# Patient Record
Sex: MALE | Race: BLACK OR AFRICAN AMERICAN | NOT HISPANIC OR LATINO | Employment: FULL TIME | URBAN - METROPOLITAN AREA
[De-identification: names, ages, dates, MRNs, and addresses within clinical notes are randomized per-mention and may not be internally consistent; named-entity substitution may affect disease eponyms.]

---

## 2017-01-13 ENCOUNTER — INITIAL CONSULT (OUTPATIENT)
Dept: HEMATOLOGY/ONCOLOGY | Facility: CLINIC | Age: 47
End: 2017-01-13
Payer: COMMERCIAL

## 2017-01-13 VITALS
WEIGHT: 315 LBS | SYSTOLIC BLOOD PRESSURE: 140 MMHG | DIASTOLIC BLOOD PRESSURE: 82 MMHG | BODY MASS INDEX: 59.08 KG/M2 | TEMPERATURE: 99 F | HEART RATE: 70 BPM | OXYGEN SATURATION: 98 %

## 2017-01-13 DIAGNOSIS — D75.839 THROMBOCYTOSIS: ICD-10-CM

## 2017-01-13 DIAGNOSIS — D72.829 LEUKOCYTOSIS, UNSPECIFIED TYPE: ICD-10-CM

## 2017-01-13 DIAGNOSIS — D64.9 ANEMIA, UNSPECIFIED TYPE: Primary | ICD-10-CM

## 2017-01-13 PROCEDURE — 1159F MED LIST DOCD IN RCRD: CPT | Mod: S$GLB,,, | Performed by: INTERNAL MEDICINE

## 2017-01-13 PROCEDURE — 99999 PR PBB SHADOW E&M-EST. PATIENT-LVL III: CPT | Mod: PBBFAC,,, | Performed by: INTERNAL MEDICINE

## 2017-01-13 PROCEDURE — 3077F SYST BP >= 140 MM HG: CPT | Mod: S$GLB,,, | Performed by: INTERNAL MEDICINE

## 2017-01-13 PROCEDURE — 99214 OFFICE O/P EST MOD 30 MIN: CPT | Mod: S$GLB,,, | Performed by: INTERNAL MEDICINE

## 2017-01-13 PROCEDURE — 3079F DIAST BP 80-89 MM HG: CPT | Mod: S$GLB,,, | Performed by: INTERNAL MEDICINE

## 2017-01-13 NOTE — MR AVS SNAPSHOT
[facility]  [address] [place] [id number]  Phone: [phone]                  Roberta Ware Jr.   2017 1:00 PM   Initial consult    Description:  Male : 1970   Provider:  Michelle Palmer MD   Department:  [facility]           Reason for Visit     Consult           Diagnoses this Visit        Comments    Anemia, unspecified type    -  Primary     Leukocytosis, unspecified type         Thrombocytosis                To Do List           Future Appointments        Provider Department Dept Phone    2017 8:30 AM LAB, [place] [facility] [phone]    2017 3:30 PM Michelle Palmer MD [facility] [phone]      Goals (5 Years of Data)              Today    16    Blood Pressure < 140/90   140/82  162/78  155/77    Exercise 3x per week (30 min per time)             Follow-Up and Disposition     Return in about 2 years (around 2019).      [facility]     [facility] Line - 24/7 Assistance  Registered nurses in the [facility] provide clinical advisement, health education, appointment booking, and other advisory services.  Call for this free service at [phone].             Medications           Message regarding Medications     Verify the changes and/or additions to your medication regime listed below are the same as discussed with your clinician today.  If any of these changes or additions are incorrect, please notify your healthcare provider.        STOP taking these medications     sildenafil (VIAGRA) 100 MG tablet Take 1 tablet (100 mg total) by mouth daily as needed for Erectile Dysfunction.           Verify that the below list of medications is an accurate representation of the medications you are currently taking.  If none reported, the list may be blank. If incorrect, please contact your healthcare provider. Carry this list with you  in case of emergency.           Current Medications     aliskiren (TEKTURNA) 300 MG Tab Take 1 tablet (300 mg total) by mouth once daily.    amlodipine (NORVASC) 10 MG tablet Take 1 tablet (10 mg total) by mouth once daily.    BYSTOLIC 10 mg Tab TAKE 2 TABLETS BY MOUTH EVERY DAY    dicyclomine (BENTYL) 20 mg tablet Take 1 tablet (20 mg total) by mouth 2 (two) times daily.    famotidine (PEPCID) 20 MG tablet Take 1 tablet (20 mg total) by mouth 2 (two) times daily.    irbesartan (AVAPRO) 300 MG tablet Take 1 tablet (300 mg total) by mouth once daily.    omeprazole (PRILOSEC) 40 MG capsule Take 1 capsule (40 mg total) by mouth every morning.    tramadol (ULTRAM) 50 mg tablet Take 1 tablet (50 mg total) by mouth every 6 (six) hours as needed.    cetirizine (ZYRTEC) 10 MG tablet Take 1 tablet (10 mg total) by mouth daily as needed for Rhinitis.           Clinical Reference Information           Vital Signs - Last Recorded  Most recent update: 1/13/2017  1:27 PM by Trisha Lopez LPN    BP Pulse Temp Wt SpO2 BMI    (!) 140/82 (BP Location: Left arm, Patient Position: Sitting, BP Method: Manual) 70 99.2 °F (37.3 °C) (Oral) (!) 211 kg (465 lb 2.7 oz) 98% 59.08 kg/m2      Blood Pressure          Most Recent Value    BP  (!)  140/82      Allergies as of 1/13/2017     Hydrochlorothiazide      Immunizations Administered on Date of Encounter - 1/13/2017     None      Orders Placed During Today's Visit      Normal Orders This Visit    Soluble Transferrin Receptor     Future Labs/Procedures Expected by Expires    C-reactive protein  1/13/2017 3/14/2018    CBC auto differential  1/13/2017 1/13/2018    Copper, serum  1/13/2017 3/14/2018    Ferritin  1/13/2017 1/13/2018    Folate RBC  1/13/2017 3/14/2018    Haptoglobin  1/13/2017 1/13/2018    Iron and TIBC  1/13/2017 1/13/2018    Jak2 V617F Mutation Detection, Blood  1/13/2017 1/13/2018    Lactate dehydrogenase  1/13/2017 3/14/2018    Pathologist Interpretation Differential  1/13/2017  3/14/2018    Protein electrophoresis, serum  1/13/2017 1/13/2018    Reticulocytes  1/13/2017 1/13/2018    Sedimentation rate, manual  1/13/2017 1/13/2018    Vitamin B12  1/13/2017 1/13/2018    Zinc  1/13/2017 3/14/2018

## 2017-01-13 NOTE — LETTER
January 16, 2017      ANTON Chau MD  [address]  [place] [address]           [facility]  [address] [place] [address]  Phone: [phone]          Patient: Roberta Ware Jr.   MR Number: [id number]   YOB: 1970   Date of Visit: 1/13/2017       Dear Dr. ANTON Chau:    Thank you for referring Roberta Ware to me for evaluation. Attached you will find relevant portions of my assessment and plan of care.    If you have questions, please do not hesitate to call me. I look forward to following Roberta Ware along with you.    Sincerely,    Michelle Palmer MD    Enclosure  CC:  No Recipients    If you would like to receive this communication electronically, please contact [email or web address] or [phone] to request more information on [employer] access.    For providers and/or their staff who would like to refer a patient to [facility], please contact us through our one-stop-shop provider referral line, Lana Richard, at [phone].    If you feel you have received this communication in error or would no longer like to receive these types of communications, please e-mail [email or web address]

## 2017-01-13 NOTE — PROGRESS NOTES
Subjective:       Patient ID: Roberta Ware Jr. is a 46 y.o. male.    Chief Complaint: Consult (Dr Chau - blood loss)    HPI   HISTORY OF PRESENT ILLNESS:  The patient is a pleasant 46-year-old gentleman seen today in consultation for anemia.  The patient was recently hospitalized with acute blood loss anemia.  The patient reported 1-1/2 week history of dark stools, lightheadedness, dizziness, fatigue and dyspnea on exertion.  Hemoglobin 6g/dl on admission. He underwent a total of 4 units of packed red blood cell transfusion during hospitalization.The patient was followed by GI during hospitalization.  EGD  performed and showed ulcerations without any evidence of bleeding.  The patient was followed by GI during hospitalization.  He takes indomethacin for gout approximately one time per week.  He also had been taking ibuprofen for headaches.  The patient reports an intermittent epigastric abdominal pain over the past year.  The patient was begun on a PPI since hospital discharge.  He has had no further episodes of   melena.  Review of records reveals that the patient was previously followed by colleague, Dr. Paul.  The patient's aunt's the patient was followed by Dr. Paul for anemia and leukocytosis.  Dr. Paul prescribed ferrous sulfate supplementation.  The patient did not take prescribed medication.  No history of alcohol abuse, chronic liver disease, clotting disorder, neuropathy or recent surgery.  He does have chronic kidney disease, stage III,HTN, gout and morbid obesity.  He reports a 3-pound weight loss.  He report arthralgias and joint swelling.  The patient has been lost to follow up in this clinic and was last seen on 1/23/2015.  He underwent a colonoscopy approximately four years ago significant for diverticulosis.  He is a lifelong nonsmoker.  He denies any palpable lymphadenopathy.  Workup to date revealed an abnormal flow cytometry, which revealed an atypical T-cell subset.  T-cell  receptor gene rearrangement was positive.  MICHAEL-2 mutation testing was negative. CBC from 12/27/2016 reveals a [place] blood [place] [address], hemoglobin of 8.6 g/dL, hematocrit of 28.5%, MCV of 86 and platelet count of 502,000. He is here for further evaluation.    PAST MEDICAL HISTORY:  Diverticulosis, ED, GERD, gout, essential hypertension,   morbid obesity, osteoarthritis, ulcer.    PAST SURGICAL HISTORY:  Colonoscopy,EGD    FAMILY HISTORY: Mother diagnosed with breast CA. Maternal aunt diagnosed with breast CA. Paternal aunt diagnosed with lung cancer. Paternal aunt diagnosed with breast cancer        Review of Systems   Constitutional: Negative for appetite change, fatigue, fever and unexpected weight change.   HENT: Negative for mouth sores.    Eyes: Negative for visual disturbance.   Respiratory: Negative for cough and shortness of breath.    Cardiovascular: Negative for chest pain.   Gastrointestinal: Negative for abdominal pain, blood in stool and diarrhea.   Genitourinary: Negative for frequency and hematuria.   Musculoskeletal: Negative for back pain.   Skin: Negative for rash.   Neurological: Negative for headaches.   Hematological: Negative for adenopathy.   Psychiatric/Behavioral: The patient is not nervous/anxious.        Objective:       Vitals:    01/13/17 1325   BP: (!) 140/82   BP Location: Left arm   Patient Position: Sitting   BP Method: Manual   Pulse: 70   Temp: 99.2 °F (37.3 °C)   TempSrc: Oral   SpO2: 98%   Weight: (!) 211 kg (465 lb 2.7 oz)       Physical Exam   Constitutional: He is oriented to person, place, and time. He appears well-developed and well-nourished.   HENT:   Head: Normocephalic.   Mouth/Throat: Oropharynx is clear and moist. No oropharyngeal exudate.   Eyes: Conjunctivae are normal. Pupils are equal, round, and reactive to light. No scleral icterus.   Neck: Normal range of motion. Neck supple. No thyromegaly present.   Cardiovascular: Normal rate, regular rhythm and normal  heart sounds.    No murmur heard.  Pulmonary/Chest: Effort normal and breath sounds normal. He has no wheezes. He has no rales.   Abdominal: Soft. Bowel sounds are normal. He exhibits no distension and no mass. There is no hepatosplenomegaly. There is no tenderness. There is no rebound and no guarding.   Musculoskeletal: Normal range of motion. He exhibits no edema.   Lymphadenopathy:     He has no cervical adenopathy.     He has no axillary adenopathy.        Right: No supraclavicular adenopathy present.        Left: No supraclavicular adenopathy present.   Neurological: He is alert and oriented to person, place, and time. No cranial nerve deficit.   Skin: No rash noted. No erythema.   Psychiatric: He has a normal mood and affect.       Labs:   Lab Results   Component Value Date    WBC 17.81 (H) 12/27/2016    WBC 17.81 (H) 12/27/2016    HGB 8.6 (L) 12/27/2016    HGB 8.6 (L) 12/27/2016    HCT 28.5 (L) 12/27/2016    HCT 28.5 (L) 12/27/2016    MCV 86 12/27/2016    MCV 86 12/27/2016     (H) 12/27/2016     (H) 12/27/2016       Assessment:       1. Anemia, unspecified type    2. Leukocytosis, unspecified type    3. Thrombocytosis        Plan:   This patient is a 46-year-old gentleman with a longstanding history a mild   anemia, lost to follow up, with a recent hospitalization secondary to   acute-on-chronic anemia related to acute blood loss.  The patient's   hematological workup to date revealed abnormal flow cytometry with an atypical   T-cell subset of unknown significance.  Molecular studies including JAK2   mutation testing negative.  Today, cell lines are also noted for a mild chronic   leukocytosis and a mild thrombocytosis.  Plan testing including B12, zinc, retic   count, SPEP, LDH, iron and TIBC, folate RBCs, ferritin, C-reactive protein,   serum copper, CBC and iron studies. Await the results of workup.  Last   followup discussed the issue of bone marrow biopsy.  The patient did not proceed  with  planned evaluation.  We will await the results of workup and we will   determine further management at that time.        CC: ANTON Chau M.D.

## 2017-01-16 ENCOUNTER — LAB VISIT (OUTPATIENT)
Dept: LAB | Facility: HOSPITAL | Age: 47
End: 2017-01-16
Attending: INTERNAL MEDICINE
Payer: COMMERCIAL

## 2017-01-16 DIAGNOSIS — D64.9 ANEMIA, UNSPECIFIED TYPE: ICD-10-CM

## 2017-01-16 LAB
BASOPHILS # BLD AUTO: 0.04 K/UL
BASOPHILS NFR BLD: 0.3 %
CRP SERPL-MCNC: 9.4 MG/L
DIFFERENTIAL METHOD: ABNORMAL
EOSINOPHIL # BLD AUTO: 0.3 K/UL
EOSINOPHIL NFR BLD: 2.2 %
ERYTHROCYTE [DISTWIDTH] IN BLOOD BY AUTOMATED COUNT: 18.6 %
ERYTHROCYTE [SEDIMENTATION RATE] IN BLOOD BY WESTERGREN METHOD: 48 MM/HR
FERRITIN SERPL-MCNC: 24 NG/ML
HAPTOGLOB SERPL-MCNC: 195 MG/DL
HCT VFR BLD AUTO: 32 %
HGB BLD-MCNC: 9.4 G/DL
IRON SERPL-MCNC: 24 UG/DL
LDH SERPL L TO P-CCNC: 193 U/L
LYMPHOCYTES # BLD AUTO: 4.4 K/UL
LYMPHOCYTES NFR BLD: 31.7 %
MCH RBC QN AUTO: 24.2 PG
MCHC RBC AUTO-ENTMCNC: 29.4 %
MCV RBC AUTO: 83 FL
MONOCYTES # BLD AUTO: 0.8 K/UL
MONOCYTES NFR BLD: 5.8 %
NEUTROPHILS # BLD AUTO: 8.3 K/UL
NEUTROPHILS NFR BLD: 59.6 %
PLATELET # BLD AUTO: 551 K/UL
PMV BLD AUTO: 8.9 FL
RBC # BLD AUTO: 3.88 M/UL
RETICS/RBC NFR AUTO: 2.2 %
SATURATED IRON: 7 %
TOTAL IRON BINDING CAPACITY: 336 UG/DL
TRANSFERRIN SERPL-MCNC: 227 MG/DL
VIT B12 SERPL-MCNC: 271 PG/ML
WBC # BLD AUTO: 13.93 K/UL

## 2017-01-16 PROCEDURE — 82728 ASSAY OF FERRITIN: CPT

## 2017-01-16 PROCEDURE — 85060 BLOOD SMEAR INTERPRETATION: CPT | Mod: ,,, | Performed by: PATHOLOGY

## 2017-01-16 PROCEDURE — 82525 ASSAY OF COPPER: CPT

## 2017-01-16 PROCEDURE — 82747 ASSAY OF FOLIC ACID RBC: CPT

## 2017-01-16 PROCEDURE — 85045 AUTOMATED RETICULOCYTE COUNT: CPT

## 2017-01-16 PROCEDURE — 82607 VITAMIN B-12: CPT

## 2017-01-16 PROCEDURE — 83615 LACTATE (LD) (LDH) ENZYME: CPT

## 2017-01-16 PROCEDURE — 84165 PROTEIN E-PHORESIS SERUM: CPT | Mod: 26,,, | Performed by: PATHOLOGY

## 2017-01-16 PROCEDURE — 84165 PROTEIN E-PHORESIS SERUM: CPT

## 2017-01-16 PROCEDURE — 84630 ASSAY OF ZINC: CPT

## 2017-01-16 PROCEDURE — 83540 ASSAY OF IRON: CPT

## 2017-01-16 PROCEDURE — 85025 COMPLETE CBC W/AUTO DIFF WBC: CPT

## 2017-01-16 PROCEDURE — 86140 C-REACTIVE PROTEIN: CPT

## 2017-01-16 PROCEDURE — 85651 RBC SED RATE NONAUTOMATED: CPT

## 2017-01-16 PROCEDURE — 83010 ASSAY OF HAPTOGLOBIN QUANT: CPT

## 2017-01-17 LAB
ALBUMIN SERPL ELPH-MCNC: 3.62 G/DL
ALPHA1 GLOB SERPL ELPH-MCNC: 0.39 G/DL
ALPHA2 GLOB SERPL ELPH-MCNC: 0.71 G/DL
B-GLOBULIN SERPL ELPH-MCNC: 0.93 G/DL
GAMMA GLOB SERPL ELPH-MCNC: 1.85 G/DL
PATH REV BLD -IMP: NORMAL
PATH REV BLD -IMP: NORMAL
PATHOLOGIST INTERPRETATION SPE: NORMAL
PROT SERPL-MCNC: 7.5 G/DL

## 2017-01-18 LAB
FOLATE RBC-MCNC: 587 NG/ML
ZINC SERPL-MCNC: 74 UG/DL (ref 60–130)

## 2017-01-19 LAB — COPPER SERPL-MCNC: 1489 UG/L (ref 665–1480)

## 2017-01-30 ENCOUNTER — OFFICE VISIT (OUTPATIENT)
Dept: HEMATOLOGY/ONCOLOGY | Facility: CLINIC | Age: 47
End: 2017-01-30
Payer: COMMERCIAL

## 2017-01-30 VITALS
OXYGEN SATURATION: 98 % | TEMPERATURE: 99 F | WEIGHT: 315 LBS | BODY MASS INDEX: 59.92 KG/M2 | HEART RATE: 62 BPM | DIASTOLIC BLOOD PRESSURE: 88 MMHG | SYSTOLIC BLOOD PRESSURE: 142 MMHG

## 2017-01-30 DIAGNOSIS — D64.9 ANEMIA, UNSPECIFIED TYPE: Primary | ICD-10-CM

## 2017-01-30 DIAGNOSIS — D72.829 LEUKOCYTOSIS, UNSPECIFIED TYPE: ICD-10-CM

## 2017-01-30 PROCEDURE — 99213 OFFICE O/P EST LOW 20 MIN: CPT | Mod: S$GLB,,, | Performed by: INTERNAL MEDICINE

## 2017-01-30 PROCEDURE — 99999 PR PBB SHADOW E&M-EST. PATIENT-LVL III: CPT | Mod: PBBFAC,,, | Performed by: INTERNAL MEDICINE

## 2017-01-30 PROCEDURE — 3079F DIAST BP 80-89 MM HG: CPT | Mod: S$GLB,,, | Performed by: INTERNAL MEDICINE

## 2017-01-30 PROCEDURE — 3077F SYST BP >= 140 MM HG: CPT | Mod: S$GLB,,, | Performed by: INTERNAL MEDICINE

## 2017-01-30 NOTE — PROGRESS NOTES
Subjective:       Patient ID: Roberta Ware Jr. is a 46 y.o. male.    Chief Complaint: Follow-up    HPI   HISTORY OF PRESENT ILLNESS:  The patient is a pleasant 46-year-old gentleman seen today in consultation for anemia.  The patient was recently hospitalized with acute blood loss anemia.  The patient reported 1-1/2 week history of dark stools, lightheadedness, dizziness, fatigue and dyspnea on exertion.  Hemoglobin 6g/dl on admission. He underwent a total of 4 units of packed red blood cell transfusion during hospitalization.The patient was followed by GI during hospitalization.  EGD  performed and showed ulcerations without any evidence of bleeding.  The patient was followed by GI during hospitalization.  He takes indomethacin for gout approximately one time per week.  He also had been taking ibuprofen for headaches.  The patient reports an intermittent epigastric abdominal pain over the past year.  The patient was begun on a PPI since hospital discharge.  He has had no further episodes of melena.  Review of records reveals that the patient was previously followed by colleague, Dr. Paul.    Dr. Paul prescribed ferrous sulfate supplementation.  The patient did not take prescribed medication.  No history of alcohol abuse, chronic liver disease, clotting disorder, neuropathy or recent surgery.  He does have chronic kidney disease, stage III,HTN, gout and morbid obesity.  He reports a 3-pound weight loss.  He report arthralgias and joint swelling.  The patient has been lost to follow up in this clinic and was last seen on 1/23/2015.  He underwent a colonoscopy approximately four years ago significant for diverticulosis.  He is a lifelong nonsmoker.  He denies any palpable lymphadenopathy.  Workup to date revealed an abnormal flow cytometry, which revealed an atypical T-cell subset.  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  MICHAEL-2 mutation testing was negative. CBC from 12/27/2016 reveals a white  blood [place] of [address], hemoglobin of 8.6 g/dL, hematocrit of 28.5%, MCV of 86 and platelet count of 502,000.      Today, he is doing well.  No SOB/CP/lightheadedness  No recent infections  No rashes    PAST MEDICAL HISTORY:  Diverticulosis, ED, GERD, gout, essential hypertension,   morbid obesity, osteoarthritis, ulcer.    PAST SURGICAL HISTORY:  Colonoscopy,EGD    FAMILY HISTORY: Mother diagnosed with breast CA. Maternal aunt diagnosed with breast CA. Paternal aunt diagnosed with lung cancer. Paternal aunt diagnosed with breast cancer        Review of Systems   Constitutional: Negative for appetite change, fatigue, fever and unexpected weight change.   HENT: Negative for mouth sores.    Eyes: Negative for visual disturbance.   Respiratory: Negative for cough and shortness of breath.    Cardiovascular: Negative for chest pain.   Gastrointestinal: Negative for abdominal pain, blood in stool and diarrhea.   Genitourinary: Negative for frequency and hematuria.   Musculoskeletal: Negative for back pain.   Skin: Negative for rash.   Neurological: Negative for headaches.   Hematological: Negative for adenopathy.   Psychiatric/Behavioral: The patient is not nervous/anxious.        Objective:       Vitals:    01/30/17 1526   BP: (!) 142/88   BP Location: Right arm   Patient Position: Sitting   BP Method: Manual   Pulse: 62   Temp: 99 °F (37.2 °C)   TempSrc: Oral   SpO2: 98%   Weight: (!) 214 kg (471 lb 12.6 oz)       Physical Exam   Constitutional: He is oriented to person, place, and time. He appears well-developed and well-nourished.   HENT:   Head: Normocephalic.   Mouth/Throat: Oropharynx is clear and moist. No oropharyngeal exudate.   Eyes: Conjunctivae are normal. Pupils are equal, round, and reactive to light. No scleral icterus.   Neck: Normal range of motion. Neck supple. No thyromegaly present.   Cardiovascular: Normal rate, regular rhythm and normal heart sounds.    No murmur heard.  Pulmonary/Chest: Effort  normal and breath sounds normal. He has no wheezes. He has no rales.   Abdominal: Soft. Bowel sounds are normal. He exhibits no distension and no mass. There is no hepatosplenomegaly. There is no tenderness. There is no rebound and no guarding.   Musculoskeletal: Normal range of motion. He exhibits no edema.   Lymphadenopathy:     He has no cervical adenopathy.     He has no axillary adenopathy.        Right: No supraclavicular adenopathy present.        Left: No supraclavicular adenopathy present.   Neurological: He is alert and oriented to person, place, and time. No cranial nerve deficit.   Skin: No rash noted. No erythema.   Psychiatric: He has a normal mood and affect.       Labs:   Lab Results   Component Value Date    WBC 13.93 (H) 01/16/2017    HGB 9.4 (L) 01/16/2017    HCT 32.0 (L) 01/16/2017    MCV 83 01/16/2017     (H) 01/16/2017   Results for TY CATES JR. (MRN [id number]) as of 1/30/2017 15:37   Ref. Range 1/16/2017 11:40   Iron Latest Ref Range: 45 - 160 ug/dL 24 (L)   TIBC Latest Ref Range: 250 - 450 ug/dL 336   Saturated Iron Latest Ref Range: 20 - 50 % 7 (L)   Transferrin Latest Ref Range: 200 - 375 mg/dL 227   Ferritin Latest Ref Range: 20.0 - 300.0 ng/mL 24   Vitamin B-12 Latest Ref Range: 210 - 950 pg/mL 271   Haptoglobin Latest Ref Range: 30 - 250 mg/dL 195   Retic Latest Ref Range: 0.4 - 2.0 % 2.2 (H)   Sed Rate Latest Ref Range: 0 - 10 mm/Hr 48 (H)     SPEP-  Normal total protein.   Increased gamma globulin, polyclonal.     Pathologist interpretation of peripheral blood smear:   Mild leukocytosis shows a mild absolute neutrophilia with some toxic   changes including cytoplasmic vacuoles.  The overall process appears   reactive.  Clinical correlation is required.   Additionally, there are scattered atypical lymphocytes, correlate   clinically in this patient with a history of a positive T-cell   receptor gene rearrangement by PCR.   Normocytic red cells appear mildly hypochromic.   Correlation with the   patient's iron status is recommended.   Increased platelets are noted.  This can be seen with iron   deficiency.  If the patient has not clinically iron deficient,     further evaluation may be indicated.     Assessment:       1. Anemia, unspecified type    2. Leukocytosis, unspecified type        Plan:   Pt clinically stable  46-year-old gentleman with a longstanding history a mild   anemia, lost to follow up, with a recent hospitalization secondary to   acute-on-chronic anemia related to acute blood loss.   SPEP-polyclonal   PBS reviewed  He will begin OTC Fe supp qd with Vit C supp to improve absorption  Previous hematological workup to date revealed abnormal flow cytometry with an atypical   T-cell subset of unknown significance.    JAK2 mutation testing negative.    Discussed issue of bmbx for further evaluation  Pt declines/not interested in pursuing bx    F/u 1 mo with cbc, Fe studies    CC: ANTON Chau M.D.

## 2017-01-30 NOTE — MR AVS SNAPSHOT
[facility]  [address] [facility] [address] [place] [address]  Phone: [phone]                  Roberta Ware Jr.   2017 3:30 PM   Office Visit    Description:  Male : 1970   Provider:  Michelle Palmer MD   Department:  [facility]           Reason for Visit     Follow-up           Diagnoses this Visit        Comments    Anemia, unspecified type    -  Primary     Leukocytosis, unspecified type                To Do List           Future Appointments        Provider Department Dept Phone    3/8/2017 3:30 PM LAB, [place] [facility] [phone]    3/10/2017 3:30 PM Michelle Palmer MD [facility] [phone]      Goals (5 Years of Data)              Today    17    Blood Pressure < 140/90   142/88  140/82  162/78    Exercise 3x per week (30 min per time)             Follow-Up and Disposition     Return in about 5 weeks (around 3/6/2017).      [facility]     [facility] Care [facility] - 24/7 Assistance  Registered nurses in the [facility] provide clinical advisement, health education, appointment booking, and other advisory services.  Call for this free service at [phone].             Medications           Message regarding Medications     Verify the changes and/or additions to your medication regime listed below are the same as discussed with your clinician today.  If any of these changes or additions are incorrect, please notify your healthcare provider.        STOP taking these medications     tramadol (ULTRAM) 50 mg tablet Take 1 tablet (50 mg total) by mouth every 6 (six) hours as needed.    famotidine (PEPCID) 20 MG tablet Take 1 tablet (20 mg total) by mouth 2 (two) times daily.           Verify that the below list of medications is an accurate representation of the medications you are currently taking.  If none reported, the list may be blank. If incorrect, please  contact your healthcare provider. Carry this list with you in case of emergency.           Current Medications     aliskiren (TEKTURNA) 300 MG Tab Take 1 tablet (300 mg total) by mouth once daily.    amlodipine (NORVASC) 10 MG tablet Take 1 tablet (10 mg total) by mouth once daily.    BYSTOLIC 10 mg Tab TAKE 2 TABLETS BY MOUTH EVERY DAY    cetirizine (ZYRTEC) 10 MG tablet Take 1 tablet (10 mg total) by mouth daily as needed for Rhinitis.    irbesartan (AVAPRO) 300 MG tablet Take 1 tablet (300 mg total) by mouth once daily.    omeprazole (PRILOSEC) 40 MG capsule Take 1 capsule (40 mg total) by mouth every morning.           Clinical Reference Information           Vital Signs - Last Recorded  Most recent update: 1/30/2017  3:30 PM by Antione Caceres MA    BP Pulse Temp Wt SpO2 BMI    (!) 142/88 (BP Location: Right arm, Patient Position: Sitting, BP Method: Manual) 62 99 °F (37.2 °C) (Oral) (!) 214 kg (471 lb 12.6 oz) 98% 59.92 kg/m2      Blood Pressure          Most Recent Value    BP  (!)  142/88      Allergies as of 1/30/2017     Hydrochlorothiazide      Immunizations Administered on Date of Encounter - 1/30/2017     None      Orders Placed During Today's Visit     Future Labs/Procedures Expected by Expires    CBC auto differential  1/30/2017 1/30/2018    Ferritin  1/30/2017 1/30/2018    Iron and TIBC  1/30/2017 1/30/2018    Methylmalonic acid, serum  1/30/2017 3/31/2018    Reticulocytes  1/30/2017 1/30/2018

## 2017-03-07 ENCOUNTER — OFFICE VISIT (OUTPATIENT)
Dept: FAMILY MEDICINE | Facility: CLINIC | Age: 47
End: 2017-03-07
Payer: COMMERCIAL

## 2017-03-07 ENCOUNTER — LAB VISIT (OUTPATIENT)
Dept: LAB | Facility: HOSPITAL | Age: 47
End: 2017-03-07
Attending: INTERNAL MEDICINE
Payer: COMMERCIAL

## 2017-03-07 VITALS
BODY MASS INDEX: 40.43 KG/M2 | SYSTOLIC BLOOD PRESSURE: 150 MMHG | HEIGHT: 74 IN | HEART RATE: 79 BPM | DIASTOLIC BLOOD PRESSURE: 80 MMHG | OXYGEN SATURATION: 95 % | WEIGHT: 315 LBS

## 2017-03-07 DIAGNOSIS — N18.30 CHRONIC KIDNEY DISEASE, STAGE III (MODERATE): ICD-10-CM

## 2017-03-07 DIAGNOSIS — D64.9 ANEMIA, UNSPECIFIED TYPE: ICD-10-CM

## 2017-03-07 DIAGNOSIS — I10 ESSENTIAL HYPERTENSION: Chronic | ICD-10-CM

## 2017-03-07 DIAGNOSIS — E66.01 MORBID OBESITY WITH BMI OF 50.0-59.9, ADULT: ICD-10-CM

## 2017-03-07 DIAGNOSIS — M19.90 OSTEOARTHRITIS, UNSPECIFIED OSTEOARTHRITIS TYPE, UNSPECIFIED SITE: Primary | ICD-10-CM

## 2017-03-07 LAB
BASOPHILS # BLD AUTO: 0.04 K/UL
BASOPHILS NFR BLD: 0.3 %
DIFFERENTIAL METHOD: ABNORMAL
EOSINOPHIL # BLD AUTO: 0.3 K/UL
EOSINOPHIL NFR BLD: 2.4 %
ERYTHROCYTE [DISTWIDTH] IN BLOOD BY AUTOMATED COUNT: 17.9 %
FERRITIN SERPL-MCNC: 33 NG/ML
HCT VFR BLD AUTO: 31.5 %
HGB BLD-MCNC: 9.2 G/DL
IRON SERPL-MCNC: 19 UG/DL
LYMPHOCYTES # BLD AUTO: 3.9 K/UL
LYMPHOCYTES NFR BLD: 28.7 %
MCH RBC QN AUTO: 22 PG
MCHC RBC AUTO-ENTMCNC: 29.2 %
MCV RBC AUTO: 75 FL
MONOCYTES # BLD AUTO: 0.9 K/UL
MONOCYTES NFR BLD: 6.5 %
NEUTROPHILS # BLD AUTO: 8.4 K/UL
NEUTROPHILS NFR BLD: 61.8 %
PLATELET # BLD AUTO: 500 K/UL
PMV BLD AUTO: 9 FL
RBC # BLD AUTO: 4.18 M/UL
RETICS/RBC NFR AUTO: 0.8 %
SATURATED IRON: 5 %
TOTAL IRON BINDING CAPACITY: 351 UG/DL
TRANSFERRIN SERPL-MCNC: 237 MG/DL
WBC # BLD AUTO: 13.52 K/UL

## 2017-03-07 PROCEDURE — 99999 PR PBB SHADOW E&M-EST. PATIENT-LVL III: CPT | Mod: PBBFAC,,, | Performed by: FAMILY MEDICINE

## 2017-03-07 PROCEDURE — 99214 OFFICE O/P EST MOD 30 MIN: CPT | Mod: S$GLB,,, | Performed by: FAMILY MEDICINE

## 2017-03-07 PROCEDURE — 1160F RVW MEDS BY RX/DR IN RCRD: CPT | Mod: S$GLB,,, | Performed by: FAMILY MEDICINE

## 2017-03-07 PROCEDURE — 83540 ASSAY OF IRON: CPT

## 2017-03-07 PROCEDURE — 82728 ASSAY OF FERRITIN: CPT

## 2017-03-07 PROCEDURE — 85025 COMPLETE CBC W/AUTO DIFF WBC: CPT

## 2017-03-07 PROCEDURE — 3077F SYST BP >= 140 MM HG: CPT | Mod: S$GLB,,, | Performed by: FAMILY MEDICINE

## 2017-03-07 PROCEDURE — 85045 AUTOMATED RETICULOCYTE COUNT: CPT

## 2017-03-07 PROCEDURE — 3079F DIAST BP 80-89 MM HG: CPT | Mod: S$GLB,,, | Performed by: FAMILY MEDICINE

## 2017-03-07 PROCEDURE — 36415 COLL VENOUS BLD VENIPUNCTURE: CPT | Mod: PO

## 2017-03-07 PROCEDURE — 83921 ORGANIC ACID SINGLE QUANT: CPT

## 2017-03-07 RX ORDER — ACETAMINOPHEN AND CODEINE PHOSPHATE 300; 30 MG/1; MG/1
1 TABLET ORAL DAILY PRN
Qty: 30 TABLET | Refills: 0 | Status: SHIPPED | OUTPATIENT
Start: 2017-03-07 | End: 2017-04-05 | Stop reason: SDUPTHER

## 2017-03-07 NOTE — PROGRESS NOTES
[facility]  Progress Note    SUBJECTIVE:     Chief Complaint   Patient presents with    Establish [facility]       HPI   Roberta Ware Jr.  is a 46 y.o. male here to establish care and for c/o ankle/knee pain more on left than right. He knows he is morbidly obese and is trying to lose weight. He has cut out all sugary drinks, just water only. His work shifts are not set so may have to work some nights which throws off his eating habits.     Review of patient's allergies indicates:   Allergen Reactions    Hydrochlorothiazide Other (See Comments)     Gout       Past Medical History:   Diagnosis Date    Anemia     Diverticulosis     Erectile dysfunction     GERD (gastroesophageal reflux disease)     Gout     Hypertension     Morbid obesity     Osteoarthritis     Ulcer      Past Surgical History:   Procedure Laterality Date    COLONOSCOPY  2012    UPPER GASTROINTESTINAL ENDOSCOPY       Family History   Problem Relation Age of Onset    Heart disease Mother     Cancer Mother      Breast    Diabetes Father     Hypertension Father     Ulcers Father     Anemia Sister      Social History   Substance Use Topics    Smoking status: Never Smoker    Smokeless tobacco: None    Alcohol use Yes      Comment: Ocassionally        Review of Systems   Constitutional: Negative for chills, fever and malaise/fatigue.   HENT: Negative.    Respiratory: Negative.  Negative for cough and shortness of breath.    Cardiovascular: Negative.  Negative for chest pain.   Gastrointestinal: Negative.  Negative for abdominal pain, nausea and vomiting.   Genitourinary: Negative.    Musculoskeletal: Positive for joint pain (knee, ankle b/l).   Neurological: Negative for weakness and headaches.   All other systems reviewed and are negative.    OBJECTIVE:     Vitals:    03/07/17 1441   BP: (!) 150/80   Pulse: 79     Body mass index is 59.31 kg/(m^2).    Physical Exam   Constitutional: He is oriented to person, place, and time  and well-developed, well-nourished, and in no distress. No distress.   HENT:   Head: Normocephalic and atraumatic.   Eyes: Conjunctivae and EOM are normal.   Cardiovascular: Normal rate and regular rhythm.  Exam reveals no gallop and no friction rub.    Murmur (systolic) heard.  Pulmonary/Chest: Effort normal and breath sounds normal. No respiratory distress. He has no wheezes. He has no rales.   Abdominal: Soft. Bowel sounds are normal. He exhibits no distension. There is no tenderness.   Musculoskeletal: He exhibits no edema.   Neurological: He is alert and oriented to person, place, and time.   Skin: Skin is warm. He is not diaphoretic.       Old records were reviewed. Labs and/or images were independently reviewed.    ASSESSMENT     1. Osteoarthritis, unspecified osteoarthritis type, unspecified site    2. Essential hypertension    3. Morbid obesity with BMI of 50.0-59.9, adult    4. Chronic kidney disease, stage III (moderate)        PLAN:     Osteoarthritis, unspecified osteoarthritis type, unspecified site  -     acetaminophen-codeine 300-30mg (TYLENOL #3) 300-30 mg Tab; Take 1 tablet by mouth daily as needed (as needed for pain).  Dispense: 30 tablet; Refill: 0  -     Advised to not take any NSAIDs, no ibuprofen, aleve.    Essential hypertension   -     Educated patient to take medications as prescribed, and to record bp logs daily to bring along to next visit. Instructed patient to decrease salt intake. Also told patient to call if any new signs or symptoms develop.   -       Morbid obesity with BMI of 50.0-59.9, adult   -     Counseled patient about healthy diet, exercise habits, and to increase physical activity.   -     He will not eat anything past 7 PM, and drink water only for starters. Will owe 2 lbs in next office visit.     Chronic kidney disease, stage III (moderate)   - Monitor closely, consider nephrology eval on next visit.    RTC  SHAYNE Ambrosio MD  03/07/2017 3:13 PM

## 2017-03-10 ENCOUNTER — OFFICE VISIT (OUTPATIENT)
Dept: HEMATOLOGY/ONCOLOGY | Facility: CLINIC | Age: 47
End: 2017-03-10
Payer: COMMERCIAL

## 2017-03-10 VITALS
TEMPERATURE: 99 F | OXYGEN SATURATION: 98 % | WEIGHT: 315 LBS | DIASTOLIC BLOOD PRESSURE: 80 MMHG | BODY MASS INDEX: 59.36 KG/M2 | HEART RATE: 69 BPM | SYSTOLIC BLOOD PRESSURE: 132 MMHG

## 2017-03-10 DIAGNOSIS — D72.829 LEUKOCYTOSIS, UNSPECIFIED TYPE: Primary | ICD-10-CM

## 2017-03-10 DIAGNOSIS — D50.9 IRON DEFICIENCY ANEMIA, UNSPECIFIED IRON DEFICIENCY ANEMIA TYPE: ICD-10-CM

## 2017-03-10 DIAGNOSIS — D75.839 THROMBOCYTOSIS: ICD-10-CM

## 2017-03-10 PROCEDURE — 3075F SYST BP GE 130 - 139MM HG: CPT | Mod: S$GLB,,, | Performed by: INTERNAL MEDICINE

## 2017-03-10 PROCEDURE — 99999 PR PBB SHADOW E&M-EST. PATIENT-LVL III: CPT | Mod: PBBFAC,,, | Performed by: INTERNAL MEDICINE

## 2017-03-10 PROCEDURE — 3079F DIAST BP 80-89 MM HG: CPT | Mod: S$GLB,,, | Performed by: INTERNAL MEDICINE

## 2017-03-10 PROCEDURE — 99213 OFFICE O/P EST LOW 20 MIN: CPT | Mod: S$GLB,,, | Performed by: INTERNAL MEDICINE

## 2017-03-10 PROCEDURE — 1160F RVW MEDS BY RX/DR IN RCRD: CPT | Mod: S$GLB,,, | Performed by: INTERNAL MEDICINE

## 2017-03-10 NOTE — MR AVS SNAPSHOT
[facility]  [address] [place] [address]  Phone: [phone]                  Roberta Ware Jr.   3/10/2017 3:30 PM   Office Visit    Description:  Male : 1970   Provider:  Michelle Palmer MD   Department:  [facility]           Reason for Visit     Follow-up           Diagnoses this Visit        Comments    Leukocytosis, unspecified type    -  Primary     Iron deficiency anemia, unspecified iron deficiency anemia type         Thrombocytosis                To Do List           Future Appointments        Provider Department Dept Phone    2017 3:30 PM Michelle Palmer MD [facility] [phone]      Goals (5 Years of Data)              Today    3/7/17    1/30/17    Blood Pressure < 140/90   132/80  132/80  132/80    Exercise 3x per week (30 min per time)             Follow-Up and Disposition     Return in about 2 months (around 5/10/2017).      [facility]     [facility] Nurse Care [facility] -  Assistance  Registered nurses in the [facility] provide clinical advisement, health education, appointment booking, and other advisory services.  Call for this free service at [phone].             Medications           Message regarding Medications     Verify the changes and/or additions to your medication regime listed below are the same as discussed with your clinician today.  If any of these changes or additions are incorrect, please notify your healthcare provider.             Verify that the below list of medications is an accurate representation of the medications you are currently taking.  If none reported, the list may be blank. If incorrect, please contact your healthcare provider. Carry this list with you in case of emergency.           Current Medications     acetaminophen-codeine 300-30mg (TYLENOL #3) 300-30 mg Tab Take 1 tablet by mouth daily as needed (as needed for pain).    aliskiren  (TEKTURNA) 300 MG Tab Take 1 tablet (300 mg total) by mouth once daily.    amlodipine (NORVASC) 10 MG tablet Take 1 tablet (10 mg total) by mouth once daily.    BYSTOLIC 10 mg Tab TAKE 2 TABLETS BY MOUTH EVERY DAY    cetirizine (ZYRTEC) 10 MG tablet Take 1 tablet (10 mg total) by mouth daily as needed for Rhinitis.    irbesartan (AVAPRO) 300 MG tablet Take 1 tablet (300 mg total) by mouth once daily.    omeprazole (PRILOSEC) 40 MG capsule Take 1 capsule (40 mg total) by mouth every morning.           Clinical Reference Information           Your Vitals Were     BP Pulse Temp Weight SpO2 BMI    132/80 (BP Location: Left arm, Patient Position: Sitting, BP Method: Manual) 69 98.5 °F (36.9 °C) (Oral) 212 kg (467 lb 6 oz) 98% 59.36 kg/m2      Blood Pressure          Most Recent Value    BP  132/80      Allergies as of 3/10/2017     Hydrochlorothiazide      Immunizations Administered on Date of Encounter - 3/10/2017     None      Language Assistance Services     ATTENTION: Language assistance services are available, free of charge. Please call [phone].      ATENCIÓN: Si habla español, tiene a tompkins disposición servicios gratuitos de asistencia lingüística. Llame al [phone].     [facility] Ý: N?u b?n nói Ti?ng Vi?t, có các d?ch v? h? tr? ngôn ng? mi?n phí dành cho b?n. G?i s? [phone].         [facility] complies with applicable Federal civil rights laws and does not discriminate on the basis of race, color, national origin, age, disability, or sex.

## 2017-03-11 LAB — METHYLMALONATE SERPL-SCNC: 0.34 UMOL/L

## 2017-03-13 ENCOUNTER — TELEPHONE (OUTPATIENT)
Dept: HEMATOLOGY/ONCOLOGY | Facility: CLINIC | Age: 47
End: 2017-03-13

## 2017-03-13 DIAGNOSIS — D50.9 IRON DEFICIENCY ANEMIA, UNSPECIFIED: Primary | ICD-10-CM

## 2017-03-20 DIAGNOSIS — I10 ESSENTIAL HYPERTENSION: ICD-10-CM

## 2017-03-20 RX ORDER — ALISKIREN 300 MG/1
300 TABLET, FILM COATED ORAL DAILY
Qty: 30 TABLET | Refills: 4 | OUTPATIENT
Start: 2017-03-20

## 2017-03-20 RX ORDER — IRBESARTAN 300 MG/1
300 TABLET ORAL DAILY
Qty: 30 TABLET | Refills: 4 | Status: SHIPPED | OUTPATIENT
Start: 2017-03-20 | End: 2017-07-05

## 2017-03-20 RX ORDER — NEBIVOLOL 10 MG/1
20 TABLET ORAL DAILY
Qty: 60 TABLET | Refills: 4 | Status: SHIPPED | OUTPATIENT
Start: 2017-03-20 | End: 2017-06-26 | Stop reason: SDUPTHER

## 2017-03-20 RX ORDER — AMLODIPINE BESYLATE 10 MG/1
10 TABLET ORAL DAILY
Qty: 30 TABLET | Refills: 4 | Status: SHIPPED | OUTPATIENT
Start: 2017-03-20 | End: 2017-10-11 | Stop reason: SDUPTHER

## 2017-03-20 NOTE — TELEPHONE ENCOUNTER
----- Message from Miriam Barbosa sent at 3/20/2017  8:43 AM CDT -----  Contact: self  Pt requesting refills for BYSTOLIC 10 mg Tab;irbesartan (AVAPRO) 300 MG tablet  amlodipine (NORVASC) 10 MG tablet;  aliskiren (TEKTURNA) 300 MG Tab        Please call pt @ [phone] to confirm        Thanks

## 2017-03-27 ENCOUNTER — TELEPHONE (OUTPATIENT)
Dept: FAMILY MEDICINE | Facility: CLINIC | Age: 47
End: 2017-03-27

## 2017-03-27 RX ORDER — TRAMADOL HYDROCHLORIDE 50 MG/1
50 TABLET ORAL EVERY 12 HOURS PRN
Qty: 30 TABLET | Refills: 0 | Status: SHIPPED | OUTPATIENT
Start: 2017-03-27 | End: 2017-04-05

## 2017-03-27 NOTE — TELEPHONE ENCOUNTER
----- Message from Ginny Herring sent at 3/24/2017  9:10 AM CDT -----  Contact: SELF  Patient is having problems with gout . He can not bend his knee . The Tylenol 3 is not working . Can something else be called in ?     [phone]       LL

## 2017-03-27 NOTE — TELEPHONE ENCOUNTER
Antolin montano/patient, requesting another pain medication. States the Tylenol #3 is not helping the gout pain in his left knee. States he still cannot bend it, please advise.

## 2017-04-05 ENCOUNTER — OFFICE VISIT (OUTPATIENT)
Dept: FAMILY MEDICINE | Facility: CLINIC | Age: 47
End: 2017-04-05
Payer: COMMERCIAL

## 2017-04-05 VITALS
OXYGEN SATURATION: 97 % | HEART RATE: 89 BPM | BODY MASS INDEX: 38.36 KG/M2 | HEIGHT: 76 IN | DIASTOLIC BLOOD PRESSURE: 86 MMHG | SYSTOLIC BLOOD PRESSURE: 150 MMHG | TEMPERATURE: 99 F | WEIGHT: 315 LBS

## 2017-04-05 DIAGNOSIS — M72.2 PLANTAR FASCIITIS: Primary | ICD-10-CM

## 2017-04-05 DIAGNOSIS — M19.90 OSTEOARTHRITIS, UNSPECIFIED OSTEOARTHRITIS TYPE, UNSPECIFIED SITE: ICD-10-CM

## 2017-04-05 PROCEDURE — 3079F DIAST BP 80-89 MM HG: CPT | Mod: S$GLB,,, | Performed by: NURSE PRACTITIONER

## 2017-04-05 PROCEDURE — 1160F RVW MEDS BY RX/DR IN RCRD: CPT | Mod: S$GLB,,, | Performed by: NURSE PRACTITIONER

## 2017-04-05 PROCEDURE — 3077F SYST BP >= 140 MM HG: CPT | Mod: S$GLB,,, | Performed by: NURSE PRACTITIONER

## 2017-04-05 PROCEDURE — 99999 PR PBB SHADOW E&M-EST. PATIENT-LVL III: CPT | Mod: PBBFAC,,, | Performed by: NURSE PRACTITIONER

## 2017-04-05 PROCEDURE — 96372 THER/PROPH/DIAG INJ SC/IM: CPT | Mod: S$GLB,,, | Performed by: NURSE PRACTITIONER

## 2017-04-05 PROCEDURE — 99214 OFFICE O/P EST MOD 30 MIN: CPT | Mod: S$GLB,,, | Performed by: NURSE PRACTITIONER

## 2017-04-05 RX ORDER — ACETAMINOPHEN AND CODEINE PHOSPHATE 300; 30 MG/1; MG/1
1 TABLET ORAL DAILY PRN
Qty: 30 TABLET | Refills: 0 | Status: SHIPPED | OUTPATIENT
Start: 2017-04-05 | End: 2017-04-15

## 2017-04-05 RX ORDER — OMEPRAZOLE 40 MG/1
40 CAPSULE, DELAYED RELEASE ORAL EVERY MORNING
Qty: 30 CAPSULE | Refills: 2 | Status: SHIPPED | OUTPATIENT
Start: 2017-04-05 | End: 2017-05-10

## 2017-04-05 RX ORDER — DEXAMETHASONE SODIUM PHOSPHATE 4 MG/ML
8 INJECTION, SOLUTION INTRA-ARTICULAR; INTRALESIONAL; INTRAMUSCULAR; INTRAVENOUS; SOFT TISSUE
Status: COMPLETED | OUTPATIENT
Start: 2017-04-05 | End: 2017-04-05

## 2017-04-05 RX ADMIN — DEXAMETHASONE SODIUM PHOSPHATE 8 MG: 4 INJECTION, SOLUTION INTRA-ARTICULAR; INTRALESIONAL; INTRAMUSCULAR; INTRAVENOUS; SOFT TISSUE at 02:04

## 2017-04-05 NOTE — MR AVS SNAPSHOT
[facility]  [address]  [place] [address]  Phone: [phone]  Fax: [phone]                  Roberta Ware Jr.   2017 1:00 PM   Office Visit    Description:  Male : 1970   Provider:  ISAURA Suarez   Department:  [facility]           Reason for Visit     Follow-up     Plantar Fasciitis           Diagnoses this Visit        Comments    Osteoarthritis, unspecified osteoarthritis type, unspecified site                To Do List           Future Appointments        Provider Department Dept Phone    2017 10:00 AM LAB, [place] [facility] [phone]    2017 3:30 PM Michelle Palmer MD [facility] [phone]      Goals (5 Years of Data)              Today    3/10/17    3/7/17    Blood Pressure < 140/90   150/86  150/86  150/86    Exercise 3x per week (30 min per time)             Follow-Up and Disposition     Return if symptoms worsen or fail to improve.       These Medications        Disp Refills Start End    omeprazole (PRILOSEC) 40 MG capsule 30 capsule 2 2017     Take 1 capsule (40 mg total) by mouth every morning. - Oral    Pharmacy: [email or web address] [address] [place] [address] [address] [facility] #: [phone]       acetaminophen-codeine 300-30mg (TYLENOL #3) 300-30 mg Tab 30 tablet 0 2017 4/15/2017    Take 1 tablet by mouth daily as needed (as needed for pain). - Oral    Pharmacy: [facility] [address] [facility] [address] [facility] #: [phone]         [facility]     [facility] Nurse Care Line -  Assistance  Unless otherwise directed by your provider, please contact [facility], our nurse care line that is available for  assistance.     Registered nurses in the [facility] provide: appointment scheduling, clinical advisement, health education, and other advisory services.  Call:  "[phone] (toll free)               Medications           Message regarding Medications     Verify the changes and/or additions to your medication regime listed below are the same as discussed with your clinician today.  If any of these changes or additions are incorrect, please notify your healthcare provider.        These medications were administered today        Dose Freq    dexamethasone injection 8 mg 8 mg Clinic/HOD 1 time    Sig: Inject 2 mLs (8 mg total) into the muscle one time.    Class: Normal    Route: Intramuscular      STOP taking these medications     tramadol (ULTRAM) 50 mg tablet Take 1 tablet (50 mg total) by mouth every 12 (twelve) hours as needed for Pain.           Verify that the below list of medications is an accurate representation of the medications you are currently taking.  If none reported, the list may be blank. If incorrect, please contact your healthcare provider. Carry this list with you in case of emergency.           Current Medications     amlodipine (NORVASC) 10 MG tablet Take 1 tablet (10 mg total) by mouth once daily.    cetirizine (ZYRTEC) 10 MG tablet Take 1 tablet (10 mg total) by mouth daily as needed for Rhinitis.    irbesartan (AVAPRO) 300 MG tablet Take 1 tablet (300 mg total) by mouth once daily.    nebivolol (BYSTOLIC) 10 MG Tab Take 2 tablets (20 mg total) by mouth once daily.    omeprazole (PRILOSEC) 40 MG capsule Take 1 capsule (40 mg total) by mouth every morning.    acetaminophen-codeine 300-30mg (TYLENOL #3) 300-30 mg Tab Take 1 tablet by mouth daily as needed (as needed for pain).           Clinical Reference Information           Your Vitals Were     BP Pulse Temp Height Weight SpO2    150/86 (BP Location: Left arm, Patient Position: Sitting, BP Method: Manual) 89 98.5 °F (36.9 °C) (Oral) 6' 4" (1.93 m) 196.6 kg (433 lb 6.8 oz) 97%    BMI                52.76 kg/m2          Blood Pressure          Most Recent Value    BP  (!)  150/86      Allergies as of " 4/5/2017     Hydrochlorothiazide      Immunizations Administered on Date of Encounter - 4/5/2017     None      Instructions      Plantar Fasciitis  Plantar fasciitis is a painful swelling of the plantar fascia. The plantar fascia is a thick, fibrous layer of tissue. It covers the bones on the bottom of your foot. And it supports the foot bones in an arched position.  This can happen gradually or suddenly. It usually affects one foot at a time. Heel pain can be sharp, like a knife sticking into the bottom of your foot. You may feel pain after exercising, long-distance jogging, stair climbing, long periods of standing, or after standing up.  Risk factors include: non-active lifestyle, arthritis, diabetes, obesity or recent weight gain, flat foot, high arch. Wearing high heels, loose shoes, or shoes with poor arch support for long periods of time adds to the risk. This problem is commonly found in runners and dancers. It also found in people who stand on hard surfaces for long periods of time.  Foot pain from this condition is usually worse in the morning. But it improves with walking. By the end of the day there may be a dull aching. Treatment requires short-term rest and controlling swelling. It may take up to 9 months before all symptoms go away. Rarely, a steroid injection into the foot, or surgery, may be needed.  Home care  · If you are overweight, lose weight to help healing.  · Choose supportive shoes with good arch support and shock absorbency. Replace athletic shoes when they become worn out. Dont walk or run barefoot.  · Premade or custom-fitted shoe inserts may be helpful. Inserts made of silicone seem to be the most effective. Custom-made inserts can be provided by a podiatrist or foot specialist, physical therapist, or orthopedist.  · Premade or custom-made night splints keep the heel stretched out while you sleep. They may prevent morning pain.  · Avoid activities that stress the feet: jogging,  prolonged standing or walking, contact sports, etc.  · First thing in the morning and before sports, stretch the bottom of your feet. Gently flex your ankle so the toes move toward your knee.  · Icing may help control heel pain. Apply an ice pack to the heel for 10-20 minutes as a preventive. Or ice your heel after a severe flare-up of symptoms. You may repeat this every 1-2 hours as needed.  · You may use over-the-counter pain medicine to control pain, unless another medicine was prescribed. Anti-inflammatory pain medicines, such as ibuprofen or naproxen, may work better than acetaminophen. If you have chronic liver or kidney disease or ever had a stomach ulcer or GI bleeding, talk with your healthcare provider before using these medicines.  Follow-up care  Follow up with your healthcare provider, physical therapist, or podiatrist or foot specialist as advised.  Call for an appointment if pain worsens or there is no relief after a few weeks of home treatment. Shoe inserts, a night splint, or a special boot may be required.  If X-rays were taken, you will be told of any new findings that may affect your care.  When to seek medical advice  Call your healthcare provider right away if any of these occur:  · Foot swelling  · Redness with increasing pain  Date Last Reviewed: 11/21/2015 © 2000-2016 [employer]. [address] [place] [address]. All rights reserved. This information is not intended as a substitute for professional medical care. Always follow your healthcare professional's instructions.             Language Assistance Services     ATTENTION: Language assistance services are available, free of charge. Please call [phone].      ATENCIÓN: Si habla janny, tiene a tompkins disposición servicios gratuitos de asistencia lingüística. Llame al [phone].     [facility] Ý: N?u b?n nói Ti?ng Vi?t, có các d?ch v? h? tr? ngôn ng? mi?n phí dành cho b?n. G?i s? [phone].         [facility] complies with applicable Federal civil rights laws and does not discriminate on the basis of race, color, national origin, age, disability, or sex.

## 2017-04-05 NOTE — PATIENT INSTRUCTIONS
Plantar Fasciitis  Plantar fasciitis is a painful swelling of the plantar fascia. The plantar fascia is a thick, fibrous layer of tissue. It covers the bones on the bottom of your foot. And it supports the foot bones in an arched position.  This can happen gradually or suddenly. It usually affects one foot at a time. Heel pain can be sharp, like a knife sticking into the bottom of your foot. You may feel pain after exercising, long-distance jogging, stair climbing, long periods of standing, or after standing up.  Risk factors include: non-active lifestyle, arthritis, diabetes, obesity or recent weight gain, flat foot, high arch. Wearing high heels, loose shoes, or shoes with poor arch support for long periods of time adds to the risk. This problem is commonly found in runners and dancers. It also found in people who stand on hard surfaces for long periods of time.  Foot pain from this condition is usually worse in the morning. But it improves with walking. By the end of the day there may be a dull aching. Treatment requires short-term rest and controlling swelling. It may take up to 9 months before all symptoms go away. Rarely, a steroid injection into the foot, or surgery, may be needed.  Home care  · If you are overweight, lose weight to help healing.  · Choose supportive shoes with good arch support and shock absorbency. Replace athletic shoes when they become worn out. Dont walk or run barefoot.  · Premade or custom-fitted shoe inserts may be helpful. Inserts made of silicone seem to be the most effective. Custom-made inserts can be provided by a podiatrist or foot specialist, physical therapist, or orthopedist.  · Premade or custom-made night splints keep the heel stretched out while you sleep. They may prevent morning pain.  · Avoid activities that stress the feet: jogging, prolonged standing or walking, contact sports, etc.  · First thing in the morning and before sports, stretch the bottom of your feet.  Gently flex your ankle so the toes move toward your knee.  · Icing may help control heel pain. Apply an ice pack to the heel for 10-20 minutes as a preventive. Or ice your heel after a severe flare-up of symptoms. You may repeat this every 1-2 hours as needed.  · You may use over-the-counter pain medicine to control pain, unless another medicine was prescribed. Anti-inflammatory pain medicines, such as ibuprofen or naproxen, may work better than acetaminophen. If you have chronic liver or kidney disease or ever had a stomach ulcer or GI bleeding, talk with your healthcare provider before using these medicines.  Follow-up care  Follow up with your healthcare provider, physical therapist, or podiatrist or foot specialist as advised.  Call for an appointment if pain worsens or there is no relief after a few weeks of home treatment. Shoe inserts, a night splint, or a special boot may be required.  If X-rays were taken, you will be told of any new findings that may affect your care.  When to seek medical advice  Call your healthcare provider right away if any of these occur:  · Foot swelling  · Redness with increasing pain  Date Last Reviewed: 11/21/2015  © 8775-4950 [employer]. [address] [place] [address], [place] [address]. All rights reserved. This information is not intended as a substitute for professional medical care. Always follow your healthcare professional's instructions.

## 2017-04-05 NOTE — LETTER
April 5, 2017      Roberta Ware   [address] [place] [address]             [facility]  [address] [place] [address]  Phone: [phone]  Fax: [phone] Roberta Ware    Was treated here on 04/05/2017    May Return to work/school on 04/06/2017    No Restrictions        ELIZABETH ReynoldsC

## 2017-04-05 NOTE — LETTER
April 5, 2017      Roberta Ware   [address] [place] [address]             [facility]  [address] [address]  Phone: [phone]  Fax: [phone] Roberta Ware    Was treated here on 04/05/2017. Please excuse for 04/03/2017 and 04/04/2017.  May Return to work/school on 04/06/2017.    No Restrictions            ISAURA Reynolds

## 2017-04-05 NOTE — PROGRESS NOTES
"Subjective:       Patient ID: Roberta Ware Jr. is a 46 y.o. male.    Chief Complaint: Follow-up and Plantar Fasciitis (bilateral foot pain )    Foot Injury    The incident occurred 3 to 5 days ago. There was no injury mechanism. The pain is present in the left foot and right foot. The quality of the pain is described as aching. The pain is at a severity of 8/10. The pain is severe. The pain has been intermittent since onset. Pertinent negatives include no inability to bear weight, numbness or tingling. Exacerbated by: walking. Treatments tried: tramadol.       Past Medical History:   Diagnosis Date    Diverticulosis     Erectile dysfunction     Osteoarthritis        Social History     Social History    Marital status: Legally      Spouse name: N/A    Number of children: N/A    Years of education: N/A     Occupational History    Not on file.     Social History Main Topics    Smoking status: Never Smoker    Smokeless tobacco: Not on file    Alcohol use Yes      Comment: Ocassionally    Drug use: No    Sexual activity: Yes     Partners: Female     Other Topics Concern    Not on file     Social History Narrative       Past Surgical History:   Procedure Laterality Date    COLONOSCOPY  2012    UPPER GASTROINTESTINAL ENDOSCOPY         Review of Systems   Musculoskeletal: Positive for joint swelling.   Neurological: Negative for tingling, weakness and numbness.   All other systems reviewed and are negative.      Objective:   BP (!) 150/86 (BP Location: Left arm, Patient Position: Sitting, BP Method: Manual)  Pulse 89  Temp 98.5 °F (36.9 °C) (Oral)   Ht 6' 4" (1.93 m)  Wt (!) 196.6 kg (433 lb 6.8 oz)  SpO2 97%  BMI 52.76 kg/m2     Physical Exam   Constitutional: He is oriented to person, place, and time. He appears well-developed and well-nourished.   HENT:   Head: Normocephalic and atraumatic.   Cardiovascular: Normal rate and regular rhythm.    Pulses:       Dorsalis pedis pulses are 2+ " "on the right side, and 2+ on the left side.        Pulmonary/Chest: Effort normal and breath sounds normal. No respiratory distress. He has no decreased breath sounds. He has no wheezes. He has no rhonchi. He has no rales.   Musculoskeletal:        Right foot: There is swelling (1 non-pitting edema). There is normal range of motion.        Left foot: There is swelling (1 non-pitting edema). There is normal range of motion.   - pain or grimacing with palpation of the fascia   Feet:   Right Foot:   Skin Integrity: Negative for erythema, warmth or callus.   Left Foot:   Skin Integrity: Negative for erythema, warmth or callus.   Neurological: He is alert and oriented to person, place, and time.   Skin: Skin is warm, dry and intact. He is not diaphoretic. No pallor.   Psychiatric: He has a normal mood and affect. His speech is normal and behavior is normal.       Assessment:       1. Plantar fasciitis    2. Osteoarthritis, unspecified osteoarthritis type, unspecified site        Plan:       Roberta was seen today for follow-up and plantar fasciitis.    Diagnoses and all orders for this visit:    Plantar fasciitis    Osteoarthritis, unspecified osteoarthritis type, unspecified site  -     acetaminophen-codeine 300-30mg (TYLENOL #3) 300-30 mg Tab; Take 1 tablet by mouth daily as needed (as needed for pain).    Other orders  -     omeprazole (PRILOSEC) 40 MG capsule; Take 1 capsule (40 mg total) by mouth every morning.  -     dexamethasone injection 8 mg; Inject 2 mLs (8 mg total) into the muscle one time.        Patient has an appt to see ankle and foot orthopedic in [facility] on Monday. Encouraged to keep appt  Return if symptoms worsen or fail to improve.  "This note will not be shared with the patient."  "

## 2017-04-06 ENCOUNTER — TELEPHONE (OUTPATIENT)
Dept: FAMILY MEDICINE | Facility: CLINIC | Age: 47
End: 2017-04-06

## 2017-04-06 ENCOUNTER — OFFICE VISIT (OUTPATIENT)
Dept: FAMILY MEDICINE | Facility: CLINIC | Age: 47
End: 2017-04-06
Payer: COMMERCIAL

## 2017-04-06 VITALS
HEIGHT: 76 IN | OXYGEN SATURATION: 97 % | SYSTOLIC BLOOD PRESSURE: 138 MMHG | TEMPERATURE: 99 F | WEIGHT: 315 LBS | DIASTOLIC BLOOD PRESSURE: 88 MMHG | BODY MASS INDEX: 38.36 KG/M2 | HEART RATE: 78 BPM

## 2017-04-06 DIAGNOSIS — I10 ESSENTIAL HYPERTENSION: Chronic | ICD-10-CM

## 2017-04-06 DIAGNOSIS — M79.671 RIGHT FOOT PAIN: Primary | ICD-10-CM

## 2017-04-06 DIAGNOSIS — E66.01 MORBID OBESITY WITH BMI OF 50.0-59.9, ADULT: ICD-10-CM

## 2017-04-06 PROCEDURE — 3075F SYST BP GE 130 - 139MM HG: CPT | Mod: S$GLB,,, | Performed by: FAMILY MEDICINE

## 2017-04-06 PROCEDURE — 99214 OFFICE O/P EST MOD 30 MIN: CPT | Mod: S$GLB,,, | Performed by: FAMILY MEDICINE

## 2017-04-06 PROCEDURE — 3079F DIAST BP 80-89 MM HG: CPT | Mod: S$GLB,,, | Performed by: FAMILY MEDICINE

## 2017-04-06 PROCEDURE — 1160F RVW MEDS BY RX/DR IN RCRD: CPT | Mod: S$GLB,,, | Performed by: FAMILY MEDICINE

## 2017-04-06 PROCEDURE — 99999 PR PBB SHADOW E&M-EST. PATIENT-LVL III: CPT | Mod: PBBFAC,,, | Performed by: FAMILY MEDICINE

## 2017-04-06 NOTE — TELEPHONE ENCOUNTER
----- Message from Kelly Williamson sent at 4/4/2017  9:39 AM CDT -----  Contact: Self  Pt calling to speak to a nurse regarding health. Please call [phone]

## 2017-04-06 NOTE — TELEPHONE ENCOUNTER
Spoke with pt regarding his foot pain. He states he came into see the NP yesterday and has gotten minimal relief but is coming to see Dr. Ambrosio today at 3pm

## 2017-04-06 NOTE — PROGRESS NOTES
[facility]  Progress Note    SUBJECTIVE:     Chief Complaint   Patient presents with    Heel Pain       HPI   Roberta Ware Jr.  is a 46 y.o. male here for c/o right foot pain, under the arch. He has flat feet, and is morbidly obese. He has lost over 24 lbs since last visit by dietary means. He is doing very well with that and is motivated. He rates foot pain as moderate.     Review of patient's allergies indicates:   Allergen Reactions    Hydrochlorothiazide Other (See Comments)     Gout       Past Medical History:   Diagnosis Date    Diverticulosis     Erectile dysfunction     Osteoarthritis      Past Surgical History:   Procedure Laterality Date    COLONOSCOPY  2012    UPPER GASTROINTESTINAL ENDOSCOPY       Family History   Problem Relation Age of Onset    Heart disease Mother     Cancer Mother      Breast    Diabetes Father     Hypertension Father     Ulcers Father     Anemia Sister      Social History   Substance Use Topics    Smoking status: Never Smoker    Smokeless tobacco: None    Alcohol use Yes      Comment: Ocassionally        Review of Systems   Constitutional: Negative for chills, fever and malaise/fatigue.   HENT: Negative.    Respiratory: Negative.  Negative for cough and shortness of breath.    Cardiovascular: Negative.  Negative for chest pain.   Gastrointestinal: Negative.  Negative for abdominal pain, nausea and vomiting.   Genitourinary: Negative.    Musculoskeletal: Positive for joint pain (foot pain, bilateral, more on R > L).   Neurological: Negative for weakness and headaches.   All other systems reviewed and are negative.    OBJECTIVE:     Vitals:    04/06/17 1521   BP: 138/88   Pulse: 78   Temp: 98.9 °F (37.2 °C)     Body mass index is 53.97 kg/(m^2).    Physical Exam   Constitutional: He is oriented to person, place, and time and well-developed, well-nourished, and in no distress. No distress.   HENT:   Head: Normocephalic and atraumatic.   Eyes:  Conjunctivae and EOM are normal.   Cardiovascular: Normal rate, regular rhythm and normal heart sounds.  Exam reveals no gallop and no friction rub.    No murmur heard.  Pulmonary/Chest: Effort normal and breath sounds normal. No respiratory distress. He has no wheezes. He has no rales.   Abdominal: Soft. Bowel sounds are normal. He exhibits no distension. There is no tenderness.   Musculoskeletal: He exhibits no tenderness (no tenderness to palpation of R heel. slight tenderness to arch area at mid 3-4th metatarsals. no evidence of fracture of infections.).   Neurological: He is alert and oriented to person, place, and time.   Skin: Skin is warm. He is not diaphoretic.       Old records were reviewed. Labs and/or images were independently reviewed.    ASSESSMENT     1. Right foot pain    2. Essential hypertension    3. Morbid obesity with BMI of 50.0-59.9, adult        PLAN:     Right foot pain   - Referred to podiatry.    Essential hypertension   -     Educated patient to take medications as prescribed, and to record bp logs daily to bring along to next visit. Instructed patient to decrease salt intake. Also told patient to call if any new signs or symptoms develop.    Morbid obesity with BMI of 50.0-59.9, adult   - Patient with around 24 lb weight loss since last office visit. Gave positive reinforcement. Continue current regimen.    RTC PRN  More than 50% of the encounter was spent counseling patient about morbid obesity, in an outpatient setting. Total time spent counseling patient: 25.    Antwan Ambrosio MD  04/06/2017 3:55 PM

## 2017-04-24 ENCOUNTER — HOSPITAL ENCOUNTER (EMERGENCY)
Facility: OTHER | Age: 47
Discharge: ANOTHER HOSPITAL | End: 2017-04-24
Attending: EMERGENCY MEDICINE
Payer: COMMERCIAL

## 2017-04-24 ENCOUNTER — HOSPITAL ENCOUNTER (INPATIENT)
Facility: HOSPITAL | Age: 47
LOS: 4 days | Discharge: HOME | DRG: 418 | End: 2017-04-28
Attending: EMERGENCY MEDICINE | Admitting: EMERGENCY MEDICINE
Payer: COMMERCIAL

## 2017-04-24 VITALS
RESPIRATION RATE: 18 BRPM | DIASTOLIC BLOOD PRESSURE: 85 MMHG | HEART RATE: 98 BPM | TEMPERATURE: 99 F | OXYGEN SATURATION: 100 % | SYSTOLIC BLOOD PRESSURE: 160 MMHG | BODY MASS INDEX: 52.71 KG/M2 | WEIGHT: 315 LBS

## 2017-04-24 DIAGNOSIS — K81.0 ACUTE CHOLECYSTITIS: ICD-10-CM

## 2017-04-24 DIAGNOSIS — R10.9 ABDOMINAL PAIN: Primary | ICD-10-CM

## 2017-04-24 DIAGNOSIS — K81.0 ACUTE CHOLECYSTITIS: Primary | ICD-10-CM

## 2017-04-24 LAB
ALBUMIN SERPL-MCNC: 3.5 G/DL (ref 3.3–5.5)
ALBUMIN SERPL-MCNC: 3.7 G/DL (ref 3.3–5.5)
ALP SERPL-CCNC: 61 U/L (ref 42–141)
ALP SERPL-CCNC: 67 U/L (ref 42–141)
BILIRUB SERPL-MCNC: 0.5 MG/DL (ref 0.2–1.6)
BILIRUB SERPL-MCNC: 0.5 MG/DL (ref 0.2–1.6)
BILIRUBIN, POC UA: NEGATIVE
BLOOD, POC UA: ABNORMAL
BUN SERPL-MCNC: 13 MG/DL (ref 7–22)
CALCIUM SERPL-MCNC: 10.1 MG/DL (ref 8–10.3)
CHLORIDE SERPL-SCNC: 101 MMOL/L (ref 98–108)
CLARITY, POC UA: CLEAR
COLOR, POC UA: YELLOW
CREAT SERPL-MCNC: 1.3 MG/DL (ref 0.6–1.2)
GLUCOSE SERPL-MCNC: 124 MG/DL (ref 73–118)
GLUCOSE, POC UA: NEGATIVE
KETONES, POC UA: NEGATIVE
LEUKOCYTE EST, POC UA: ABNORMAL
NITRITE, POC UA: NEGATIVE
PH UR STRIP: 5.5 [PH]
POC ALT (SGPT): 11 U/L (ref 10–47)
POC ALT (SGPT): 18 U/L (ref 10–47)
POC AMYLASE: 46 U/L (ref 14–97)
POC AST (SGOT): 21 U/L (ref 11–38)
POC AST (SGOT): 23 U/L (ref 11–38)
POC CARDIAC TROPONIN I: 0 NG/ML
POC GGT: 12 U/L (ref 5–65)
POC TCO2: 27 MMOL/L (ref 18–33)
POTASSIUM BLD-SCNC: 4 MMOL/L (ref 3.6–5.1)
PROTEIN, POC UA: 30 MG/DL
PROTEIN, POC: 8.3 G/DL (ref 6.4–8.1)
PROTEIN, POC: 8.4 G/DL (ref 6.4–8.1)
SAMPLE: NORMAL
SODIUM BLD-SCNC: 140 MMOL/L (ref 128–145)
SPECIFIC GRAVITY, POC UA: 1.02
UROBILINOGEN, POC UA: 0.2 E.U./DL

## 2017-04-24 PROCEDURE — 25500020 PHARM REV CODE 255: Performed by: EMERGENCY MEDICINE

## 2017-04-24 PROCEDURE — 12000002 HC ACUTE/MED SURGE SEMI-PRIVATE ROOM

## 2017-04-24 PROCEDURE — 96366 THER/PROPH/DIAG IV INF ADDON: CPT

## 2017-04-24 PROCEDURE — 25000003 PHARM REV CODE 250: Performed by: EMERGENCY MEDICINE

## 2017-04-24 PROCEDURE — 96375 TX/PRO/DX INJ NEW DRUG ADDON: CPT

## 2017-04-24 PROCEDURE — 63600175 PHARM REV CODE 636 W HCPCS: Performed by: EMERGENCY MEDICINE

## 2017-04-24 PROCEDURE — 99285 EMERGENCY DEPT VISIT HI MDM: CPT | Mod: 25

## 2017-04-24 PROCEDURE — 93010 ELECTROCARDIOGRAM REPORT: CPT | Mod: ,,, | Performed by: INTERNAL MEDICINE

## 2017-04-24 PROCEDURE — 93005 ELECTROCARDIOGRAM TRACING: CPT

## 2017-04-24 PROCEDURE — 96365 THER/PROPH/DIAG IV INF INIT: CPT

## 2017-04-24 PROCEDURE — 87086 URINE CULTURE/COLONY COUNT: CPT

## 2017-04-24 PROCEDURE — 96361 HYDRATE IV INFUSION ADD-ON: CPT

## 2017-04-24 PROCEDURE — 99284 EMERGENCY DEPT VISIT MOD MDM: CPT | Mod: 25,27

## 2017-04-24 RX ORDER — NEBIVOLOL 5 MG/1
20 TABLET ORAL DAILY
Status: DISCONTINUED | OUTPATIENT
Start: 2017-04-25 | End: 2017-04-28 | Stop reason: HOSPADM

## 2017-04-24 RX ORDER — MORPHINE SULFATE 10 MG/ML
2 INJECTION INTRAMUSCULAR; INTRAVENOUS; SUBCUTANEOUS EVERY 4 HOURS PRN
Status: DISCONTINUED | OUTPATIENT
Start: 2017-04-24 | End: 2017-04-28 | Stop reason: HOSPADM

## 2017-04-24 RX ORDER — ACETAMINOPHEN 325 MG/1
650 TABLET ORAL EVERY 6 HOURS PRN
Status: DISCONTINUED | OUTPATIENT
Start: 2017-04-24 | End: 2017-04-28 | Stop reason: HOSPADM

## 2017-04-24 RX ORDER — ONDANSETRON 2 MG/ML
4 INJECTION INTRAMUSCULAR; INTRAVENOUS EVERY 12 HOURS PRN
Status: DISCONTINUED | OUTPATIENT
Start: 2017-04-24 | End: 2017-04-28 | Stop reason: HOSPADM

## 2017-04-24 RX ORDER — IRBESARTAN 150 MG/1
300 TABLET ORAL DAILY
Status: DISCONTINUED | OUTPATIENT
Start: 2017-04-25 | End: 2017-04-28 | Stop reason: HOSPADM

## 2017-04-24 RX ORDER — AMLODIPINE BESYLATE 5 MG/1
10 TABLET ORAL DAILY
Status: DISCONTINUED | OUTPATIENT
Start: 2017-04-25 | End: 2017-04-28 | Stop reason: HOSPADM

## 2017-04-24 RX ORDER — SODIUM CHLORIDE 9 MG/ML
INJECTION, SOLUTION INTRAVENOUS CONTINUOUS
Status: DISCONTINUED | OUTPATIENT
Start: 2017-04-24 | End: 2017-04-26

## 2017-04-24 RX ORDER — SODIUM CHLORIDE 9 MG/ML
1000 INJECTION, SOLUTION INTRAVENOUS
Status: COMPLETED | OUTPATIENT
Start: 2017-04-24 | End: 2017-04-24

## 2017-04-24 RX ORDER — PANTOPRAZOLE SODIUM 40 MG/1
40 TABLET, DELAYED RELEASE ORAL DAILY
Status: DISCONTINUED | OUTPATIENT
Start: 2017-04-25 | End: 2017-04-28 | Stop reason: HOSPADM

## 2017-04-24 RX ORDER — FAMOTIDINE 10 MG/ML
20 INJECTION INTRAVENOUS
Status: COMPLETED | OUTPATIENT
Start: 2017-04-24 | End: 2017-04-24

## 2017-04-24 RX ORDER — ONDANSETRON 2 MG/ML
4 INJECTION INTRAMUSCULAR; INTRAVENOUS
Status: COMPLETED | OUTPATIENT
Start: 2017-04-24 | End: 2017-04-24

## 2017-04-24 RX ORDER — MORPHINE SULFATE 10 MG/ML
6 INJECTION INTRAMUSCULAR; INTRAVENOUS; SUBCUTANEOUS EVERY 4 HOURS PRN
Status: DISCONTINUED | OUTPATIENT
Start: 2017-04-24 | End: 2017-04-27

## 2017-04-24 RX ADMIN — FAMOTIDINE 20 MG: 10 INJECTION INTRAVENOUS at 12:04

## 2017-04-24 RX ADMIN — SODIUM CHLORIDE 1000 ML: 0.9 INJECTION, SOLUTION INTRAVENOUS at 01:04

## 2017-04-24 RX ADMIN — CEFTRIAXONE 1 G: 1 INJECTION, SOLUTION INTRAVENOUS at 05:04

## 2017-04-24 RX ADMIN — ONDANSETRON 4 MG: 2 INJECTION INTRAMUSCULAR; INTRAVENOUS at 12:04

## 2017-04-24 RX ADMIN — SODIUM CHLORIDE 1000 ML: 0.9 INJECTION, SOLUTION INTRAVENOUS at 12:04

## 2017-04-24 RX ADMIN — PIPERACILLIN AND TAZOBACTAM 4.5 G: 4; .5 INJECTION, POWDER, LYOPHILIZED, FOR SOLUTION INTRAVENOUS; PARENTERAL at 09:04

## 2017-04-24 RX ADMIN — SODIUM CHLORIDE: 0.9 INJECTION, SOLUTION INTRAVENOUS at 09:04

## 2017-04-24 RX ADMIN — IOHEXOL 100 ML: 350 INJECTION, SOLUTION INTRAVENOUS at 02:04

## 2017-04-24 NOTE — IP AVS SNAPSHOT
[place] [address] [place] [address]  Phone: [phone]           Patient Discharge Instructions   Our goal is to set you up for success. This packet includes information on your condition, medications, and your home care.  It will help you care for yourself to prevent having to return to the hospital.     Please ask your nurse if you have any questions.      There are many details to remember when preparing to leave the hospital. Here is what you will need to do:    1. Take your medicine. If you are prescribed medications, review your Medication List on the following pages. You may have new medications to  at the pharmacy and others that you'll need to stop taking. Review the instructions for how and when to take your medications. Talk with your doctor or nurses if you are unsure of what to do.     2. Go to your follow-up appointments. Specific follow-up information is listed in the following pages. Your may be contacted by a nurse or clinical provider about future appointments. Be sure we have all of the phone numbers to reach you. Please contact your provider's office if you are unable to make an appointment.     3. Watch for warning signs. Your doctor or nurse will give you detailed warning signs to watch for and when to call for assistance. These instructions may also include educational information about your condition. If you experience any of warning signs to your health, call your doctor.               ** Verify the list of medication(s) below is accurate and up to date. Carry this with you in case of emergency. If your medications have changed, please notify your healthcare provider.             Medication List      START taking these medications        Additional Info                      hydrocodone-acetaminophen 5-325mg 5-325 mg per tablet   Commonly known as:  NORCO   Quantity:  30 tablet   Refills:  0   Dose:  1 tablet    Instructions:  Take 1 tablet by  mouth every 4 (four) hours as needed.     Begin Date    AM    Noon    PM    Bedtime         CONTINUE taking these medications        Additional Info                      amlodipine 10 MG tablet   Commonly known as:  NORVASC   Quantity:  30 tablet   Refills:  4   Dose:  10 mg    Last time this was given:  10 mg on 4/28/2017  8:46 AM   Instructions:  Take 1 tablet (10 mg total) by mouth once daily.     Begin Date    AM    Noon    PM    Bedtime       cetirizine 10 MG tablet   Commonly known as:  ZYRTEC   Quantity:  30 tablet   Refills:  5   Dose:  10 mg    Instructions:  Take 1 tablet (10 mg total) by mouth daily as needed for Rhinitis.     Begin Date    AM    Noon    PM    Bedtime       irbesartan 300 MG tablet   Commonly known as:  AVAPRO   Quantity:  30 tablet   Refills:  4   Dose:  300 mg    Last time this was given:  300 mg on 4/28/2017  8:46 AM   Instructions:  Take 1 tablet (300 mg total) by mouth once daily.     Begin Date    AM    Noon    PM    Bedtime       nebivolol 10 MG Tab   Commonly known as:  BYSTOLIC   Quantity:  60 tablet   Refills:  4   Dose:  20 mg    Last time this was given:  20 mg on 4/28/2017  8:46 AM   Instructions:  Take 2 tablets (20 mg total) by mouth once daily.     Begin Date    AM    Noon    PM    Bedtime       omeprazole 40 MG capsule   Commonly known as:  PRILOSEC   Quantity:  30 capsule   Refills:  2   Dose:  40 mg    Instructions:  Take 1 capsule (40 mg total) by mouth every morning.     Begin Date    AM    Noon    PM    Bedtime            Where to Get Your Medications      You can get these medications from any pharmacy     Bring a paper prescription for each of these medications     hydrocodone-acetaminophen 5-325mg 5-325 mg per tablet                  Please bring to all follow up appointments:    1. A copy of your discharge instructions.  2. All medicines you are currently taking in their original bottles.  3. Identification and insurance card.    Please arrive 15 minutes ahead of  scheduled appointment time.    Please call 24 hours in advance if you must reschedule your appointment and/or time.        Your Scheduled Appointments     May 09, 2017 10:00 AM CDT   Non-Fasting Lab with [facility], [place]   [facility] ([facility]    [address] [facility]  [place] [address]   [phone]            May 10, 2017  3:10 PM CDT   Post Op-OCC with Maurizio Solano MD   [facility]    [address] [place] [address]. [address]  [place] [address]   [phone]            May 12, 2017  3:30 PM CDT   Established Patient Visit with Michelle Palmer MD   [facility] ([facility])    [address] [facility] [address]  [place] [address] [phone]              Follow-up Information     Follow up with Maurizio Solano MD On 5/10/2017.    Specialty:  General Surgery    Why:  Wednesday at 3pm    Contact information:    [address] [place] [address]  [address]  [place] [address]  [phone]          Discharge Instructions     Future Orders    Activity as tolerated     Call MD for:  difficulty breathing or increased cough     Call MD for:  persistent nausea and vomiting or diarrhea     Call MD for:  redness, tenderness, or signs of infection (pain, swelling, redness, odor or green/yellow discharge around incision site)     Call MD for:  severe uncontrolled pain     Call MD for:  temperature >100.4     Diet general     Questions:    Total calories:      Fat restriction, if any:      Protein restriction, if any:      Na restriction, if any:      Fluid restriction:      Additional restrictions:      No dressing needed         Primary Diagnosis     Your primary diagnosis was:  Gall Bladder Inflammation      Admission Information     Date & Time Provider Department CSN    4/24/2017  8:05 PM Maurizio Solano MD [facility] [id number]      Care Providers     Provider Role Specialty Primary office phone    Maurizio Solano MD Attending Provider  "General Surgery [phone]    Maurizio Solano MD Surgeon  General Surgery [phone]      Your Vitals Were     BP Pulse Temp Resp Height Weight    173/80 75 99.1 °F (37.3 °C) (Oral) 18 6' 4" (1.93 m) 196.4 kg (433 lb)    SpO2 BMI             100% 52.71 kg/m2         Recent Lab Values        12/20/2016                          10:19 PM           A1C 5.5           Comment for A1C at 10:19 PM on 12/20/2016:  According to ADA guidelines, hemoglobin A1C <7.0% represents  optimal control in non-pregnant diabetic patients.  Different  metrics may apply to specific populations.   Standards of Medical Care in Diabetes - 2016.  For the purpose of screening for the presence of diabetes:  <5.7%     Consistent with the absence of diabetes  5.7-6.4%  Consistent with increasing risk for diabetes   (prediabetes)  >or=6.5%  Consistent with diabetes  Currently no consensus exists for use of hemoglobin A1C  for diagnosis of diabetes for children.        Pending Labs     Order Current Status    Specimen to Pathology - Surgery In process    Urine culture Preliminary result      Allergies as of 4/28/2017        Reactions    Hydrochlorothiazide Other (See Comments)    Gout    Tramadol     Pt states make him jittery [facility]     [facility] Nurse Care Line - 24/7 Assistance  Unless otherwise directed by your provider, please contact [facility], our nurse care line that is available for 24/7 assistance.     Registered nurses in the [facility] provide clinical advisement, health education, appointment booking, and other advisory services.  Call for this free service at [phone].        Advance Directives     An advance directive is a document which, in the event you are no longer able to make decisions for yourself, tells your healthcare team what kind of treatment you do or do not want to receive, or who you would like to make those decisions for you.  If you do not currently have an advance " directive, [facility] encourages you to create one.  For more information call:  [phone]), [phone] ([phone]),  or log on to [email or web address].        Language Assistance Services     ATTENTION: Language assistance services are available, free of charge. Please call [phone].      ATENCIÓN: Si habla español, tiene a tompkins disposición servicios gratuitos de asistencia lingüística. Llame al [phone].     CHÚ Ý: N?u b?n nói Ti?ng Vi?t, có các d?ch v? h? tr? ngôn ng? mi?n phí dành cho b?n. G?i s? [phone].        Chronic Kindey Disease Education              [facility] complies with applicable Federal civil rights laws and does not discriminate on the basis of race, color, national origin, age, disability, or sex.

## 2017-04-24 NOTE — ED NOTES
APPEARANCE: Alert, oriented and in no acute distress. Patient speech is clear patient able to verbalize name and  from his armband. Patient Memory intact.   CARDIAC: Normal rate 68 from vital signs no murmur heard on ascultation. Patient denies chest pain upon arrival  PERIPHERAL VASCULAR: + 2 peripheral pulses present. + 2 radial pulse generalized Normal cap refill <3 seconds No edema. Warm to touch.    RESPIRATORY:Normal rate and effort, breath sounds clear bilaterally throughout chest. Respirations are equal and unlabored no obvious signs of distress.  GASTRO: soft, bowel sounds normal(+ 4 quadrants) generalized tenderness, no abdominal distention. nausea and vomiting x few episodes at home + large habitus  MUSC: Full ROM. No bony tenderness or soft tissue tenderness. No obvious deformity.  SKIN: Skin is warm and dry, normal skin turgor, mucous membranes moist.  NEURO: Sensory intact to light touch bilaterally. Wykoff coma scale: eyes open spontaneously-4, oriented & converses-5, obeys commands-6. No neurological abnormalities.   MENTAL STATUS: awake, alert and aware of environment.  EYE: PERRL, both eyes: pupils brisk and reactive to light.  2mm Normal size.  Gait steady with walking

## 2017-04-24 NOTE — ED PROVIDER NOTES
Encounter Date: 4/24/2017       History   No chief complaint on file.    Review of patient's allergies indicates:   Allergen Reactions    Hydrochlorothiazide Other (See Comments)     Gout     HPI Comments: Roberta Ware Jr. is a 46 y.o. male who presents to the Emergency Department with  abdominal pain with nausea and vomiting.  Patient states he ate greens last night woke up at 2 AM today vomiting.  Patient states he has a bubbling pain in his stomach that moves all around his belly.  Patient also reports no bowel movement in 2 days.  Patient has been unable to keep anything down by mouth.    Patient is a 46 y.o. male presenting with the following complaint: abdominal pain. The history is provided by the patient.   Abdominal Pain   The current episode started today. The onset of the illness was abrupt. The problem has not changed since onset.The abdominal pain is located in the epigastric region. The abdominal pain radiates to the RUQ and LUQ. The severity of the abdominal pain is 8/10. The abdominal pain is relieved by nothing. The abdominal pain is exacerbated by eating. The other symptoms of the illness include nausea and vomiting. The other symptoms of the illness do not include fever, shortness of breath or diarrhea.   Nausea began today. The nausea is exacerbated by food, motion and activity.   The vomiting began today. Vomiting occurs 2 to 5 times per day. The emesis contains stomach contents.   Additional symptoms associated with the illness include constipation. Significant associated medical issues do not include diabetes, substance abuse or HIV.     Past Medical History:   Diagnosis Date    Diverticulosis     Erectile dysfunction     Osteoarthritis      Past Surgical History:   Procedure Laterality Date    COLONOSCOPY  2012    UPPER GASTROINTESTINAL ENDOSCOPY       Family History   Problem Relation Age of Onset    Heart disease Mother     Cancer Mother      Breast    Diabetes Father      Hypertension Father     Ulcers Father     Anemia Sister      Social History   Substance Use Topics    Smoking status: Never Smoker    Smokeless tobacco: Not on file    Alcohol use Yes      Comment: Ocassionally     Review of Systems   Constitutional: Negative for fever.   Respiratory: Negative for shortness of breath.    Cardiovascular: Negative for chest pain.   Gastrointestinal: Positive for abdominal pain, constipation, nausea and vomiting. Negative for diarrhea.   All other systems reviewed and are negative.      Physical Exam   Initial Vitals   BP Pulse Resp Temp SpO2   04/24/17 1139 04/24/17 1139 04/24/17 1139 04/24/17 1139 04/24/17 1139   171/84 68 18 98.6 °F (37 °C) 100 %     Physical Exam    Nursing note and vitals reviewed.  Constitutional: He appears well-developed and well-nourished. He is not diaphoretic. No distress.   HENT:   Head: Normocephalic and atraumatic.   Right Ear: External ear normal.   Left Ear: External ear normal.   Nose: Nose normal.   Eyes: EOM are normal. Pupils are equal, round, and reactive to light.   Neck: Normal range of motion. Neck supple.   Cardiovascular: Normal rate, regular rhythm, normal heart sounds and intact distal pulses. Exam reveals no gallop and no friction rub.    No murmur heard.  Pulmonary/Chest: Breath sounds normal. No respiratory distress. He has no wheezes. He has no rhonchi. He has no rales.   Abdominal: Soft. Bowel sounds are normal. There is tenderness in the epigastric area. There is no rebound and no guarding.   Musculoskeletal: Normal range of motion. He exhibits no edema or tenderness.   Neurological: He is alert and oriented to person, place, and time. He has normal strength and normal reflexes. He displays normal reflexes. No cranial nerve deficit or sensory deficit.   Skin: Skin is warm and dry.   Psychiatric: He has a normal mood and affect.         ED Course   Procedures  Labs Reviewed   POCT URINALYSIS W/O SCOPE - Abnormal; Notable for the  following:        Result Value    Glucose, UA Negative (*)     Bilirubin, UA Negative (*)     Ketones, UA Negative (*)     Nitrite, UA Negative (*)     All other components within normal limits   POCT CMP - Abnormal; Notable for the following:     POC Creatinine 1.3 (*)     POC Glucose 124 (*)     Protein 8.3 (*)     All other components within normal limits   POCT LIVER PANEL - Abnormal; Notable for the following:     Protein 8.4 (*)     All other components within normal limits   CULTURE, URINE   TROPONIN ISTAT   POCT CBC   POCT URINALYSIS W/O SCOPE   POCT CMP   POCT TROPONIN   POCT AMYLASE     UA positive for trace leukocytes negative nitrites and negative ketones  Amylase normal at 46.  ALT 11, AST 23 total bili is 0.5.  Upon is negative at 0.00.  white blood cell count is significantly elevated at 19.7, hemoglobin is 9.1, hematocrit is 20.3, and platelets are 390.    EKG Readings: (Independently Interpreted)   Initial Reading: No STEMI. Rhythm: Normal Sinus Rhythm. Heart Rate: 65. Axis: Normal. Clinical Impression: Normal Sinus Rhythm Other Impression: Abnormal EKG, LVH via criteria, no ST elevation, incomplete right bundle-branch block appreciated.  QTC within normal limits at 426     Imaging Results         US Abdomen Limited (Final result) Result time:  04/24/17 17:09:27    Final result by Interface, Rad Results In (04/24/17 17:09:27)    Narrative:    Study Desc:   US ABDOMEN LIMITED  Clinical History: Nausea vomiting and abdominal pain  EXAM: RUQ  ULTRASOUND     IMAGES: 38     COMPARISON: NONE       The liver demonstrates no acute pathology, there is no intrahepatic biliary duct   dilation.   The CBD measures 6 mm.  The gallbladder demonstrates stones,  wall thickening   but no pericholecystic  fluid.  There was a positive sonographic Elliott sign by report.    Pancreas is not well seen due to bowel gas.  The right kidney demonstrates no stones,   hydronephrosis or focal lesion.  Known renal stones not  appreciated by ultrasound.    Visualized portions of aorta and IVC appear unremarkable.   Remainder demonstrates no   acute pathology.      Findings concerning for acute cholecystitis.  Please correlate.     SL: 24 Signed by: Prudencio Jones MD.  2017-04-24 17:09:25            CT Abdomen Pelvis With Contrast (Final result) Result time:  04/24/17 14:10:32    Final result by Interface, Rad Results In (04/24/17 14:10:32)    Narrative:    Study Desc:   CT ABDOMEN PELVIS WITH CONTRAST  Clinical History: Epigastric abdominal pain with vomiting     Comparison: None     Technique: Routine contrast-enhanced abdomen and pelvis CT with sagittal and coronal   reconstructions.     DLP: 1270.44     Findings:     The visualized lung bases are clear.     Cholelithiasis is noted.  Liver, spleen, pancreas, and adrenal glands have a normal   appearance.  Punctate nonobstructing renal calculi are present bilaterally.  No   hydronephrosis.     No acute bowel obstruction.  Normal caliber of the appendix.  Colonic diverticulosis   without acute diverticulitis.     No free air, free fluid or pathologic lymphadenopathy.     Bladder is incompletely distended.  No pelvic masses.     No acute osseous abnormality.     Impression:     1.  Cholelithiasis.  Right upper quadrant ultrasound should be considered if there is   concern for acute cholecystitis.  2.  Nonobstructing renal calculi.  3.  Colonic diverticulosis without acute diverticulitis.     SL: 24  Signed by: Sandor Rae MD  2017-04-24 14:10:28            X-Ray Abdomen Flat And Erect (Final result) Result time:  04/24/17 13:54:21    Final result by Interface, Rad Results In (04/24/17 13:54:21)    Narrative:    Study Desc:   XR ABDOMEN FLAT AND ERECT  Reason: Upper abdominal pain and gas filling.  Comparison exam: None.     FINDINGS:  The single view of the abdomen shows some gas and fecal material in the ascending colon.   There is no abnormal dilatation of bowel loops. There is no  pneumatosis or mass effect.   There are no radiopaque densities noted.  There are no acute osseous abnormalities noted.     The visualized lung bases are clear.     IMPRESSION:  1.  Nonobstructive bowel gas pattern.  Some gas and fecal material in the ascending colon.     SL: 24 Signed by: Milton Leonard MD  2017-04-24 13:54:19                                     ED Course       Contacted transfer center and spoke with Duglas at 5:26pm  Consultation for admission.  Discussed patient's presentation, past medical history, physical exam, and ED course.  Also discussed vital signs and diagnosis is.  At this time patient will be transferred to [facility] for evaluation by general surgery.  Patient is agreeable to transfer.    Clinical Impression:   The primary encounter diagnosis was Abdominal pain. A diagnosis of Acute cholecystitis was also pertinent to this visit.          Karrie Crocker DO  04/24/17 1929

## 2017-04-25 ENCOUNTER — ANESTHESIA EVENT (OUTPATIENT)
Dept: SURGERY | Facility: HOSPITAL | Age: 47
DRG: 418 | End: 2017-04-25
Payer: COMMERCIAL

## 2017-04-25 ENCOUNTER — ANESTHESIA (OUTPATIENT)
Dept: SURGERY | Facility: HOSPITAL | Age: 47
DRG: 418 | End: 2017-04-25
Payer: COMMERCIAL

## 2017-04-25 ENCOUNTER — SURGERY (OUTPATIENT)
Age: 47
End: 2017-04-25

## 2017-04-25 LAB
ALBUMIN SERPL BCP-MCNC: 3.3 G/DL
ALP SERPL-CCNC: 76 U/L
ALT SERPL W/O P-5'-P-CCNC: 9 U/L
ANION GAP SERPL CALC-SCNC: 13 MMOL/L
ANISOCYTOSIS BLD QL SMEAR: SLIGHT
AST SERPL-CCNC: 14 U/L
BASOPHILS # BLD AUTO: 0.02 K/UL
BASOPHILS NFR BLD: 0.1 %
BILIRUB SERPL-MCNC: 0.4 MG/DL
BUN SERPL-MCNC: 12 MG/DL
CALCIUM SERPL-MCNC: 9.1 MG/DL
CHLORIDE SERPL-SCNC: 108 MMOL/L
CO2 SERPL-SCNC: 21 MMOL/L
CREAT SERPL-MCNC: 1.4 MG/DL
DACRYOCYTES BLD QL SMEAR: ABNORMAL
DIFFERENTIAL METHOD: ABNORMAL
EOSINOPHIL # BLD AUTO: 0.2 K/UL
EOSINOPHIL NFR BLD: 0.9 %
ERYTHROCYTE [DISTWIDTH] IN BLOOD BY AUTOMATED COUNT: 19.6 %
EST. GFR  (AFRICAN AMERICAN): >60 ML/MIN/1.73 M^2
EST. GFR  (NON AFRICAN AMERICAN): >60 ML/MIN/1.73 M^2
GLUCOSE SERPL-MCNC: 79 MG/DL
HCT VFR BLD AUTO: 28.9 %
HGB BLD-MCNC: 8.6 G/DL
HYPOCHROMIA BLD QL SMEAR: ABNORMAL
LYMPHOCYTES # BLD AUTO: 3.4 K/UL
LYMPHOCYTES NFR BLD: 19.6 %
MCH RBC QN AUTO: 20.8 PG
MCHC RBC AUTO-ENTMCNC: 29.8 %
MCV RBC AUTO: 70 FL
MONOCYTES # BLD AUTO: 0.9 K/UL
MONOCYTES NFR BLD: 5.3 %
NEUTROPHILS # BLD AUTO: 12.8 K/UL
NEUTROPHILS NFR BLD: 74.8 %
OVALOCYTES BLD QL SMEAR: ABNORMAL
PLATELET # BLD AUTO: 309 K/UL
PMV BLD AUTO: 9.9 FL
POIKILOCYTOSIS BLD QL SMEAR: SLIGHT
POLYCHROMASIA BLD QL SMEAR: ABNORMAL
POTASSIUM SERPL-SCNC: 5.2 MMOL/L
PROT SERPL-MCNC: 7.2 G/DL
RBC # BLD AUTO: 4.14 M/UL
SODIUM SERPL-SCNC: 142 MMOL/L
TARGETS BLD QL SMEAR: ABNORMAL
WBC # BLD AUTO: 17.23 K/UL

## 2017-04-25 PROCEDURE — 12000002 HC ACUTE/MED SURGE SEMI-PRIVATE ROOM

## 2017-04-25 PROCEDURE — 36000709 HC OR TIME LEV III EA ADD 15 MIN: Performed by: SURGERY

## 2017-04-25 PROCEDURE — 25000003 PHARM REV CODE 250: Performed by: EMERGENCY MEDICINE

## 2017-04-25 PROCEDURE — 25000003 PHARM REV CODE 250: Performed by: NURSE ANESTHETIST, CERTIFIED REGISTERED

## 2017-04-25 PROCEDURE — 85025 COMPLETE CBC W/AUTO DIFF WBC: CPT

## 2017-04-25 PROCEDURE — 37000009 HC ANESTHESIA EA ADD 15 MINS: Performed by: SURGERY

## 2017-04-25 PROCEDURE — 94799 UNLISTED PULMONARY SVC/PX: CPT

## 2017-04-25 PROCEDURE — D9220A PRA ANESTHESIA: Mod: CRNA,,, | Performed by: NURSE ANESTHETIST, CERTIFIED REGISTERED

## 2017-04-25 PROCEDURE — 71000033 HC RECOVERY, INTIAL HOUR: Performed by: SURGERY

## 2017-04-25 PROCEDURE — 0FT44ZZ RESECTION OF GALLBLADDER, PERCUTANEOUS ENDOSCOPIC APPROACH: ICD-10-PCS | Performed by: SURGERY

## 2017-04-25 PROCEDURE — 25000003 PHARM REV CODE 250: Performed by: SURGERY

## 2017-04-25 PROCEDURE — 88304 TISSUE EXAM BY PATHOLOGIST: CPT | Mod: 26,,, | Performed by: PATHOLOGY

## 2017-04-25 PROCEDURE — 63600175 PHARM REV CODE 636 W HCPCS: Performed by: NURSE ANESTHETIST, CERTIFIED REGISTERED

## 2017-04-25 PROCEDURE — 80053 COMPREHEN METABOLIC PANEL: CPT

## 2017-04-25 PROCEDURE — 25000003 PHARM REV CODE 250

## 2017-04-25 PROCEDURE — 63600175 PHARM REV CODE 636 W HCPCS: Performed by: SURGERY

## 2017-04-25 PROCEDURE — 99900035 HC TECH TIME PER 15 MIN (STAT)

## 2017-04-25 PROCEDURE — 36000708 HC OR TIME LEV III 1ST 15 MIN: Performed by: SURGERY

## 2017-04-25 PROCEDURE — D9220A PRA ANESTHESIA: Mod: ANES,,, | Performed by: ANESTHESIOLOGY

## 2017-04-25 PROCEDURE — 88304 TISSUE EXAM BY PATHOLOGIST: CPT | Performed by: PATHOLOGY

## 2017-04-25 PROCEDURE — 27201423 OPTIME MED/SURG SUP & DEVICES STERILE SUPPLY: Performed by: SURGERY

## 2017-04-25 PROCEDURE — 63600175 PHARM REV CODE 636 W HCPCS: Performed by: EMERGENCY MEDICINE

## 2017-04-25 PROCEDURE — 37000008 HC ANESTHESIA 1ST 15 MINUTES: Performed by: SURGERY

## 2017-04-25 RX ORDER — MIDAZOLAM HYDROCHLORIDE 1 MG/ML
INJECTION, SOLUTION INTRAMUSCULAR; INTRAVENOUS
Status: DISCONTINUED | OUTPATIENT
Start: 2017-04-25 | End: 2017-04-25

## 2017-04-25 RX ORDER — NEOSTIGMINE METHYLSULFATE 1 MG/ML
INJECTION, SOLUTION INTRAVENOUS
Status: DISCONTINUED | OUTPATIENT
Start: 2017-04-25 | End: 2017-04-25

## 2017-04-25 RX ORDER — METOCLOPRAMIDE HYDROCHLORIDE 5 MG/ML
INJECTION INTRAMUSCULAR; INTRAVENOUS
Status: DISCONTINUED | OUTPATIENT
Start: 2017-04-25 | End: 2017-04-25

## 2017-04-25 RX ORDER — HYDROMORPHONE HYDROCHLORIDE 2 MG/ML
0.2 INJECTION, SOLUTION INTRAMUSCULAR; INTRAVENOUS; SUBCUTANEOUS EVERY 5 MIN PRN
Status: DISCONTINUED | OUTPATIENT
Start: 2017-04-25 | End: 2017-04-27

## 2017-04-25 RX ORDER — ACETAMINOPHEN 10 MG/ML
1000 INJECTION, SOLUTION INTRAVENOUS ONCE
Status: COMPLETED | OUTPATIENT
Start: 2017-04-25 | End: 2017-04-25

## 2017-04-25 RX ORDER — LIDOCAINE HCL/PF 100 MG/5ML
SYRINGE (ML) INTRAVENOUS
Status: DISCONTINUED | OUTPATIENT
Start: 2017-04-25 | End: 2017-04-25

## 2017-04-25 RX ORDER — BUPIVACAINE HYDROCHLORIDE 2.5 MG/ML
INJECTION, SOLUTION INFILTRATION; PERINEURAL
Status: DISCONTINUED | OUTPATIENT
Start: 2017-04-25 | End: 2017-04-25 | Stop reason: HOSPADM

## 2017-04-25 RX ORDER — MORPHINE SULFATE 10 MG/ML
5 INJECTION INTRAMUSCULAR; INTRAVENOUS; SUBCUTANEOUS EVERY 4 HOURS PRN
Status: DISCONTINUED | OUTPATIENT
Start: 2017-04-25 | End: 2017-04-27

## 2017-04-25 RX ORDER — GLYCOPYRROLATE 0.2 MG/ML
INJECTION INTRAMUSCULAR; INTRAVENOUS
Status: DISCONTINUED | OUTPATIENT
Start: 2017-04-25 | End: 2017-04-25

## 2017-04-25 RX ORDER — PROPOFOL 10 MG/ML
VIAL (ML) INTRAVENOUS
Status: DISCONTINUED | OUTPATIENT
Start: 2017-04-25 | End: 2017-04-25

## 2017-04-25 RX ORDER — ONDANSETRON 2 MG/ML
4 INJECTION INTRAMUSCULAR; INTRAVENOUS EVERY 8 HOURS PRN
Status: DISCONTINUED | OUTPATIENT
Start: 2017-04-25 | End: 2017-04-28 | Stop reason: HOSPADM

## 2017-04-25 RX ORDER — SODIUM CHLORIDE 0.9 % (FLUSH) 0.9 %
3 SYRINGE (ML) INJECTION
Status: DISCONTINUED | OUTPATIENT
Start: 2017-04-25 | End: 2017-04-28 | Stop reason: HOSPADM

## 2017-04-25 RX ORDER — SUCCINYLCHOLINE CHLORIDE 20 MG/ML
INJECTION INTRAMUSCULAR; INTRAVENOUS
Status: DISCONTINUED | OUTPATIENT
Start: 2017-04-25 | End: 2017-04-25

## 2017-04-25 RX ORDER — FENTANYL CITRATE 50 UG/ML
INJECTION, SOLUTION INTRAMUSCULAR; INTRAVENOUS
Status: DISCONTINUED | OUTPATIENT
Start: 2017-04-25 | End: 2017-04-25

## 2017-04-25 RX ORDER — CALCIUM CHLORIDE INJECTION 100 MG/ML
INJECTION, SOLUTION INTRAVENOUS
Status: DISCONTINUED | OUTPATIENT
Start: 2017-04-25 | End: 2017-04-25

## 2017-04-25 RX ORDER — FAMOTIDINE 20 MG/50ML
INJECTION, SOLUTION INTRAVENOUS
Status: COMPLETED
Start: 2017-04-25 | End: 2017-04-25

## 2017-04-25 RX ORDER — ONDANSETRON 2 MG/ML
INJECTION INTRAMUSCULAR; INTRAVENOUS
Status: DISCONTINUED | OUTPATIENT
Start: 2017-04-25 | End: 2017-04-25

## 2017-04-25 RX ORDER — SODIUM CHLORIDE 9 MG/ML
INJECTION, SOLUTION INTRAVENOUS CONTINUOUS
Status: DISCONTINUED | OUTPATIENT
Start: 2017-04-25 | End: 2017-04-26

## 2017-04-25 RX ORDER — DIPHENHYDRAMINE HYDROCHLORIDE 50 MG/ML
25 INJECTION INTRAMUSCULAR; INTRAVENOUS EVERY 6 HOURS PRN
Status: DISCONTINUED | OUTPATIENT
Start: 2017-04-25 | End: 2017-04-28 | Stop reason: HOSPADM

## 2017-04-25 RX ORDER — ROCURONIUM BROMIDE 10 MG/ML
INJECTION, SOLUTION INTRAVENOUS
Status: DISCONTINUED | OUTPATIENT
Start: 2017-04-25 | End: 2017-04-25

## 2017-04-25 RX ADMIN — PROPOFOL 200 MG: 10 INJECTION, EMULSION INTRAVENOUS at 04:04

## 2017-04-25 RX ADMIN — BUPIVACAINE 20 ML: 13.3 INJECTION, SUSPENSION, LIPOSOMAL INFILTRATION at 05:04

## 2017-04-25 RX ADMIN — FENTANYL CITRATE 100 MCG: 50 INJECTION INTRAMUSCULAR; INTRAVENOUS at 04:04

## 2017-04-25 RX ADMIN — GLYCOPYRROLATE 0.6 MG: 0.2 INJECTION, SOLUTION INTRAMUSCULAR; INTRAVENOUS at 05:04

## 2017-04-25 RX ADMIN — FAMOTIDINE 20 MG: 20 INJECTION, SOLUTION INTRAVENOUS at 04:04

## 2017-04-25 RX ADMIN — SODIUM CHLORIDE: 0.9 INJECTION, SOLUTION INTRAVENOUS at 06:04

## 2017-04-25 RX ADMIN — MIDAZOLAM HYDROCHLORIDE 2 MG: 1 INJECTION, SOLUTION INTRAMUSCULAR; INTRAVENOUS at 04:04

## 2017-04-25 RX ADMIN — ROCURONIUM BROMIDE 5 MG: 10 INJECTION, SOLUTION INTRAVENOUS at 04:04

## 2017-04-25 RX ADMIN — MORPHINE SULFATE 6 MG: 10 INJECTION INTRAVENOUS at 09:04

## 2017-04-25 RX ADMIN — SODIUM CHLORIDE: 0.9 INJECTION, SOLUTION INTRAVENOUS at 05:04

## 2017-04-25 RX ADMIN — ONDANSETRON 4 MG: 2 INJECTION, SOLUTION INTRAMUSCULAR; INTRAVENOUS at 04:04

## 2017-04-25 RX ADMIN — PIPERACILLIN AND TAZOBACTAM 4.5 G: 4; .5 INJECTION, POWDER, LYOPHILIZED, FOR SOLUTION INTRAVENOUS; PARENTERAL at 05:04

## 2017-04-25 RX ADMIN — PROPOFOL 100 MG: 10 INJECTION, EMULSION INTRAVENOUS at 04:04

## 2017-04-25 RX ADMIN — CALCIUM CHLORIDE 100 MG: 100 INJECTION, SOLUTION INTRAVENOUS; INTRAVENTRICULAR at 04:04

## 2017-04-25 RX ADMIN — PANTOPRAZOLE SODIUM 40 MG: 40 TABLET, DELAYED RELEASE ORAL at 08:04

## 2017-04-25 RX ADMIN — PIPERACILLIN AND TAZOBACTAM 4.5 G: 4; .5 INJECTION, POWDER, LYOPHILIZED, FOR SOLUTION INTRAVENOUS; PARENTERAL at 02:04

## 2017-04-25 RX ADMIN — BUPIVACAINE HYDROCHLORIDE 20 ML: 2.5 INJECTION, SOLUTION INFILTRATION; PERINEURAL at 05:04

## 2017-04-25 RX ADMIN — GLYCOPYRROLATE 0.2 MG: 0.2 INJECTION, SOLUTION INTRAMUSCULAR; INTRAVENOUS at 04:04

## 2017-04-25 RX ADMIN — PROPOFOL 100 MG: 10 INJECTION, EMULSION INTRAVENOUS at 05:04

## 2017-04-25 RX ADMIN — SUCCINYLCHOLINE CHLORIDE 140 MG: 20 INJECTION, SOLUTION INTRAMUSCULAR; INTRAVENOUS at 04:04

## 2017-04-25 RX ADMIN — AMLODIPINE BESYLATE 10 MG: 5 TABLET ORAL at 08:04

## 2017-04-25 RX ADMIN — METOCLOPRAMIDE 10 MG: 5 INJECTION, SOLUTION INTRAMUSCULAR; INTRAVENOUS at 04:04

## 2017-04-25 RX ADMIN — NEOSTIGMINE METHYLSULFATE 5 MG: 1 INJECTION INTRAVENOUS at 05:04

## 2017-04-25 RX ADMIN — NEBIVOLOL HYDROCHLORIDE 20 MG: 5 TABLET ORAL at 08:04

## 2017-04-25 RX ADMIN — PIPERACILLIN AND TAZOBACTAM 4.5 G: 4; .5 INJECTION, POWDER, LYOPHILIZED, FOR SOLUTION INTRAVENOUS; PARENTERAL at 09:04

## 2017-04-25 RX ADMIN — ACETAMINOPHEN 1000 MG: 10 INJECTION, SOLUTION INTRAVENOUS at 06:04

## 2017-04-25 RX ADMIN — LIDOCAINE HYDROCHLORIDE 100 MG: 20 INJECTION, SOLUTION INTRAVENOUS at 04:04

## 2017-04-25 RX ADMIN — IRBESARTAN 300 MG: 150 TABLET ORAL at 08:04

## 2017-04-25 NOTE — OP NOTE
Laparoscopic cholecystectomy      DATE OF PROCEDURE: 04/25/2017       PREOPERATIVE DIAGNOSIS: symptomatic cholelithiasis.     POSTOPERATIVE DIAGNOSIS: same     PROCEDURE PERFORMED: Laparoscopic cholecystectomy.     SURGEON: Maurizio Solano M.D.     ANESTHESIA: General.     ESTIMATED BLOOD LOSS: minimal     DESCRIPTION OF OPERATION: The patient was brought to the Operating Room, placed  on operating room table in supine position. Under adequate anesthesia, prepped  and draped around her abdomen in the usual sterile fashion. Incision was made   in umbilicus through which a 5-mm port was inserted under direct vision. The   patient had her abdomen insufflated with 3.5 liters of CO2. Two other ports   were placed. An epigastric 11 mm and off right subcostal 5 mm port were placed.  The gallbladder was identified and retracted in cephalad fashion. Dissection   was done around the triangle of Calot. The cystic duct and cystic artery were   both identified and divided. The cystic duct was clipped. The gallbladder was   removed from the gallbladder fossa in antegrade fashion and brought out through   the epigastric port site. The abdomen was desufflated. The ports were removed   and port sites closed in layers with absorbable suture. Steri-Strips were   applied as well as bandage. The patient was awakened and transported to the   Recovery Room in satisfactory condition.

## 2017-04-25 NOTE — TRANSFER OF CARE
"Anesthesia Transfer of Care Note    Patient: Roberta Ware Jr.    Procedure(s) Performed: Procedure(s) (LRB):  CHOLECYSTECTOMY-LAPAROSCOPIC (N/A)    Patient location: PACU    Anesthesia Type: general    Transport from OR: Transported from OR on room air with adequate spontaneous ventilation    Post pain: adequate analgesia    Post assessment: no apparent anesthetic complications and tolerated procedure well    Post vital signs: stable    Level of consciousness: awake, alert and oriented    Nausea/Vomiting: no nausea/vomiting    Complications: none          Last vitals:   Visit Vitals    BP (!) 180/81 (BP Location: Right arm, Patient Position: Lying, BP Method: Automatic)    Pulse 72    Temp 37.8 °C (100 °F) (Oral)    Resp 18    Ht 6' 4" (1.93 m)    Wt (!) 196.4 kg (433 lb)    SpO2 100%    BMI 52.71 kg/m2     "

## 2017-04-25 NOTE — ANESTHESIA POSTPROCEDURE EVALUATION
"Anesthesia Post Evaluation    Patient: Roberta Ware Jr.    Procedure(s) Performed: Procedure(s) (LRB):  CHOLECYSTECTOMY-LAPAROSCOPIC (N/A)    Final Anesthesia Type: general  Patient location during evaluation: PACU  Patient participation: Yes- Able to Participate  Level of consciousness: awake and alert and oriented  Post-procedure vital signs: reviewed and stable  Pain management: adequate  Airway patency: patent  PONV status at discharge: No PONV  Anesthetic complications: no      Cardiovascular status: blood pressure returned to baseline and hemodynamically stable  Respiratory status: unassisted, spontaneous ventilation and room air  Hydration status: euvolemic  Follow-up not needed.        Visit Vitals    BP (!) 174/70    Pulse 64    Temp 37.8 °C (100 °F) (Oral)    Resp 20    Ht 6' 4" (1.93 m)    Wt (!) 196.4 kg (433 lb)    SpO2 96%    BMI 52.71 kg/m2       Pain/Norma Score: Pain Assessment Performed: Yes (4/25/2017  8:00 AM)  Presence of Pain: denies (4/25/2017  6:26 PM)  Pain Rating Prior to Med Admin: 0 (4/25/2017  6:08 PM)  Norma Score: 10 (4/25/2017  6:26 PM)      "

## 2017-04-25 NOTE — ED PROVIDER NOTES
Encounter Date: 4/24/2017    SCRIBE #1 NOTE: I, Barrera Pak, am scribing for, and in the presence of,  Beth Pinon MD. I have scribed the following portions of the note - Other sections scribed: ROS, HPI.       History     Chief Complaint   Patient presents with    Abdominal Pain     PT SENT FROM [place] FOR CHOLECYSTITIS     Review of patient's allergies indicates:   Allergen Reactions    Hydrochlorothiazide Other (See Comments)     Gout     HPI Comments: CC: Abdominal pain    HPI: This 46 y.o. M, who  has a past medical history of Diverticulosis; Erectile dysfunction; and Osteoarthritis, presents to the ED for evaluation of acute, intermittent; waxing and waning abdominal pain x19 hours. He was seen at the free standing [facility] ER in [place] and US showed cholecystitis. He was treated with IV fluids and Zofran, started on Rocephin and referred here. On exam pain is 4/10. Pain was at its worst (7/10) after eating. He notes 3 episodes of nausea and vomiting since onset. He has treated with Pepto-bismol, Dulcolax and Gas-X.  He denies prior episodes of similar pain. He denies chest pain, shortness of breath, fever, flank pain, dysuria and headache.     The history is provided by the patient.     Past Medical History:   Diagnosis Date    Diverticulosis     Erectile dysfunction     Osteoarthritis      Past Surgical History:   Procedure Laterality Date    COLONOSCOPY  2012    UPPER GASTROINTESTINAL ENDOSCOPY       Family History   Problem Relation Age of Onset    Heart disease Mother     Cancer Mother      Breast    Diabetes Father     Hypertension Father     Ulcers Father     Anemia Sister      Social History   Substance Use Topics    Smoking status: Never Smoker    Smokeless tobacco: None    Alcohol use Yes      Comment: Ocassionally     Review of Systems   Constitutional: Negative for fever.   HENT: Negative for sore throat.    Eyes: Negative for visual disturbance.   Respiratory: Negative for  shortness of breath.    Cardiovascular: Negative for chest pain.   Gastrointestinal: Positive for abdominal pain (RUQ). Negative for diarrhea, nausea and vomiting.   Genitourinary: Negative for dysuria.   Musculoskeletal: Negative for back pain.   Skin: Negative for rash.   Neurological: Negative for headaches.       Physical Exam   Initial Vitals   BP Pulse Resp Temp SpO2   04/24/17 2028 04/24/17 2028 04/24/17 2028 04/24/17 2028 04/24/17 2028   156/72 66 17 98.4 °F (36.9 °C) 99 %     Physical Exam    Constitutional: He appears well-developed and well-nourished. He is Obese .  Non-toxic appearance. He does not have a sickly appearance. He does not appear ill.   HENT:   Head: Normocephalic and atraumatic.   Eyes: EOM are normal.   Neck: Neck supple.   Cardiovascular: Normal rate and regular rhythm.   Abdominal: Soft. Bowel sounds are normal. There is tenderness in the right upper quadrant. There is no rebound and no guarding.   Neurological: He is alert.   Skin: Skin is warm and dry. No rash noted.   Psychiatric: He has a normal mood and affect.         ED Course   Procedures  Labs Reviewed - No data to display          Medical Decision Making:   History:   Old Medical Records: I decided to obtain old medical records.  Initial Assessment:   This is an emergent evaluation a 46-year-old man presented to the emergency department today secondary to right upper quadrant abdominal pain.  Differential Diagnosis:   Differential diagnoses include acute cholecystitis, cholangitis, cholelithiasis, gastritis  Clinical Tests:   Lab Tests: Reviewed  The following lab test(s) were unremarkable: CBC and CMP  Radiological Study: Reviewed  ED Management:  On physical examination, patient was morbidly obese.  Examination of the abdomen revealed tenderness in the right upper quadrant without rebound or guarding however.  Ultrasound and labs were reviewed from outside facility and were found to be concerning for acute cholecystitis on  imaging.  His labs however were reassuring.  Patient's care was discussed with Dr. Solano.  Patient was started on Zosyn in the emergency department, made nothing by mouth with IV fluids, and will likely have a cholecystectomy tomorrow.     Beth Pinon MD  20:42 PM  4/24/2017               Scribe Attestation:   Scribe #1: I performed the above scribed service and the documentation accurately describes the services I performed. I attest to the accuracy of the note.    Attending Attestation:           Physician Attestation for Scribe:  Physician Attestation Statement for Scribe #1: I, Beth Pinon MD, reviewed documentation, as scribed by Barrera Pak in my presence, and it is both accurate and complete.                 ED Course     Clinical Impression:   The encounter diagnosis was Acute cholecystitis.    Disposition:   Disposition: Admitted  Condition: Stable       Beth Pinon MD  04/25/17 0023

## 2017-04-25 NOTE — ANESTHESIA PREPROCEDURE EVALUATION
04/25/2017  Roberta Ware Jr. is a 46 y.o., male with supermorbid obesity BMI 52.8, HTN, GERD, high-risk for MIREYA, gout on indomethacin prn, here for cholecystectomy.  Pre-operative evaluation for Procedure(s) (LRB):  CHOLECYSTECTOMY-LAPAROSCOPIC (N/A)    Roberta Ware Jr. is a 46 y.o. male     Patient Active Problem List   Diagnosis    Essential hypertension    Iron deficiency anemia    Diverticulosis    Morbid obesity with BMI of 50.0-59.9, adult    Erectile dysfunction    Osteoarthritis    Leukocytosis    Plantar fasciitis    Gout    AR (allergic rhinitis)    Chronic kidney disease, stage III (moderate)    Thrombocytosis    Acute cholecystitis       Review of patient's allergies indicates:   Allergen Reactions    Hydrochlorothiazide Other (See Comments)     Gout    Tramadol      Pt states make him jittery        No current facility-administered medications on file prior to encounter.      Current Outpatient Prescriptions on File Prior to Encounter   Medication Sig Dispense Refill    amlodipine (NORVASC) 10 MG tablet Take 1 tablet (10 mg total) by mouth once daily. 30 tablet 4    irbesartan (AVAPRO) 300 MG tablet Take 1 tablet (300 mg total) by mouth once daily. 30 tablet 4    nebivolol (BYSTOLIC) 10 MG Tab Take 2 tablets (20 mg total) by mouth once daily. 60 tablet 4    cetirizine (ZYRTEC) 10 MG tablet Take 1 tablet (10 mg total) by mouth daily as needed for Rhinitis. 30 tablet 5    omeprazole (PRILOSEC) 40 MG capsule Take 1 capsule (40 mg total) by mouth every morning. 30 capsule 2       Past Surgical History:   Procedure Laterality Date    COLONOSCOPY  2012    UPPER GASTROINTESTINAL ENDOSCOPY         Social History     Social History    Marital status:      Spouse name: N/A    Number of children: N/A    Years of education: N/A     Occupational History    Not on  file.     Social History Main Topics    Smoking status: Never Smoker    Smokeless tobacco: Not on file    Alcohol use Yes      Comment: Ocassionally    Drug use: No    Sexual activity: Yes     Partners: Female     Other Topics Concern    Not on file     Social History Narrative         Vital Signs Range (Last 24H):  Temp:  [36.8 °C (98.2 °F)-36.9 °C (98.4 °F)]   Pulse:  [60-98]   Resp:  [17-20]   BP: (156-182)/(72-95)   SpO2:  [99 %-100 %]       CBC:   Recent Labs      04/25/17 0417   WBC  17.23*   RBC  4.14*   HGB  8.6*   HCT  28.9*   PLT  309   MCV  70*   MCH  20.8*   MCHC  29.8*       CMP:   Recent Labs      04/25/17 0417   NA  142   K  5.2*   CL  108   CO2  21*   BUN  12   CREATININE  1.4   GLU  79   CALCIUM  9.1   ALBUMIN  3.3*   PROT  7.2   ALKPHOS  76   ALT  9*   AST  14   BILITOT  0.4         Anesthesia Evaluation    I have reviewed the Patient Summary Reports.     I have reviewed the Medications.     Review of Systems  Anesthesia Hx:  No problems with previous Anesthesia Denies Hx of Anesthetic complications  History of prior surgery of interest to airway management or planning: Denies Family Hx of Anesthesia complications.   Denies Personal Hx of Anesthesia complications.   Hematology/Oncology:  Hematology Normal   Oncology Normal     EENT/Dental:EENT/Dental Normal   Cardiovascular:   Exercise tolerance: good Hypertension    Pulmonary:  Pulmonary Normal    Renal/:   Chronic Renal Disease, CRI    Hepatic/GI:   No Bowel Prep. GERD    Musculoskeletal:   Arthritis     Neurological:  Neurology Normal    Endocrine:  Endocrine Normal    Psych:  Psychiatric Normal           Physical Exam  General:  Well nourished, Morbid Obesity    Airway/Jaw/Neck:  Airway Findings: Mouth Opening: Normal Tongue: Normal  General Airway Assessment: Adult, Average  Mallampati: III  TM Distance: Normal, at least 6 cm  Jaw/Neck Findings:  Neck ROM: Normal ROM  Neck Findings:  Girth Increased      Dental:  Dental Findings:  In tact   Chest/Lungs:  Chest/Lungs Findings: Clear to auscultation     Heart/Vascular:  Heart Findings: Rhythm: Regular Rhythm        Mental Status:  Mental Status Findings:  Alert and Oriented, Cooperative         Anesthesia Plan  Type of Anesthesia, risks & benefits discussed:  Anesthesia Type:  general  Patient's Preference:   Intra-op Monitoring Plan: standard ASA monitors  Intra-op Monitoring Plan Comments:   Post Op Pain Control Plan:   Post Op Pain Control Plan Comments:   Induction:   IV  Beta Blocker:  Patient is on a Beta-Blocker and has received one dose within the past 24 hours (No further documentation required).       Informed Consent: Patient understands risks and agrees with Anesthesia plan.  Questions answered. Anesthesia consent signed with patient.  ASA Score: 3     Day of Surgery Review of History & Physical:    H&P update referred to the surgeon.     Anesthesia Plan Notes: NPO adequate        Ready For Surgery From Anesthesia Perspective.

## 2017-04-25 NOTE — PLAN OF CARE
Problem: Cholecystectomy (Adult)  Goal: Signs and Symptoms of Listed Potential Problems Will be Absent, Minimized or Managed (Cholecystectomy)  Signs and symptoms of listed potential problems will be absent, minimized or managed by discharge/transition of care (reference Cholecystectomy (Adult) CPG).  Outcome: Ongoing (interventions implemented as appropriate)  Patient remains free of falls, awaiting Lap Choley surgery. Patient denies complaint of pain, abdomen soft and tender upon palpation. NPO status maintained after midnight. Pre op checklist complete, iv antibiotics given per orders, surgery explained to patient. Consent signed by Dr. Solano. Will continue to monitor    04/25/17 1529   Cholecystectomy   Problems Assessed (Cholecystectomy) decreased bowel motility;infection;pain;postoperative nausea and vomiting;postoperative urinary retention;VTE (venous thromboembolism);wound healing impaired   Problems Present (Cholecystectomy) respiratory compromise;VTE (venous thromboembolism);decreased bowel motility;infection;pain;bleeding;postoperative nausea and vomiting;postoperative urinary retention

## 2017-04-25 NOTE — PLAN OF CARE
Problem: Patient Care Overview  Goal: Plan of Care Review  Outcome: Ongoing (interventions implemented as appropriate)    04/25/17 6426   Coping/Psychosocial   Plan Of Care Reviewed With patient         Pt remains free of falls and injury this shift,. Vitals stable.  Pt gait stable walkes with personal cane.  IV c/d/i NS @100 per MAR. NPO maintained. Pt oriented to room, call light within reach.  Will continue to monitor.

## 2017-04-25 NOTE — BRIEF OP NOTE
[facility]  Brief Operative Note    SUMMARY     Surgery Date: 4/25/2017     Surgeon(s) and Role:     * Maurizio Solano MD - Primary    Assisting Surgeon: None    Pre-op Diagnosis:  Acute cholecystitis [K81.0]    Post-op Diagnosis:  Post-Op Diagnosis Codes:     * Acute cholecystitis [K81.0]    Procedure(s) (LRB):  CHOLECYSTECTOMY-LAPAROSCOPIC (N/A)    Anesthesia: General    Description of Procedure: laparoscopic removal of gallbladder    Description of the findings of the procedure: large inflamed gallbladder    Estimated Blood Loss: minimal         Specimens:   Specimen (12h ago through future)    Start     Ordered    04/25/17 1721  Specimen to Pathology - Surgery  Once     Comments:  Gallbladder and contents    04/25/17 1720

## 2017-04-25 NOTE — PLAN OF CARE
[facility] [id number]  [facility]A [place] - [address] AT [facility] [place]  [address]  [place] [address]  Phone: [phone] Fax: [phone]       04/25/17 1006   Discharge Assessment   Assessment Type Discharge Planning Assessment   Confirmed/corrected address and phone number on facesheet? Yes   Assessment information obtained from? Patient   Prior to hospitilization cognitive status: Alert/Oriented   Prior to hospitalization functional status: Independent   Current cognitive status: Alert/Oriented   Current Functional Status: Independent   Arrived From home or self-care   Lives With sibling(s)   Able to Return to Prior Arrangements yes   Is patient able to care for self after discharge? Yes   How many people do you have in your home that can help with your care after discharge? 2   Who are your caregiver(s) and their phone number(s)? sistercorey Pineda and Ida   Patient's perception of discharge disposition home or selfcare   Readmission Within The Last 30 Days no previous admission in last 30 days   Equipment Currently Used at Home none   Do you have any problems affording any of your prescribed medications? No   Is the patient taking medications as prescribed? yes   Do you have any financial concerns preventing you from receiving the healthcare you need? No   Does the patient have transportation to healthcare appointments? Yes   Transportation Available car   On Dialysis? No   Discharge Plan A Home with family   Discharge Plan B (Prefer Appts after 230 on MWF)   Patient/Family In Agreement With Plan yes

## 2017-04-25 NOTE — H&P
[facility]  History & Physical     Subjective:     Chief Complaint/Reason for Admission: abdominal pain with gallstones    History of Present Illness:   Patient 46 y.o. male presents with abdominal pain with history of gallstones.     Patient Active Problem List    Diagnosis Date Noted    Acute cholecystitis 04/24/2017    Thrombocytosis 03/10/2017    Chronic kidney disease, stage III (moderate) 12/21/2016    AR (allergic rhinitis) 11/11/2014    Plantar fasciitis 02/17/2014    Gout 02/17/2014    Leukocytosis 01/17/2014    Essential hypertension     Iron deficiency anemia     Diverticulosis     Morbid obesity with BMI of 50.0-59.9, adult     Erectile dysfunction     Osteoarthritis         Prescriptions Prior to Admission   Medication Sig Dispense Refill Last Dose    amlodipine (NORVASC) 10 MG tablet Take 1 tablet (10 mg total) by mouth once daily. 30 tablet 4 4/23/2017    irbesartan (AVAPRO) 300 MG tablet Take 1 tablet (300 mg total) by mouth once daily. 30 tablet 4 4/23/2017    nebivolol (BYSTOLIC) 10 MG Tab Take 2 tablets (20 mg total) by mouth once daily. 60 tablet 4 4/23/2017    cetirizine (ZYRTEC) 10 MG tablet Take 1 tablet (10 mg total) by mouth daily as needed for Rhinitis. 30 tablet 5 Unknown    omeprazole (PRILOSEC) 40 MG capsule Take 1 capsule (40 mg total) by mouth every morning. 30 capsule 2 Unknown     Review of patient's allergies indicates:   Allergen Reactions    Hydrochlorothiazide Other (See Comments)     Gout    Tramadol      Pt states make him jittery         Past Medical History:   Diagnosis Date    Diverticulosis     Erectile dysfunction     Osteoarthritis       Past Surgical History:   Procedure Laterality Date    COLONOSCOPY  2012    UPPER GASTROINTESTINAL ENDOSCOPY        Family History   Problem Relation Age of Onset    Heart disease Mother     Cancer Mother      Breast    Diabetes Father     Hypertension Father     Ulcers Father     Anemia  "Sister       Social History   Substance Use Topics    Smoking status: Never Smoker    Smokeless tobacco: Not on file    Alcohol use Yes      Comment: Ocassionally        Review of Systems:  Pertinent items are noted in HPI.    OBJECTIVE:     Patient Vitals for the past 8 hrs:   BP Temp Temp src Pulse Resp SpO2   04/25/17 1136 (!) 168/90 98.2 °F (36.8 °C) Oral 64 20 100 %     BP (!) 168/90 (BP Location: Right arm, Patient Position: Lying, BP Method: Automatic)  Pulse 64  Temp 98.2 °F (36.8 °C) (Oral)   Resp 20  Ht 6' 4" (1.93 m)  Wt (!) 196.4 kg (433 lb)  SpO2 100%  BMI 52.71 kg/m2    General Appearance:    Alert, cooperative, no distress, appears stated age   Head:    Normocephalic, without obvious abnormality, atraumatic   Eyes:    PERRL, conjunctiva/corneas clear, EOM's intact, fundi     benign, both eyes        Ears:    Normal TM's and external ear canals, both ears   Nose:   Nares normal, septum midline, mucosa normal, no drainage    or sinus tenderness   Throat:   Lips, mucosa, and tongue normal; teeth and gums normal   Neck:   Supple, symmetrical, trachea midline, no adenopathy;        thyroid:  No enlargement/tenderness/nodules; no carotid    bruit or JVD   Back:     Symmetric, no curvature, ROM normal, no CVA tenderness   Lungs:     Clear to auscultation bilaterally, respirations unlabored   Chest wall:    No tenderness or deformity   Heart:    Regular rate and rhythm, S1 and S2 normal, no murmur, rub   or gallop   Abdomen:     Soft, non-tender, bowel sounds active all four quadrants,     no masses, no organomegaly   Genitalia:    Normal male without lesion, discharge or tenderness   Rectal:    Normal tone, normal prostate, no masses or tenderness;    guaiac negative stool   Extremities:   Extremities normal, atraumatic, no cyanosis or edema   Pulses:   2+ and symmetric all extremities   Skin:   Skin color, texture, turgor normal, no rashes or lesions   Lymph nodes:   Cervical, supraclavicular, and " axillary nodes normal   Neurologic:   CNII-XII intact. Normal strength, sensation and reflexes       throughout       Data Review:  CBC:   Recent Labs  Lab 04/25/17 0417   WBC 17.23*   RBC 4.14*   HGB 8.6*   HCT 28.9*      MCV 70*   MCH 20.8*   MCHC 29.8*     BMP:   Recent Labs  Lab 04/25/17 0417   GLU 79      K 5.2*      CO2 21*   BUN 12   CREATININE 1.4   CALCIUM 9.1     CMP:   Recent Labs  Lab 04/25/17 0417   GLU 79   CALCIUM 9.1   ALBUMIN 3.3*   PROT 7.2      K 5.2*   CO2 21*      BUN 12   CREATININE 1.4   ALKPHOS 76   ALT 9*   AST 14   BILITOT 0.4       ASSESSMENT/PLAN:     Active Hospital Problems    Diagnosis  POA    Acute cholecystitis [K81.0]  Yes      Resolved Hospital Problems    Diagnosis Date Resolved POA   No resolved problems to display.       Plan:  Admit to hospital and then to OR for lap ricarda

## 2017-04-26 LAB
ALBUMIN SERPL BCP-MCNC: 3 G/DL
ALBUMIN SERPL BCP-MCNC: 3 G/DL
ALP SERPL-CCNC: 65 U/L
ALP SERPL-CCNC: 65 U/L
ALT SERPL W/O P-5'-P-CCNC: 13 U/L
ALT SERPL W/O P-5'-P-CCNC: 13 U/L
ANION GAP SERPL CALC-SCNC: 10 MMOL/L
ANISOCYTOSIS BLD QL SMEAR: SLIGHT
ANISOCYTOSIS BLD QL SMEAR: SLIGHT
AST SERPL-CCNC: 19 U/L
AST SERPL-CCNC: 19 U/L
BACTERIA UR CULT: NORMAL
BACTERIA UR CULT: NORMAL
BASOPHILS # BLD AUTO: 0.01 K/UL
BASOPHILS # BLD AUTO: 0.01 K/UL
BASOPHILS NFR BLD: 0.1 %
BASOPHILS NFR BLD: 0.1 %
BILIRUB SERPL-MCNC: 0.6 MG/DL
BILIRUB SERPL-MCNC: 0.6 MG/DL
BUN SERPL-MCNC: 12 MG/DL
CALCIUM SERPL-MCNC: 8.9 MG/DL
CHLORIDE SERPL-SCNC: 107 MMOL/L
CO2 SERPL-SCNC: 24 MMOL/L
CREAT SERPL-MCNC: 1.5 MG/DL
DACRYOCYTES BLD QL SMEAR: ABNORMAL
DACRYOCYTES BLD QL SMEAR: ABNORMAL
DIFFERENTIAL METHOD: ABNORMAL
DIFFERENTIAL METHOD: ABNORMAL
EOSINOPHIL # BLD AUTO: 0.3 K/UL
EOSINOPHIL # BLD AUTO: 0.3 K/UL
EOSINOPHIL NFR BLD: 1.8 %
EOSINOPHIL NFR BLD: 1.8 %
ERYTHROCYTE [DISTWIDTH] IN BLOOD BY AUTOMATED COUNT: 19.6 %
ERYTHROCYTE [DISTWIDTH] IN BLOOD BY AUTOMATED COUNT: 19.6 %
EST. GFR  (AFRICAN AMERICAN): >60 ML/MIN/1.73 M^2
EST. GFR  (NON AFRICAN AMERICAN): 55 ML/MIN/1.73 M^2
GLUCOSE SERPL-MCNC: 98 MG/DL
HCT VFR BLD AUTO: 27.8 %
HCT VFR BLD AUTO: 27.8 %
HGB BLD-MCNC: 8.2 G/DL
HGB BLD-MCNC: 8.2 G/DL
HYPOCHROMIA BLD QL SMEAR: ABNORMAL
HYPOCHROMIA BLD QL SMEAR: ABNORMAL
LYMPHOCYTES # BLD AUTO: 4.2 K/UL
LYMPHOCYTES # BLD AUTO: 4.2 K/UL
LYMPHOCYTES NFR BLD: 26.6 %
LYMPHOCYTES NFR BLD: 26.6 %
MAGNESIUM SERPL-MCNC: 2.1 MG/DL
MCH RBC QN AUTO: 20.9 PG
MCH RBC QN AUTO: 20.9 PG
MCHC RBC AUTO-ENTMCNC: 29.5 %
MCHC RBC AUTO-ENTMCNC: 29.5 %
MCV RBC AUTO: 71 FL
MCV RBC AUTO: 71 FL
MONOCYTES # BLD AUTO: 1 K/UL
MONOCYTES # BLD AUTO: 1 K/UL
MONOCYTES NFR BLD: 6.4 %
MONOCYTES NFR BLD: 6.4 %
NEUTROPHILS # BLD AUTO: 10.2 K/UL
NEUTROPHILS # BLD AUTO: 10.2 K/UL
NEUTROPHILS NFR BLD: 65.4 %
NEUTROPHILS NFR BLD: 65.4 %
OVALOCYTES BLD QL SMEAR: ABNORMAL
OVALOCYTES BLD QL SMEAR: ABNORMAL
PLATELET # BLD AUTO: 359 K/UL
PLATELET # BLD AUTO: 359 K/UL
PMV BLD AUTO: 8.7 FL
PMV BLD AUTO: 8.7 FL
POIKILOCYTOSIS BLD QL SMEAR: SLIGHT
POIKILOCYTOSIS BLD QL SMEAR: SLIGHT
POLYCHROMASIA BLD QL SMEAR: ABNORMAL
POLYCHROMASIA BLD QL SMEAR: ABNORMAL
POTASSIUM SERPL-SCNC: 4.7 MMOL/L
PROT SERPL-MCNC: 7 G/DL
PROT SERPL-MCNC: 7 G/DL
RBC # BLD AUTO: 3.93 M/UL
RBC # BLD AUTO: 3.93 M/UL
SODIUM SERPL-SCNC: 141 MMOL/L
TARGETS BLD QL SMEAR: ABNORMAL
TARGETS BLD QL SMEAR: ABNORMAL
WBC # BLD AUTO: 15.7 K/UL
WBC # BLD AUTO: 15.7 K/UL

## 2017-04-26 PROCEDURE — 80053 COMPREHEN METABOLIC PANEL: CPT

## 2017-04-26 PROCEDURE — 25000003 PHARM REV CODE 250: Performed by: EMERGENCY MEDICINE

## 2017-04-26 PROCEDURE — 85025 COMPLETE CBC W/AUTO DIFF WBC: CPT

## 2017-04-26 PROCEDURE — 83735 ASSAY OF MAGNESIUM: CPT

## 2017-04-26 PROCEDURE — 12000002 HC ACUTE/MED SURGE SEMI-PRIVATE ROOM

## 2017-04-26 PROCEDURE — 25000003 PHARM REV CODE 250: Performed by: SURGERY

## 2017-04-26 PROCEDURE — 63600175 PHARM REV CODE 636 W HCPCS: Performed by: SURGERY

## 2017-04-26 RX ADMIN — IRBESARTAN 300 MG: 150 TABLET ORAL at 09:04

## 2017-04-26 RX ADMIN — PIPERACILLIN AND TAZOBACTAM 4.5 G: 4; .5 INJECTION, POWDER, LYOPHILIZED, FOR SOLUTION INTRAVENOUS; PARENTERAL at 09:04

## 2017-04-26 RX ADMIN — PIPERACILLIN AND TAZOBACTAM 4.5 G: 4; .5 INJECTION, POWDER, LYOPHILIZED, FOR SOLUTION INTRAVENOUS; PARENTERAL at 05:04

## 2017-04-26 RX ADMIN — PANTOPRAZOLE SODIUM 40 MG: 40 TABLET, DELAYED RELEASE ORAL at 09:04

## 2017-04-26 RX ADMIN — PIPERACILLIN AND TAZOBACTAM 4.5 G: 4; .5 INJECTION, POWDER, LYOPHILIZED, FOR SOLUTION INTRAVENOUS; PARENTERAL at 04:04

## 2017-04-26 RX ADMIN — SODIUM CHLORIDE: 0.9 INJECTION, SOLUTION INTRAVENOUS at 05:04

## 2017-04-26 RX ADMIN — ACETAMINOPHEN 650 MG: 325 TABLET, FILM COATED ORAL at 04:04

## 2017-04-26 RX ADMIN — NEBIVOLOL HYDROCHLORIDE 20 MG: 5 TABLET ORAL at 09:04

## 2017-04-26 RX ADMIN — MORPHINE SULFATE 5 MG: 10 INJECTION INTRAVENOUS at 04:04

## 2017-04-26 RX ADMIN — AMLODIPINE BESYLATE 10 MG: 5 TABLET ORAL at 09:04

## 2017-04-26 NOTE — NURSING
Midline Surgical bandage leaking scant amounts  serosanguinous drainage noted to gown and bandage. ABD and meipore tape reinforced dressing. Will monitor.

## 2017-04-26 NOTE — PROGRESS NOTES
"Call placed to Dr. Solano's office [phone], spoke to Nighat. Follow up appointment arranged for patient to see MD on Wednesday, May 10th at 3pm. All information added to "follow up" tab.     "

## 2017-04-26 NOTE — PLAN OF CARE
Problem: Patient Care Overview  Goal: Plan of Care Review  Outcome: Ongoing (interventions implemented as appropriate)    04/26/17 0192   Coping/Psychosocial   Plan Of Care Reviewed With patient      Pt remains free of falls and injury this shift,. Vitals stable. Pt gait stable. IV c/d/i NS @125, IV ABX this shift per MAR. Clear liquids tolerated well. Pain controlled well with IV pain meds. Surgical dressing intact dried blood noted. Will continue to monitor.

## 2017-04-26 NOTE — NURSING TRANSFER
Nursing Transfer Note      4/25/2017     Transfer from Recovery room    Transfer via stretcher    Transfer with wife    Transported by surgical tech    Medicines sent: no    Chart send with patient: yes    Notified: no    Patient reassessed at: 1840    Upon arrival to floor: Patient lethargic with wife @ bedside. Complaints of incisional pain. Abdomen with 3 op sites, dressings intact. Tolerating clear liquid diet fine. Patient encouraged to turn, cough and deep breathe. IS given to patient with instructions for use

## 2017-04-26 NOTE — PROGRESS NOTES
WRITTEN HEALTHCARE DISCHARGE INFORMATION     Things that YOU are responsible for to Manage Your Care At Home:  1. Getting your prescriptions filled.  2. Taking you medications as directed. DO NOT MISS ANY DOSES!  3. Going to your follow-up doctor appointments. This is important because it allows the doctor to monitor your progress and to determine if any changes need to be made to your treatment plan.    If you are unable to make your follow up appointments, please call the number listed and reschedule this appointment.     After discharge, if you need assistance, you can call [facility] Line for 24/7 assistance at [phone]    Thank you for choosing [facility] and allowing us to care for you.   From your care management team: Charleen Mayen [phone] or [phone]     You should receive a call from [facility] within 48-72 hours to help manage your care after discharge. Please try to make sure that you answer your phone for this important phone call.     Follow-up Information     Follow up with Maurizio Solano MD On 5/10/2017.    Specialty:  General Surgery    Why:  Wednesday at 3pm    Contact information:    [address] [place] [address]  [address]  [place] [address]  [phone]

## 2017-04-26 NOTE — PLAN OF CARE
Problem: Cholecystectomy (Adult)  Goal: Signs and Symptoms of Listed Potential Problems Will be Absent, Minimized or Managed (Cholecystectomy)  Signs and symptoms of listed potential problems will be absent, minimized or managed by discharge/transition of care (reference Cholecystectomy (Adult) CPG).   Outcome: Ongoing (interventions implemented as appropriate)  Patient remains free of falls, denies complaints of pain, abdomen soft and non tender. Abdominal lap sites C/D/I. Consumed 100% of breakfast and lunch. Passing gas and +BM noted on today. IV out and restarted by Nursing supervisor. IV antibiotics restarted    04/26/17 1605   Cholecystectomy   Problems Assessed (Cholecystectomy) pain;decreased bowel motility;postoperative nausea and vomiting;postoperative urinary retention;respiratory compromise   Problems Present (Cholecystectomy) decreased bowel motility;postoperative nausea and vomiting;respiratory compromise;situational response;VTE (venous thromboembolism)

## 2017-04-27 LAB
ALBUMIN SERPL BCP-MCNC: 2.9 G/DL
ALP SERPL-CCNC: 61 U/L
ALT SERPL W/O P-5'-P-CCNC: 18 U/L
ANION GAP SERPL CALC-SCNC: 10 MMOL/L
ANISOCYTOSIS BLD QL SMEAR: SLIGHT
AST SERPL-CCNC: 17 U/L
BACTERIA #/AREA URNS HPF: ABNORMAL /HPF
BASOPHILS # BLD AUTO: 0.03 K/UL
BASOPHILS NFR BLD: 0.2 %
BILIRUB SERPL-MCNC: 0.4 MG/DL
BILIRUB UR QL STRIP: NEGATIVE
BUN SERPL-MCNC: 10 MG/DL
CALCIUM SERPL-MCNC: 8.9 MG/DL
CHLORIDE SERPL-SCNC: 106 MMOL/L
CLARITY UR: CLEAR
CO2 SERPL-SCNC: 25 MMOL/L
COLOR UR: YELLOW
CREAT SERPL-MCNC: 1.6 MG/DL
DACRYOCYTES BLD QL SMEAR: ABNORMAL
DIFFERENTIAL METHOD: ABNORMAL
EOSINOPHIL # BLD AUTO: 0.5 K/UL
EOSINOPHIL NFR BLD: 2.6 %
ERYTHROCYTE [DISTWIDTH] IN BLOOD BY AUTOMATED COUNT: 19.8 %
EST. GFR  (AFRICAN AMERICAN): 59 ML/MIN/1.73 M^2
EST. GFR  (NON AFRICAN AMERICAN): 51 ML/MIN/1.73 M^2
GLUCOSE SERPL-MCNC: 92 MG/DL
GLUCOSE UR QL STRIP: NEGATIVE
HCT VFR BLD AUTO: 26.3 %
HGB BLD-MCNC: 7.7 G/DL
HGB UR QL STRIP: ABNORMAL
HYPOCHROMIA BLD QL SMEAR: ABNORMAL
KETONES UR QL STRIP: NEGATIVE
LEUKOCYTE ESTERASE UR QL STRIP: NEGATIVE
LYMPHOCYTES # BLD AUTO: 4.4 K/UL
LYMPHOCYTES NFR BLD: 25.1 %
MCH RBC QN AUTO: 20.5 PG
MCHC RBC AUTO-ENTMCNC: 29.3 %
MCV RBC AUTO: 70 FL
MICROSCOPIC COMMENT: ABNORMAL
MONOCYTES # BLD AUTO: 1.3 K/UL
MONOCYTES NFR BLD: 7.5 %
NEUTROPHILS # BLD AUTO: 11.3 K/UL
NEUTROPHILS NFR BLD: 65 %
NITRITE UR QL STRIP: NEGATIVE
OVALOCYTES BLD QL SMEAR: ABNORMAL
PH UR STRIP: 6 [PH] (ref 5–8)
PLATELET # BLD AUTO: 328 K/UL
PMV BLD AUTO: 9.1 FL
POIKILOCYTOSIS BLD QL SMEAR: SLIGHT
POLYCHROMASIA BLD QL SMEAR: ABNORMAL
POTASSIUM SERPL-SCNC: 4.5 MMOL/L
PROT SERPL-MCNC: 6.6 G/DL
PROT UR QL STRIP: NEGATIVE
RBC # BLD AUTO: 3.75 M/UL
RBC #/AREA URNS HPF: 10 /HPF (ref 0–4)
SODIUM SERPL-SCNC: 141 MMOL/L
SP GR UR STRIP: 1.01 (ref 1–1.03)
SQUAMOUS #/AREA URNS HPF: 10 /HPF
TARGETS BLD QL SMEAR: ABNORMAL
URN SPEC COLLECT METH UR: ABNORMAL
UROBILINOGEN UR STRIP-ACNC: NEGATIVE EU/DL
WBC # BLD AUTO: 17.44 K/UL
WBC #/AREA URNS HPF: 5 /HPF (ref 0–5)

## 2017-04-27 PROCEDURE — 25000003 PHARM REV CODE 250: Performed by: EMERGENCY MEDICINE

## 2017-04-27 PROCEDURE — 12000002 HC ACUTE/MED SURGE SEMI-PRIVATE ROOM

## 2017-04-27 PROCEDURE — 81000 URINALYSIS NONAUTO W/SCOPE: CPT

## 2017-04-27 PROCEDURE — 85025 COMPLETE CBC W/AUTO DIFF WBC: CPT

## 2017-04-27 PROCEDURE — 63600175 PHARM REV CODE 636 W HCPCS: Performed by: SURGERY

## 2017-04-27 PROCEDURE — 87086 URINE CULTURE/COLONY COUNT: CPT

## 2017-04-27 PROCEDURE — 63600175 PHARM REV CODE 636 W HCPCS: Performed by: EMERGENCY MEDICINE

## 2017-04-27 PROCEDURE — 80053 COMPREHEN METABOLIC PANEL: CPT

## 2017-04-27 PROCEDURE — 36415 COLL VENOUS BLD VENIPUNCTURE: CPT

## 2017-04-27 RX ORDER — HYDRALAZINE HYDROCHLORIDE 20 MG/ML
10 INJECTION INTRAMUSCULAR; INTRAVENOUS EVERY 4 HOURS PRN
Status: DISCONTINUED | OUTPATIENT
Start: 2017-04-27 | End: 2017-04-28 | Stop reason: HOSPADM

## 2017-04-27 RX ORDER — HYDROCODONE BITARTRATE AND ACETAMINOPHEN 10; 325 MG/1; MG/1
1 TABLET ORAL EVERY 4 HOURS PRN
Status: DISCONTINUED | OUTPATIENT
Start: 2017-04-27 | End: 2017-04-28 | Stop reason: HOSPADM

## 2017-04-27 RX ORDER — HYDROCODONE BITARTRATE AND ACETAMINOPHEN 5; 325 MG/1; MG/1
1 TABLET ORAL EVERY 4 HOURS PRN
Status: DISCONTINUED | OUTPATIENT
Start: 2017-04-27 | End: 2017-04-28 | Stop reason: HOSPADM

## 2017-04-27 RX ADMIN — MORPHINE SULFATE 2 MG: 10 INJECTION INTRAVENOUS at 12:04

## 2017-04-27 RX ADMIN — HYDRALAZINE HYDROCHLORIDE 10 MG: 20 INJECTION INTRAMUSCULAR; INTRAVENOUS at 10:04

## 2017-04-27 RX ADMIN — IRBESARTAN 300 MG: 150 TABLET ORAL at 08:04

## 2017-04-27 RX ADMIN — AMLODIPINE BESYLATE 10 MG: 5 TABLET ORAL at 08:04

## 2017-04-27 RX ADMIN — NEBIVOLOL HYDROCHLORIDE 20 MG: 5 TABLET ORAL at 08:04

## 2017-04-27 RX ADMIN — PANTOPRAZOLE SODIUM 40 MG: 40 TABLET, DELAYED RELEASE ORAL at 09:04

## 2017-04-27 RX ADMIN — ACETAMINOPHEN 650 MG: 325 TABLET, FILM COATED ORAL at 08:04

## 2017-04-27 RX ADMIN — PIPERACILLIN AND TAZOBACTAM 4.5 G: 4; .5 INJECTION, POWDER, LYOPHILIZED, FOR SOLUTION INTRAVENOUS; PARENTERAL at 06:04

## 2017-04-27 RX ADMIN — PIPERACILLIN AND TAZOBACTAM 4.5 G: 4; .5 INJECTION, POWDER, LYOPHILIZED, FOR SOLUTION INTRAVENOUS; PARENTERAL at 01:04

## 2017-04-27 RX ADMIN — PIPERACILLIN AND TAZOBACTAM 4.5 G: 4; .5 INJECTION, POWDER, LYOPHILIZED, FOR SOLUTION INTRAVENOUS; PARENTERAL at 08:04

## 2017-04-27 NOTE — PLAN OF CARE
Problem: Cholecystectomy (Adult)  Goal: Signs and Symptoms of Listed Potential Problems Will be Absent, Minimized or Managed (Cholecystectomy)  Signs and symptoms of listed potential problems will be absent, minimized or managed by discharge/transition of care (reference Cholecystectomy (Adult) CPG).   Outcome: Ongoing (interventions implemented as appropriate)  Patient AAOX3, denies complaints of pain, abdomen soft and non tender. Abdominal dressings C/D/I. Tolerating regular diet well, Tmax- 99.8- urine culture and u/a sent to lab, cxray complete. IV antibiotics given per orders. Patient encouraged to turn, cough and deep breathe in addition to using IS q 2 hrs while awake    04/27/17 9468   Cholecystectomy   Problems Assessed (Cholecystectomy) pain;decreased bowel motility;postoperative urinary retention;infection;situational response;postoperative nausea and vomiting   Problems Present (Cholecystectomy) infection

## 2017-04-27 NOTE — PROGRESS NOTES
Temp to 101.3.  No pain, no SOB, no dizziness, no lightheadedness.  Had transfusion for anemia in December and was lightheaded before transfusion.  Does not feel that way now.    Vitals:    04/26/17 2000 04/27/17 0046 04/27/17 0427 04/27/17 0730   BP: 138/67 (!) 166/78 (!) 146/80 (!) 178/65   BP Location: Left arm Left arm Left arm    Patient Position: Lying Lying Lying    BP Method: Automatic Automatic Automatic    Pulse: 67 70 63 65   Resp: 19 (!) 21 20 20   Temp: 100.2 °F (37.9 °C) 98.8 °F (37.1 °C) 99 °F (37.2 °C) 98.8 °F (37.1 °C)   TempSrc: Oral Oral Oral Oral   SpO2: 95% 95% 95% 99%   Weight:       Height:         A/O x 3  RRR  Soft, cortes tender, incisions c/d/i    A/P s/p lap ricarda  IS  Cxr, UA

## 2017-04-28 VITALS
WEIGHT: 315 LBS | OXYGEN SATURATION: 100 % | HEART RATE: 75 BPM | BODY MASS INDEX: 38.36 KG/M2 | TEMPERATURE: 99 F | RESPIRATION RATE: 18 BRPM | SYSTOLIC BLOOD PRESSURE: 173 MMHG | DIASTOLIC BLOOD PRESSURE: 80 MMHG | HEIGHT: 76 IN

## 2017-04-28 LAB
ALBUMIN SERPL BCP-MCNC: 3 G/DL
ALP SERPL-CCNC: 67 U/L
ALT SERPL W/O P-5'-P-CCNC: 26 U/L
ANION GAP SERPL CALC-SCNC: 10 MMOL/L
ANISOCYTOSIS BLD QL SMEAR: SLIGHT
AST SERPL-CCNC: 20 U/L
BASOPHILS # BLD AUTO: 0.04 K/UL
BASOPHILS NFR BLD: 0.2 %
BILIRUB SERPL-MCNC: 0.6 MG/DL
BUN SERPL-MCNC: 8 MG/DL
CALCIUM SERPL-MCNC: 9 MG/DL
CHLORIDE SERPL-SCNC: 105 MMOL/L
CO2 SERPL-SCNC: 24 MMOL/L
CREAT SERPL-MCNC: 1.5 MG/DL
DIFFERENTIAL METHOD: ABNORMAL
EOSINOPHIL # BLD AUTO: 0.3 K/UL
EOSINOPHIL NFR BLD: 1.6 %
ERYTHROCYTE [DISTWIDTH] IN BLOOD BY AUTOMATED COUNT: 19.4 %
EST. GFR  (AFRICAN AMERICAN): >60 ML/MIN/1.73 M^2
EST. GFR  (NON AFRICAN AMERICAN): 55 ML/MIN/1.73 M^2
GLUCOSE SERPL-MCNC: 92 MG/DL
HCT VFR BLD AUTO: 26.3 %
HGB BLD-MCNC: 7.8 G/DL
HYPOCHROMIA BLD QL SMEAR: ABNORMAL
LYMPHOCYTES # BLD AUTO: 4.5 K/UL
LYMPHOCYTES NFR BLD: 21.2 %
MCH RBC QN AUTO: 20.7 PG
MCHC RBC AUTO-ENTMCNC: 29.7 %
MCV RBC AUTO: 70 FL
MONOCYTES # BLD AUTO: 1.4 K/UL
MONOCYTES NFR BLD: 6.6 %
NEUTROPHILS # BLD AUTO: 14.8 K/UL
NEUTROPHILS NFR BLD: 71 %
PLATELET # BLD AUTO: 332 K/UL
PMV BLD AUTO: 9.3 FL
POIKILOCYTOSIS BLD QL SMEAR: SLIGHT
POTASSIUM SERPL-SCNC: 4.8 MMOL/L
PROT SERPL-MCNC: 7 G/DL
RBC # BLD AUTO: 3.77 M/UL
SODIUM SERPL-SCNC: 139 MMOL/L
WBC # BLD AUTO: 21.02 K/UL

## 2017-04-28 PROCEDURE — 85025 COMPLETE CBC W/AUTO DIFF WBC: CPT

## 2017-04-28 PROCEDURE — 80053 COMPREHEN METABOLIC PANEL: CPT

## 2017-04-28 PROCEDURE — 25000003 PHARM REV CODE 250: Performed by: EMERGENCY MEDICINE

## 2017-04-28 RX ORDER — HYDROCODONE BITARTRATE AND ACETAMINOPHEN 5; 325 MG/1; MG/1
1 TABLET ORAL EVERY 4 HOURS PRN
Qty: 30 TABLET | Refills: 0 | Status: SHIPPED | OUTPATIENT
Start: 2017-04-28 | End: 2017-10-11

## 2017-04-28 RX ADMIN — AMLODIPINE BESYLATE 10 MG: 5 TABLET ORAL at 08:04

## 2017-04-28 RX ADMIN — IRBESARTAN 300 MG: 150 TABLET ORAL at 08:04

## 2017-04-28 RX ADMIN — PANTOPRAZOLE SODIUM 40 MG: 40 TABLET, DELAYED RELEASE ORAL at 08:04

## 2017-04-28 RX ADMIN — PIPERACILLIN AND TAZOBACTAM 4.5 G: 4; .5 INJECTION, POWDER, LYOPHILIZED, FOR SOLUTION INTRAVENOUS; PARENTERAL at 04:04

## 2017-04-28 RX ADMIN — NEBIVOLOL HYDROCHLORIDE 20 MG: 5 TABLET ORAL at 08:04

## 2017-04-28 NOTE — PLAN OF CARE
04/28/17 1013   Final Note   Assessment Type Final Discharge Note   Discharge Disposition Home   Discharge planning education complete? Yes   What phone number can be called within the next 1-3 days to see how you are doing after discharge? [phone]   Hospital Follow Up  Appt(s) scheduled? Yes   Discharge plans and expectations educations in teach back method with documentation complete? Yes   Offered [facility] -- Bedside Delivery? n/a   Discharge/Hospital Encounter Summary to (non[facility]) PCP No   Referral to Outpatient Case Management complete? No   Referral to / orders for Home Health Complete? No   30 day supply of medicines given at discharge, if documented non-compliance / non-adherence? No   Any social issues identified prior to discharge? No   Did you assess the readiness or willingness of the family or caregiver to support self management of care? Yes   Right Care Referral Info   Post Acute Recommendation No Care

## 2017-04-28 NOTE — PROGRESS NOTES
"TN reviewed follow up appointment information as well as  "Post op discharge instructions" handout with patient using teach back while informing patient to concentrate on signs and symptoms to look for after discharge that would flag him that he needs to contact the doctor. Patient is in agreement and verbalized an understanding. Placed discharge information in blue discharge folder.  TN also reviewed patient responsibility checklist with him using teach back. Patient was able to verbalize his responsibilities after discharge to manager his care at home being   1. Going to follow up appointments   2.  rx from the pharmacy when discharged  3. Taking his medication as prescribed     Patients nurseAda informed that patient can discharge from  standpoint. Also that follow up appointment has been set up and she can now complete discharge and review signs and symptoms teaching and complete discharge.      Pt inquiring about a return to work slip. TN notified pt that the MD will provide the return to work slip when he follows up with MD on 5/10/2017 @ 3PM.  "

## 2017-04-28 NOTE — NURSING
Discharge instructions, follow up appointments, and prescription given and explained to patient. Verbalizes understanding of all. IV removed, patient tolerated well. Will continue to monitor patient.

## 2017-04-28 NOTE — PLAN OF CARE
Problem: Patient Care Overview  Goal: Plan of Care Review  Outcome: Ongoing (interventions implemented as appropriate)  Pt remains free of falls and injuries. Pt s/p lap ricarda on 4/26. Abd incisions with telfa island dressings CDI w/ dried drainage. Pt tolerating regular diet with flatus and BM. IV abx cont as scheduled. Low grade temp x1 overnight, now resolved, MD aware. Pt cont with use of I.S.; cont to encourage turn, cough, deep breathing. Pain managed with PRN pain med; BP slowly trending down with IV hydralazine. No other complaints, will cont to monitor.

## 2017-04-28 NOTE — PHYSICIAN QUERY
"PT Name: Roberta Ware Jr.  MR #: [id number]    Physician Query Form - Hematology Clarification      CDS/: Macarena Bragg RN, CCDS               Contact information:  [phone]    This form is a permanent document in the medical record.      Query Date: April 28, 2017    By submitting this query, we are merely seeking further clarification of documentation. Please utilize your independent clinical judgment when addressing the question(s) below.    The Medical record contains the following:   Indicators  Supporting Clinical Findings Location in Medical Record   x "Anemia" documented Iron deficiency anemia   H&P   x H & H = On 3/7/17 =  9.2/31.5  On 4/25    =  8.6/28.9  On 4/27    =  7.7/26.3  On 4/28    =  7.8/26.3     Anesthesia Chart  Lab    BP =                     HR=      "GI bleeding" documented      Acute bleeding (Non GI site)      Transfusion(s)     x Treatment: CBC daily Orders   x Other:  Had transfusion for anemia in December.     PRE-OP DIAGNOSIS: symptomatic cholelithiasis.  PROC PERFORMED: Laparoscopic cholecystectomy  EBL = minimal    HTN  CKD Stage 3  GERD   Sx PN 4/27      Op Note 4/25      H&P  Anesthesia Chart     Provider, please specify diagnosis or diagnoses associated with above clinical findings.      [  ] Anemia of chronic disease ( Specify chronic disease)      [  ] CKD     [  ] Other (Specify) _______________________________     [  ] Clinically Undetermined     [x  ] Other Hematological Diagnosis (please specify): __________Iron deficiency anemia_______________________    [  ] Clinically Undetermined       Please document in your progress notes daily for the duration of treatment, until resolved, and include in your discharge summary.                                                                                                      "

## 2017-04-28 NOTE — PROGRESS NOTES
Pt's BP trending upwards tonight with BP of 192/91. Pt does not c/o pain. Temp still with low grade temp. Notified Dr. Rivas of BP and temp. New orders given with new order for pain pill as well. Will cont to monitor.

## 2017-04-28 NOTE — DISCHARGE SUMMARY
[facility]  General Surgery  Discharge Summary      Patient Name: Roberta Ware Jr.  MRN: [id number]  Admission Date: 4/24/2017  Hospital Length of Stay: 4 days  Discharge Date and Time:  04/28/2017 8:51 AM  Attending Physician: Maurizio Solano MD   Discharging Provider: Maurizio Solano MD  Primary Care Provider: Antwan Ambrosio MD     HPI: 47 yo male with acute cholecystitis    Procedure(s) (LRB):  CHOLECYSTECTOMY-LAPAROSCOPIC (N/A)     Hospital Course: initially slow post op course but eventually he improved and will be sent home.    Consults:     Significant Diagnostic Studies: none    Pending Diagnostic Studies:     None        Final Active Diagnoses:    Diagnosis Date Noted POA    PRINCIPAL PROBLEM:  Acute cholecystitis [K81.0] 04/24/2017 Yes      Problems Resolved During this Admission:    Diagnosis Date Noted Date Resolved POA      Discharged Condition: good    Disposition: Home or Self Care    Follow Up:  Follow-up Information     Follow up with Maurizio Solano MD On 5/10/2017.    Specialty:  General Surgery    Why:  Wednesday at 3pm    Contact information:    [address] [place] [address]  [address] [place] [id number]  [phone]          Patient Instructions:     Diet general     Activity as tolerated     Call MD for:  temperature >100.4     Call MD for:  persistent nausea and vomiting or diarrhea     Call MD for:  severe uncontrolled pain     Call MD for:  redness, tenderness, or signs of infection (pain, swelling, redness, odor or green/yellow discharge around incision site)     Call MD for:  difficulty breathing or increased cough     No dressing needed       Medications:  Reconciled Home Medications:   Current Discharge Medication List      START taking these medications    Details   hydrocodone-acetaminophen 5-325mg (NORCO) 5-325 mg per tablet Take 1 tablet by mouth every 4 (four) hours as needed.  Qty: 30 tablet, Refills: 0         CONTINUE these medications which have NOT  CHANGED    Details   amlodipine (NORVASC) 10 MG tablet Take 1 tablet (10 mg total) by mouth once daily.  Qty: 30 tablet, Refills: 4    Associated Diagnoses: Essential hypertension      irbesartan (AVAPRO) 300 MG tablet Take 1 tablet (300 mg total) by mouth once daily.  Qty: 30 tablet, Refills: 4    Associated Diagnoses: Essential hypertension      nebivolol (BYSTOLIC) 10 MG Tab Take 2 tablets (20 mg total) by mouth once daily.  Qty: 60 tablet, Refills: 4      cetirizine (ZYRTEC) 10 MG tablet Take 1 tablet (10 mg total) by mouth daily as needed for Rhinitis.  Qty: 30 tablet, Refills: 5    Associated Diagnoses: AR (allergic rhinitis)      omeprazole (PRILOSEC) 40 MG capsule Take 1 capsule (40 mg total) by mouth every morning.  Qty: 30 capsule, Refills: 2             Maurizio Solano MD  General Surgery  [facility]

## 2017-04-29 LAB — BACTERIA UR CULT: NO GROWTH

## 2017-05-02 ENCOUNTER — PATIENT OUTREACH (OUTPATIENT)
Dept: ADMINISTRATIVE | Facility: CLINIC | Age: 47
End: 2017-05-02
Payer: COMMERCIAL

## 2017-05-02 NOTE — PATIENT INSTRUCTIONS
Discharge Instructions for Laparoscopic Cholecystectomy  You have had a procedure known as a laparoscopic cholecystectomy. A laparoscopic cholecystectomy is a procedure to remove your gallbladder. People who have this procedure usually recover more quickly and have less pain than with open gallbladder surgery (called open cholecystectomy). There is no need for a special diet after this surgery.  You can live a full and healthy life without your gallbladder. This includes eating the foods and doing the things you enjoyed before your gallbladder problems started.  Home Care  Ask someone to drive you to your appointments for the next 3 days. Dont drive until you are no longer taking pain medication.  Wash the skin around your incision daily with mild soap and water. It's okay to shower the day after your surgery.  Eat your regular diet. It is wise to stay away from rich, greasy, or spicy food for a few days.  Remember, it takes about 1 week for you to get most of your strength and energy back.  Make an office visit to talk to your doctor if the following symptoms dont go away within a week after your surgery:  Fatigue  Pain around the incision  Diarrhea or constipation  Loss of appetite  Don't be alarmed if you have discomfort in your shoulder and chest for up to 48 hours after surgery. This is caused by carbon dioxide (gas) used during the operation. The discomfort will go away.  Follow-Up  Make a follow-up appointment as directed by our staff.    When to Call Your Doctor  Call your doctor immediately if you have any of the following:  Yellowing of your eyes or skin (jaundice)  Chills  Fever above 100.0°F  Redness, swelling, increasing pain, pus, or a foul smell at the incision site  Dark or rust-colored urine  Stool that is ryanne-colored or light in color instead of brown  Increasing abdominal pain   © 6751-6037 [employer], [address] [place] [address], [place] [address]. All rights reserved. This information is not  intended as a substitute for professional medical care. Always follow your healthcare professional's instructions.

## 2017-05-02 NOTE — Clinical Note
Please forward this important TCC information to your provider in order to maximize the post discharge care delivery of this patient.  C3 nurse spoke with Roberta Ware Jr.  for a TCC post hospital discharge follow up appointment. The patient has a scheduled HOSFU appointment with Antwan Ambrosio MD on 5/3 @ 0800.   Respectfully, Diana Thomas RN  [facility] C[facility]   [email or web address]     Please do not reply to this message, as this inbox is not routinely monitored.

## 2017-05-03 ENCOUNTER — OFFICE VISIT (OUTPATIENT)
Dept: FAMILY MEDICINE | Facility: CLINIC | Age: 47
End: 2017-05-03
Payer: COMMERCIAL

## 2017-05-03 VITALS
DIASTOLIC BLOOD PRESSURE: 82 MMHG | BODY MASS INDEX: 38.36 KG/M2 | WEIGHT: 315 LBS | TEMPERATURE: 98 F | HEART RATE: 59 BPM | SYSTOLIC BLOOD PRESSURE: 139 MMHG | HEIGHT: 76 IN | OXYGEN SATURATION: 97 %

## 2017-05-03 DIAGNOSIS — M25.521 RIGHT ELBOW PAIN: Primary | ICD-10-CM

## 2017-05-03 PROBLEM — K81.0 ACUTE CHOLECYSTITIS: Status: RESOLVED | Noted: 2017-04-24 | Resolved: 2017-05-03

## 2017-05-03 PROCEDURE — 1160F RVW MEDS BY RX/DR IN RCRD: CPT | Mod: S$GLB,,, | Performed by: FAMILY MEDICINE

## 2017-05-03 PROCEDURE — 99214 OFFICE O/P EST MOD 30 MIN: CPT | Mod: S$GLB,,, | Performed by: FAMILY MEDICINE

## 2017-05-03 PROCEDURE — 99999 PR PBB SHADOW E&M-EST. PATIENT-LVL III: CPT | Mod: PBBFAC,,, | Performed by: FAMILY MEDICINE

## 2017-05-03 PROCEDURE — 3075F SYST BP GE 130 - 139MM HG: CPT | Mod: S$GLB,,, | Performed by: FAMILY MEDICINE

## 2017-05-03 PROCEDURE — 3079F DIAST BP 80-89 MM HG: CPT | Mod: S$GLB,,, | Performed by: FAMILY MEDICINE

## 2017-05-03 RX ORDER — HYDROCODONE BITARTRATE AND ACETAMINOPHEN 7.5; 325 MG/1; MG/1
TABLET ORAL
Refills: 0 | COMMUNITY
Start: 2017-04-28 | End: 2017-05-10 | Stop reason: DRUGHIGH

## 2017-05-03 RX ORDER — NAPROXEN 500 MG/1
500 TABLET ORAL 2 TIMES DAILY PRN
Qty: 30 TABLET | Refills: 0 | Status: SHIPPED | OUTPATIENT
Start: 2017-05-03 | End: 2018-01-02 | Stop reason: SDUPTHER

## 2017-05-03 RX ORDER — DICLOFENAC SODIUM 30 MG/G
GEL TOPICAL
COMMUNITY
Start: 2017-04-12 | End: 2017-05-10

## 2017-05-03 RX ORDER — LIDOCAINE AND PRILOCAINE 25; 25 MG/G; MG/G
CREAM TOPICAL
COMMUNITY
Start: 2017-04-12 | End: 2017-05-10

## 2017-05-03 RX ORDER — EFINACONAZOLE 100 MG/ML
SOLUTION TOPICAL
Refills: 3 | COMMUNITY
Start: 2017-04-11 | End: 2018-07-01

## 2017-05-03 RX ORDER — IBUPROFEN AND FAMOTIDINE 800; 26.6 MG/1; MG/1
TABLET, COATED ORAL
Refills: 3 | COMMUNITY
Start: 2017-04-12 | End: 2017-07-05

## 2017-05-03 NOTE — PROGRESS NOTES
[facility]  Progress Note    SUBJECTIVE:     Chief Complaint   Patient presents with    Elbow Pain    Hand Pain       HPI   Roberta Ware Jr.  is a 46 y.o. male, recently cholecystectomy for acute cholecystitis, here for c/o right elbow/hand pain for the past 3 days. He is unable ot bend his elbow to do daily activities much, and has some poor right hand  strength. He is still able to move hands/fingers. Rates it as moderate pain. Denies any trauma/falls.    Review of patient's allergies indicates:   Allergen Reactions    Hydrochlorothiazide Other (See Comments)     Gout    Tramadol      Pt states make him jittery        Past Medical History:   Diagnosis Date    Diverticulosis     Erectile dysfunction     Osteoarthritis      Past Surgical History:   Procedure Laterality Date    CHOLECYSTECTOMY      COLONOSCOPY  2012    UPPER GASTROINTESTINAL ENDOSCOPY       Family History   Problem Relation Age of Onset    Heart disease Mother     Cancer Mother      Breast    Diabetes Father     Hypertension Father     Ulcers Father     Anemia Sister      Social History   Substance Use Topics    Smoking status: Never Smoker    Smokeless tobacco: None    Alcohol use Yes      Comment: Ocassionally        Review of Systems   Constitutional: Negative for chills, fever and malaise/fatigue.   HENT: Negative.    Respiratory: Negative.  Negative for cough and shortness of breath.    Cardiovascular: Negative.  Negative for chest pain.   Gastrointestinal: Negative.  Negative for abdominal pain, nausea and vomiting.   Genitourinary: Negative.    Musculoskeletal: Positive for joint pain (right elbow/hand ). Negative for falls.   Neurological: Negative for weakness and headaches.   All other systems reviewed and are negative.    OBJECTIVE:     Vitals:    05/03/17 0810   BP: (!) 146/82   Pulse: (!) 59   Temp: 98.4 °F (36.9 °C)     Body mass index is 53.43 kg/(m^2).    Physical Exam   Constitutional: He is  oriented to person, place, and time. He appears distressed (mild).   HENT:   Head: Normocephalic and atraumatic.   Eyes: Conjunctivae and EOM are normal.   Pulmonary/Chest: Effort normal. No respiratory distress.   Musculoskeletal: He exhibits tenderness (to palpation of olecranon process, and tenderness along R forearm/hand. some swelling noted but no erythema.  decreased R elbow ROM due to pain. able to wiggle fingers but not a good  strength).   Neurological: He is alert and oriented to person, place, and time.   Skin: Skin is warm. He is not diaphoretic.       Old records were reviewed. Labs and/or images were independently reviewed.    ASSESSMENT     1. Right elbow pain        PLAN:     Right elbow pain  -     naproxen (NAPROSYN) 500 MG tablet; Take 1 tablet (500 mg total) by mouth 2 (two) times daily as needed.  Dispense: 30 tablet; Refill: 0  -     Will try to decrease the inflammation and swelling with NSAIDs, ICE, and elevation. If pain continues advised patient to RTC. i suspect the swelling (from recent hospital IVs?)  is causing his inability to fully flex his elbow and hands. Will focus on inflammation/swelling reduction first.       RTC PRURMILA Ambrosio MD  05/03/2017 8:27 AM

## 2017-05-09 ENCOUNTER — LAB VISIT (OUTPATIENT)
Dept: LAB | Facility: HOSPITAL | Age: 47
End: 2017-05-09
Attending: INTERNAL MEDICINE
Payer: COMMERCIAL

## 2017-05-09 DIAGNOSIS — D50.9 IRON DEFICIENCY ANEMIA, UNSPECIFIED: ICD-10-CM

## 2017-05-09 LAB
ANISOCYTOSIS BLD QL SMEAR: ABNORMAL
BASOPHILS # BLD AUTO: 0.04 K/UL
BASOPHILS NFR BLD: 0.3 %
DIFFERENTIAL METHOD: ABNORMAL
EOSINOPHIL # BLD AUTO: 0.2 K/UL
EOSINOPHIL NFR BLD: 1.5 %
ERYTHROCYTE [DISTWIDTH] IN BLOOD BY AUTOMATED COUNT: 21 %
FERRITIN SERPL-MCNC: 72 NG/ML
HCT VFR BLD AUTO: 32.4 %
HGB BLD-MCNC: 9.3 G/DL
HYPOCHROMIA BLD QL SMEAR: ABNORMAL
IRON SERPL-MCNC: 26 UG/DL
LYMPHOCYTES # BLD AUTO: 4.3 K/UL
LYMPHOCYTES NFR BLD: 28.9 %
MCH RBC QN AUTO: 20.4 PG
MCHC RBC AUTO-ENTMCNC: 28.7 %
MCV RBC AUTO: 71 FL
MONOCYTES # BLD AUTO: 0.7 K/UL
MONOCYTES NFR BLD: 4.5 %
NEUTROPHILS # BLD AUTO: 9.7 K/UL
NEUTROPHILS NFR BLD: 64.8 %
OVALOCYTES BLD QL SMEAR: ABNORMAL
PLATELET # BLD AUTO: 777 K/UL
PLATELET BLD QL SMEAR: ABNORMAL
PMV BLD AUTO: 8.7 FL
POIKILOCYTOSIS BLD QL SMEAR: SLIGHT
POLYCHROMASIA BLD QL SMEAR: ABNORMAL
RBC # BLD AUTO: 4.55 M/UL
SATURATED IRON: 8 %
TARGETS BLD QL SMEAR: ABNORMAL
TOTAL IRON BINDING CAPACITY: 312 UG/DL
TRANSFERRIN SERPL-MCNC: 211 MG/DL
WBC # BLD AUTO: 15 K/UL

## 2017-05-09 PROCEDURE — 83540 ASSAY OF IRON: CPT

## 2017-05-09 PROCEDURE — 36415 COLL VENOUS BLD VENIPUNCTURE: CPT | Mod: PO

## 2017-05-09 PROCEDURE — 85007 BL SMEAR W/DIFF WBC COUNT: CPT | Mod: PO

## 2017-05-09 PROCEDURE — 85027 COMPLETE CBC AUTOMATED: CPT | Mod: PO

## 2017-05-09 PROCEDURE — 82728 ASSAY OF FERRITIN: CPT

## 2017-05-24 ENCOUNTER — PATIENT MESSAGE (OUTPATIENT)
Dept: CASE MANAGEMENT | Facility: HOSPITAL | Age: 47
End: 2017-05-24

## 2017-06-26 DIAGNOSIS — I10 ESSENTIAL HYPERTENSION: Primary | ICD-10-CM

## 2017-06-26 RX ORDER — NEBIVOLOL 10 MG/1
20 TABLET ORAL DAILY
Qty: 60 TABLET | Refills: 5 | Status: SHIPPED | OUTPATIENT
Start: 2017-06-26 | End: 2017-09-17 | Stop reason: SDUPTHER

## 2017-06-26 NOTE — TELEPHONE ENCOUNTER
----- Message from Sherron Kunz sent at 6/26/2017  2:52 PM CDT -----  Contact: self  Pt is requesting a refill for nebivolol (BYSTOLIC) 10 MG Tab. [phone]

## 2017-07-03 ENCOUNTER — TELEPHONE (OUTPATIENT)
Dept: HEMATOLOGY/ONCOLOGY | Facility: CLINIC | Age: 47
End: 2017-07-03

## 2017-07-05 ENCOUNTER — LAB VISIT (OUTPATIENT)
Dept: LAB | Facility: HOSPITAL | Age: 47
End: 2017-07-05
Attending: INTERNAL MEDICINE
Payer: COMMERCIAL

## 2017-07-05 ENCOUNTER — OFFICE VISIT (OUTPATIENT)
Dept: HEMATOLOGY/ONCOLOGY | Facility: CLINIC | Age: 47
End: 2017-07-05
Payer: COMMERCIAL

## 2017-07-05 VITALS
BODY MASS INDEX: 38.36 KG/M2 | HEART RATE: 54 BPM | SYSTOLIC BLOOD PRESSURE: 148 MMHG | OXYGEN SATURATION: 98 % | WEIGHT: 315 LBS | HEIGHT: 76 IN | TEMPERATURE: 99 F | DIASTOLIC BLOOD PRESSURE: 98 MMHG

## 2017-07-05 DIAGNOSIS — D72.829 LEUKOCYTOSIS, UNSPECIFIED TYPE: ICD-10-CM

## 2017-07-05 DIAGNOSIS — D50.9 IRON DEFICIENCY ANEMIA, UNSPECIFIED IRON DEFICIENCY ANEMIA TYPE: ICD-10-CM

## 2017-07-05 DIAGNOSIS — D75.839 THROMBOCYTOSIS: Primary | ICD-10-CM

## 2017-07-05 DIAGNOSIS — D75.839 THROMBOCYTOSIS: ICD-10-CM

## 2017-07-05 LAB
BASOPHILS # BLD AUTO: 0.04 K/UL
BASOPHILS NFR BLD: 0.3 %
DIFFERENTIAL METHOD: ABNORMAL
EOSINOPHIL # BLD AUTO: 0.4 K/UL
EOSINOPHIL NFR BLD: 2.6 %
ERYTHROCYTE [DISTWIDTH] IN BLOOD BY AUTOMATED COUNT: 21.6 %
FERRITIN SERPL-MCNC: 13 NG/ML
HCT VFR BLD AUTO: 32.2 %
HGB BLD-MCNC: 9.5 G/DL
IRON SERPL-MCNC: 23 UG/DL
LYMPHOCYTES # BLD AUTO: 4.9 K/UL
LYMPHOCYTES NFR BLD: 34.6 %
MCH RBC QN AUTO: 22.1 PG
MCHC RBC AUTO-ENTMCNC: 29.5 %
MCV RBC AUTO: 75 FL
MONOCYTES # BLD AUTO: 0.8 K/UL
MONOCYTES NFR BLD: 5.6 %
NEUTROPHILS # BLD AUTO: 8 K/UL
NEUTROPHILS NFR BLD: 56.6 %
PLATELET # BLD AUTO: 393 K/UL
PMV BLD AUTO: 9.4 FL
RBC # BLD AUTO: 4.3 M/UL
SATURATED IRON: 6 %
TOTAL IRON BINDING CAPACITY: 355 UG/DL
TRANSFERRIN SERPL-MCNC: 240 MG/DL
WBC # BLD AUTO: 14.08 K/UL

## 2017-07-05 PROCEDURE — 83540 ASSAY OF IRON: CPT

## 2017-07-05 PROCEDURE — 99999 PR PBB SHADOW E&M-EST. PATIENT-LVL IV: CPT | Mod: PBBFAC,,, | Performed by: INTERNAL MEDICINE

## 2017-07-05 PROCEDURE — 99213 OFFICE O/P EST LOW 20 MIN: CPT | Mod: S$GLB,,, | Performed by: INTERNAL MEDICINE

## 2017-07-05 PROCEDURE — 85025 COMPLETE CBC W/AUTO DIFF WBC: CPT

## 2017-07-05 PROCEDURE — 82728 ASSAY OF FERRITIN: CPT

## 2017-07-05 PROCEDURE — 36415 COLL VENOUS BLD VENIPUNCTURE: CPT | Mod: PO

## 2017-07-05 NOTE — PROGRESS NOTES
Subjective:       Patient ID: Roberta Ware Jr. is a 46 y.o. male.    Chief Complaint: Follow-up    HPI   HISTORY OF PRESENT ILLNESS:  The patient is a pleasant 46-year-old gentleman seen today for f/u for anemia and thrombocytosis The patient was recently hospitalized with acute blood loss anemia.  The patient reported 1-1/2 week history of dark stools, lightheadedness, dizziness, fatigue and dyspnea on exertion.  Hemoglobin 6g/dl on admission. He underwent a total of 4 units of packed red blood cell transfusion during hospitalization.The patient was followed by GI during hospitalization.  EGD  performed and showed ulcerations without any evidence of bleeding.  The patient was followed by GI during hospitalization.  He takes indomethacin for gout approximately one time per week.  He also had been taking ibuprofen for headaches.  The patient reports an intermittent epigastric abdominal pain over the past year.  The patient was begun on a PPI since hospital discharge.  He has had no further episodes of melena.  Review of records reveals that the patient was previously followed by colleague, Dr. Paul.    Dr. Paul prescribed ferrous sulfate supplementation.  The patient did not take prescribed medication.  No history of alcohol abuse, chronic liver disease, clotting disorder, neuropathy or recent surgery.  He does have chronic kidney disease, stage III,HTN, gout and morbid obesity.  The patient has been lost to follow up in this clinic and was last seen on 1/23/2015.  He underwent a colonoscopy approximately four years ago significant for diverticulosis.  He is a lifelong nonsmoker.  Workup to date revealed an abnormal flow cytometry, which revealed an atypical T-cell subset.  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  MICHAEL-2 mutation testing was negative. CBC from 12/27/2016 reveals [place] blood [place] [id number], hemoglobin of 8.6 g/dL, hematocrit of 28.5%, MCV of 86 and platelet count of  "502,000.      Today, he is doing well.  No new issues  He remains  sub compliant with prescribed Fe supp  He reports he was [place] of [place] on business and forgot to take medication.  No fatigue  No SOB/CP/lightheadedness  No recent infections  No rashes    He did not undergo LABS as planned    PAST MEDICAL HISTORY:  Diverticulosis, ED, GERD, gout, essential hypertension,   morbid obesity, osteoarthritis, ulcer.    PAST SURGICAL HISTORY:  Colonoscopy,EGD    FAMILY HISTORY: Mother diagnosed with breast CA. Maternal aunt diagnosed with breast CA. Paternal aunt diagnosed with lung cancer. Paternal aunt diagnosed with breast cancer        Review of Systems   Constitutional: Negative for appetite change, fatigue, fever and unexpected weight change.   HENT: Negative for mouth sores.    Eyes: Negative for visual disturbance.   Respiratory: Negative for cough and shortness of breath.    Cardiovascular: Negative for chest pain.   Gastrointestinal: Negative for abdominal pain, blood in stool and diarrhea.   Genitourinary: Negative for frequency and hematuria.   Musculoskeletal: Negative for back pain.   Skin: Negative for rash.   Neurological: Negative for headaches.   Hematological: Negative for adenopathy.   Psychiatric/Behavioral: The patient is not nervous/anxious.        Objective:       Vitals:    07/05/17 1525 07/05/17 1531   BP: (!) 148/98    BP Location: Right arm    Patient Position: Sitting    BP Method: Manual    Pulse: (!) 58 (!) 54   Temp: 98.7 °F (37.1 °C)    TempSrc: Oral    SpO2: 98% 98%   Weight: (!) 203.7 kg (449 lb 1.2 oz)    Height: 6' 4" (1.93 m)        Physical Exam   Constitutional: He is oriented to person, place, and time. He appears well-developed and well-nourished.   HENT:   Head: Normocephalic.   Mouth/Throat: Oropharynx is clear and moist. No oropharyngeal exudate.   Eyes: Conjunctivae are normal. Pupils are equal, round, and reactive to light. No scleral icterus.   Neck: Normal range of motion. " Neck supple. No thyromegaly present.   Cardiovascular: Normal rate and regular rhythm.    Murmur heard.  Pulmonary/Chest: Effort normal and breath sounds normal. He has no wheezes. He has no rales.   Abdominal: Soft. Bowel sounds are normal. He exhibits no distension and no mass. There is no hepatosplenomegaly. There is no tenderness. There is no rebound and no guarding.   Musculoskeletal: Normal range of motion. He exhibits no edema.   Lymphadenopathy:     He has no cervical adenopathy.     He has no axillary adenopathy.        Right: No supraclavicular adenopathy present.        Left: No supraclavicular adenopathy present.   Neurological: He is alert and oriented to person, place, and time. No cranial nerve deficit.   Skin: No rash noted. No erythema.   Psychiatric: He has a normal mood and affect.       Labs:   Lab Results   Component Value Date    WBC 15.00 (H) 05/09/2017    HGB 9.3 (L) 05/09/2017    HCT 32.4 (L) 05/09/2017    MCV 71 (L) 05/09/2017     (H) 05/09/2017       SPEP-  Normal total protein.   Increased gamma globulin, polyclonal.     Pathologist interpretation of peripheral blood smear:   Mild leukocytosis shows a mild absolute neutrophilia with some toxic   changes including cytoplasmic vacuoles.  The overall process appears   reactive.  Clinical correlation is required.   Additionally, there are scattered atypical lymphocytes, correlate   clinically in this patient with a history of a positive T-cell   receptor gene rearrangement by PCR.   Normocytic red cells appear mildly hypochromic.  Correlation with the   patient's iron status is recommended.   Increased platelets are noted.  This can be seen with iron   deficiency.  If the patient has not clinically iron deficient,     further evaluation may be indicated.       Results for TY CATES  (MRN [id number]) as of 3/10/2017 15:31   Ref. Range 3/7/2017 15:12   Iron Latest Ref Range: 45 - 160 ug/dL 19 (L)   TIBC Latest Ref Range: 250 -  450 ug/dL 351   Saturated Iron Latest Ref Range: 20 - 50 % 5 (L)   Transferrin Latest Ref Range: 200 - 375 mg/dL 237   Ferritin Latest Ref Range: 20.0 - 300.0 ng/mL 33   Retic Latest Ref Range: 0.4 - 2.0 % 0.8     No labs today   Assessment:       1. Thrombocytosis    2. Leukocytosis, unspecified type        Plan:   Pt clinically stable  46-year-old gentleman with a longstanding history a mild   Anemia,previously  lost to follow up, with a recent hospitalization secondary to   acute-on-chronic anemia related to acute blood loss.   SPEP-polyclonal   CBC, Fe Studies TODAY  Continues with sub compliance with prescribed Fe stupp  Pt strongly advised to begin OTC Fe supp qd with Vit C supp to improve absorption  Previous hematological workup to date revealed abnormal flow cytometry with an atypical   T-cell subset of unknown significance.    JAK2 mutation testing negative.    Discussed issue of bmbx for further evaluation  Pt continues to decline/not interested in pursuing bx    F/u 2 mo with cbc, Fe studies sed rate prior to f/u     CC: ANTON Chau M.D.

## 2017-09-01 ENCOUNTER — LAB VISIT (OUTPATIENT)
Dept: LAB | Facility: HOSPITAL | Age: 47
End: 2017-09-01
Attending: INTERNAL MEDICINE
Payer: COMMERCIAL

## 2017-09-01 DIAGNOSIS — D75.839 THROMBOCYTOSIS: ICD-10-CM

## 2017-09-01 PROCEDURE — 36415 COLL VENOUS BLD VENIPUNCTURE: CPT | Mod: PO

## 2017-09-01 PROCEDURE — 81270 JAK2 GENE: CPT

## 2017-09-05 ENCOUNTER — PATIENT MESSAGE (OUTPATIENT)
Dept: HEMATOLOGY/ONCOLOGY | Facility: CLINIC | Age: 47
End: 2017-09-05

## 2017-09-05 ENCOUNTER — TELEPHONE (OUTPATIENT)
Dept: HEMATOLOGY/ONCOLOGY | Facility: CLINIC | Age: 47
End: 2017-09-05

## 2017-09-05 NOTE — TELEPHONE ENCOUNTER
Attempted to reach pt to change or reschedule appointment for 9/6. Unable to leave msg do to VM being full.

## 2017-09-05 NOTE — TELEPHONE ENCOUNTER
Attempted to reach pt's emergency contact Miriam to notify the pt to call the office to reschedule his appointment. Unable to leave VM due to VM being full.

## 2017-09-06 LAB
JAK2 P.V617F BLD/T QL: NORMAL
JAK2 V617F MUTATION DETECTION BLD RESULT: NORMAL

## 2017-09-07 ENCOUNTER — LAB VISIT (OUTPATIENT)
Dept: LAB | Facility: HOSPITAL | Age: 47
End: 2017-09-07
Attending: INTERNAL MEDICINE
Payer: COMMERCIAL

## 2017-09-07 DIAGNOSIS — D50.9 IRON DEFICIENCY ANEMIA, UNSPECIFIED IRON DEFICIENCY ANEMIA TYPE: ICD-10-CM

## 2017-09-07 DIAGNOSIS — D75.839 THROMBOCYTOSIS: ICD-10-CM

## 2017-09-07 LAB
BASOPHILS NFR BLD: 0 %
DIFFERENTIAL METHOD: ABNORMAL
EOSINOPHIL NFR BLD: 3 %
ERYTHROCYTE [DISTWIDTH] IN BLOOD BY AUTOMATED COUNT: 19.6 %
ERYTHROCYTE [SEDIMENTATION RATE] IN BLOOD BY WESTERGREN METHOD: 21 MM/HR
FERRITIN SERPL-MCNC: 19 NG/ML
HCT VFR BLD AUTO: 36.5 %
HGB BLD-MCNC: 11 G/DL
IRON SERPL-MCNC: 24 UG/DL
LYMPHOCYTES NFR BLD: 46 %
MCH RBC QN AUTO: 23.3 PG
MCHC RBC AUTO-ENTMCNC: 30.1 G/DL
MCV RBC AUTO: 77 FL
MONOCYTES NFR BLD: 8 %
NEUTROPHILS NFR BLD: 43 %
PLATELET # BLD AUTO: 319 K/UL
PMV BLD AUTO: 9.7 FL
RBC # BLD AUTO: 4.72 M/UL
SATURATED IRON: 7 %
TOTAL IRON BINDING CAPACITY: 363 UG/DL
TRANSFERRIN SERPL-MCNC: 245 MG/DL
WBC # BLD AUTO: 13.92 K/UL

## 2017-09-07 PROCEDURE — 83540 ASSAY OF IRON: CPT

## 2017-09-07 PROCEDURE — 85027 COMPLETE CBC AUTOMATED: CPT

## 2017-09-07 PROCEDURE — 82728 ASSAY OF FERRITIN: CPT

## 2017-09-07 PROCEDURE — 36415 COLL VENOUS BLD VENIPUNCTURE: CPT | Mod: PO

## 2017-09-07 PROCEDURE — 85007 BL SMEAR W/DIFF WBC COUNT: CPT

## 2017-09-07 PROCEDURE — 85651 RBC SED RATE NONAUTOMATED: CPT

## 2017-09-11 ENCOUNTER — TELEPHONE (OUTPATIENT)
Dept: HEMATOLOGY/ONCOLOGY | Facility: CLINIC | Age: 47
End: 2017-09-11

## 2017-09-11 DIAGNOSIS — D50.9 IRON DEFICIENCY ANEMIA, UNSPECIFIED IRON DEFICIENCY ANEMIA TYPE: Primary | ICD-10-CM

## 2017-09-17 DIAGNOSIS — I10 ESSENTIAL HYPERTENSION: ICD-10-CM

## 2017-09-17 RX ORDER — NEBIVOLOL HYDROCHLORIDE 10 MG/1
TABLET ORAL
Qty: 60 TABLET | Refills: 2 | Status: SHIPPED | OUTPATIENT
Start: 2017-09-17 | End: 2017-10-11 | Stop reason: SDUPTHER

## 2017-10-11 ENCOUNTER — OFFICE VISIT (OUTPATIENT)
Dept: FAMILY MEDICINE | Facility: CLINIC | Age: 47
End: 2017-10-11
Payer: COMMERCIAL

## 2017-10-11 ENCOUNTER — LAB VISIT (OUTPATIENT)
Dept: LAB | Facility: HOSPITAL | Age: 47
End: 2017-10-11
Attending: FAMILY MEDICINE
Payer: COMMERCIAL

## 2017-10-11 VITALS
TEMPERATURE: 98 F | WEIGHT: 315 LBS | BODY MASS INDEX: 38.36 KG/M2 | OXYGEN SATURATION: 97 % | HEART RATE: 68 BPM | HEIGHT: 76 IN | SYSTOLIC BLOOD PRESSURE: 139 MMHG | DIASTOLIC BLOOD PRESSURE: 89 MMHG

## 2017-10-11 DIAGNOSIS — E66.01 MORBID OBESITY WITH BMI OF 50.0-59.9, ADULT: ICD-10-CM

## 2017-10-11 DIAGNOSIS — I10 ESSENTIAL HYPERTENSION: ICD-10-CM

## 2017-10-11 DIAGNOSIS — N52.9 ERECTILE DYSFUNCTION, UNSPECIFIED ERECTILE DYSFUNCTION TYPE: Primary | ICD-10-CM

## 2017-10-11 LAB
CHOLEST SERPL-MCNC: 144 MG/DL
CHOLEST/HDLC SERPL: 4 {RATIO}
HDLC SERPL-MCNC: 36 MG/DL
HDLC SERPL: 25 %
LDLC SERPL CALC-MCNC: 86.4 MG/DL
NONHDLC SERPL-MCNC: 108 MG/DL
TRIGL SERPL-MCNC: 108 MG/DL

## 2017-10-11 PROCEDURE — 99214 OFFICE O/P EST MOD 30 MIN: CPT | Mod: S$GLB,,, | Performed by: FAMILY MEDICINE

## 2017-10-11 PROCEDURE — 83036 HEMOGLOBIN GLYCOSYLATED A1C: CPT

## 2017-10-11 PROCEDURE — 36415 COLL VENOUS BLD VENIPUNCTURE: CPT | Mod: PO

## 2017-10-11 PROCEDURE — 80061 LIPID PANEL: CPT

## 2017-10-11 PROCEDURE — 99999 PR PBB SHADOW E&M-EST. PATIENT-LVL III: CPT | Mod: PBBFAC,,, | Performed by: FAMILY MEDICINE

## 2017-10-11 RX ORDER — IRBESARTAN 300 MG/1
TABLET ORAL
Status: CANCELLED | OUTPATIENT
Start: 2017-10-11

## 2017-10-11 RX ORDER — DICLOFENAC SODIUM 30 MG/G
GEL TOPICAL
COMMUNITY
Start: 2017-08-15 | End: 2017-10-11

## 2017-10-11 RX ORDER — CHLORTHALIDONE 25 MG/1
25 TABLET ORAL DAILY
Qty: 30 TABLET | Refills: 2 | Status: SHIPPED | OUTPATIENT
Start: 2017-10-11 | End: 2018-04-06 | Stop reason: SDUPTHER

## 2017-10-11 RX ORDER — SILDENAFIL 100 MG/1
50 TABLET, FILM COATED ORAL DAILY PRN
Qty: 3 TABLET | Refills: 0 | Status: SHIPPED | OUTPATIENT
Start: 2017-10-11 | End: 2018-07-01

## 2017-10-11 RX ORDER — NEBIVOLOL 10 MG/1
TABLET ORAL
Qty: 60 TABLET | Refills: 2 | Status: SHIPPED | OUTPATIENT
Start: 2017-10-11 | End: 2018-02-10 | Stop reason: SDUPTHER

## 2017-10-11 RX ORDER — IRBESARTAN 300 MG/1
TABLET ORAL
COMMUNITY
Start: 2017-09-17 | End: 2017-10-11 | Stop reason: SDUPTHER

## 2017-10-11 RX ORDER — IRBESARTAN 300 MG/1
300 TABLET ORAL DAILY
Qty: 90 TABLET | Refills: 3 | Status: SHIPPED | OUTPATIENT
Start: 2017-10-11 | End: 2018-07-01

## 2017-10-11 RX ORDER — AMLODIPINE BESYLATE 10 MG/1
10 TABLET ORAL DAILY
Qty: 90 TABLET | Refills: 3 | Status: SHIPPED | OUTPATIENT
Start: 2017-10-11 | End: 2018-11-17 | Stop reason: SDUPTHER

## 2017-10-11 NOTE — PROGRESS NOTES
[facility]  Progress Note    SUBJECTIVE:     Chief Complaint   Patient presents with    Hypertension       HPI   Roberta Ware Jr.  is a 47 y.o. male here for follow up of his hypertension. He takes his meds as directed. He admits been eating very bad, and has gained over 25 lbs since last office visit. No chest pain, SOB. He will try to diet/exercise better starting today, and will focus on losing weight. He will try by cutting out all the sugary drinks first.     Review of patient's allergies indicates:   Allergen Reactions    Hydrochlorothiazide Other (See Comments)     Gout    Tramadol      Pt states make him jittery        Past Medical History:   Diagnosis Date    Diverticulosis     Erectile dysfunction     Osteoarthritis      Past Surgical History:   Procedure Laterality Date    CHOLECYSTECTOMY      COLONOSCOPY  2012    UPPER GASTROINTESTINAL ENDOSCOPY       Family History   Problem Relation Age of Onset    Heart disease Mother     Cancer Mother      Breast    Diabetes Father     Hypertension Father     Ulcers Father     Anemia Sister      Social History   Substance Use Topics    Smoking status: Never Smoker    Smokeless tobacco: Never Used    Alcohol use Yes      Comment: Ocassionally        Review of Systems   Constitutional: Negative for chills, fever and malaise/fatigue.   HENT: Negative.    Respiratory: Negative.  Negative for cough and shortness of breath.    Cardiovascular: Negative.  Negative for chest pain.   Gastrointestinal: Negative.  Negative for abdominal pain, nausea and vomiting.   Genitourinary:        + ED   Neurological: Negative for weakness and headaches.   All other systems reviewed and are negative.    OBJECTIVE:     Vitals:    10/11/17 1129   BP: 139/89   Pulse: 68   Temp: 98.2 °F (36.8 °C)     Body mass index is 56.65 kg/m².    Physical Exam   Constitutional: He is oriented to person, place, and time and well-developed, well-nourished, and in no  distress. No distress.   HENT:   Head: Normocephalic and atraumatic.   Nose: Nose normal.   Eyes: Conjunctivae and EOM are normal.   Cardiovascular: Normal rate, regular rhythm and normal heart sounds.  Exam reveals no gallop and no friction rub.    No murmur heard.  Pulmonary/Chest: Effort normal and breath sounds normal. No respiratory distress. He has no wheezes. He has no rales. He exhibits no tenderness.   Abdominal: Soft. Bowel sounds are normal. He exhibits no distension. There is no tenderness. There is no rebound.   Neurological: He is alert and oriented to person, place, and time.   Skin: Skin is warm. He is not diaphoretic.       Old records were reviewed. Labs and/or images were independently reviewed.    ASSESSMENT     1. Erectile dysfunction, unspecified erectile dysfunction type    2. Essential hypertension    3. Morbid obesity with BMI of 50.0-59.9, adult        PLAN:     Erectile dysfunction, unspecified erectile dysfunction type  -     sildenafil (VIAGRA) 100 MG tablet; Take 0.5 tablets (50 mg total) by mouth daily as needed for Erectile Dysfunction.  Dispense: 3 tablet; Refill: 0    Essential hypertension  -     nebivolol (BYSTOLIC) 10 MG Tab; TAKE 2 TABLETS(20 MG) BY MOUTH EVERY DAY  Dispense: 60 tablet; Refill: 2  -     amlodipine (NORVASC) 10 MG tablet; Take 1 tablet (10 mg total) by mouth once daily.  Dispense: 90 tablet; Refill: 3  -     irbesartan (AVAPRO) 300 MG tablet; Take 1 tablet (300 mg total) by mouth once daily.  Dispense: 90 tablet; Refill: 3  -     Start chlorthalidone (HYGROTEN) 25 MG Tab; Take 1 tablet (25 mg total) by mouth once daily.  Dispense: 30 tablet; Refill: 2  -     Hemoglobin A1c; Future  -     Lipid panel; Future  -     Educated patient to take medications as prescribed, and to record bp logs daily to bring along to next visit. Instructed patient to decrease salt intake. Also told patient to call if any new signs or symptoms develop.    Morbid obesity with BMI of  50.0-59.9, adult  -     Ambulatory referral to Nutrition Services  -     Counseled patient about healthy diet, exercise habits, and to increase physical activity.    Other orders  -     Cancel: irbesartan (AVAPRO) 300 MG tablet;       RTC PRN or 2-3 weeks for BP management.    Antwan Ambrosio MD  10/11/2017 3:05 PM

## 2017-10-12 LAB
ESTIMATED AVG GLUCOSE: 123 MG/DL
HBA1C MFR BLD HPLC: 5.9 %

## 2017-10-23 ENCOUNTER — OFFICE VISIT (OUTPATIENT)
Dept: OTOLARYNGOLOGY | Facility: CLINIC | Age: 47
End: 2017-10-23
Payer: COMMERCIAL

## 2017-10-23 VITALS — HEART RATE: 52 BPM | TEMPERATURE: 98 F | DIASTOLIC BLOOD PRESSURE: 94 MMHG | SYSTOLIC BLOOD PRESSURE: 168 MMHG

## 2017-10-23 DIAGNOSIS — H61.23 HEARING LOSS DUE TO CERUMEN IMPACTION, BILATERAL: Primary | ICD-10-CM

## 2017-10-23 PROCEDURE — 99999 PR PBB SHADOW E&M-EST. PATIENT-LVL III: CPT | Mod: PBBFAC,,, | Performed by: OTOLARYNGOLOGY

## 2017-10-23 PROCEDURE — 69210 REMOVE IMPACTED EAR WAX UNI: CPT | Mod: S$GLB,,, | Performed by: OTOLARYNGOLOGY

## 2017-10-23 PROCEDURE — 99499 UNLISTED E&M SERVICE: CPT | Mod: S$GLB,,, | Performed by: OTOLARYNGOLOGY

## 2017-10-23 NOTE — PROGRESS NOTES
CC: Ear cleaning   HPI:Mr. Ware is a 47-year-old Afro-American male with a BMI of 56 kg/m².  He is here for an ear cleaning procedure.  I last cleaned is ears in 2013 in August.  A large amount cerumen was removed from each ear canal at that time his right ear canal was irrigated after peroxide instillation.  Audiometry was not performed at that visit.  He was diagnosed with hypertrophy of both inferior nasal turbinates and tonsillar hypertrophy.  It was suggested that he return for ear canal maintenance of wax removal every 6-12 months base of the volume of cerumen removed his last visit.  There is no evidence of previous audiometry in our EMR.    PMH: High blood pressure, anemia, hearing loss  PSH: Gallstone removal  Family history: Heart disease, high blood pressure, high cholesterol, glaucoma, diabetes, arthritis, anemia, hearing loss, vertigo, kidney disease, breast cancer, colon cancer  Occupation:   His review of systems questionnaire indicates positive responses to questions regarding dizziness, high blood pressure, stomach pain, anemia; he denies trouble hearing, ear pain, ear pressure, ear ringing, discharge, ear infections, head trauma or significant family history of hearing loss.    PE: Blood pressure 168/94 pulse 52 temperature 98.3 height 6 feet 4 inches weight 465 pounds  Gen.: Alert and oriented saw spoken gentleman in no acute distress.  Both ears were examined under the microscope in the micro-procedure room.  A large cerumen impaction is carefully extracted from the right ear canal with large diameter suction.  Right eardrum is intact and clear when visualized.  Some residual cerumen remains attached to the canal wall which is left alone.  A large cerumen impaction is extracted from left ear canal with large diameter suction.  Left eardrum is intact and clear when visualized directly.  A small amount of residual wax is left alone attached to the canal wall.  Audiometry was  not performed today (visit completed after 5 PM).      DIAGNOSIS:     ICD-10-CM ICD-9-CM    1. Hearing loss due to cerumen impaction, bilateral H61.23 389.8      380.4      PLAN: Large cerumen impactions removed from both eacs  RTC yearly for ear cleaning; audiometry on return prn

## 2017-10-23 NOTE — PATIENT INSTRUCTIONS
Large cerumen impactions removed from both eacs  RTC yearly for ear cleaning; audiometry on return prn

## 2017-10-25 ENCOUNTER — LAB VISIT (OUTPATIENT)
Dept: LAB | Facility: HOSPITAL | Age: 47
End: 2017-10-25
Attending: INTERNAL MEDICINE
Payer: COMMERCIAL

## 2017-10-25 DIAGNOSIS — D50.9 IRON DEFICIENCY ANEMIA, UNSPECIFIED IRON DEFICIENCY ANEMIA TYPE: ICD-10-CM

## 2017-10-25 LAB
ALBUMIN SERPL BCP-MCNC: 3.7 G/DL
ALP SERPL-CCNC: 81 U/L
ALT SERPL W/O P-5'-P-CCNC: 12 U/L
ANION GAP SERPL CALC-SCNC: 5 MMOL/L
AST SERPL-CCNC: 15 U/L
BASOPHILS # BLD AUTO: 0.05 K/UL
BASOPHILS NFR BLD: 0.4 %
BILIRUB SERPL-MCNC: 0.4 MG/DL
BUN SERPL-MCNC: 15 MG/DL
CALCIUM SERPL-MCNC: 9.4 MG/DL
CHLORIDE SERPL-SCNC: 104 MMOL/L
CO2 SERPL-SCNC: 30 MMOL/L
CREAT SERPL-MCNC: 1.5 MG/DL
DIFFERENTIAL METHOD: ABNORMAL
EOSINOPHIL # BLD AUTO: 0.4 K/UL
EOSINOPHIL NFR BLD: 3.1 %
ERYTHROCYTE [DISTWIDTH] IN BLOOD BY AUTOMATED COUNT: 21.3 %
EST. GFR  (AFRICAN AMERICAN): >60 ML/MIN/1.73 M^2
EST. GFR  (NON AFRICAN AMERICAN): 55 ML/MIN/1.73 M^2
FERRITIN SERPL-MCNC: 25 NG/ML
GLUCOSE SERPL-MCNC: 112 MG/DL
HCT VFR BLD AUTO: 37.4 %
HGB BLD-MCNC: 11.2 G/DL
IMM GRANULOCYTES # BLD AUTO: 0.03 K/UL
IMM GRANULOCYTES NFR BLD AUTO: 0.2 %
IRON SERPL-MCNC: 81 UG/DL
LYMPHOCYTES # BLD AUTO: 5.1 K/UL
LYMPHOCYTES NFR BLD: 38.6 %
MCH RBC QN AUTO: 23.9 PG
MCHC RBC AUTO-ENTMCNC: 29.9 G/DL
MCV RBC AUTO: 80 FL
MONOCYTES # BLD AUTO: 0.8 K/UL
MONOCYTES NFR BLD: 6 %
NEUTROPHILS # BLD AUTO: 6.9 K/UL
NEUTROPHILS NFR BLD: 51.7 %
NRBC BLD-RTO: 0 /100 WBC
PLATELET # BLD AUTO: 307 K/UL
PMV BLD AUTO: 10 FL
POTASSIUM SERPL-SCNC: 4.3 MMOL/L
PROT SERPL-MCNC: 8.2 G/DL
RBC # BLD AUTO: 4.69 M/UL
SATURATED IRON: 22 %
SODIUM SERPL-SCNC: 139 MMOL/L
TOTAL IRON BINDING CAPACITY: 373 UG/DL
TRANSFERRIN SERPL-MCNC: 252 MG/DL
WBC # BLD AUTO: 13.3 K/UL

## 2017-10-25 PROCEDURE — 85025 COMPLETE CBC W/AUTO DIFF WBC: CPT

## 2017-10-25 PROCEDURE — 83540 ASSAY OF IRON: CPT

## 2017-10-25 PROCEDURE — 82728 ASSAY OF FERRITIN: CPT

## 2017-10-25 PROCEDURE — 80053 COMPREHEN METABOLIC PANEL: CPT

## 2017-10-25 PROCEDURE — 36415 COLL VENOUS BLD VENIPUNCTURE: CPT | Mod: PO

## 2017-11-10 ENCOUNTER — TELEPHONE (OUTPATIENT)
Dept: FAMILY MEDICINE | Facility: CLINIC | Age: 47
End: 2017-11-10

## 2017-11-10 NOTE — TELEPHONE ENCOUNTER
----- Message from Mariposa Alvarez sent at 11/10/2017  2:12 PM CST -----  Contact: [facility] [phone]  Pt script for Viagra was denied by insurance. Thanks.........Cristiana

## 2017-11-10 NOTE — TELEPHONE ENCOUNTER
Spoke w/patient, informed Rx denied by insurance company of coverage. Patient states he will pay out of pocket.r

## 2017-11-21 ENCOUNTER — TELEPHONE (OUTPATIENT)
Dept: HEMATOLOGY/ONCOLOGY | Facility: CLINIC | Age: 47
End: 2017-11-21

## 2017-11-21 ENCOUNTER — LAB VISIT (OUTPATIENT)
Dept: LAB | Facility: HOSPITAL | Age: 47
End: 2017-11-21
Payer: COMMERCIAL

## 2017-11-21 DIAGNOSIS — D50.9 IRON (FE) DEFICIENCY ANEMIA: Primary | ICD-10-CM

## 2017-11-21 DIAGNOSIS — D50.9 IRON (FE) DEFICIENCY ANEMIA: ICD-10-CM

## 2017-11-21 LAB
BASOPHILS # BLD AUTO: 0.04 K/UL
BASOPHILS NFR BLD: 0.3 %
DIFFERENTIAL METHOD: ABNORMAL
EOSINOPHIL # BLD AUTO: 0.5 K/UL
EOSINOPHIL NFR BLD: 3 %
ERYTHROCYTE [DISTWIDTH] IN BLOOD BY AUTOMATED COUNT: 20 %
ERYTHROCYTE [SEDIMENTATION RATE] IN BLOOD BY WESTERGREN METHOD: 13 MM/HR
FERRITIN SERPL-MCNC: 33 NG/ML
HCT VFR BLD AUTO: 36.8 %
HGB BLD-MCNC: 11.2 G/DL
IRON SERPL-MCNC: 25 UG/DL
LYMPHOCYTES # BLD AUTO: 5.3 K/UL
LYMPHOCYTES NFR BLD: 34.7 %
MCH RBC QN AUTO: 24.6 PG
MCHC RBC AUTO-ENTMCNC: 30.4 G/DL
MCV RBC AUTO: 81 FL
MONOCYTES # BLD AUTO: 0.8 K/UL
MONOCYTES NFR BLD: 5.1 %
NEUTROPHILS # BLD AUTO: 8.6 K/UL
NEUTROPHILS NFR BLD: 56.9 %
PLATELET # BLD AUTO: 305 K/UL
PMV BLD AUTO: 9.6 FL
RBC # BLD AUTO: 4.56 M/UL
SATURATED IRON: 7 %
TOTAL IRON BINDING CAPACITY: 342 UG/DL
TRANSFERRIN SERPL-MCNC: 231 MG/DL
WBC # BLD AUTO: 15.17 K/UL

## 2017-11-21 PROCEDURE — 83540 ASSAY OF IRON: CPT

## 2017-11-21 PROCEDURE — 85651 RBC SED RATE NONAUTOMATED: CPT

## 2017-11-21 PROCEDURE — 82728 ASSAY OF FERRITIN: CPT

## 2017-11-21 PROCEDURE — 36415 COLL VENOUS BLD VENIPUNCTURE: CPT | Mod: PO

## 2017-11-21 PROCEDURE — 85025 COMPLETE CBC W/AUTO DIFF WBC: CPT | Mod: PO

## 2017-11-30 ENCOUNTER — OFFICE VISIT (OUTPATIENT)
Dept: HEMATOLOGY/ONCOLOGY | Facility: CLINIC | Age: 47
End: 2017-11-30
Payer: COMMERCIAL

## 2017-11-30 VITALS
SYSTOLIC BLOOD PRESSURE: 141 MMHG | OXYGEN SATURATION: 98 % | TEMPERATURE: 99 F | DIASTOLIC BLOOD PRESSURE: 67 MMHG | BODY MASS INDEX: 56.82 KG/M2 | WEIGHT: 315 LBS | HEART RATE: 56 BPM

## 2017-11-30 DIAGNOSIS — D64.9 ANEMIA, UNSPECIFIED TYPE: ICD-10-CM

## 2017-11-30 DIAGNOSIS — D72.829 LEUKOCYTOSIS, UNSPECIFIED TYPE: Primary | ICD-10-CM

## 2017-11-30 DIAGNOSIS — N18.30 CHRONIC KIDNEY DISEASE, STAGE III (MODERATE): ICD-10-CM

## 2017-11-30 PROCEDURE — 99213 OFFICE O/P EST LOW 20 MIN: CPT | Mod: S$GLB,,, | Performed by: INTERNAL MEDICINE

## 2017-11-30 PROCEDURE — 99999 PR PBB SHADOW E&M-EST. PATIENT-LVL III: CPT | Mod: PBBFAC,,, | Performed by: INTERNAL MEDICINE

## 2017-11-30 NOTE — PATIENT INSTRUCTIONS
Take over the counter Iron supplements Ferrous sulfate 325mg  Twice a day. Take with chewable Vitamin C 500mg .

## 2017-11-30 NOTE — PROGRESS NOTES
Subjective:       Patient ID: Roberta Ware Jr. is a 47 y.o. male.    Chief Complaint: No chief complaint on file.    HPI     PT ARRIVED > 15 MIN LATE FOR VISIT  HISTORY OF PRESENT ILLNESS:  The patient is a pleasant 46-year-old gentleman seen today for f/u for abnl blood counts. He has anemia and thrombocytosis  And intermittent leukocytosis. The patient was hospitalized 12/2016 with acute blood loss anemia.  The patient reported 1-1/2 week history of dark stools, lightheadedness, dizziness, fatigue and dyspnea on exertion.  Hemoglobin 6g/dl on admission. He underwent a total of 4 units of packed red blood cell transfusion during hospitalization.The patient was followed by GI during hospitalization.  EGD  performed and showed ulcerations without any evidence of bleeding.  The patient was followed by GI during hospitalization.  He takes indomethacin for gout approximately one time per week.  He also had been taking ibuprofen for headaches.  The patient reports an intermittent epigastric abdominal pain over the past year.  The patient was begun on a PPI since hospital discharge.  He has had no further episodes of melena.  Review of records reveals that the patient was previously followed by colleague, Dr. Paul.    Dr. Paul prescribed ferrous sulfate supplementation.  The patient did not take prescribed medication.  No history of alcohol abuse, chronic liver disease, clotting disorder, neuropathy or recent surgery.  He does have chronic kidney disease, stage III,HTN, gout and morbid obesity.  The patient previously  lost to follow up in this clinic and was last seen on 1/23/2015.  He underwent a colonoscopy approximately four years ago significant for diverticulosis.  He is a lifelong nonsmoker.  Workup to date revealed an abnormal flow cytometry, which revealed an atypical T-cell subset.  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  MICHAEL-2 mutation testing was negative. CBC from 12/27/2016 reveals a  "white blood [place] of [address], hemoglobin of 8.6 g/dL, hematocrit of 28.5%, MCV of 86 and platelet count of 502,000.      Today, he is " doing great"   No new issues  He remains  sub compliant with prescribed Fe supp  He reports he was out of [place] on business and forgot to take medication.  No fatigue  No SOB/CP/lightheadedness  No recent infections  No rashes        PAST MEDICAL HISTORY:  Diverticulosis, ED, GERD, gout, essential hypertension,   morbid obesity, osteoarthritis, ulcer.    PAST SURGICAL HISTORY:  Colonoscopy,EGD    FAMILY HISTORY: Mother diagnosed with breast CA. Maternal aunt diagnosed with breast CA. Paternal aunt diagnosed with lung cancer. Paternal aunt diagnosed with breast cancer        Review of Systems   Constitutional: Negative for appetite change, fatigue, fever and unexpected weight change.   HENT: Negative for mouth sores.    Eyes: Negative for visual disturbance.   Respiratory: Negative for cough and shortness of breath.    Cardiovascular: Negative for chest pain.   Gastrointestinal: Negative for abdominal pain, blood in stool and diarrhea.   Genitourinary: Negative for frequency and hematuria.   Musculoskeletal: Negative for back pain.   Skin: Negative for rash.   Neurological: Negative for headaches.   Hematological: Negative for adenopathy.   Psychiatric/Behavioral: The patient is not nervous/anxious.        Objective:       Vitals:    11/30/17 1538   BP: (!) 141/67   BP Location: Right arm   Patient Position: Sitting   BP Method: Large (Automatic)   Pulse: (!) 56   Temp: 99 °F (37.2 °C)   TempSrc: Oral   SpO2: 98%   Weight: (!) 211.7 kg (466 lb 13.2 oz)       Physical Exam   Constitutional: He is oriented to person, place, and time. He appears well-developed and well-nourished.   HENT:   Head: Normocephalic.   Mouth/Throat: Oropharynx is clear and moist. No oropharyngeal exudate.   Eyes: Conjunctivae are normal. Pupils are equal, round, and reactive to light. No scleral icterus. "   Neck: Normal range of motion. Neck supple. No thyromegaly present.   Cardiovascular: Normal rate and regular rhythm.    Murmur heard.  Pulmonary/Chest: Effort normal and breath sounds normal. He has no wheezes. He has no rales.   Abdominal: Soft. Bowel sounds are normal. He exhibits no distension and no mass. There is no hepatosplenomegaly. There is no tenderness. There is no rebound and no guarding.   Musculoskeletal: Normal range of motion. He exhibits no edema.   Lymphadenopathy:     He has no cervical adenopathy.     He has no axillary adenopathy.        Right: No supraclavicular adenopathy present.        Left: No supraclavicular adenopathy present.   Neurological: He is alert and oriented to person, place, and time. No cranial nerve deficit.   Skin: No rash noted. No erythema.   Psychiatric: He has a normal mood and affect.       Labs:   Lab Results   Component Value Date    WBC 15.17 (H) 11/21/2017    HGB 11.2 (L) 11/21/2017    HCT 36.8 (L) 11/21/2017    MCV 81 (L) 11/21/2017     11/21/2017       SPEP-  Normal total protein.   Increased gamma globulin, polyclonal.     Pathologist interpretation of peripheral blood smear:   Mild leukocytosis shows a mild absolute neutrophilia with some toxic   changes including cytoplasmic vacuoles.  The overall process appears   reactive.  Clinical correlation is required.   Additionally, there are scattered atypical lymphocytes, correlate   clinically in this patient with a history of a positive T-cell   receptor gene rearrangement by PCR.   Normocytic red cells appear mildly hypochromic.  Correlation with the   patient's iron status is recommended.   Increased platelets are noted.  This can be seen with iron   deficiency.  If the patient has not clinically iron deficient,     further evaluation may be indicated.       Lab Results   Component Value Date    IRON 25 (L) 11/21/2017    TIBC 342 11/21/2017    FERRITIN 33 11/21/2017         Assessment:       1.  Leukocytosis, unspecified type    2. Anemia, unspecified type    3. Chronic kidney disease, stage III (moderate)        Plan:   Pt clinically stable  46-year-old gentleman with a longstanding history a mild   Anemia,previously  lost to follow up, with  hospitalization 12/2016 secondary to   acute-on-chronic anemia related to acute blood loss.   SPEP-polyclonal   Continues with sub compliance with prescribed Fe stupp  Pt strongly advised to begin OTC Fe supp qd with Vit C supp to improve absorption  Previous hematological workup to date revealed abnormal flow cytometry with an atypical   T-cell subset of unknown significance.    JAK2 mutation testing negative.    Plt ct now normalized   Discussed issue of bmbx for further evaluation  Pt continues to decline/not interested in pursuing bx    F/u 2 mo with cbc, Fe studies sed rate prior to f/u     CC: ANTON Chau M.D.

## 2017-12-27 ENCOUNTER — TELEPHONE (OUTPATIENT)
Dept: FAMILY MEDICINE | Facility: CLINIC | Age: 47
End: 2017-12-27

## 2017-12-27 NOTE — TELEPHONE ENCOUNTER
----- Message from Kenzie Mendez sent at 12/27/2017 12:33 PM CST -----  Contact: Self   Patient says he has gout pain in his elbow. Please call patient at [phone].

## 2017-12-27 NOTE — TELEPHONE ENCOUNTER
Spoke w/patient, requesting medication for left elbow gout pain x1/day. Patient states he has experienced this before. Declined OV. Please advise.

## 2018-01-02 ENCOUNTER — OFFICE VISIT (OUTPATIENT)
Dept: FAMILY MEDICINE | Facility: CLINIC | Age: 48
End: 2018-01-02
Payer: COMMERCIAL

## 2018-01-02 VITALS
DIASTOLIC BLOOD PRESSURE: 89 MMHG | SYSTOLIC BLOOD PRESSURE: 139 MMHG | HEIGHT: 76 IN | HEART RATE: 66 BPM | BODY MASS INDEX: 38.36 KG/M2 | WEIGHT: 315 LBS | TEMPERATURE: 98 F | OXYGEN SATURATION: 97 %

## 2018-01-02 DIAGNOSIS — I10 ESSENTIAL HYPERTENSION: Chronic | ICD-10-CM

## 2018-01-02 DIAGNOSIS — M70.22 OLECRANON BURSITIS OF LEFT ELBOW: Primary | ICD-10-CM

## 2018-01-02 PROCEDURE — 99999 PR PBB SHADOW E&M-EST. PATIENT-LVL III: CPT | Mod: PBBFAC,,, | Performed by: FAMILY MEDICINE

## 2018-01-02 PROCEDURE — 99214 OFFICE O/P EST MOD 30 MIN: CPT | Mod: S$GLB,,, | Performed by: FAMILY MEDICINE

## 2018-01-02 RX ORDER — NAPROXEN 500 MG/1
500 TABLET ORAL 2 TIMES DAILY PRN
Qty: 30 TABLET | Refills: 0 | Status: SHIPPED | OUTPATIENT
Start: 2018-01-02 | End: 2018-05-15 | Stop reason: SDUPTHER

## 2018-01-02 NOTE — PROGRESS NOTES
[facility]  Progress Note    SUBJECTIVE:     Chief Complaint   Patient presents with    Joint Swelling       HPI   Roberta Ware Jr.  is a 47 y.o. male here for c/o left elbow pain. This started about a week ago. Rates pain as moderate. It is slightly improving, as pain is decreasing. He leans on his elbows a lot at work. No trauma/falls. Hasn't tried anything for it.     Review of patient's allergies indicates:   Allergen Reactions    Hydrochlorothiazide Other (See Comments)     Gout    Tramadol      Pt states make him jittery        Past Medical History:   Diagnosis Date    Diverticulosis     Erectile dysfunction     Osteoarthritis      Past Surgical History:   Procedure Laterality Date    CHOLECYSTECTOMY      COLONOSCOPY  2012    UPPER GASTROINTESTINAL ENDOSCOPY       Family History   Problem Relation Age of Onset    Heart disease Mother     Cancer Mother      Breast    Diabetes Father     Hypertension Father     Ulcers Father     Anemia Sister      Social History   Substance Use Topics    Smoking status: Never Smoker    Smokeless tobacco: Never Used    Alcohol use Yes      Comment: Ocassionally        Review of Systems   Constitutional: Negative for chills and fever.   Musculoskeletal: Positive for joint pain (left elbow pain).   Skin: Negative for itching and rash.     OBJECTIVE:     Vitals:    01/02/18 1529   BP: 139/89   Pulse: 66   Temp: 98 °F (36.7 °C)     Body mass index is 57.64 kg/m².    Physical Exam   Constitutional: He is oriented to person, place, and time and well-developed, well-nourished, and in no distress. No distress.   HENT:   Head: Normocephalic and atraumatic.   Eyes: Conjunctivae and EOM are normal.   Pulmonary/Chest: Effort normal. No respiratory distress.   Musculoskeletal: He exhibits tenderness (to palpation of left olecranon bursa, which is slightly warm, and swollen. no skin breaks. no evidence of cellulitis . good ROM of left elbow).   Neurological:  He is alert and oriented to person, place, and time.   Skin: Skin is warm. He is not diaphoretic.       Old records were reviewed. Labs and/or images were independently reviewed.    ASSESSMENT     1. Olecranon bursitis of left elbow    2. Essential hypertension        PLAN:     Olecranon bursitis of left elbow  -     naproxen (NAPROSYN) 500 MG tablet; Take 1 tablet (500 mg total) by mouth 2 (two) times daily as needed.  Dispense: 30 tablet; Refill: 0  -     Continue to place ice packs on affected areas 3-4 times daily, and wear elbow brace. Take medications as prescribed (for total of 3 days). Instructed patient to call MD if symptoms persist or worsen.    Essential hypertension   -     Educated patient to take medications as prescribed, and to record bp logs daily to bring along to next visit. Instructed patient to decrease salt intake. Also told patient to call if any new signs or symptoms develop.      RTC PRURMILA Ambrosio MD  01/02/2018 3:59 PM

## 2018-02-10 DIAGNOSIS — I10 ESSENTIAL HYPERTENSION: ICD-10-CM

## 2018-02-12 RX ORDER — NEBIVOLOL HYDROCHLORIDE 10 MG/1
TABLET ORAL
Qty: 60 TABLET | Refills: 0 | Status: SHIPPED | OUTPATIENT
Start: 2018-02-12 | End: 2018-03-28 | Stop reason: SDUPTHER

## 2018-02-19 ENCOUNTER — LAB VISIT (OUTPATIENT)
Dept: LAB | Facility: HOSPITAL | Age: 48
End: 2018-02-19
Attending: INTERNAL MEDICINE
Payer: COMMERCIAL

## 2018-02-19 DIAGNOSIS — D64.9 ANEMIA, UNSPECIFIED TYPE: ICD-10-CM

## 2018-02-19 LAB
BASOPHILS # BLD AUTO: 0.07 K/UL
BASOPHILS NFR BLD: 0.5 %
DIFFERENTIAL METHOD: ABNORMAL
EOSINOPHIL # BLD AUTO: 0.6 K/UL
EOSINOPHIL NFR BLD: 4.1 %
ERYTHROCYTE [DISTWIDTH] IN BLOOD BY AUTOMATED COUNT: 17.2 %
HCT VFR BLD AUTO: 37.6 %
HGB BLD-MCNC: 11.3 G/DL
IMM GRANULOCYTES # BLD AUTO: 0.05 K/UL
IMM GRANULOCYTES NFR BLD AUTO: 0.4 %
LYMPHOCYTES # BLD AUTO: 4.7 K/UL
LYMPHOCYTES NFR BLD: 35.5 %
MCH RBC QN AUTO: 25.4 PG
MCHC RBC AUTO-ENTMCNC: 30.1 G/DL
MCV RBC AUTO: 85 FL
MONOCYTES # BLD AUTO: 0.9 K/UL
MONOCYTES NFR BLD: 6.8 %
NEUTROPHILS # BLD AUTO: 7 K/UL
NEUTROPHILS NFR BLD: 52.7 %
NRBC BLD-RTO: 0 /100 WBC
PLATELET # BLD AUTO: 299 K/UL
PMV BLD AUTO: 10.2 FL
RBC # BLD AUTO: 4.45 M/UL
WBC # BLD AUTO: 13.32 K/UL

## 2018-02-19 PROCEDURE — 82728 ASSAY OF FERRITIN: CPT

## 2018-02-19 PROCEDURE — 83540 ASSAY OF IRON: CPT

## 2018-02-19 PROCEDURE — 85025 COMPLETE CBC W/AUTO DIFF WBC: CPT

## 2018-02-19 PROCEDURE — 36415 COLL VENOUS BLD VENIPUNCTURE: CPT | Mod: PO

## 2018-02-20 LAB
FERRITIN SERPL-MCNC: 36 NG/ML
IRON SERPL-MCNC: 94 UG/DL
SATURATED IRON: 28 %
TOTAL IRON BINDING CAPACITY: 336 UG/DL
TRANSFERRIN SERPL-MCNC: 227 MG/DL

## 2018-02-21 ENCOUNTER — OFFICE VISIT (OUTPATIENT)
Dept: HEMATOLOGY/ONCOLOGY | Facility: CLINIC | Age: 48
End: 2018-02-21
Payer: COMMERCIAL

## 2018-02-21 VITALS
HEART RATE: 70 BPM | BODY MASS INDEX: 39.17 KG/M2 | OXYGEN SATURATION: 97 % | WEIGHT: 315 LBS | TEMPERATURE: 99 F | SYSTOLIC BLOOD PRESSURE: 138 MMHG | DIASTOLIC BLOOD PRESSURE: 82 MMHG | HEIGHT: 75 IN

## 2018-02-21 DIAGNOSIS — D75.839 THROMBOCYTOSIS: ICD-10-CM

## 2018-02-21 DIAGNOSIS — D64.9 ANEMIA, UNSPECIFIED TYPE: Primary | ICD-10-CM

## 2018-02-21 DIAGNOSIS — D72.829 LEUKOCYTOSIS, UNSPECIFIED TYPE: ICD-10-CM

## 2018-02-21 DIAGNOSIS — I10 ESSENTIAL HYPERTENSION: Chronic | ICD-10-CM

## 2018-02-21 PROCEDURE — 3008F BODY MASS INDEX DOCD: CPT | Mod: S$GLB,,, | Performed by: INTERNAL MEDICINE

## 2018-02-21 PROCEDURE — 99999 PR PBB SHADOW E&M-EST. PATIENT-LVL III: CPT | Mod: PBBFAC,,, | Performed by: INTERNAL MEDICINE

## 2018-02-21 PROCEDURE — 99213 OFFICE O/P EST LOW 20 MIN: CPT | Mod: S$GLB,,, | Performed by: INTERNAL MEDICINE

## 2018-02-21 NOTE — PROGRESS NOTES
Subjective:       Patient ID: Roberta Ware Jr. is a 47 y.o. male.    Chief Complaint: Follow-up    HPI       HISTORY OF PRESENT ILLNESS:  The patient is a pleasant 46-year-old gentleman seen today for f/u for abnl blood counts. He has anemia and thrombocytosis  And intermittent leukocytosis. The patient was hospitalized 12/2016 with acute blood loss anemia.  The patient reported 1-1/2 week history of dark stools, lightheadedness, dizziness, fatigue and dyspnea on exertion.  Hemoglobin 6g/dl on admission. He underwent a total of 4 units of packed red blood cell transfusion during hospitalization.The patient was followed by GI during hospitalization.  EGD  performed and showed ulcerations without any evidence of bleeding.  The patient was followed by GI during hospitalization.  He takes indomethacin for gout approximately one time per week.  He also had been taking ibuprofen for headaches.  The patient reports an intermittent epigastric abdominal pain over the past year.  The patient was begun on a PPI since hospital discharge.  He has had no further episodes of melena.  Review of records reveals that the patient was previously followed by colleague, Dr. Paul.    Dr. Paul prescribed ferrous sulfate supplementation.  The patient did not take prescribed medication.  No history of alcohol abuse, chronic liver disease, clotting disorder, neuropathy or recent surgery.  He does have chronic kidney disease, stage III,HTN, gout and morbid obesity.  The patient previously  lost to follow up in this clinic and was last seen on 1/23/2015.  He underwent a colonoscopy approximately four years ago significant for diverticulosis.  He is a lifelong nonsmoker.  Workup to date revealed an abnormal flow cytometry, which revealed an atypical T-cell subset.  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  MICHAEL-2 mutation testing was negative. CBC from 12/27/2016 reveals a white blood cell count of 77841, hemoglobin of 8.6  "g/dL, hematocrit of 28.5%, MCV of 86 and platelet count of 502,000.      Today, he has no new issues  He reports improved   compliance with OTC  prescribed Fe supp  No fatigue  No SOB/CP/lightheadedness  No melena, hematochezia or change in bowel habits   No recent infections  No rashes    He recently returned from a cruise      PAST MEDICAL HISTORY:  Diverticulosis, ED, GERD, gout, essential hypertension,   morbid obesity, osteoarthritis, ulcer.    PAST SURGICAL HISTORY:  Colonoscopy,EGD    FAMILY HISTORY: Mother diagnosed with breast CA. Maternal aunt diagnosed with breast CA. Paternal aunt diagnosed with lung cancer. Paternal aunt diagnosed with breast cancer        Review of Systems   Constitutional: Negative for appetite change, fatigue, fever and unexpected weight change.   HENT: Negative for mouth sores.    Eyes: Negative for visual disturbance.   Respiratory: Negative for cough and shortness of breath.    Cardiovascular: Negative for chest pain.   Gastrointestinal: Negative for abdominal pain, blood in stool and diarrhea.   Genitourinary: Negative for frequency and hematuria.   Musculoskeletal: Negative for back pain.   Skin: Negative for rash.   Neurological: Negative for headaches.   Hematological: Negative for adenopathy.   Psychiatric/Behavioral: The patient is not nervous/anxious.        Objective:       Vitals:    02/21/18 1309   BP: 138/82   BP Location: Right arm   Patient Position: Sitting   BP Method: Medium (Manual)   Pulse: 70   Temp: 98.7 °F (37.1 °C)   TempSrc: Oral   SpO2: 97%   Weight: (!) 215.1 kg (474 lb 1.6 oz)   Height: 6' 3" (1.905 m)       Physical Exam   Constitutional: He is oriented to person, place, and time. He appears well-developed and well-nourished.   HENT:   Head: Normocephalic.   Mouth/Throat: Oropharynx is clear and moist. No oropharyngeal exudate.   Eyes: Conjunctivae are normal. Pupils are equal, round, and reactive to light. No scleral icterus.   Neck: Normal range of " motion. Neck supple. No thyromegaly present.   Cardiovascular: Normal rate and regular rhythm.    Murmur heard.  Pulmonary/Chest: Effort normal and breath sounds normal. He has no wheezes. He has no rales.   Abdominal: Soft. Bowel sounds are normal. He exhibits no distension and no mass. There is no hepatosplenomegaly. There is no tenderness. There is no rebound and no guarding.   Musculoskeletal: Normal range of motion. He exhibits no edema.   Lymphadenopathy:     He has no cervical adenopathy.     He has no axillary adenopathy.        Right: No supraclavicular adenopathy present.        Left: No supraclavicular adenopathy present.   Neurological: He is alert and oriented to person, place, and time. No cranial nerve deficit.   Skin: No rash noted. No erythema.   Psychiatric: He has a normal mood and affect.       Labs:   Lab Results   Component Value Date    WBC 13.32 (H) 02/19/2018    HGB 11.3 (L) 02/19/2018    HCT 37.6 (L) 02/19/2018    MCV 85 02/19/2018     02/19/2018       SPEP-  Normal total protein.   Increased gamma globulin, polyclonal.     Pathologist interpretation of peripheral blood smear:   Mild leukocytosis shows a mild absolute neutrophilia with some toxic   changes including cytoplasmic vacuoles.  The overall process appears   reactive.  Clinical correlation is required.   Additionally, there are scattered atypical lymphocytes, correlate   clinically in this patient with a history of a positive T-cell   receptor gene rearrangement by PCR.   Normocytic red cells appear mildly hypochromic.  Correlation with the   patient's iron status is recommended.   Increased platelets are noted.  This can be seen with iron   deficiency.  If the patient has not clinically iron deficient,     further evaluation may be indicated.       Lab Results   Component Value Date    IRON 94 02/19/2018    TIBC 336 02/19/2018    FERRITIN 36 02/19/2018         Assessment:       1. Anemia, unspecified type    2.  Thrombocytosis    3. Leukocytosis, unspecified type    4. Essential hypertension        Plan:   Pt clinically stable  47-year-old gentleman with a longstanding history a mild   Anemia,previously  lost to follow up, with  hospitalization 12/2016 secondary to   acute-on-chronic anemia related to acute blood loss.   SPEP-polyclonal   Fe parameters impoved  C-scope 2011- divertivuloisis  Cont OTC Fe supp qd with Vit C supp to improve absorption  Previous hematological workup to date revealed abnormal flow cytometry with an atypical   T-cell subset of unknown significance.    JAK2 mutation testing negative.    Plt ct now normalized   Discussed issue of bmbx for further evaluation  Pt continues to decline/not interested in pursuing bx    F/u 3 mo with cbc, Fe studies sed rate prior to f/u     CC: ANTON Chau M.D.

## 2018-02-26 ENCOUNTER — TELEPHONE (OUTPATIENT)
Dept: HEMATOLOGY/ONCOLOGY | Facility: CLINIC | Age: 48
End: 2018-02-26

## 2018-02-26 NOTE — TELEPHONE ENCOUNTER
----- Message from Michelle Palmer MD sent at 2/21/2018  2:11 PM CST -----  Ok. Notify pt   ----- Message -----  From: Masha Sr RN  Sent: 2/21/2018   1:36 PM  To: Michelle Palmer MD    It was in legacy, follow up 10yrs.  ----- Message -----  From: Michelle Palmer MD  Sent: 2/21/2018   1:25 PM  To: Masha Sr RN    F/u on colonoscopy report 2011 ( no reports? )  When is follow-up  due?

## 2018-03-28 DIAGNOSIS — I10 ESSENTIAL HYPERTENSION: ICD-10-CM

## 2018-03-28 RX ORDER — NEBIVOLOL HYDROCHLORIDE 10 MG/1
TABLET ORAL
Qty: 60 TABLET | Refills: 3 | Status: SHIPPED | OUTPATIENT
Start: 2018-03-28 | End: 2018-09-15 | Stop reason: SDUPTHER

## 2018-04-06 DIAGNOSIS — I10 ESSENTIAL HYPERTENSION: ICD-10-CM

## 2018-04-09 RX ORDER — CHLORTHALIDONE 25 MG/1
TABLET ORAL
Qty: 90 TABLET | Refills: 2 | Status: SHIPPED | OUTPATIENT
Start: 2018-04-09 | End: 2019-01-07

## 2018-05-15 DIAGNOSIS — M70.22 OLECRANON BURSITIS OF LEFT ELBOW: ICD-10-CM

## 2018-05-15 RX ORDER — NAPROXEN 500 MG/1
TABLET ORAL
Qty: 30 TABLET | Refills: 0 | Status: SHIPPED | OUTPATIENT
Start: 2018-05-15 | End: 2018-07-09 | Stop reason: SDUPTHER

## 2018-05-28 ENCOUNTER — LAB VISIT (OUTPATIENT)
Dept: LAB | Facility: HOSPITAL | Age: 48
End: 2018-05-28
Attending: INTERNAL MEDICINE
Payer: COMMERCIAL

## 2018-05-28 DIAGNOSIS — D64.9 ANEMIA, UNSPECIFIED TYPE: ICD-10-CM

## 2018-05-28 LAB
BASOPHILS # BLD AUTO: 0.04 K/UL
BASOPHILS NFR BLD: 0.2 %
DIFFERENTIAL METHOD: ABNORMAL
EOSINOPHIL # BLD AUTO: 0.5 K/UL
EOSINOPHIL NFR BLD: 2.6 %
ERYTHROCYTE [DISTWIDTH] IN BLOOD BY AUTOMATED COUNT: 17.3 %
FERRITIN SERPL-MCNC: 75 NG/ML
HCT VFR BLD AUTO: 39.5 %
HGB BLD-MCNC: 11.5 G/DL
IMM GRANULOCYTES # BLD AUTO: 0.07 K/UL
IMM GRANULOCYTES NFR BLD AUTO: 0.4 %
IRON SERPL-MCNC: 27 UG/DL
LYMPHOCYTES # BLD AUTO: 4.3 K/UL
LYMPHOCYTES NFR BLD: 25.1 %
MCH RBC QN AUTO: 26 PG
MCHC RBC AUTO-ENTMCNC: 29.1 G/DL
MCV RBC AUTO: 89 FL
MONOCYTES # BLD AUTO: 0.9 K/UL
MONOCYTES NFR BLD: 5.3 %
NEUTROPHILS # BLD AUTO: 11.3 K/UL
NEUTROPHILS NFR BLD: 66.4 %
NRBC BLD-RTO: 0 /100 WBC
PLATELET # BLD AUTO: 314 K/UL
PMV BLD AUTO: 9.8 FL
RBC # BLD AUTO: 4.43 M/UL
SATURATED IRON: 9 %
TOTAL IRON BINDING CAPACITY: 305 UG/DL
TRANSFERRIN SERPL-MCNC: 206 MG/DL
WBC # BLD AUTO: 17.03 K/UL

## 2018-05-28 PROCEDURE — 83540 ASSAY OF IRON: CPT

## 2018-05-28 PROCEDURE — 36415 COLL VENOUS BLD VENIPUNCTURE: CPT | Mod: PO

## 2018-05-28 PROCEDURE — 82728 ASSAY OF FERRITIN: CPT

## 2018-05-28 PROCEDURE — 85025 COMPLETE CBC W/AUTO DIFF WBC: CPT

## 2018-05-30 ENCOUNTER — TELEPHONE (OUTPATIENT)
Dept: HEMATOLOGY/ONCOLOGY | Facility: CLINIC | Age: 48
End: 2018-05-30

## 2018-05-30 ENCOUNTER — OFFICE VISIT (OUTPATIENT)
Dept: HEMATOLOGY/ONCOLOGY | Facility: CLINIC | Age: 48
End: 2018-05-30
Payer: COMMERCIAL

## 2018-05-30 VITALS
SYSTOLIC BLOOD PRESSURE: 143 MMHG | HEIGHT: 76 IN | WEIGHT: 315 LBS | OXYGEN SATURATION: 97 % | HEART RATE: 97 BPM | BODY MASS INDEX: 38.36 KG/M2 | DIASTOLIC BLOOD PRESSURE: 66 MMHG | TEMPERATURE: 99 F

## 2018-05-30 DIAGNOSIS — D50.9 IRON DEFICIENCY ANEMIA: Primary | ICD-10-CM

## 2018-05-30 DIAGNOSIS — N18.30 CHRONIC KIDNEY DISEASE, STAGE III (MODERATE): ICD-10-CM

## 2018-05-30 DIAGNOSIS — D64.9 ANEMIA, UNSPECIFIED TYPE: Primary | ICD-10-CM

## 2018-05-30 DIAGNOSIS — D72.829 LEUKOCYTOSIS, UNSPECIFIED TYPE: ICD-10-CM

## 2018-05-30 PROCEDURE — 99213 OFFICE O/P EST LOW 20 MIN: CPT | Mod: S$GLB,,, | Performed by: INTERNAL MEDICINE

## 2018-05-30 PROCEDURE — 99999 PR PBB SHADOW E&M-EST. PATIENT-LVL IV: CPT | Mod: PBBFAC,,, | Performed by: INTERNAL MEDICINE

## 2018-05-30 PROCEDURE — 3077F SYST BP >= 140 MM HG: CPT | Mod: CPTII,S$GLB,, | Performed by: INTERNAL MEDICINE

## 2018-05-30 PROCEDURE — 3078F DIAST BP <80 MM HG: CPT | Mod: CPTII,S$GLB,, | Performed by: INTERNAL MEDICINE

## 2018-05-30 PROCEDURE — 3008F BODY MASS INDEX DOCD: CPT | Mod: CPTII,S$GLB,, | Performed by: INTERNAL MEDICINE

## 2018-05-30 RX ORDER — DICYCLOMINE HYDROCHLORIDE 20 MG/1
TABLET ORAL
COMMUNITY
End: 2018-07-09

## 2018-05-30 RX ORDER — NEBIVOLOL 10 MG/1
TABLET ORAL
COMMUNITY
End: 2018-09-16

## 2018-05-30 RX ORDER — ALISKIREN 300 MG/1
TABLET, FILM COATED ORAL
COMMUNITY
End: 2018-07-01

## 2018-05-30 RX ORDER — OMEPRAZOLE 40 MG/1
CAPSULE, DELAYED RELEASE ORAL
COMMUNITY
End: 2021-01-03

## 2018-05-30 RX ORDER — BENAZEPRIL HYDROCHLORIDE 20 MG/1
TABLET ORAL
COMMUNITY
End: 2018-07-01

## 2018-05-30 RX ORDER — FAMOTIDINE 20 MG/1
TABLET, FILM COATED ORAL
COMMUNITY
End: 2019-02-06

## 2018-05-30 NOTE — PROGRESS NOTES
Subjective:       Patient ID: Roberta Ware Jr. is a 47 y.o. male.    Chief Complaint: Follow-up    HPI       HISTORY OF PRESENT ILLNESS:  The patient is a pleasant 46-year-old gentleman seen today for f/u for abnl blood counts. He has anemia and thrombocytosis  And intermittent leukocytosis. The patient was hospitalized 12/2016 with acute blood loss anemia.  The patient reported 1-1/2 week history of dark stools, lightheadedness, dizziness, fatigue and dyspnea on exertion.  Hemoglobin 6g/dl on admission. He underwent a total of 4 units of packed red blood cell transfusion during hospitalization.The patient was followed by GI during hospitalization.  EGD  performed and showed ulcerations without any evidence of bleeding.  The patient was followed by GI during hospitalization.  He takes indomethacin for gout approximately one time per week.  He also had been taking ibuprofen for headaches.  The patient reports an intermittent epigastric abdominal pain over the past year.  The patient was begun on a PPI since hospital discharge.  He has had no further episodes of melena.  Review of records reveals that the patient was previously followed by colleague, Dr. Paul.    Dr. Paul prescribed ferrous sulfate supplementation.  The patient did not take prescribed medication.  No history of alcohol abuse, chronic liver disease, clotting disorder, neuropathy or recent surgery.  He does have chronic kidney disease, stage III,HTN, gout and morbid obesity.  The patient previously  lost to follow up in this clinic and was last seen on 1/23/2015.  He underwent a colonoscopy approximately four years ago significant for diverticulosis.  He is a lifelong nonsmoker.  Workup to date revealed an abnormal flow cytometry, which revealed an atypical T-cell subset.  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  MICHAEL-2 mutation testing was negative. CBC from 12/27/2016 reveals a white blood [place] count of 56679, hemoglobin of 8.6  "g/dL, hematocrit of 28.5%, MCV of 86 and platelet count of 502,000.      Today, he has no new issues  He has not been taking Fe supp  He reports life-related stressors  He is taking care of his chronically ill father  No fatigue  No SOB/CP  No cough  No melena, hematochezia or change in bowel habits   No recent infections  No rashes        PAST MEDICAL HISTORY:  Diverticulosis, ED, GERD, gout, essential hypertension,   morbid obesity, osteoarthritis, ulcer.    PAST SURGICAL HISTORY:  Colonoscopy,EGD    FAMILY HISTORY: Mother diagnosed with breast CA. Maternal aunt diagnosed with breast CA. Paternal aunt diagnosed with lung cancer. Paternal aunt diagnosed with breast cancer        Review of Systems   Constitutional: Negative for appetite change, fatigue, fever and unexpected weight change.   HENT: Negative for mouth sores.    Eyes: Negative for visual disturbance.   Respiratory: Negative for cough and shortness of breath.    Cardiovascular: Negative for chest pain.   Gastrointestinal: Negative for abdominal pain, blood in stool and diarrhea.   Genitourinary: Negative for frequency and hematuria.   Musculoskeletal: Negative for back pain.   Skin: Negative for rash.   Neurological: Negative for headaches.   Hematological: Negative for adenopathy.   Psychiatric/Behavioral: The patient is not nervous/anxious.        Objective:       Vitals:    05/30/18 1425 05/30/18 1430   BP: (!) 162/81 (!) 143/66   BP Location: Right arm Left arm   Patient Position: Sitting Sitting   BP Method: Large (Automatic) Large (Automatic)   Pulse: 97    Temp: 98.7 °F (37.1 °C)    TempSrc: Oral    SpO2: 97%    Weight: (!) 218.8 kg (482 lb 5.9 oz)    Height: 6' 4" (1.93 m)        Physical Exam   Constitutional: He is oriented to person, place, and time. He appears well-developed and well-nourished.   HENT:   Head: Normocephalic.   Mouth/Throat: Oropharynx is clear and moist. No oropharyngeal exudate.   Eyes: Conjunctivae are normal. Pupils are " equal, round, and reactive to light. No scleral icterus.   Neck: Normal range of motion. Neck supple. No thyromegaly present.   Cardiovascular: Normal rate and regular rhythm.    Murmur heard.  Pulmonary/Chest: Effort normal and breath sounds normal. He has no wheezes. He has no rales.   Abdominal: Soft. Bowel sounds are normal. He exhibits no distension and no mass. There is no hepatosplenomegaly. There is no tenderness. There is no rebound and no guarding.   Musculoskeletal: Normal range of motion. He exhibits no edema.   Lymphadenopathy:     He has no cervical adenopathy.     He has no axillary adenopathy.        Right: No supraclavicular adenopathy present.        Left: No supraclavicular adenopathy present.   Neurological: He is alert and oriented to person, place, and time. No cranial nerve deficit.   Skin: No rash noted. No erythema.   Psychiatric: He has a normal mood and affect.       Labs:   Lab Results   Component Value Date    WBC 17.03 (H) 05/28/2018    HGB 11.5 (L) 05/28/2018    HCT 39.5 (L) 05/28/2018    MCV 89 05/28/2018     05/28/2018       SPEP-  Normal total protein.   Increased gamma globulin, polyclonal.     Pathologist interpretation of peripheral blood smear:   Mild leukocytosis shows a mild absolute neutrophilia with some toxic   changes including cytoplasmic vacuoles.  The overall process appears   reactive.  Clinical correlation is required.   Additionally, there are scattered atypical lymphocytes, correlate   clinically in this patient with a history of a positive T-cell   receptor gene rearrangement by PCR.   Normocytic red cells appear mildly hypochromic.  Correlation with the   patient's iron status is recommended.   Increased platelets are noted.  This can be seen with iron   deficiency.  If the patient has not clinically iron deficient,     further evaluation may be indicated.       Lab Results   Component Value Date    IRON 27 (L) 05/28/2018    TIBC 305 05/28/2018    FERRITIN  75 05/28/2018         Assessment:       1. Anemia, unspecified type    2. Leukocytosis, unspecified type    3. Chronic kidney disease, stage III (moderate)        Plan:   Pt clinically stable  47-year-old gentleman with a longstanding history a mild   Anemia,previously  lost to follow up, with  hospitalization 12/2016 secondary to   acute-on-chronic anemia related to acute blood loss.   SPEP-polyclonal   Fe parameters abnl  C-scope 2011- divertivuloisis  EGD 2016 - non-bleeding duodenal ulcer with no stigmata                         of bleeding.  Pt instructed to restart  OTC Fe supp qd with Vit C supp to improve absorption  Previous hematological workup to date revealed abnormal flow cytometry with an atypical   T-cell subset of unknown significance.    JAK2 mutation testing negative.    Plt ct now normalized   Discussed issue of bmbx for further evaluation  Pt continues to decline/not interested in pursuing bx    F/u 3 mo with cbc, Fe studies sed rate prior to f/u     CC: ANTON Chau M.D.

## 2018-07-01 ENCOUNTER — HOSPITAL ENCOUNTER (OUTPATIENT)
Facility: HOSPITAL | Age: 48
Discharge: HOME | End: 2018-07-03
Attending: EMERGENCY MEDICINE | Admitting: ORTHOPAEDIC SURGERY
Payer: COMMERCIAL

## 2018-07-01 DIAGNOSIS — M79.89 SWELLING OF LEFT MIDDLE FINGER: ICD-10-CM

## 2018-07-01 DIAGNOSIS — I10 HYPERTENSION: ICD-10-CM

## 2018-07-01 DIAGNOSIS — R79.89 ELEVATED SERUM CREATININE: ICD-10-CM

## 2018-07-01 DIAGNOSIS — M65.9 FLEXOR TENOSYNOVITIS OF FINGER: Primary | ICD-10-CM

## 2018-07-01 DIAGNOSIS — M10.9 ACUTE GOUTY ARTHRITIS: ICD-10-CM

## 2018-07-01 PROBLEM — M65.949 FLEXOR TENOSYNOVITIS OF FINGER: Status: ACTIVE | Noted: 2018-07-01

## 2018-07-01 LAB
ALBUMIN SERPL-MCNC: 3.7 G/DL (ref 3.3–5.5)
ALP SERPL-CCNC: 62 U/L (ref 42–141)
BILIRUB SERPL-MCNC: 0.5 MG/DL (ref 0.2–1.6)
BUN SERPL-MCNC: 16 MG/DL (ref 7–22)
CALCIUM SERPL-MCNC: 9.7 MG/DL (ref 8–10.3)
CHLORIDE SERPL-SCNC: 105 MMOL/L (ref 98–108)
CREAT SERPL-MCNC: 1.5 MG/DL (ref 0.6–1.2)
CRP SERPL-MCNC: 28.3 MG/L
ERYTHROCYTE [SEDIMENTATION RATE] IN BLOOD BY WESTERGREN METHOD: 49 MM/HR
GLUCOSE SERPL-MCNC: 103 MG/DL (ref 73–118)
HCO3 UR-SCNC: 27.4 MMOL/L (ref 24–28)
LDH SERPL L TO P-CCNC: 0.76 MMOL/L (ref 0.5–2.2)
PCO2 BLDA: 41.4 MMHG (ref 35–45)
PH SMN: 7.43 [PH] (ref 7.35–7.45)
PO2 BLDA: 36 MMHG (ref 40–60)
POC ALT (SGPT): 17 U/L (ref 10–47)
POC AST (SGOT): 22 U/L (ref 11–38)
POC BE: 3 MMOL/L
POC SATURATED O2: 71 % (ref 95–100)
POC TCO2: 29 MMOL/L (ref 18–33)
POC TCO2: 29 MMOL/L (ref 24–29)
POTASSIUM BLD-SCNC: 3.6 MMOL/L (ref 3.6–5.1)
PROTEIN, POC: 7.8 G/DL (ref 6.4–8.1)
SAMPLE: ABNORMAL
SODIUM BLD-SCNC: 143 MMOL/L (ref 128–145)
URATE SERPL-MCNC: 12.9 MG/DL

## 2018-07-01 PROCEDURE — 85025 COMPLETE CBC W/AUTO DIFF WBC: CPT

## 2018-07-01 PROCEDURE — 96365 THER/PROPH/DIAG IV INF INIT: CPT

## 2018-07-01 PROCEDURE — 96375 TX/PRO/DX INJ NEW DRUG ADDON: CPT

## 2018-07-01 PROCEDURE — 85652 RBC SED RATE AUTOMATED: CPT

## 2018-07-01 PROCEDURE — 93005 ELECTROCARDIOGRAM TRACING: CPT

## 2018-07-01 PROCEDURE — 87040 BLOOD CULTURE FOR BACTERIA: CPT

## 2018-07-01 PROCEDURE — G0378 HOSPITAL OBSERVATION PER HR: HCPCS

## 2018-07-01 PROCEDURE — 93010 ELECTROCARDIOGRAM REPORT: CPT | Mod: ,,, | Performed by: INTERNAL MEDICINE

## 2018-07-01 PROCEDURE — 25000003 PHARM REV CODE 250: Performed by: EMERGENCY MEDICINE

## 2018-07-01 PROCEDURE — 63600175 PHARM REV CODE 636 W HCPCS: Performed by: EMERGENCY MEDICINE

## 2018-07-01 PROCEDURE — 84550 ASSAY OF BLOOD/URIC ACID: CPT

## 2018-07-01 PROCEDURE — 86140 C-REACTIVE PROTEIN: CPT

## 2018-07-01 PROCEDURE — 80053 COMPREHEN METABOLIC PANEL: CPT

## 2018-07-01 PROCEDURE — 36000704 HC OR TIME LEV I 1ST 15 MIN: Performed by: ORTHOPAEDIC SURGERY

## 2018-07-01 PROCEDURE — 36000705 HC OR TIME LEV I EA ADD 15 MIN: Performed by: ORTHOPAEDIC SURGERY

## 2018-07-01 PROCEDURE — 25000003 PHARM REV CODE 250: Performed by: PHYSICIAN ASSISTANT

## 2018-07-01 PROCEDURE — 63600175 PHARM REV CODE 636 W HCPCS: Performed by: NURSE PRACTITIONER

## 2018-07-01 PROCEDURE — 96376 TX/PRO/DX INJ SAME DRUG ADON: CPT

## 2018-07-01 PROCEDURE — 99285 EMERGENCY DEPT VISIT HI MDM: CPT | Mod: 25

## 2018-07-01 PROCEDURE — 25000003 PHARM REV CODE 250: Performed by: NURSE PRACTITIONER

## 2018-07-01 RX ORDER — CHLORTHALIDONE 25 MG/1
25 TABLET ORAL DAILY
Status: DISCONTINUED | OUTPATIENT
Start: 2018-07-01 | End: 2018-07-03 | Stop reason: HOSPADM

## 2018-07-01 RX ORDER — COLCHICINE 0.6 MG/1
1.2 TABLET, FILM COATED ORAL
Status: COMPLETED | OUTPATIENT
Start: 2018-07-01 | End: 2018-07-01

## 2018-07-01 RX ORDER — NEBIVOLOL 5 MG/1
20 TABLET ORAL DAILY
Status: DISCONTINUED | OUTPATIENT
Start: 2018-07-01 | End: 2018-07-03 | Stop reason: HOSPADM

## 2018-07-01 RX ORDER — CHLORTHALIDONE 25 MG/1
25 TABLET ORAL DAILY
Status: DISCONTINUED | OUTPATIENT
Start: 2018-07-02 | End: 2018-07-01

## 2018-07-01 RX ORDER — AMLODIPINE BESYLATE 5 MG/1
10 TABLET ORAL DAILY
Status: DISCONTINUED | OUTPATIENT
Start: 2018-07-01 | End: 2018-07-03 | Stop reason: HOSPADM

## 2018-07-01 RX ORDER — ONDANSETRON 2 MG/ML
4 INJECTION INTRAMUSCULAR; INTRAVENOUS EVERY 8 HOURS PRN
Status: DISCONTINUED | OUTPATIENT
Start: 2018-07-01 | End: 2018-07-03 | Stop reason: HOSPADM

## 2018-07-01 RX ORDER — KETOROLAC TROMETHAMINE 30 MG/ML
15 INJECTION, SOLUTION INTRAMUSCULAR; INTRAVENOUS
Status: DISCONTINUED | OUTPATIENT
Start: 2018-07-01 | End: 2018-07-01

## 2018-07-01 RX ORDER — NEBIVOLOL 5 MG/1
20 TABLET ORAL DAILY
Status: DISCONTINUED | OUTPATIENT
Start: 2018-07-02 | End: 2018-07-01

## 2018-07-01 RX ORDER — KETOROLAC TROMETHAMINE 30 MG/ML
10 INJECTION, SOLUTION INTRAMUSCULAR; INTRAVENOUS EVERY 6 HOURS PRN
Status: DISCONTINUED | OUTPATIENT
Start: 2018-07-01 | End: 2018-07-03 | Stop reason: HOSPADM

## 2018-07-01 RX ORDER — AMLODIPINE BESYLATE 5 MG/1
10 TABLET ORAL DAILY
Status: DISCONTINUED | OUTPATIENT
Start: 2018-07-02 | End: 2018-07-01

## 2018-07-01 RX ADMIN — PIPERACILLIN AND TAZOBACTAM 4.5 G: 4; .5 INJECTION, POWDER, LYOPHILIZED, FOR SOLUTION INTRAVENOUS; PARENTERAL at 09:07

## 2018-07-01 RX ADMIN — PIPERACILLIN AND TAZOBACTAM 4.5 G: 4; .5 INJECTION, POWDER, LYOPHILIZED, FOR SOLUTION INTRAVENOUS; PARENTERAL at 02:07

## 2018-07-01 RX ADMIN — VANCOMYCIN HYDROCHLORIDE 2000 MG: 1 INJECTION, POWDER, LYOPHILIZED, FOR SOLUTION INTRAVENOUS at 03:07

## 2018-07-01 RX ADMIN — NEBIVOLOL HYDROCHLORIDE 20 MG: 5 TABLET ORAL at 09:07

## 2018-07-01 RX ADMIN — COLCHICINE 1.2 MG: 0.6 TABLET, FILM COATED ORAL at 08:07

## 2018-07-01 RX ADMIN — AMLODIPINE BESYLATE 10 MG: 5 TABLET ORAL at 09:07

## 2018-07-01 RX ADMIN — CHLORTHALIDONE 25 MG: 25 TABLET ORAL at 10:07

## 2018-07-01 NOTE — ED NOTES
Pt arrived from [place] ED' was sent to be seen by orthopedic doctor to assess left middle finger; pt is awake alert and oriented x4, rates finger pain 6/10; swelling noted.

## 2018-07-01 NOTE — ED TRIAGE NOTES
Pt transferred from the [facility] for an othro evaluation for a swollen left middle finger.

## 2018-07-01 NOTE — ED PROVIDER NOTES
"Encounter Date: 7/1/2018    SCRIBE #1 NOTE: I, Lacie Crockett, am scribing for, and in the presence of,  ALE Gaitan. I have scribed the following portions of the note - Other sections scribed: HPI and ROS.       History     Chief Complaint   Patient presents with    Joint Swelling     Pt states," Left middle finger swelling for one week. I do have gout. Not sure if this is it."     CC: Joint Swelling    HPI: This right handed 47 y.o male who has Gout, HTN, and Osteoarthritis presents to the ED for an evaluation after being transferred from [facility] with reports of left 3rd finger pain with associated swelling.  Patient reports his symptoms began 3 weeks ago and has gotten worse since initial onset.  Patient was sent to rule out possible Flexor tenosynovitis of finger.  Patient denies fever, chills, nausea, emesis, diarrhea, extremity numbness, extremity weakness, rash, wrist pain, elbow pain, or any other associated symptoms.  No alleviating factors.      The history is provided by the patient. No  was used.     Review of patient's allergies indicates:   Allergen Reactions    Hydrochlorothiazide Other (See Comments)     Gout    Tramadol Swelling     Pt states make him jittery      Past Medical History:   Diagnosis Date    Diverticulosis     Erectile dysfunction     Gout     Hypertension     Osteoarthritis      Past Surgical History:   Procedure Laterality Date    CHOLECYSTECTOMY      COLONOSCOPY  2012    UPPER GASTROINTESTINAL ENDOSCOPY       Family History   Problem Relation Age of Onset    Heart disease Mother     Cancer Mother         Breast    Diabetes Father     Hypertension Father     Ulcers Father     Anemia Sister      Social History   Substance Use Topics    Smoking status: Never Smoker    Smokeless tobacco: Never Used    Alcohol use Yes      Comment: Ocassionally     Review of Systems   Constitutional: Negative for fever.   HENT: Negative for trouble " swallowing.    Respiratory: Negative for cough and shortness of breath.    Cardiovascular: Negative for chest pain.   Gastrointestinal: Negative for abdominal pain, diarrhea, nausea and vomiting.   Musculoskeletal: Positive for arthralgias and joint swelling. Negative for back pain.   Skin: Negative for rash and wound.   Neurological: Negative for weakness and numbness.   Psychiatric/Behavioral: Negative for confusion.       Physical Exam     Initial Vitals [07/01/18 0753]   BP Pulse Resp Temp SpO2   (!) 152/71 61 16 98.7 °F (37.1 °C) 99 %      MAP       --         Physical Exam    Nursing note and vitals reviewed.  Constitutional: He appears well-developed and well-nourished. He is not diaphoretic. He is Obese . He is cooperative.  Non-toxic appearance. He does not have a sickly appearance. No distress.   HENT:   Head: Normocephalic and atraumatic.   Right Ear: External ear normal.   Left Ear: External ear normal.   Mouth/Throat: Oropharynx is clear and moist.   Eyes: Conjunctivae and EOM are normal.   Neck: Normal range of motion.   Cardiovascular: Normal rate, regular rhythm and intact distal pulses.   Pulses:       Radial pulses are 2+ on the right side, and 2+ on the left side.   Pulmonary/Chest: No tachypnea and no bradypnea. No respiratory distress. He has no wheezes. He has no rhonchi. He has no rales.   Musculoskeletal:        Left hand: Left middle finger: Exhibits decreased ROM and swelling. Motor /Testing: Middle Finger: 4/5 (decreased 2/2 swelling, no pain with flex/ext).   Neurological: He is alert and oriented to person, place, and time. GCS eye subscore is 4. GCS verbal subscore is 5. GCS motor subscore is 6.   Skin: Skin is warm and dry. Capillary refill takes less than 2 seconds. No rash noted.   Psychiatric: He has a normal mood and affect. His behavior is normal. Judgment and thought content normal.         ED Course   Procedures  Labs Reviewed   SEDIMENTATION RATE - Abnormal; Notable for the  following:        Result Value    Sed Rate 49 (*)     All other components within normal limits   C-REACTIVE PROTEIN - Abnormal; Notable for the following:     CRP 28.3 (*)     All other components within normal limits   URIC ACID - Abnormal; Notable for the following:     Uric Acid 12.9 (*)     All other components within normal limits   POCT CMP - Abnormal; Notable for the following:     POC Creatinine 1.5 (*)     POC Potassium 3.6 (*)     All other components within normal limits   ISTAT PROCEDURE - Abnormal; Notable for the following:     POC PO2 36 (*)     POC SATURATED O2 71 (*)     All other components within normal limits   CULTURE, BLOOD   CULTURE, BLOOD   POCT CBC   POCT CMP          Imaging Results          US Extremity Non Vascular Limited Left (Final result)  Result time 07/01/18 11:02:37   Procedure changed from US Soft Tissue Misc     Final result by Vinnie Damon MD (07/01/18 11:02:37)                 Impression:      Findings suspicious for tenosynovitis the PIP joint of the 3rd digit.    Marked edema throughout the soft tissue of the 3rd digit.      Electronically signed by: Vinnie Damon MD  Date:    07/01/2018  Time:    11:02             Narrative:    EXAMINATION:  US EXTREMITY NON VASCULAR LIMITED LEFT    CLINICAL HISTORY:  Left finger pain and swelling;    TECHNIQUE:  Ultrasound was performed the 3rd digit of the left hand.    COMPARISON:  Plain film from same date    FINDINGS:  Diffuse soft tissue thickening throughout the 3rd digit.  There is focal soft tissue thickening adjacent to the flexor tendon measuring 2.2 x 0.4 x 1.6 cm.  Findings are concerning for tenosynovitis.                               X-Ray Hand 3 view Left (Final result)  Result time 07/01/18 08:51:49    Final result by Vinnie Damon MD (07/01/18 08:51:49)                 Impression:      No evidence of fracture or dislocation.  Nonspecific soft tissue swelling of the 3rd digit.      Electronically signed  by: Vinnie Damon MD  Date:    07/01/2018  Time:    08:51             Narrative:    EXAMINATION:  XR HAND COMPLETE 3 VIEW LEFT    CLINICAL HISTORY:  left middle finger swelling;.    TECHNIQUE:  PA, lateral, and oblique views of the left hand were performed.    COMPARISON:  None    FINDINGS:  No evidence of fracture or dislocation.  Joint spaces are satisfactorily maintained.  No evidence of radiopaque foreign body.  Mild soft tissue swelling of the 3rd digit.                                 Medical Decision Making:   History:   Old Medical Records: I decided to obtain old medical records.  Old Records Summarized: records from previous admission(s).       <> Summary of Records: Patient seen in the [facility] ED prior to being transferred to our ED.  X-ray of the hand with no evidence of fracture dislocation.  Nonspecific soft tissue swelling over the 3rd digit.  Ultrasound with soft tissue thickening adjacent to the flexor tendon concerning for flexor tenosynovitis.  Labs with a creatinine 1.5.  Potassium 3.6.  WBC count 14.3.  Uric acid 12.9.  CRP 28.3.  Sed rate 49.  ED Management:  ED Physician:    48 yo male sent from outside facility with suspected tenosynovitis vs. Gout. Chart reviewed and pt examined. Pt's exam is notable for joint swelling- finger held in flexion. Pt withotu exquisite pain or tenderness of flexor tendon. Given clinical appearance, elevated WBC and inflammatory markers will admit for observation and IV abx after discussing case with on-call orthopedics -Dr. Gallagher       APC / Resident Notes:   This is an evaluation of a 47 y.o. male that presents to the Emergency Department as a transfer from [facility].  Patient was seen there with findings concerning for flexor tenosynovitis and sent to the emergency department for orthopedic evaluation.  Patient reports a one-week history of pain and swelling to the left 3rd digit.  He reports no injuries, open wounds, or drainage. Physical Exam shows a  non-toxic, afebrile, and well appearing male.  There is diffuse swelling of the left 3rd digit from the MCP distally.  Patient is holding it slightly flexed however he is able to flex and extend the digit without any increase in pain. There is no increased warmth compared to the remaining fingers.  He has no tenderness to palpation over the flexor tendon.  Distal sensation and perfusion is intact.  2+ radial pulse.  Dorsal, palmar hand, and finger compartments are soft.  Patient reports swelling has been consistent without increases over the last week. Vital Signs Are Reassuring. If available, previous records reviewed. RESULTS:  Labs from outside facility show increase in inflammatory markers with a white count of 14.3.  Elevated uric acid.  Elevated creatinine 1.5.  Ultrasound with findings concerning for flexor tenosynovitis.    Consult:  Spoke with Dr. Bowie, Orthopedics, he would like the patient admitted with vancomycin and Zosyn.  He will evaluate the patient.    My overall impression is flexor tenosynovitis versus gout. I considered, but at this time, do not suspect fracture, dislocation, compartment syndrome.    After review of the patients physical exam, ED testing, and history/symptoms, the patient will be admitted. Orthopedics was paged. Call returned and the case was discussed.  Dr. Bowie will accept the admission to the Ortho Service with recommendations for abx.  Obs orders will be completed. The diagnosis, treatment and plan for admission were discussed with the patient and understanding was verbalized. All questions or concerns have been addressed. This case was discussed with and the patient has been examined by Dr. Carrera who is in agreement with my assessment and plan. LOGAN Bhatti, FNP-C        Scribe Attestation:   Scribe #1: I performed the above scribed service and the documentation accurately describes the services I performed. I attest to the accuracy of the note.    Attending  Attestation:           Physician Attestation for Scribe:  Physician Attestation Statement for Scribe #1: I, Quan Tay, NP-C, reviewed documentation, as scribed by Lacie Crockett in my presence, and it is both accurate and complete.                    Clinical Impression:   The primary encounter diagnosis was Flexor tenosynovitis of finger. Diagnoses of Hypertension and Swelling of left middle finger were also pertinent to this visit.      Disposition:   Disposition: Placed in Observation  Condition: Stable                        Fausto Carrera MD  07/01/18 1353       Quan Tay, GAVI  07/01/18 1433

## 2018-07-01 NOTE — ED PROVIDER NOTES
"Encounter Date: 7/1/2018       History     Chief Complaint   Patient presents with    Joint Swelling     Pt states," Left middle finger swelling for one week. I do have gout. Not sure if this is it."     47-year-old male chief complaint  swollen left middle finger and throbbing pain in finger.  Pain is significantly worse when he tries to move.  Patient cannot close his fist.  Patient states the finger has been painful and swollen for about a week.  He took some naproxen the swelling did decrease in his knuckle but the finger remains swollen.  Patient states pain is worse when you touch each of the finger joints.  Patient in no denies any injury. No numbness tingling or weakness.          Review of patient's allergies indicates:   Allergen Reactions    Hydrochlorothiazide Other (See Comments)     Gout    Tramadol Swelling     Pt states make him jittery      Past Medical History:   Diagnosis Date    Diverticulosis     Erectile dysfunction     Gout     Hypertension     Osteoarthritis      Past Surgical History:   Procedure Laterality Date    CHOLECYSTECTOMY      COLONOSCOPY  2012    UPPER GASTROINTESTINAL ENDOSCOPY       Family History   Problem Relation Age of Onset    Heart disease Mother     Cancer Mother         Breast    Diabetes Father     Hypertension Father     Ulcers Father     Anemia Sister      Social History   Substance Use Topics    Smoking status: Never Smoker    Smokeless tobacco: Never Used    Alcohol use Yes      Comment: Ocassionally     Review of Systems   Constitutional: Negative for fever.   HENT: Negative for sore throat.    Respiratory: Negative for shortness of breath.    Cardiovascular: Negative for chest pain.   Gastrointestinal: Negative for nausea.   Genitourinary: Negative for dysuria.   Musculoskeletal: Positive for arthralgias and joint swelling. Negative for back pain.   Skin: Negative for rash.   Neurological: Negative for weakness.   Hematological: Does not " bruise/bleed easily.   All other systems reviewed and are negative.      Physical Exam     Initial Vitals [07/01/18 0753]   BP Pulse Resp Temp SpO2   (!) 152/71 61 16 98.7 °F (37.1 °C) 99 %      MAP       --         Physical Exam    Nursing note and vitals reviewed.  Constitutional: He appears well-developed and well-nourished. He is not diaphoretic. No distress.   HENT:   Head: Normocephalic and atraumatic.   Right Ear: External ear normal.   Left Ear: External ear normal.   Nose: Nose normal.   Mouth/Throat: Oropharynx is clear and moist.   Eyes: EOM are normal. Pupils are equal, round, and reactive to light. Right eye exhibits no discharge. Left eye exhibits no discharge.   Neck: Normal range of motion. Neck supple.   Cardiovascular: Normal rate, regular rhythm, normal heart sounds and intact distal pulses. Exam reveals no gallop and no friction rub.    No murmur heard.  Pulmonary/Chest: Breath sounds normal. No respiratory distress. He has no wheezes. He has no rhonchi. He has no rales. He exhibits no tenderness.   Abdominal: Soft. Bowel sounds are normal. He exhibits no distension and no mass. There is no tenderness. There is no rebound and no guarding.   Musculoskeletal: Normal range of motion. He exhibits edema and tenderness.        Right shoulder: Normal. He exhibits normal range of motion, no tenderness, no bony tenderness, no deformity, no laceration and no pain.        Left shoulder: Normal. He exhibits normal range of motion, no tenderness, no bony tenderness, no deformity, no laceration and no pain.        Right elbow: Normal.He exhibits normal range of motion, no swelling and no effusion.        Left elbow: Normal. He exhibits normal range of motion, no swelling and no effusion.        Right wrist: Normal. He exhibits normal range of motion, no tenderness and no bony tenderness.        Left wrist: Normal. He exhibits normal range of motion, no tenderness and no bony tenderness.        Left hand: He  exhibits bony tenderness. He exhibits normal range of motion and normal capillary refill. Normal sensation noted.        Hands:  Left middle finger swelling.     Neurological: He is alert and oriented to person, place, and time. He has normal strength. No cranial nerve deficit or sensory deficit.   Skin: Skin is warm. No rash noted. No pallor.   Psychiatric: He has a normal mood and affect.                       ED Course   Procedures  Labs Reviewed   SEDIMENTATION RATE - Abnormal; Notable for the following:        Result Value    Sed Rate 49 (*)     All other components within normal limits   C-REACTIVE PROTEIN - Abnormal; Notable for the following:     CRP 28.3 (*)     All other components within normal limits   URIC ACID - Abnormal; Notable for the following:     Uric Acid 12.9 (*)     All other components within normal limits   POCT CMP - Abnormal; Notable for the following:     POC Creatinine 1.5 (*)     POC Potassium 3.6 (*)     All other components within normal limits   ISTAT PROCEDURE - Abnormal; Notable for the following:     POC PO2 36 (*)     POC SATURATED O2 71 (*)     All other components within normal limits   CULTURE, BLOOD   CULTURE, BLOOD   POCT CBC   POCT CMP       CBC white blood cell count 14.3, hemoglobin 11.8, hematocrit 36.9, and platelets 287  CMP sodium 143, potassium 3.6, bicarb 29, chloride 105, glucose 103, BUN 16, creatinine 1.5  EKG Readings: (Independently Interpreted)   Initial Reading: No STEMI. Previous EKG: Compared with most recent EKG Previous EKG Date: When compared to previous EKG April 24, 2017 rate has decreased by 6 beats per minute. Rhythm: Sinus Bradycardia. Heart Rate: 59. Axis: Normal. Clinical Impression: Normal Sinus Rhythm Other Impression: Abnormal EKG, sinus bradycardia, LVH by criteria, nonspecific T-wave abnormality.  Incomplete right bundle-branch block appreciated on prior EKG April 24, 2017       Imaging Results          US Extremity Non Vascular Limited Left  (In process)    Procedure changed from US Soft Tissue Misc                X-Ray Hand 3 view Left (Final result)  Result time 07/01/18 08:51:49    Final result by Vinnie Damon MD (07/01/18 08:51:49)                 Impression:      No evidence of fracture or dislocation.  Nonspecific soft tissue swelling of the 3rd digit.      Electronically signed by: Vinnie Damon MD  Date:    07/01/2018  Time:    08:51             Narrative:    EXAMINATION:  XR HAND COMPLETE 3 VIEW LEFT    CLINICAL HISTORY:  left middle finger swelling;.    TECHNIQUE:  PA, lateral, and oblique views of the left hand were performed.    COMPARISON:  None    FINDINGS:  No evidence of fracture or dislocation.  Joint spaces are satisfactorily maintained.  No evidence of radiopaque foreign body.  Mild soft tissue swelling of the 3rd digit.                                                 Medical decision making   Chief complaint:  Left middle finger pain and swelling  Differential diagnosis:  Gout, cellulitis, flexor tenosynovitis, hand pain, and finger pain  Treatment in the ED Physical Exam, colchicine, IV fluids, and Toradol.  Patient reports decreased pain after medication.    Discussed labs, and imaging results.  Patient is agreeable to transfer to [facility] for orthopedic evaluation.  Discussed patient with on-call ortho doctor noe VILLEDA.  Orthopedics is requesting no antibiotics at this time.  They request patient be sent to [facility] for evaluation in the ER.  Patient is declining a EMS transfer.  Patient provided a sling for comfort.  Patient is going to [facility] via POV.  Patient signed form declining EMS transport.  Spoke with Dr. rowley regarding patient's ED presentation, ED course, evaluation and treatment.  Dr. Fontaine on-call NP is aware of the patient and will evaluate the patient when patient arrives in the ED.      Patient walked out of the ED awake alert oriented x4 speaking clearly in full sentences moving  all 4 extremities         Clinical Impression:   The primary encounter diagnosis was Flexor tenosynovitis of finger. Diagnoses of Hypertension and Swelling of left middle finger were also pertinent to this visit.                             Karrie Crocker DO  07/01/18 1408

## 2018-07-02 LAB
ALBUMIN SERPL BCP-MCNC: 3.6 G/DL
ALP SERPL-CCNC: 76 U/L
ALT SERPL W/O P-5'-P-CCNC: 11 U/L
ANION GAP SERPL CALC-SCNC: 7 MMOL/L
AST SERPL-CCNC: 12 U/L
BASOPHILS # BLD AUTO: 0.02 K/UL
BASOPHILS NFR BLD: 0.2 %
BILIRUB SERPL-MCNC: 0.4 MG/DL
BUN SERPL-MCNC: 19 MG/DL
CALCIUM SERPL-MCNC: 9.7 MG/DL
CHLORIDE SERPL-SCNC: 102 MMOL/L
CO2 SERPL-SCNC: 33 MMOL/L
CREAT SERPL-MCNC: 1.6 MG/DL
DIFFERENTIAL METHOD: ABNORMAL
EOSINOPHIL # BLD AUTO: 0.4 K/UL
EOSINOPHIL NFR BLD: 2.9 %
ERYTHROCYTE [DISTWIDTH] IN BLOOD BY AUTOMATED COUNT: 17.3 %
EST. GFR  (AFRICAN AMERICAN): 59 ML/MIN/1.73 M^2
EST. GFR  (NON AFRICAN AMERICAN): 51 ML/MIN/1.73 M^2
GLUCOSE SERPL-MCNC: 91 MG/DL
HCT VFR BLD AUTO: 37.8 %
HGB BLD-MCNC: 11.8 G/DL
LYMPHOCYTES # BLD AUTO: 4.3 K/UL
LYMPHOCYTES NFR BLD: 32.6 %
MCH RBC QN AUTO: 26.3 PG
MCHC RBC AUTO-ENTMCNC: 31.2 G/DL
MCV RBC AUTO: 84 FL
MONOCYTES # BLD AUTO: 1 K/UL
MONOCYTES NFR BLD: 7.2 %
NEUTROPHILS # BLD AUTO: 7.6 K/UL
NEUTROPHILS NFR BLD: 56.9 %
PLATELET # BLD AUTO: 306 K/UL
PMV BLD AUTO: 10 FL
POTASSIUM SERPL-SCNC: 4.1 MMOL/L
PROT SERPL-MCNC: 7.9 G/DL
RBC # BLD AUTO: 4.48 M/UL
SODIUM SERPL-SCNC: 142 MMOL/L
VANCOMYCIN TROUGH SERPL-MCNC: 5.9 UG/ML
WBC # BLD AUTO: 13.28 K/UL

## 2018-07-02 PROCEDURE — 25000003 PHARM REV CODE 250: Performed by: NURSE PRACTITIONER

## 2018-07-02 PROCEDURE — 25000003 PHARM REV CODE 250: Performed by: EMERGENCY MEDICINE

## 2018-07-02 PROCEDURE — G0378 HOSPITAL OBSERVATION PER HR: HCPCS

## 2018-07-02 PROCEDURE — 85025 COMPLETE CBC W/AUTO DIFF WBC: CPT

## 2018-07-02 PROCEDURE — 63600175 PHARM REV CODE 636 W HCPCS: Performed by: NURSE PRACTITIONER

## 2018-07-02 PROCEDURE — 80202 ASSAY OF VANCOMYCIN: CPT

## 2018-07-02 PROCEDURE — 63600175 PHARM REV CODE 636 W HCPCS: Performed by: EMERGENCY MEDICINE

## 2018-07-02 PROCEDURE — 36415 COLL VENOUS BLD VENIPUNCTURE: CPT

## 2018-07-02 PROCEDURE — 25000003 PHARM REV CODE 250: Performed by: ORTHOPAEDIC SURGERY

## 2018-07-02 PROCEDURE — 80053 COMPREHEN METABOLIC PANEL: CPT

## 2018-07-02 PROCEDURE — 25000003 PHARM REV CODE 250: Performed by: PHYSICIAN ASSISTANT

## 2018-07-02 RX ORDER — LIDOCAINE HYDROCHLORIDE 10 MG/ML
1 INJECTION INFILTRATION; PERINEURAL ONCE
Status: COMPLETED | OUTPATIENT
Start: 2018-07-02 | End: 2018-07-02

## 2018-07-02 RX ADMIN — VANCOMYCIN HYDROCHLORIDE 2000 MG: 1 INJECTION, POWDER, LYOPHILIZED, FOR SOLUTION INTRAVENOUS at 05:07

## 2018-07-02 RX ADMIN — CHLORTHALIDONE 25 MG: 25 TABLET ORAL at 08:07

## 2018-07-02 RX ADMIN — LIDOCAINE HYDROCHLORIDE 1 ML: 10 INJECTION, SOLUTION INFILTRATION; PERINEURAL at 05:07

## 2018-07-02 RX ADMIN — NEBIVOLOL HYDROCHLORIDE 20 MG: 5 TABLET ORAL at 08:07

## 2018-07-02 RX ADMIN — AMLODIPINE BESYLATE 10 MG: 5 TABLET ORAL at 08:07

## 2018-07-02 RX ADMIN — PIPERACILLIN AND TAZOBACTAM 4.5 G: 4; .5 INJECTION, POWDER, LYOPHILIZED, FOR SOLUTION INTRAVENOUS; PARENTERAL at 06:07

## 2018-07-02 RX ADMIN — PIPERACILLIN AND TAZOBACTAM 4.5 G: 4; .5 INJECTION, POWDER, LYOPHILIZED, FOR SOLUTION INTRAVENOUS; PARENTERAL at 08:07

## 2018-07-02 NOTE — PROGRESS NOTES
No new issues. Pt feels about the same as this am  VSS    Left long finger- suspicious area about the volar prox phalanx is unchanged. + TTP and induration   Flex/ext unchanged      A/P Left long finger abscess  1. Aspiration attempted- no pus returned in the needle but some pus was expressed through the needle stick wound  2. NPO after MN for formal I&D tomorrow

## 2018-07-02 NOTE — NURSING
Patient arrived on unit accompanied w/ spouse.  Patient is calm and alert.  Vancomycin infusing.  SCD in place but unable to fit HOMAR hose as larger size is not stocked.  Will continue to monitor.

## 2018-07-02 NOTE — PROGRESS NOTES
TN contacted patient's insurance at [phone] to verify insurance coverage of preferred provided. Ref-[id number], spoke with Tyron.    Patient informed that provider is in network.

## 2018-07-02 NOTE — PLAN OF CARE
"TN met with patient and patient's significant other at bedside to complete discharge needs assessment. TN explained duties of case management to patient.  TN reviewed and provided  "[facility] Packet", "Discharge Planning Begins on Admission" brochure and discussed "Help at Home". Patient stated that he lives alone however his significant other Fide will be able to help him when needed. TN also discussed his responsibilities to manage his health at home. Patient was informed to leave folder at bedside during hospital stay. Contact information added to white board.    Patient Needs- Verification that preferred provided is covered under patient's insurance.     PCP- Antwan Ambrosio MD     Patient Preferred Pharmacy:    [facility] [address] [facility] [place] - [address] AT [facility]  [address]  [place] [address]  Phone: [phone] Fax: [phone]      Appointment Time Preference: afternoon       07/02/18 1215   Discharge Assessment   Assessment Type Discharge Planning Assessment   Confirmed/corrected address and phone number on facesheet? Yes   Assessment information obtained from? Patient   Prior to hospitilization cognitive status: Alert/Oriented   Prior to hospitalization functional status: Independent   Current cognitive status: Alert/Oriented   Current Functional Status: Independent   Lives With alone   Able to Return to Prior Arrangements yes   Is patient able to care for self after discharge? Yes   Who are your caregiver(s) and their phone number(s)? Fide- significant other@ [phone]   Patient's perception of discharge disposition home or selfcare   Readmission Within The Last 30 Days no previous admission in last 30 days   Patient currently being followed by outpatient case management? No   Patient currently receives any other outside agency services? No   Equipment Currently Used at Home none   Do you have any problems affording any of your prescribed medications? " No   Is the patient taking medications as prescribed? yes   Does the patient have transportation home? Yes   Transportation Available car;family or friend will provide   Does the patient receive services at the [facility]? No   Discharge Plan A Home   Discharge Plan B Home   Patient/Family In Agreement With Plan yes

## 2018-07-02 NOTE — PLAN OF CARE
Problem: Patient Care Overview  Goal: Plan of Care Review   07/02/18 8811   Coping/Psychosocial   Plan Of Care Reviewed With patient;spouse   Patient resting in bed with  Eyes closed, resp. Even nono-labored no acute distress noted. Able to  Make needs known. Denies pain and discomfort at present time. Patient with  Swelling of the left hand 3rd finger. Pain denies pain to the finger and hand. Patient continues on IV ABT therapy. NO adverse reactions noted. Safety maintained, bed locked and in lowest position with  Call light in reach. Will monitor.

## 2018-07-02 NOTE — NURSING
"Patient with increased blood pressure. Patient stated , "I did not take my blood pressure medication this morning." call placed out to  DR. Bowie, spoke with Kirsten Michele NP. And ordered to administer patient's home medications. Medications were administered. Patient tolerated without any problem. Will reassess patient's blood pressure. Patient currently resting in bed with eyes closed. Denies pain and discomfort at present time. Will monitor.    "

## 2018-07-02 NOTE — PROGRESS NOTES
Pt seen/examined. Full H&P to follow  This appears to be a gouty flare-up, but an abscess at the volar aspect of the prox phalanx is not ruled out. Will re-examine this afternoon. Pt may require aspiration. Possible I&D tomorrow

## 2018-07-03 ENCOUNTER — ANESTHESIA EVENT (OUTPATIENT)
Dept: SURGERY | Facility: HOSPITAL | Age: 48
End: 2018-07-03
Payer: COMMERCIAL

## 2018-07-03 ENCOUNTER — ANESTHESIA (OUTPATIENT)
Dept: SURGERY | Facility: HOSPITAL | Age: 48
End: 2018-07-03
Payer: COMMERCIAL

## 2018-07-03 VITALS
WEIGHT: 315 LBS | HEIGHT: 76 IN | TEMPERATURE: 98 F | RESPIRATION RATE: 18 BRPM | SYSTOLIC BLOOD PRESSURE: 141 MMHG | DIASTOLIC BLOOD PRESSURE: 82 MMHG | HEART RATE: 56 BPM | OXYGEN SATURATION: 91 % | BODY MASS INDEX: 38.36 KG/M2

## 2018-07-03 PROBLEM — M10.9 ACUTE GOUTY ARTHRITIS: Status: ACTIVE | Noted: 2018-07-01

## 2018-07-03 LAB
ALBUMIN SERPL BCP-MCNC: 3.3 G/DL
ALP SERPL-CCNC: 64 U/L
ALT SERPL W/O P-5'-P-CCNC: 12 U/L
ANION GAP SERPL CALC-SCNC: 9 MMOL/L
AST SERPL-CCNC: 13 U/L
BASOPHILS # BLD AUTO: 0.03 K/UL
BASOPHILS NFR BLD: 0.2 %
BILIRUB SERPL-MCNC: 0.3 MG/DL
BUN SERPL-MCNC: 18 MG/DL
CALCIUM SERPL-MCNC: 9.2 MG/DL
CHLORIDE SERPL-SCNC: 102 MMOL/L
CO2 SERPL-SCNC: 28 MMOL/L
CREAT SERPL-MCNC: 1.6 MG/DL
DIFFERENTIAL METHOD: ABNORMAL
EOSINOPHIL # BLD AUTO: 0.6 K/UL
EOSINOPHIL NFR BLD: 4.1 %
ERYTHROCYTE [DISTWIDTH] IN BLOOD BY AUTOMATED COUNT: 16.6 %
EST. GFR  (AFRICAN AMERICAN): 59 ML/MIN/1.73 M^2
EST. GFR  (NON AFRICAN AMERICAN): 51 ML/MIN/1.73 M^2
GLUCOSE SERPL-MCNC: 147 MG/DL
GRAM STN SPEC: NORMAL
GRAM STN SPEC: NORMAL
HCT VFR BLD AUTO: 37.2 %
HGB BLD-MCNC: 11.6 G/DL
LYMPHOCYTES # BLD AUTO: 5.2 K/UL
LYMPHOCYTES NFR BLD: 33.4 %
MCH RBC QN AUTO: 26 PG
MCHC RBC AUTO-ENTMCNC: 31.2 G/DL
MCV RBC AUTO: 83 FL
MONOCYTES # BLD AUTO: 1.3 K/UL
MONOCYTES NFR BLD: 8.6 %
NEUTROPHILS # BLD AUTO: 8.3 K/UL
NEUTROPHILS NFR BLD: 53.7 %
PLATELET # BLD AUTO: 287 K/UL
PMV BLD AUTO: 10.1 FL
POTASSIUM SERPL-SCNC: 3.9 MMOL/L
PROT SERPL-MCNC: 7.6 G/DL
RBC # BLD AUTO: 4.46 M/UL
SODIUM SERPL-SCNC: 139 MMOL/L
WBC # BLD AUTO: 15.55 K/UL

## 2018-07-03 PROCEDURE — G0378 HOSPITAL OBSERVATION PER HR: HCPCS

## 2018-07-03 PROCEDURE — 71000039 HC RECOVERY, EACH ADD'L HOUR: Performed by: ORTHOPAEDIC SURGERY

## 2018-07-03 PROCEDURE — 25000003 PHARM REV CODE 250: Performed by: EMERGENCY MEDICINE

## 2018-07-03 PROCEDURE — D9220A PRA ANESTHESIA: Mod: ANES,,, | Performed by: ANESTHESIOLOGY

## 2018-07-03 PROCEDURE — 63600175 PHARM REV CODE 636 W HCPCS: Performed by: NURSE ANESTHETIST, CERTIFIED REGISTERED

## 2018-07-03 PROCEDURE — 25000003 PHARM REV CODE 250: Performed by: PHYSICIAN ASSISTANT

## 2018-07-03 PROCEDURE — 71000033 HC RECOVERY, INTIAL HOUR: Performed by: ORTHOPAEDIC SURGERY

## 2018-07-03 PROCEDURE — 25000003 PHARM REV CODE 250: Performed by: NURSE PRACTITIONER

## 2018-07-03 PROCEDURE — 87075 CULTR BACTERIA EXCEPT BLOOD: CPT

## 2018-07-03 PROCEDURE — 36000704 HC OR TIME LEV I 1ST 15 MIN: Performed by: ORTHOPAEDIC SURGERY

## 2018-07-03 PROCEDURE — 37000008 HC ANESTHESIA 1ST 15 MINUTES: Performed by: ORTHOPAEDIC SURGERY

## 2018-07-03 PROCEDURE — 85025 COMPLETE CBC W/AUTO DIFF WBC: CPT

## 2018-07-03 PROCEDURE — 87070 CULTURE OTHR SPECIMN AEROBIC: CPT

## 2018-07-03 PROCEDURE — 63600175 PHARM REV CODE 636 W HCPCS: Performed by: EMERGENCY MEDICINE

## 2018-07-03 PROCEDURE — 87205 SMEAR GRAM STAIN: CPT

## 2018-07-03 PROCEDURE — 25000003 PHARM REV CODE 250: Performed by: NURSE ANESTHETIST, CERTIFIED REGISTERED

## 2018-07-03 PROCEDURE — S0028 INJECTION, FAMOTIDINE, 20 MG: HCPCS | Performed by: NURSE ANESTHETIST, CERTIFIED REGISTERED

## 2018-07-03 PROCEDURE — D9220A PRA ANESTHESIA: Mod: CRNA,,, | Performed by: NURSE ANESTHETIST, CERTIFIED REGISTERED

## 2018-07-03 PROCEDURE — 80053 COMPREHEN METABOLIC PANEL: CPT

## 2018-07-03 PROCEDURE — 37000009 HC ANESTHESIA EA ADD 15 MINS: Performed by: ORTHOPAEDIC SURGERY

## 2018-07-03 PROCEDURE — 63600175 PHARM REV CODE 636 W HCPCS: Performed by: ANESTHESIOLOGY

## 2018-07-03 PROCEDURE — 63600175 PHARM REV CODE 636 W HCPCS: Performed by: NURSE PRACTITIONER

## 2018-07-03 PROCEDURE — 36415 COLL VENOUS BLD VENIPUNCTURE: CPT

## 2018-07-03 PROCEDURE — 36000705 HC OR TIME LEV I EA ADD 15 MIN: Performed by: ORTHOPAEDIC SURGERY

## 2018-07-03 RX ORDER — COLCHICINE 0.6 MG/1
0.6 TABLET ORAL 2 TIMES DAILY
Qty: 30 TABLET | Refills: 0 | Status: SHIPPED | OUTPATIENT
Start: 2018-07-03 | End: 2019-01-07

## 2018-07-03 RX ORDER — MIDAZOLAM HYDROCHLORIDE 1 MG/ML
INJECTION, SOLUTION INTRAMUSCULAR; INTRAVENOUS
Status: DISCONTINUED | OUTPATIENT
Start: 2018-07-03 | End: 2018-07-03

## 2018-07-03 RX ORDER — SULFAMETHOXAZOLE AND TRIMETHOPRIM 800; 160 MG/1; MG/1
1 TABLET ORAL 2 TIMES DAILY
Qty: 14 TABLET | Refills: 0 | Status: SHIPPED | OUTPATIENT
Start: 2018-07-03 | End: 2019-01-07

## 2018-07-03 RX ORDER — FAMOTIDINE 10 MG/ML
INJECTION INTRAVENOUS
Status: DISCONTINUED | OUTPATIENT
Start: 2018-07-03 | End: 2018-07-03

## 2018-07-03 RX ORDER — ONDANSETRON 2 MG/ML
INJECTION INTRAMUSCULAR; INTRAVENOUS
Status: DISCONTINUED | OUTPATIENT
Start: 2018-07-03 | End: 2018-07-03

## 2018-07-03 RX ORDER — SODIUM CHLORIDE 0.9 % (FLUSH) 0.9 %
3 SYRINGE (ML) INJECTION
Status: DISCONTINUED | OUTPATIENT
Start: 2018-07-03 | End: 2018-07-03 | Stop reason: HOSPADM

## 2018-07-03 RX ORDER — HYDROMORPHONE HYDROCHLORIDE 2 MG/ML
0.2 INJECTION, SOLUTION INTRAMUSCULAR; INTRAVENOUS; SUBCUTANEOUS EVERY 5 MIN PRN
Status: DISCONTINUED | OUTPATIENT
Start: 2018-07-03 | End: 2018-07-03 | Stop reason: HOSPADM

## 2018-07-03 RX ORDER — LIDOCAINE HCL/PF 100 MG/5ML
SYRINGE (ML) INTRAVENOUS
Status: DISCONTINUED | OUTPATIENT
Start: 2018-07-03 | End: 2018-07-03

## 2018-07-03 RX ORDER — FENTANYL CITRATE 50 UG/ML
INJECTION, SOLUTION INTRAMUSCULAR; INTRAVENOUS
Status: DISCONTINUED | OUTPATIENT
Start: 2018-07-03 | End: 2018-07-03

## 2018-07-03 RX ORDER — PROPOFOL 10 MG/ML
VIAL (ML) INTRAVENOUS
Status: DISCONTINUED | OUTPATIENT
Start: 2018-07-03 | End: 2018-07-03

## 2018-07-03 RX ORDER — HYDROCODONE BITARTRATE AND ACETAMINOPHEN 5; 325 MG/1; MG/1
1 TABLET ORAL EVERY 6 HOURS PRN
Qty: 30 TABLET | Refills: 0 | Status: SHIPPED | OUTPATIENT
Start: 2018-07-03 | End: 2019-01-07

## 2018-07-03 RX ADMIN — HYDROMORPHONE HYDROCHLORIDE 0.2 MG: 2 INJECTION, SOLUTION INTRAMUSCULAR; INTRAVENOUS; SUBCUTANEOUS at 12:07

## 2018-07-03 RX ADMIN — FAMOTIDINE 20 MG: 10 INJECTION, SOLUTION INTRAVENOUS at 10:07

## 2018-07-03 RX ADMIN — PIPERACILLIN AND TAZOBACTAM 4.5 G: 4; .5 INJECTION, POWDER, LYOPHILIZED, FOR SOLUTION INTRAVENOUS; PARENTERAL at 04:07

## 2018-07-03 RX ADMIN — PROPOFOL 50 MG: 10 INJECTION, EMULSION INTRAVENOUS at 11:07

## 2018-07-03 RX ADMIN — HYDROMORPHONE HYDROCHLORIDE 0.2 MG: 2 INJECTION, SOLUTION INTRAMUSCULAR; INTRAVENOUS; SUBCUTANEOUS at 11:07

## 2018-07-03 RX ADMIN — MIDAZOLAM HYDROCHLORIDE 2 MG: 1 INJECTION, SOLUTION INTRAMUSCULAR; INTRAVENOUS at 10:07

## 2018-07-03 RX ADMIN — ONDANSETRON 4 MG: 2 INJECTION, SOLUTION INTRAMUSCULAR; INTRAVENOUS at 10:07

## 2018-07-03 RX ADMIN — PIPERACILLIN AND TAZOBACTAM 4.5 G: 4; .5 INJECTION, POWDER, LYOPHILIZED, FOR SOLUTION INTRAVENOUS; PARENTERAL at 11:07

## 2018-07-03 RX ADMIN — FENTANYL CITRATE 100 MCG: 50 INJECTION INTRAMUSCULAR; INTRAVENOUS at 10:07

## 2018-07-03 RX ADMIN — PROPOFOL 200 MG: 10 INJECTION, EMULSION INTRAVENOUS at 10:07

## 2018-07-03 RX ADMIN — NEBIVOLOL HYDROCHLORIDE 20 MG: 5 TABLET ORAL at 09:07

## 2018-07-03 RX ADMIN — ONDANSETRON 4 MG: 2 INJECTION INTRAMUSCULAR; INTRAVENOUS at 01:07

## 2018-07-03 RX ADMIN — VANCOMYCIN HYDROCHLORIDE 2000 MG: 1 INJECTION, POWDER, LYOPHILIZED, FOR SOLUTION INTRAVENOUS at 03:07

## 2018-07-03 RX ADMIN — LIDOCAINE HYDROCHLORIDE 100 MG: 20 INJECTION, SOLUTION INTRAVENOUS at 10:07

## 2018-07-03 NOTE — PROGRESS NOTES
PCP and Post- op appointments scheduled.    Follow-up Information     Perez Bowie MD On 7/12/2018.    Specialty:  Orthopedic Surgery  Why:  On Thursday @ 9:00am, outpatient services  Contact information:  [address]  [facility]  [place] [address]  [phone]             Tamika Watts NP On 7/9/2018.    Specialty:  Urgent Care  Why:  On Monday @ 2:20pm, outpatient services  Contact information:  [address] [facility] [facility] [address]  [phone]

## 2018-07-03 NOTE — PLAN OF CARE
"TN reviewed follow up appointment information as well as  "Post op discharge instructions" handout with patient using teach back. Patient stated he will notify the doctor if he has blood in his stools, a fever, and if his incision opens up. Patient is in agreement and verbalized an understanding. Placed discharge information in blue discharge folder.  TN also reviewed patient responsibility checklist with him using teach back. Patient was able to verbalize his responsibilities after discharge to manage his care at home bein. Going to follow up appointments   2. Picking up rx from the pharmacy when discharged  3. Taking his medications as prescribed      Patient's nurse, Charito, informed that patient can discharge from  standpoint.       18 1319   Final Note   Assessment Type Final Discharge Note   Discharge Disposition Home   What phone number can be called within the next 1-3 days to see how you are doing after discharge? ([phone])   Hospital Follow Up  Appt(s) scheduled? Yes   Discharge plans and expectations educations in teach back method with documentation complete? Yes   Right Care Referral Info   Post Acute Recommendation No Care     "

## 2018-07-03 NOTE — TRANSFER OF CARE
"Anesthesia Transfer of Care Note    Patient: Roberta Ware Jr.    Procedure(s) Performed: Procedure(s) (LRB):  INCISION AND DRAINAGE LEFT LONG FINGER (Left)    Patient location: PACU    Anesthesia Type: general    Transport from OR: Transported from OR on room air with adequate spontaneous ventilation    Post pain: adequate analgesia    Post assessment: no apparent anesthetic complications and tolerated procedure well    Post vital signs: stable    Level of consciousness: awake, alert and oriented    Complications: none    Transfer of care protocol was followed      Last vitals:   Visit Vitals  BP (!) 172/88   Pulse 62   Temp 37 °C (98.6 °F) (Oral)   Resp 16   Ht 6' 4" (1.93 m)   Wt (!) 219 kg (482 lb 12.9 oz)   SpO2 96%   BMI 58.77 kg/m²     "

## 2018-07-03 NOTE — ANESTHESIA PREPROCEDURE EVALUATION
07/03/2018  Roberta Ware Jr. is a 47 y.o., male with supermorbid obesity BMI 52.8, HTN, GERD, high-risk for MIREYA, gout on indomethacin prn, here for cholecystectomy.  Pre-operative evaluation for Procedure(s) (LRB):  CHOLECYSTECTOMY-LAPAROSCOPIC (N/A)    Roberta Ware Jr. is a 47 y.o. male     Patient Active Problem List   Diagnosis    Essential hypertension    Iron deficiency anemia    Diverticulosis    Morbid obesity with BMI of 50.0-59.9, adult    Erectile dysfunction    Osteoarthritis    Leukocytosis    Plantar fasciitis    Gout    AR (allergic rhinitis)    Chronic kidney disease, stage III (moderate)    Thrombocytosis    Anemia    Flexor tenosynovitis of finger       Review of patient's allergies indicates:   Allergen Reactions    Hydrochlorothiazide Other (See Comments)     Gout    Tramadol      Pt states make him jittery        Current Facility-Administered Medications on File Prior to Visit   Medication Dose Route Frequency Provider Last Rate Last Dose    amLODIPine tablet 10 mg  10 mg Oral Daily KASHMIR Smith   10 mg at 07/02/18 0830    chlorthalidone tablet 25 mg  25 mg Oral Daily KASHMIR Smith   25 mg at 07/02/18 0830    ketorolac injection 9.999 mg  9.999 mg Intravenous Q6H PRN LATOSHA Solorzano        nebivolol tablet 20 mg  20 mg Oral Daily KASHMIR Smith   20 mg at 07/03/18 0929    ondansetron injection 4 mg  4 mg Intravenous Q8H PRN LATOSHA Solorzano        piperacillin-tazobactam 4.5 g in sodium chloride 0.9% 100 mL IVPB (ready to mix system)  4.5 g Intravenous Q8H LATOSHA Solorzano 25 mL/hr at 07/03/18 0413 4.5 g at 07/03/18 0413    vancomycin (VANCOCIN) 2,000 mg in dextrose 5 % 500 mL IVPB  2,000 mg Intravenous Q12H Fausto Carrera  mL/hr at 07/03/18 0302 2,000 mg at 07/03/18 0302     Current Outpatient Prescriptions on  File Prior to Visit   Medication Sig Dispense Refill    amlodipine (NORVASC) 10 MG tablet Take 1 tablet (10 mg total) by mouth once daily. 90 tablet 3    BYSTOLIC 10 mg Tab TAKE 2 TABLETS(20 MG) BY MOUTH EVERY DAY 60 tablet 3    chlorthalidone (HYGROTEN) 25 MG Tab TAKE 1 TABLET(25 MG) BY MOUTH EVERY DAY 90 tablet 2    dicyclomine (BENTYL) 20 mg tablet dicyclomine 20 mg tablet      famotidine (PEPCID) 20 MG tablet famotidine 20 mg tablet      naproxen (NAPROSYN) 500 MG tablet TAKE 1 TABLET(500 MG) BY MOUTH TWICE DAILY AS NEEDED 30 tablet 0    nebivolol (BYSTOLIC) 10 MG Tab Bystolic 10 mg tablet. Take 2 tabs daily (20 mg total).      omeprazole (PRILOSEC) 40 MG capsule omeprazole 40 mg capsule,delayed release         Past Surgical History:   Procedure Laterality Date    CHOLECYSTECTOMY      COLONOSCOPY  2012    UPPER GASTROINTESTINAL ENDOSCOPY         Social History     Social History    Marital status:      Spouse name: N/A    Number of children: N/A    Years of education: N/A     Occupational History    Not on file.     Social History Main Topics    Smoking status: Never Smoker    Smokeless tobacco: Never Used    Alcohol use Yes      Comment: Ocassionally    Drug use: No    Sexual activity: Yes     Partners: Female     Other Topics Concern    Not on file     Social History Narrative    No narrative on file         Vital Signs Range (Last 24H):  Temp:  [36.6 °C (97.9 °F)-36.8 °C (98.3 °F)]   Pulse:  [50-60]   Resp:  [16-20]   BP: (137-159)/(67-83)   SpO2:  [95 %-100 %]       CBC:   Recent Labs      07/02/18   0611  07/03/18   0502   WBC  13.28*  15.55*   RBC  4.48*  4.46*   HGB  11.8*  11.6*   HCT  37.8*  37.2*   PLT  306  287   MCV  84  83   MCH  26.3*  26.0*   MCHC  31.2*  31.2*       CMP:   Recent Labs      07/02/18   0611  07/03/18   0502   NA  142  139   K  4.1  3.9   CL  102  102   CO2  33*  28   BUN  19  18   CREATININE  1.6*  1.6*   GLU  91  147*   CALCIUM  9.7  9.2   ALBUMIN   3.6  3.3*   PROT  7.9  7.6   ALKPHOS  76  64   ALT  11  12   AST  12  13   BILITOT  0.4  0.3         Anesthesia Evaluation    I have reviewed the Patient Summary Reports.     I have reviewed the Medications.     Review of Systems  Anesthesia Hx:  No problems with previous Anesthesia Denies Hx of Anesthetic complications  History of prior surgery of interest to airway management or planning: Denies Family Hx of Anesthesia complications.   Denies Personal Hx of Anesthesia complications.   Hematology/Oncology:  Hematology Normal   Oncology Normal     EENT/Dental:EENT/Dental Normal   Cardiovascular:   Exercise tolerance: good Hypertension    Pulmonary:  Pulmonary Normal    Renal/:   Chronic Renal Disease, CRI    Hepatic/GI:   No Bowel Prep. GERD    Musculoskeletal:   Arthritis     Neurological:  Neurology Normal    Endocrine:  Endocrine Normal    Psych:  Psychiatric Normal           Physical Exam  General:  Well nourished, Morbid Obesity    Airway/Jaw/Neck:  Airway Findings: Mouth Opening: Normal Tongue: Normal  General Airway Assessment: Adult, Average  Mallampati: III  TM Distance: Normal, at least 6 cm  Jaw/Neck Findings:  Neck ROM: Normal ROM  Neck Findings:  Girth Increased      Dental:  Dental Findings: In tact   Chest/Lungs:  Chest/Lungs Findings: Clear to auscultation     Heart/Vascular:  Heart Findings: Rhythm: Regular Rhythm        Mental Status:  Mental Status Findings:  Alert and Oriented, Cooperative         Anesthesia Plan  Type of Anesthesia, risks & benefits discussed:  Anesthesia Type:  general  Patient's Preference:   Intra-op Monitoring Plan: standard ASA monitors  Intra-op Monitoring Plan Comments:   Post Op Pain Control Plan:   Post Op Pain Control Plan Comments:   Induction:   IV  Beta Blocker:  Patient is on a Beta-Blocker and has received one dose within the past 24 hours (No further documentation required).       Informed Consent: Patient understands risks and agrees with Anesthesia plan.   Questions answered. Anesthesia consent signed with patient.  ASA Score: 3     Day of Surgery Review of History & Physical:    H&P update referred to the surgeon.     Anesthesia Plan Notes: NPO adequate        Ready For Surgery From Anesthesia Perspective.                                                                                                                  07/03/2018  Roberta Ware Jr. is a 47 y.o., male.    Anesthesia Evaluation    I have reviewed the Patient Summary Reports.    I have reviewed the Nursing Notes.   I have reviewed the Medications.     Review of Systems  Anesthesia Hx:  No problems with previous Anesthesia Denies Hx of Anesthetic complications  Neg history of prior surgery. Denies Family Hx of Anesthesia complications.   Denies Personal Hx of Anesthesia complications.   Social:  Non-Smoker, No Alcohol Use    Hematology/Oncology:     Oncology Normal    -- Anemia:   EENT/Dental:EENT/Dental Normal   Cardiovascular:   Exercise tolerance: good Hypertension    Pulmonary:  Pulmonary Normal    Renal/:   Chronic Renal Disease, CRI    Hepatic/GI:  Hepatic/GI Normal    Musculoskeletal:  Musculoskeletal Normal    Neurological:  Neurology Normal    Endocrine:  Endocrine Normal    Dermatological:  Skin Normal    Psych:  Psychiatric Normal           Physical Exam  General:  Obesity    Airway/Jaw/Neck:  Airway Findings: Mouth Opening: Normal General Airway Assessment: Adult  Mallampati: II  TM Distance: Normal, at least 6 cm         Dental:  DENTAL FINDINGS: Normal   Chest/Lungs:  Chest/Lungs Clear    Heart/Vascular:  Heart Findings: Normal Heart murmur: negative       Mental Status:  Mental Status Findings:  Cooperative, Alert and Oriented         Anesthesia Plan  Type of Anesthesia, risks & benefits discussed:  Anesthesia Type:  general  Patient's Preference:   Intra-op Monitoring Plan:   Intra-op Monitoring Plan Comments:   Post Op Pain Control Plan:   Post Op Pain Control Plan Comments:    Induction:   IV  Beta Blocker:  Patient is on a Beta-Blocker and has received one dose within the past 24 hours (No further documentation required).       Informed Consent: Patient understands risks and agrees with Anesthesia plan.  Questions answered. Anesthesia consent signed with patient.  ASA Score: 2     Day of Surgery Review of History & Physical:            Ready For Surgery From Anesthesia Perspective.

## 2018-07-03 NOTE — PROGRESS NOTES
D/c instructions and RX given. Pt stated understanding. No distress noted. IV d/c'd cath tip intact. Pressure held. No redness/swelling noted. W/c ordered for d/c.

## 2018-07-03 NOTE — PLAN OF CARE
Problem: Patient Care Overview  Goal: Plan of Care Review   07/03/18 3142   Coping/Psychosocial   Plan Of Care Reviewed With patient;spouse   Patient resting in bed with eyes closed. Easily awaken by verbal stimuli. Resp. Even non-labored able to voice needs. Denies pain and discomfort at present time. Patient continues on IV ABX therapy for swelling to left hand 3rd finger. Remains NPO for I&D on today. Patient stated understanding of care of plan and looks forward to getting better and going home. Safety maintained, bed locked and din lowest position with  Call light in reach. Patient spouse at bed side for support. Will monitor.

## 2018-07-03 NOTE — H&P
"[facility]  Orthopedics  H&P    Patient Name: Roberta Ware Jr.  MRN: [id number]  Admission Date: 7/1/2018  Primary Care Provider: Antwan Ambrosio MD    Patient information was obtained from patient and ER records.     Subjective:     Principal Problem:<principal problem not specified>    Chief Complaint:   Chief Complaint   Patient presents with    Joint Swelling     Pt states," Left middle finger swelling for one week. I do have gout. Not sure if this is it."        HPI: 46 yo male with a sig pmhx of gout presented to the ED with a chief complaint of left long finger pain and swelling for one week. He denies any specific injury. Reports a history of gout. Aspiration was attempted at bedside. No pus returned in the needle, but pus was expressed through the needle stick.    Past Medical History:   Diagnosis Date    Diverticulosis     Erectile dysfunction     Gout     Hypertension     Osteoarthritis        Past Surgical History:   Procedure Laterality Date    CHOLECYSTECTOMY      COLONOSCOPY  2012    UPPER GASTROINTESTINAL ENDOSCOPY         Review of patient's allergies indicates:   Allergen Reactions    Hydrochlorothiazide Other (See Comments)     Gout    Tramadol Swelling     Pt states make him jittery        Current Facility-Administered Medications   Medication    amLODIPine tablet 10 mg    chlorthalidone tablet 25 mg    ketorolac injection 9.999 mg    nebivolol tablet 20 mg    ondansetron injection 4 mg    piperacillin-tazobactam 4.5 g in sodium chloride 0.9% 100 mL IVPB (ready to mix system)    vancomycin (VANCOCIN) 2,000 mg in dextrose 5 % 500 mL IVPB     Family History     Problem Relation (Age of Onset)    Anemia Sister    Cancer Mother    Diabetes Father    Heart disease Mother    Hypertension Father    Ulcers Father        Social History Main Topics    Smoking status: Never Smoker    Smokeless tobacco: Never Used    Alcohol use Yes      Comment: Ocassionally    " "Drug use: No    Sexual activity: Yes     Partners: Female     Review of Systems   Constitution: Negative.   HENT: Negative.    Eyes: Negative.    Cardiovascular: Negative.    Respiratory: Negative.    Endocrine: Negative.    Skin: Negative.    Musculoskeletal:        Left long finger pain and swelling   Gastrointestinal: Negative.    Genitourinary: Negative.    Neurological: Negative.    Psychiatric/Behavioral: Negative.    Allergic/Immunologic: Negative.      Objective:     Vital Signs (Most Recent):  Temp: 98.1 °F (36.7 °C) (07/03/18 0518)  Pulse: (!) 50 (07/03/18 0518)  Resp: 18 (07/03/18 0518)  BP: (!) 153/72 (07/03/18 0518)  SpO2: 95 % (07/03/18 0518) Vital Signs (24h Range):  Temp:  [96.1 °F (35.6 °C)-98.3 °F (36.8 °C)] 98.1 °F (36.7 °C)  Pulse:  [50-60] 50  Resp:  [16-20] 18  SpO2:  [94 %-100 %] 95 %  BP: (137-159)/(67-83) 153/72     Weight: (!) 219 kg (482 lb 12.9 oz)  Height: 6' 4" (193 cm)  Body mass index is 58.77 kg/m².      Intake/Output Summary (Last 24 hours) at 07/03/18 0709  Last data filed at 07/03/18 0600   Gross per 24 hour   Intake             1200 ml   Output             1000 ml   Net              200 ml               Left Hand/Wrist Exam     Comments:  Left long finger: Skin intact and warm to touch. + Swelling and erythema. Worse at base of the MCP joint to DIP joint. Limited ROM due to pain .             Significant Labs:   CBC:   Recent Labs  Lab 07/02/18  0611 07/03/18  0502   WBC 13.28* 15.55*   HGB 11.8* 11.6*   HCT 37.8* 37.2*    287     All pertinent labs within the past 24 hours have been reviewed.    Significant Imaging: X-Ray: I have reviewed all pertinent results/findings and my personal findings are:  No evidence of fracture or dislocation.  Nonspecific soft tissue swelling of the 3rd digit.    Assessment/Plan:     Active Diagnoses:    Diagnosis Date Noted POA    Flexor tenosynovitis of finger [M65.9] 07/01/2018 Yes      Problems Resolved During this Admission:    " Diagnosis Date Noted Date Resolved POA   A/P: Left long finger infection  1) admit to ortho  2) NPO  3) to I&D Tuesday morning for I&D left long finger        KASHMIR Smith  Orthopedics  [facility]

## 2018-07-03 NOTE — PROGRESS NOTES
Pt returned to unit from surg, L hand with cast pad/ace. No distress noted. Will continue to monitor. Pt sitting up on side of with with family in attendance.

## 2018-07-03 NOTE — PROGRESS NOTES
WRITTEN HEALTHCARE DISCHARGE INFORMATION     Things that YOU are responsible for to Manage Your Care At Home:  1. Getting your prescriptions filled.  2. Taking you medications as directed. DO NOT MISS ANY DOSES!  3. Going to your follow-up doctor appointments. This is important because it allows the doctor to monitor your progress and to determine if any changes need to be made to your treatment plan.    If you are unable to make your follow up appointments, please call the number listed and reschedule this appointment.     After discharge, if you need assistance, you can call [facility] Line for 24/7 assistance at [phone]    Thank you for choosing [facility] and allowing us to care for you.   From your care manager: Isabell CHERY,[place] @ [phone]     You should receive a call from [facility] within 48-72 hours to help manage your care after discharge. Please try to make sure that you answer your phone for this important phone call.     Follow-up Information     Perez Bowie MD On 7/12/2018.    Specialty:  Orthopedic Surgery  Why:  On Thursday @ 9:00am, outpatient services  Contact information:  [address] [facility] [address]  [place] [address]  [phone]             Tamika Watts NP On 7/9/2018.    Specialty:  Urgent Care  Why:  On Monday @ 2:20pm, outpatient services  Contact information:  [address]  [place] [address]  [phone]

## 2018-07-05 NOTE — OP NOTE
DATE OF PROCEDURE:  07/03/2018.    PREOPERATIVE DIAGNOSIS:  Left long finger abscess.    POSTOPERATIVE DIAGNOSIS:  Left long finger tophaceous gout.    PROCEDURE:  I and D left long finger with debulking of gouty tophi.    SURGEON:  Perez Bowie M.D.    ASSISTANT:  Kirsten Michele.    COMPLICATIONS:  None.    IMPLANTS:  None.    BLOOD LOSS:  Minimal.    IMPLANTS:  No implants.    DESCRIPTION OF PROCEDURE:  After proper consents were obtained, the patient was   taken to the Operating Room and administered general anesthesia.  Left upper   extremity was prepped and draped in normal sterile fashion.  Limb was elevated,   tourniquet was inflated.  Incision was made directly over the indurated part of   his long finger overlying the proximal phalanx.  Incision was carried through   the subcutaneous tissue and tophaceous material was expressed.  There was no   purulence.  Cultures were taken, anyway and sent to the lab.  A curette was   introduced in thegouty material was debulked.  It did extend slightly further distally across the   PIP joint on the volar side.  This was debulked as well.  About 1.5 liters of   normal saline with Betadine were then infiltrated through the wound with   pulsatile lavage.  Skin was closed with 4-0 nylon.  Sterile dressings were   applied.  Tourniquet was deflated.  The patient was aroused in the Operating   Room and transferred to Recovery in stable condition.      KIRTI  dd: 07/03/2018 11:36:05 (CDT)  td: 07/03/2018 16:52:10 (CDT)  Doc ID   #[id number]  Job ID #[id number]    CC:

## 2018-07-05 NOTE — ANESTHESIA POSTPROCEDURE EVALUATION
"Anesthesia Post Evaluation    Patient: Roberta Ware Jr.    Procedure(s) Performed: Procedure(s) (LRB):  INCISION AND DRAINAGE LEFT LONG FINGER (Left)    Final Anesthesia Type: general  Patient location during evaluation: PACU  Patient participation: Yes- Able to Participate  Level of consciousness: awake and alert, oriented and awake  Post-procedure vital signs: reviewed and stable  Airway patency: patent  PONV status at discharge: No PONV  Anesthetic complications: no      Cardiovascular status: blood pressure returned to baseline  Respiratory status: unassisted, spontaneous ventilation and room air  Hydration status: euvolemic  Follow-up not needed.        Visit Vitals  BP (!) 141/82   Pulse (!) 56   Temp 36.8 °C (98.3 °F)   Resp 18   Ht 6' 4" (1.93 m)   Wt (!) 219 kg (482 lb 12.9 oz)   SpO2 (!) 91%   BMI 58.77 kg/m²       Pain/Norma Score: No Data Recorded      "

## 2018-07-05 NOTE — DISCHARGE SUMMARY
[facility]  Orthopedics  Discharge Summary      Patient Name: Roberta Ware Jr.  MRN: [id number]  Admission Date: 7/1/2018  Hospital Length of Stay: 0 days  Discharge Date and Time: 7/3/2018  4:02 PM  Attending Physician: No att. providers found Dr Perez Bowie  Discharging Provider: KASHMIR Smith  Primary Care Provider: Antwan Ambrosio MD    HPI: left long finger pain and swelling     Procedure(s) (LRB):  INCISION AND DRAINAGE LEFT LONG FINGER (Left)      Hospital Course: Patient ws admitted to orthopedic surgery service with a pre op diagnosis of left long finger abscess. He had an I&D of his left long finger with debulking of gouty tophi. No infection was found. He tolerated the procedure well with no complication. No consults were made post op. He was discharged home to self care. He is to follow up in two weeks, can have a normal diet, and can use his left long finger as tolerated.         Significant Diagnostic Studies: Labs: All labs within the past 24 hours have been reviewed    Pending Diagnostic Studies:     None        Final Active Diagnoses:    Diagnosis Date Noted POA    PRINCIPAL PROBLEM:  Acute gouty arthritis [M10.9] 07/01/2018 Yes    Gout [M10.9] 02/17/2014 Yes      Problems Resolved During this Admission:    Diagnosis Date Noted Date Resolved POA      Discharged Condition: stable    Disposition: Home or Self Care    Follow Up:  Follow-up Information     Perez Bowie MD On 7/12/2018.    Specialty:  Orthopedic Surgery  Why:  On Thursday @ 9:00am, outpatient services  Contact information:  [address] [facility]  [place] [address]  [phone]             Tamika Watts NP On 7/9/2018.    Specialty:  Urgent Care  Why:  On Monday @ 2:20pm, outpatient services  Contact information:  [address] [facility] [facility] [place] [address]  [phone]                 Patient Instructions: Normal diet. Follow up in two weeks. Use of left long finger as tolerated.     Diet Adult Regular      Keep surgical extremity elevated     Ice to affected area     Notify your health care provider if you experience any of the following:  temperature >100.4     Notify your health care provider if you experience any of the following:  persistent nausea and vomiting or diarrhea     Notify your health care provider if you experience any of the following:  severe uncontrolled pain     Notify your health care provider if you experience any of the following:  redness, tenderness, or signs of infection (pain, swelling, redness, odor or green/yellow discharge around incision site)     Remove dressing in 72 hours     Activity as tolerated     Shower on day dressing removed (No bath)       Medications:  Reconciled Home Medications:      Medication List      START taking these medications    colchicine 0.6 mg tablet  Take 1 tablet (0.6 mg total) by mouth 2 (two) times daily. Prn gouty flare-up, for up to 3 days     HYDROcodone-acetaminophen 5-325 mg per tablet  Commonly known as:  NORCO  Take 1 tablet by mouth every 6 (six) hours as needed for Pain.     sulfamethoxazole-trimethoprim 800-160mg 800-160 mg Tab  Commonly known as:  BACTRIM DS  Take 1 tablet by mouth 2 (two) times daily.        CONTINUE taking these medications    amLODIPine 10 MG tablet  Commonly known as:  NORVASC  Take 1 tablet (10 mg total) by mouth once daily.     * BYSTOLIC 10 MG Tab  Generic drug:  nebivolol  Bystolic 10 mg tablet. Take 2 tabs daily (20 mg total).     * BYSTOLIC 10 MG Tab  Generic drug:  nebivolol  TAKE 2 TABLETS(20 MG) BY MOUTH EVERY DAY     chlorthalidone 25 MG Tab  Commonly known as:  HYGROTEN  TAKE 1 TABLET(25 MG) BY MOUTH EVERY DAY     dicyclomine 20 mg tablet  Commonly known as:  BENTYL  dicyclomine 20 mg tablet     famotidine 20 MG tablet  Commonly known as:  PEPCID  famotidine 20 mg tablet     naproxen 500 MG tablet  Commonly known as:  NAPROSYN  TAKE 1 TABLET(500 MG) BY MOUTH TWICE DAILY AS NEEDED     omeprazole 40 MG  capsule  Commonly known as:  PRILOSEC  omeprazole 40 mg capsule,delayed release        * This list has 2 medication(s) that are the same as other medications prescribed for you. Read the directions carefully, and ask your doctor or other care provider to review them with you.                KASHMIR Smith  Orthopedics  [facility]

## 2018-07-06 LAB
BACTERIA BLD CULT: NORMAL
BACTERIA BLD CULT: NORMAL

## 2018-07-07 LAB
BACTERIA SPEC AEROBE CULT: NO GROWTH
BACTERIA SPEC ANAEROBE CULT: NORMAL

## 2018-07-09 ENCOUNTER — OFFICE VISIT (OUTPATIENT)
Dept: FAMILY MEDICINE | Facility: CLINIC | Age: 48
End: 2018-07-09
Payer: COMMERCIAL

## 2018-07-09 VITALS
HEIGHT: 76 IN | SYSTOLIC BLOOD PRESSURE: 134 MMHG | OXYGEN SATURATION: 97 % | TEMPERATURE: 98 F | HEART RATE: 62 BPM | WEIGHT: 315 LBS | BODY MASS INDEX: 38.36 KG/M2 | DIASTOLIC BLOOD PRESSURE: 82 MMHG

## 2018-07-09 DIAGNOSIS — Z48.89 ENCOUNTER FOR POSTOPERATIVE WOUND CARE: Primary | ICD-10-CM

## 2018-07-09 DIAGNOSIS — N52.9 ERECTILE DYSFUNCTION, UNSPECIFIED ERECTILE DYSFUNCTION TYPE: ICD-10-CM

## 2018-07-09 PROCEDURE — 99999 PR PBB SHADOW E&M-EST. PATIENT-LVL III: CPT | Mod: PBBFAC,,, | Performed by: NURSE PRACTITIONER

## 2018-07-09 PROCEDURE — 99213 OFFICE O/P EST LOW 20 MIN: CPT | Mod: S$GLB,,, | Performed by: NURSE PRACTITIONER

## 2018-07-09 PROCEDURE — 3079F DIAST BP 80-89 MM HG: CPT | Mod: CPTII,S$GLB,, | Performed by: NURSE PRACTITIONER

## 2018-07-09 PROCEDURE — 3008F BODY MASS INDEX DOCD: CPT | Mod: CPTII,S$GLB,, | Performed by: NURSE PRACTITIONER

## 2018-07-09 PROCEDURE — 3075F SYST BP GE 130 - 139MM HG: CPT | Mod: CPTII,S$GLB,, | Performed by: NURSE PRACTITIONER

## 2018-07-09 RX ORDER — SILDENAFIL 50 MG/1
50 TABLET, FILM COATED ORAL DAILY PRN
Qty: 30 TABLET | Refills: 0 | Status: SHIPPED | OUTPATIENT
Start: 2018-07-09 | End: 2019-01-07

## 2018-07-09 RX ORDER — NAPROXEN 500 MG/1
TABLET ORAL
Qty: 30 TABLET | Refills: 0 | Status: SHIPPED | OUTPATIENT
Start: 2018-07-09 | End: 2019-01-18

## 2018-07-09 NOTE — PROGRESS NOTES
Subjective:       Patient ID: Roberta Ware Jr. is a 47 y.o. male.    Chief Complaint: Hospital Follow Up (Finger Abcess)    Wound Check   Previous treatment included I&D of abscess. The maximum temperature noted was less than 100.4 F. The temperature was taken using an axillary reading. There has been no drainage from the wound. There is no redness present. There is no swelling present. There is no pain present. He has no difficulty moving the affected extremity or digit.     Review of Systems   Constitutional: Negative for chills, diaphoresis, fatigue and fever.   Gastrointestinal: Negative for vomiting.   Skin: Positive for wound. Negative for color change and rash.   Hematological: Negative for adenopathy. Does not bruise/bleed easily.       Objective:      Physical Exam   Constitutional: He appears well-developed and well-nourished.   Skin: Skin is warm and dry. Capillary refill takes less than 2 seconds.    Sutures in place to left long finger. Moderate swelling noted to finger. No redness or tenderness noted. No drainage noted. Wound is healing well.        Assessment:       1. Encounter for postoperative wound care    2. Erectile dysfunction, unspecified erectile dysfunction type        Plan:       Roberta was seen today for hospital follow up.    Diagnoses and all orders for this visit:    Encounter for postoperative wound care  -     naproxen (NAPROSYN) 500 MG tablet; TAKE 1 TABLET(500 MG) BY MOUTH TWICE DAILY AS NEEDED    Erectile dysfunction, unspecified erectile dysfunction type  -     sildenafil (VIAGRA) 50 MG tablet; Take 1 tablet (50 mg total) by mouth daily as needed for Erectile Dysfunction.

## 2018-09-01 ENCOUNTER — LAB VISIT (OUTPATIENT)
Dept: LAB | Facility: HOSPITAL | Age: 48
End: 2018-09-01
Attending: INTERNAL MEDICINE
Payer: COMMERCIAL

## 2018-09-01 DIAGNOSIS — D50.9 IRON DEFICIENCY ANEMIA: ICD-10-CM

## 2018-09-01 DIAGNOSIS — D64.9 ANEMIA, UNSPECIFIED TYPE: ICD-10-CM

## 2018-09-01 DIAGNOSIS — D72.829 LEUKOCYTOSIS, UNSPECIFIED TYPE: ICD-10-CM

## 2018-09-01 LAB
ALBUMIN SERPL BCP-MCNC: 3.5 G/DL
ALP SERPL-CCNC: 72 U/L
ALT SERPL W/O P-5'-P-CCNC: 13 U/L
ANION GAP SERPL CALC-SCNC: 9 MMOL/L
AST SERPL-CCNC: 17 U/L
BASOPHILS # BLD AUTO: 0.06 K/UL
BASOPHILS NFR BLD: 0.4 %
BILIRUB SERPL-MCNC: 0.3 MG/DL
BUN SERPL-MCNC: 15 MG/DL
CALCIUM SERPL-MCNC: 9.9 MG/DL
CHLORIDE SERPL-SCNC: 104 MMOL/L
CO2 SERPL-SCNC: 29 MMOL/L
CREAT SERPL-MCNC: 1.5 MG/DL
DIFFERENTIAL METHOD: ABNORMAL
EOSINOPHIL # BLD AUTO: 0.5 K/UL
EOSINOPHIL NFR BLD: 3.3 %
ERYTHROCYTE [DISTWIDTH] IN BLOOD BY AUTOMATED COUNT: 17.2 %
ERYTHROCYTE [SEDIMENTATION RATE] IN BLOOD BY WESTERGREN METHOD: 26 MM/HR
EST. GFR  (AFRICAN AMERICAN): >60 ML/MIN/1.73 M^2
EST. GFR  (NON AFRICAN AMERICAN): 54.7 ML/MIN/1.73 M^2
FERRITIN SERPL-MCNC: 84 NG/ML
GLUCOSE SERPL-MCNC: 104 MG/DL
HCT VFR BLD AUTO: 39.8 %
HGB BLD-MCNC: 11.9 G/DL
IMM GRANULOCYTES # BLD AUTO: 0.06 K/UL
IMM GRANULOCYTES NFR BLD AUTO: 0.4 %
IRON SERPL-MCNC: 48 UG/DL
LYMPHOCYTES # BLD AUTO: 4.9 K/UL
LYMPHOCYTES NFR BLD: 35.4 %
MCH RBC QN AUTO: 26.1 PG
MCHC RBC AUTO-ENTMCNC: 29.9 G/DL
MCV RBC AUTO: 87 FL
MONOCYTES # BLD AUTO: 0.7 K/UL
MONOCYTES NFR BLD: 5.3 %
NEUTROPHILS # BLD AUTO: 7.6 K/UL
NEUTROPHILS NFR BLD: 55.2 %
NRBC BLD-RTO: 0 /100 WBC
PLATELET # BLD AUTO: 328 K/UL
PMV BLD AUTO: 10 FL
POTASSIUM SERPL-SCNC: 4.1 MMOL/L
PROT SERPL-MCNC: 7.8 G/DL
RBC # BLD AUTO: 4.56 M/UL
SATURATED IRON: 16 %
SODIUM SERPL-SCNC: 142 MMOL/L
TOTAL IRON BINDING CAPACITY: 308 UG/DL
TRANSFERRIN SERPL-MCNC: 208 MG/DL
WBC # BLD AUTO: 13.86 K/UL

## 2018-09-01 PROCEDURE — 36415 COLL VENOUS BLD VENIPUNCTURE: CPT | Mod: PO

## 2018-09-01 PROCEDURE — 82728 ASSAY OF FERRITIN: CPT

## 2018-09-01 PROCEDURE — 85652 RBC SED RATE AUTOMATED: CPT

## 2018-09-01 PROCEDURE — 83540 ASSAY OF IRON: CPT

## 2018-09-01 PROCEDURE — 80053 COMPREHEN METABOLIC PANEL: CPT

## 2018-09-01 PROCEDURE — 85025 COMPLETE CBC W/AUTO DIFF WBC: CPT

## 2018-09-15 DIAGNOSIS — I10 ESSENTIAL HYPERTENSION: ICD-10-CM

## 2018-09-16 RX ORDER — NEBIVOLOL HYDROCHLORIDE 10 MG/1
TABLET ORAL
Qty: 60 TABLET | Refills: 1 | Status: SHIPPED | OUTPATIENT
Start: 2018-09-16 | End: 2018-12-24 | Stop reason: SDUPTHER

## 2018-11-17 DIAGNOSIS — I10 ESSENTIAL HYPERTENSION: ICD-10-CM

## 2018-11-19 RX ORDER — AMLODIPINE BESYLATE 10 MG/1
TABLET ORAL
Qty: 90 TABLET | Refills: 0 | Status: SHIPPED | OUTPATIENT
Start: 2018-11-19 | End: 2019-06-24

## 2018-12-24 DIAGNOSIS — I10 ESSENTIAL HYPERTENSION: ICD-10-CM

## 2018-12-24 RX ORDER — NEBIVOLOL HYDROCHLORIDE 10 MG/1
TABLET ORAL
Qty: 60 TABLET | Refills: 0 | Status: SHIPPED | OUTPATIENT
Start: 2018-12-24 | End: 2019-02-06

## 2019-01-07 ENCOUNTER — LAB VISIT (OUTPATIENT)
Dept: LAB | Facility: HOSPITAL | Age: 49
End: 2019-01-07
Attending: NURSE PRACTITIONER
Payer: COMMERCIAL

## 2019-01-07 ENCOUNTER — OFFICE VISIT (OUTPATIENT)
Dept: FAMILY MEDICINE | Facility: CLINIC | Age: 49
End: 2019-01-07
Payer: COMMERCIAL

## 2019-01-07 VITALS
SYSTOLIC BLOOD PRESSURE: 130 MMHG | OXYGEN SATURATION: 96 % | TEMPERATURE: 99 F | HEART RATE: 95 BPM | DIASTOLIC BLOOD PRESSURE: 84 MMHG

## 2019-01-07 DIAGNOSIS — N18.30 CHRONIC KIDNEY DISEASE, STAGE III (MODERATE): ICD-10-CM

## 2019-01-07 DIAGNOSIS — M25.561 ACUTE PAIN OF RIGHT KNEE: Primary | ICD-10-CM

## 2019-01-07 DIAGNOSIS — M25.561 ACUTE PAIN OF RIGHT KNEE: ICD-10-CM

## 2019-01-07 DIAGNOSIS — M10.9 ACUTE GOUTY ARTHRITIS: ICD-10-CM

## 2019-01-07 DIAGNOSIS — I10 ESSENTIAL HYPERTENSION: ICD-10-CM

## 2019-01-07 LAB — URATE SERPL-MCNC: 11.9 MG/DL

## 2019-01-07 PROCEDURE — 3079F DIAST BP 80-89 MM HG: CPT | Mod: CPTII,S$GLB,, | Performed by: NURSE PRACTITIONER

## 2019-01-07 PROCEDURE — 3075F SYST BP GE 130 - 139MM HG: CPT | Mod: CPTII,S$GLB,, | Performed by: NURSE PRACTITIONER

## 2019-01-07 PROCEDURE — 84550 ASSAY OF BLOOD/URIC ACID: CPT

## 2019-01-07 PROCEDURE — 3075F PR MOST RECENT SYSTOLIC BLOOD PRESS GE 130-139MM HG: ICD-10-PCS | Mod: CPTII,S$GLB,, | Performed by: NURSE PRACTITIONER

## 2019-01-07 PROCEDURE — 99999 PR PBB SHADOW E&M-EST. PATIENT-LVL III: CPT | Mod: PBBFAC,,, | Performed by: NURSE PRACTITIONER

## 2019-01-07 PROCEDURE — 99214 PR OFFICE/OUTPT VISIT, EST, LEVL IV, 30-39 MIN: ICD-10-PCS | Mod: 25,S$GLB,, | Performed by: NURSE PRACTITIONER

## 2019-01-07 PROCEDURE — 96372 PR INJECTION,THERAP/PROPH/DIAG2ST, IM OR SUBCUT: ICD-10-PCS | Mod: 59,S$GLB,, | Performed by: NURSE PRACTITIONER

## 2019-01-07 PROCEDURE — 99214 OFFICE O/P EST MOD 30 MIN: CPT | Mod: 25,S$GLB,, | Performed by: NURSE PRACTITIONER

## 2019-01-07 PROCEDURE — 96372 THER/PROPH/DIAG INJ SC/IM: CPT | Mod: 59,S$GLB,, | Performed by: NURSE PRACTITIONER

## 2019-01-07 PROCEDURE — 36415 COLL VENOUS BLD VENIPUNCTURE: CPT | Mod: PO

## 2019-01-07 PROCEDURE — 3079F PR MOST RECENT DIASTOLIC BLOOD PRESSURE 80-89 MM HG: ICD-10-PCS | Mod: CPTII,S$GLB,, | Performed by: NURSE PRACTITIONER

## 2019-01-07 PROCEDURE — 99999 PR PBB SHADOW E&M-EST. PATIENT-LVL III: ICD-10-PCS | Mod: PBBFAC,,, | Performed by: NURSE PRACTITIONER

## 2019-01-07 RX ORDER — METHOCARBAMOL 750 MG/1
TABLET, FILM COATED ORAL
COMMUNITY
Start: 2018-10-19 | End: 2019-05-27

## 2019-01-07 RX ORDER — KETOROLAC TROMETHAMINE 30 MG/ML
30 INJECTION, SOLUTION INTRAMUSCULAR; INTRAVENOUS
Status: DISCONTINUED | OUTPATIENT
Start: 2019-01-07 | End: 2019-01-07

## 2019-01-07 RX ORDER — COLCHICINE 0.6 MG/1
TABLET ORAL
COMMUNITY
End: 2019-01-18 | Stop reason: SDUPTHER

## 2019-01-07 RX ORDER — TRIAMCINOLONE ACETONIDE 40 MG/ML
40 INJECTION, SUSPENSION INTRA-ARTICULAR; INTRAMUSCULAR
Status: COMPLETED | OUTPATIENT
Start: 2019-01-07 | End: 2019-01-07

## 2019-01-07 RX ORDER — HYDROCODONE BITARTRATE AND ACETAMINOPHEN 5; 325 MG/1; MG/1
TABLET ORAL
COMMUNITY
End: 2019-01-08

## 2019-01-07 RX ORDER — METHOCARBAMOL 750 MG/1
TABLET, FILM COATED ORAL
COMMUNITY
End: 2019-02-06 | Stop reason: SDUPTHER

## 2019-01-07 RX ORDER — SILDENAFIL 50 MG/1
TABLET, FILM COATED ORAL
COMMUNITY
End: 2019-04-22

## 2019-01-07 RX ORDER — AMLODIPINE BESYLATE 10 MG/1
TABLET ORAL
Qty: 90 TABLET | Refills: 0 | OUTPATIENT
Start: 2019-01-07

## 2019-01-07 RX ORDER — ALLOPURINOL 100 MG/1
TABLET ORAL
COMMUNITY
Start: 2018-11-17 | End: 2019-01-18

## 2019-01-07 RX ORDER — KETOROLAC TROMETHAMINE 30 MG/ML
30 INJECTION, SOLUTION INTRAMUSCULAR; INTRAVENOUS
Status: COMPLETED | OUTPATIENT
Start: 2019-01-07 | End: 2019-01-07

## 2019-01-07 RX ORDER — SULFAMETHOXAZOLE AND TRIMETHOPRIM 800; 160 MG/1; MG/1
TABLET ORAL
COMMUNITY
End: 2019-02-06 | Stop reason: ALTCHOICE

## 2019-01-07 RX ORDER — ALLOPURINOL 100 MG/1
TABLET ORAL
COMMUNITY
End: 2019-01-18

## 2019-01-07 RX ORDER — CHLORTHALIDONE 25 MG/1
TABLET ORAL
COMMUNITY
End: 2019-04-12

## 2019-01-07 RX ADMIN — KETOROLAC TROMETHAMINE 30 MG: 30 INJECTION, SOLUTION INTRAMUSCULAR; INTRAVENOUS at 11:01

## 2019-01-07 RX ADMIN — TRIAMCINOLONE ACETONIDE 40 MG: 40 INJECTION, SUSPENSION INTRA-ARTICULAR; INTRAMUSCULAR at 10:01

## 2019-01-07 NOTE — PROGRESS NOTES
Subjective:       Patient ID: Roberta Ware Jr. is a 48 y.o. male.    Chief Complaint: Knee Pain and fluid right knee    Knee Pain    The incident occurred 3 to 5 days ago. The incident occurred at home. There was no injury mechanism. The pain is present in the right knee. The quality of the pain is described as aching. The pain is at a severity of 10/10. The pain has been constant since onset. Associated symptoms include an inability to bear weight (hurts to walk on it ). Pertinent negatives include no loss of motion, loss of sensation, muscle weakness, numbness or tingling. He reports no foreign bodies present. The symptoms are aggravated by movement, palpation and weight bearing. He has tried NSAIDs for the symptoms. The treatment provided no relief.     Review of Systems   Constitutional: Negative for chills, fatigue and fever.   Cardiovascular: Negative for chest pain, palpitations and leg swelling.   Musculoskeletal: Positive for arthralgias (right knee) and gait problem (hurts to walk on right knee).   Neurological: Negative for dizziness, tingling, weakness, light-headedness and numbness.   Hematological: Negative for adenopathy. Does not bruise/bleed easily.       Objective:      Physical Exam   Constitutional: He is oriented to person, place, and time. Vital signs are normal. He appears well-developed and well-nourished.   Cardiovascular: Normal rate, regular rhythm and normal heart sounds.   Pulmonary/Chest: Effort normal and breath sounds normal.   Abdominal: Soft. Bowel sounds are normal.   Musculoskeletal:        Right knee: He exhibits decreased range of motion, swelling and bony tenderness. He exhibits no effusion, no ecchymosis, no deformity, no laceration, no erythema, normal alignment, no LCL laxity and normal patellar mobility. Tenderness found.        Legs:  Neurological: He is alert and oriented to person, place, and time.   Skin: Skin is warm, dry and intact.   Psychiatric: He has a  normal mood and affect.       Assessment:       1. Acute pain of right knee    2. Acute gouty arthritis    3. Chronic kidney disease, stage III (moderate)        Plan:       Roberta was seen today for knee pain and fluid right knee.    Diagnoses and all orders for this visit:    Acute pain of right knee  -     X-Ray Knee 3 View Right; Future  -     Uric acid; Future  -     triamcinolone acetonide injection 40 mg  -     Discontinue: ketorolac injection 30 mg  -     ketorolac injection 30 mg    Acute gouty arthritis  -     methylPREDNISolone (MEDROL DOSEPACK) 4 mg tablet; use as directed  -     HYDROcodone-acetaminophen (NORCO) 5-325 mg per tablet; Take 1 tablet by mouth every 6 (six) hours as needed for Pain.  Home care  During a gout attack:  · Rest painful joints. If gout affects the joints of your foot or leg, you may want to use crutches for the first few days to keep from bearing weight on the affected joint.  · When sitting or lying down, raise the painful joint to a level higher than your heart.  · Apply an ice pack (ice cubes in a plastic bag wrapped in a thin towel) over the injured area for 20 minutes every 1 to 2 hours the first day for pain relief. Continue this 3 to 4 times a day for swelling and pain.  · Avoid alcohol and foods listed below (see Preventing attacks) during a gout attack. Drink extra fluid to help flush the uric acid through your kidneys.  · If you were prescribed a medicine to treat gout, take it as your healthcare provider has instructed. Don't skip doses.  · Take anti-inflammatory medicine as directed.   · If pain medicines have been prescribed, take them exactly as directed.    Preventing attacks  · Minimize or avoid alcohol use. Excess alcohol intake can cause a gout attack.  · Limit these foods and beverages:  ¨ Organ meats, such as kidneys and liver  ¨ Certain seafoods (anchovies, sardines, shrimp, scallops, herring, mackerel)  ¨ Wild game, meat extracts and meat gravies  ¨ Foods  and beverages sweetened with high-fructose corn syrup, such as sodas  · Eat a healthy diet including low-fat and nonfat dairy, whole grains, and vegetables.  · If you are overweight, talk to your healthcare provider about a weight reduction plan. Avoid fasting or extreme low calorie diets (less than 900 calories per day). This will increase uric acid levels in the body.  · If you have diabetes or high blood pressure, work with your doctor to manage these conditions.  · Protect the joint from injury. Trauma can trigger a gout attack.  Follow-up care  Follow up with your healthcare provider, or as advised.   When to seek medical advice  Call your healthcare provider if you have any of the following:  · Fever over 100.4°F (38.ºC) with worsening joint pain  · Increasing redness around the joint  · Pain developing in another joint  · Repeated vomiting, abdominal pain, or blood in the vomit or stool (black or red color)  Date Last Reviewed: 3/1/2017  © 2447-3219 [employer]. [address] [place] [address], [place] [address]. All rights reserved. This information is not intended as a substitute for professional medical care. Always follow your healthcare professional's instructions.    Chronic kidney disease, stage III (moderate)    The current medical regimen is effective;  continue present plan and medications.

## 2019-01-08 ENCOUNTER — TELEPHONE (OUTPATIENT)
Dept: FAMILY MEDICINE | Facility: CLINIC | Age: 49
End: 2019-01-08

## 2019-01-08 RX ORDER — METHYLPREDNISOLONE 4 MG/1
TABLET ORAL
Qty: 1 PACKAGE | Refills: 0 | Status: SHIPPED | OUTPATIENT
Start: 2019-01-08 | End: 2019-01-18

## 2019-01-08 RX ORDER — HYDROCODONE BITARTRATE AND ACETAMINOPHEN 5; 325 MG/1; MG/1
1 TABLET ORAL EVERY 6 HOURS PRN
Qty: 15 TABLET | Refills: 0 | Status: SHIPPED | OUTPATIENT
Start: 2019-01-08 | End: 2020-04-27

## 2019-01-08 NOTE — PATIENT INSTRUCTIONS

## 2019-01-08 NOTE — TELEPHONE ENCOUNTER
Spoke with patient and he is inquiring about results from lab and xray of right knee done on yesterday.

## 2019-01-08 NOTE — TELEPHONE ENCOUNTER
----- Message from Hilary Barrett sent at 1/7/2019  4:43 PM CST -----  Contact: Self  Pt is calling to speak with staff to get results. Please call pt at [phone]

## 2019-01-18 ENCOUNTER — OFFICE VISIT (OUTPATIENT)
Dept: FAMILY MEDICINE | Facility: CLINIC | Age: 49
End: 2019-01-18
Payer: COMMERCIAL

## 2019-01-18 VITALS
OXYGEN SATURATION: 97 % | TEMPERATURE: 99 F | DIASTOLIC BLOOD PRESSURE: 89 MMHG | BODY MASS INDEX: 58.1 KG/M2 | HEIGHT: 76 IN | HEART RATE: 106 BPM | SYSTOLIC BLOOD PRESSURE: 139 MMHG

## 2019-01-18 DIAGNOSIS — M10.9 GOUT, UNSPECIFIED CAUSE, UNSPECIFIED CHRONICITY, UNSPECIFIED SITE: Primary | ICD-10-CM

## 2019-01-18 DIAGNOSIS — I10 ESSENTIAL HYPERTENSION: Chronic | ICD-10-CM

## 2019-01-18 PROCEDURE — 99999 PR PBB SHADOW E&M-EST. PATIENT-LVL III: ICD-10-PCS | Mod: PBBFAC,,, | Performed by: FAMILY MEDICINE

## 2019-01-18 PROCEDURE — 99214 PR OFFICE/OUTPT VISIT, EST, LEVL IV, 30-39 MIN: ICD-10-PCS | Mod: 25,S$GLB,, | Performed by: FAMILY MEDICINE

## 2019-01-18 PROCEDURE — 99214 OFFICE O/P EST MOD 30 MIN: CPT | Mod: 25,S$GLB,, | Performed by: FAMILY MEDICINE

## 2019-01-18 PROCEDURE — 99999 PR PBB SHADOW E&M-EST. PATIENT-LVL III: CPT | Mod: PBBFAC,,, | Performed by: FAMILY MEDICINE

## 2019-01-18 PROCEDURE — 96372 PR INJECTION,THERAP/PROPH/DIAG2ST, IM OR SUBCUT: ICD-10-PCS | Mod: S$GLB,,, | Performed by: FAMILY MEDICINE

## 2019-01-18 PROCEDURE — 3075F PR MOST RECENT SYSTOLIC BLOOD PRESS GE 130-139MM HG: ICD-10-PCS | Mod: CPTII,S$GLB,, | Performed by: FAMILY MEDICINE

## 2019-01-18 PROCEDURE — 96372 THER/PROPH/DIAG INJ SC/IM: CPT | Mod: S$GLB,,, | Performed by: FAMILY MEDICINE

## 2019-01-18 PROCEDURE — 3079F PR MOST RECENT DIASTOLIC BLOOD PRESSURE 80-89 MM HG: ICD-10-PCS | Mod: CPTII,S$GLB,, | Performed by: FAMILY MEDICINE

## 2019-01-18 PROCEDURE — 3079F DIAST BP 80-89 MM HG: CPT | Mod: CPTII,S$GLB,, | Performed by: FAMILY MEDICINE

## 2019-01-18 PROCEDURE — 3008F PR BODY MASS INDEX (BMI) DOCUMENTED: ICD-10-PCS | Mod: CPTII,S$GLB,, | Performed by: FAMILY MEDICINE

## 2019-01-18 PROCEDURE — 3008F BODY MASS INDEX DOCD: CPT | Mod: CPTII,S$GLB,, | Performed by: FAMILY MEDICINE

## 2019-01-18 PROCEDURE — 3075F SYST BP GE 130 - 139MM HG: CPT | Mod: CPTII,S$GLB,, | Performed by: FAMILY MEDICINE

## 2019-01-18 RX ORDER — ALLOPURINOL 300 MG/1
300 TABLET ORAL DAILY
Qty: 90 TABLET | Refills: 3 | Status: SHIPPED | OUTPATIENT
Start: 2019-01-18 | End: 2020-03-11 | Stop reason: SDUPTHER

## 2019-01-18 RX ORDER — COLCHICINE 0.6 MG/1
0.6 TABLET ORAL 2 TIMES DAILY PRN
Qty: 30 TABLET | Refills: 2 | Status: SHIPPED | OUTPATIENT
Start: 2019-01-18 | End: 2019-02-20 | Stop reason: DRUGHIGH

## 2019-01-18 NOTE — PROGRESS NOTES
Patient tolerate Solu-Medrol 80 mg IM injection . Patient advise to wait 15 min after injection  for assessment of any posssible side effects.

## 2019-01-20 NOTE — PROGRESS NOTES
[facility]  Progress Note    SUBJECTIVE:     Chief Complaint   Patient presents with    Knee Pain       HPI   Roberta Ware Jr.  is a 48 y.o. male here for c/o left knee pain. He has gout which usually takes naproxen for and helps. Admits been eating bad, especially red meats. Rates pain as moderate. Certain positions make it worst. No falls/trauma.     Review of patient's allergies indicates:   Allergen Reactions    Hydrochlorothiazide Other (See Comments)     Gout    Tramadol Swelling     Pt states make him jittery        Past Medical History:   Diagnosis Date    Diverticulosis     Erectile dysfunction     Gout     Hypertension     Osteoarthritis      Past Surgical History:   Procedure Laterality Date    CHOLECYSTECTOMY      CHOLECYSTECTOMY-LAPAROSCOPIC N/A 4/25/2017    Performed by [facility] at [facility] OR    COLONOSCOPY  2012    ESOPHAGOGASTRODUODENOSCOPY (EGD) N/A 12/21/2016    Performed by [facility]O (2ND FLR)    INCISION AND DRAINAGE LEFT LONG FINGER Left 7/3/2018    Performed by Perez [facility]ie [facility] at [facility] OR    UPPER GASTROINTESTINAL ENDOSCOPY       Family History   Problem Relation Age of Onset    Heart disease Mother     Cancer Mother         Breast    Diabetes Father     Hypertension Father     Ulcers Father     Anemia Sister      Social History     Tobacco Use    Smoking status: Never Smoker    Smokeless tobacco: Never Used   Substance Use Topics    Alcohol use: Yes     Comment: Ocassionally    Drug use: No        Review of Systems   Constitutional: Negative for chills, fever and malaise/fatigue.   HENT: Negative.    Respiratory: Negative.  Negative for cough and shortness of breath.    Cardiovascular: Negative.  Negative for chest pain.   Gastrointestinal: Negative.  Negative for abdominal pain, nausea and vomiting.   Genitourinary: Negative.    Musculoskeletal: Positive for joint pain (L knee).   Neurological: Negative for weakness  and headaches.   All other systems reviewed and are negative.    OBJECTIVE:     Vitals:    01/18/19 1553   BP: 139/89   Pulse: 106   Temp: 99.2 °F (37.3 °C)     Body mass index is 58.1 kg/m².    Physical Exam   Constitutional: He is oriented to person, place, and time and well-developed, well-nourished, and in no distress. No distress.   HENT:   Head: Normocephalic and atraumatic.   Nose: Nose normal.   Eyes: Conjunctivae and EOM are normal.   Cardiovascular: Normal rate, regular rhythm and normal heart sounds. Exam reveals no gallop and no friction rub.   No murmur heard.  Pulmonary/Chest: Effort normal and breath sounds normal. No respiratory distress. He has no wheezes. He has no rales. He exhibits no tenderness.   Abdominal: Soft. Bowel sounds are normal. He exhibits no distension. There is no tenderness. There is no rebound.   Musculoskeletal: He exhibits tenderness (no obvious fractures, dislocations. no redness, slightly warmer on L knee).   Neurological: He is alert and oriented to person, place, and time.   Skin: Skin is warm. He is not diaphoretic.       Old records were reviewed. Labs and/or images were independently reviewed.    ASSESSMENT     1. Gout, unspecified cause, unspecified chronicity, unspecified site    2. Essential hypertension        PLAN:     Gout, unspecified cause, unspecified chronicity, unspecified site  -     Increase allopurinol (ZYLOPRIM) 300 MG tablet; Take 1 tablet (300 mg total) by mouth once daily.  Dispense: 90 tablet; Refill: 3  -     colchicine (COLCRYS) 0.6 mg tablet; Take 1 tablet (0.6 mg total) by mouth 2 (two) times daily as needed (gout).  Dispense: 30 tablet; Refill: 2  -     methylPREDNISolone sodium succinate injection 80 mg  -     Counseled patient about healthy diet (decrease purine rich foods), exercise habits.   -     Discussed no NSAIDs due to his CKD.     Essential hypertension   -     Stable. Continue current regimen.      RTC PRURMILA Ambrosio MD  01/20/2019  8:55 AM

## 2019-02-06 ENCOUNTER — LAB VISIT (OUTPATIENT)
Dept: LAB | Facility: HOSPITAL | Age: 49
End: 2019-02-06
Attending: FAMILY MEDICINE
Payer: COMMERCIAL

## 2019-02-06 ENCOUNTER — OFFICE VISIT (OUTPATIENT)
Dept: FAMILY MEDICINE | Facility: CLINIC | Age: 49
End: 2019-02-06
Payer: COMMERCIAL

## 2019-02-06 VITALS
DIASTOLIC BLOOD PRESSURE: 88 MMHG | WEIGHT: 315 LBS | HEART RATE: 81 BPM | TEMPERATURE: 99 F | SYSTOLIC BLOOD PRESSURE: 136 MMHG | BODY MASS INDEX: 38.36 KG/M2 | HEIGHT: 76 IN | OXYGEN SATURATION: 97 %

## 2019-02-06 DIAGNOSIS — I10 ESSENTIAL HYPERTENSION: Chronic | ICD-10-CM

## 2019-02-06 DIAGNOSIS — M1A.9XX0 CHRONIC GOUT WITHOUT TOPHUS, UNSPECIFIED CAUSE, UNSPECIFIED SITE: ICD-10-CM

## 2019-02-06 DIAGNOSIS — E66.01 MORBID OBESITY WITH BMI OF 50.0-59.9, ADULT: ICD-10-CM

## 2019-02-06 DIAGNOSIS — M1A.9XX0 CHRONIC GOUT WITHOUT TOPHUS, UNSPECIFIED CAUSE, UNSPECIFIED SITE: Primary | ICD-10-CM

## 2019-02-06 LAB
ALBUMIN SERPL BCP-MCNC: 3.9 G/DL
ALP SERPL-CCNC: 80 U/L
ALT SERPL W/O P-5'-P-CCNC: 14 U/L
ANION GAP SERPL CALC-SCNC: 9 MMOL/L
AST SERPL-CCNC: 15 U/L
BILIRUB SERPL-MCNC: 0.3 MG/DL
BUN SERPL-MCNC: 15 MG/DL
CALCIUM SERPL-MCNC: 10.2 MG/DL
CHLORIDE SERPL-SCNC: 101 MMOL/L
CO2 SERPL-SCNC: 30 MMOL/L
CREAT SERPL-MCNC: 1.4 MG/DL
EST. GFR  (AFRICAN AMERICAN): >60 ML/MIN/1.73 M^2
EST. GFR  (NON AFRICAN AMERICAN): 59.4 ML/MIN/1.73 M^2
GLUCOSE SERPL-MCNC: 96 MG/DL
POTASSIUM SERPL-SCNC: 4.1 MMOL/L
PROT SERPL-MCNC: 8.3 G/DL
SODIUM SERPL-SCNC: 140 MMOL/L
URATE SERPL-MCNC: 7.5 MG/DL

## 2019-02-06 PROCEDURE — 84550 ASSAY OF BLOOD/URIC ACID: CPT

## 2019-02-06 PROCEDURE — 3075F SYST BP GE 130 - 139MM HG: CPT | Mod: CPTII,S$GLB,, | Performed by: FAMILY MEDICINE

## 2019-02-06 PROCEDURE — 3079F DIAST BP 80-89 MM HG: CPT | Mod: CPTII,S$GLB,, | Performed by: FAMILY MEDICINE

## 2019-02-06 PROCEDURE — 3008F PR BODY MASS INDEX (BMI) DOCUMENTED: ICD-10-PCS | Mod: CPTII,S$GLB,, | Performed by: FAMILY MEDICINE

## 2019-02-06 PROCEDURE — 3008F BODY MASS INDEX DOCD: CPT | Mod: CPTII,S$GLB,, | Performed by: FAMILY MEDICINE

## 2019-02-06 PROCEDURE — 80053 COMPREHEN METABOLIC PANEL: CPT

## 2019-02-06 PROCEDURE — 3079F PR MOST RECENT DIASTOLIC BLOOD PRESSURE 80-89 MM HG: ICD-10-PCS | Mod: CPTII,S$GLB,, | Performed by: FAMILY MEDICINE

## 2019-02-06 PROCEDURE — 99999 PR PBB SHADOW E&M-EST. PATIENT-LVL III: CPT | Mod: PBBFAC,,, | Performed by: FAMILY MEDICINE

## 2019-02-06 PROCEDURE — 99999 PR PBB SHADOW E&M-EST. PATIENT-LVL III: ICD-10-PCS | Mod: PBBFAC,,, | Performed by: FAMILY MEDICINE

## 2019-02-06 PROCEDURE — 99214 OFFICE O/P EST MOD 30 MIN: CPT | Mod: S$GLB,,, | Performed by: FAMILY MEDICINE

## 2019-02-06 PROCEDURE — 36415 COLL VENOUS BLD VENIPUNCTURE: CPT | Mod: PO

## 2019-02-06 PROCEDURE — 99214 PR OFFICE/OUTPT VISIT, EST, LEVL IV, 30-39 MIN: ICD-10-PCS | Mod: S$GLB,,, | Performed by: FAMILY MEDICINE

## 2019-02-06 PROCEDURE — 3075F PR MOST RECENT SYSTOLIC BLOOD PRESS GE 130-139MM HG: ICD-10-PCS | Mod: CPTII,S$GLB,, | Performed by: FAMILY MEDICINE

## 2019-02-06 NOTE — PROGRESS NOTES
[facility]  Progress Note    SUBJECTIVE:     Chief Complaint   Patient presents with    Gout     1 month follow up       HPI   Roberta Ware Jr.  is a 48 y.o. male here for follow-up of his chronic conditions. Takes meds as directed. Feels much better with the allopurinol, no gout flare up recently. Patient has no other new complaints/problems at this time.      Review of patient's allergies indicates:   Allergen Reactions    Hydrochlorothiazide Other (See Comments)     Gout    Tramadol Swelling     Pt states make him jittery        Past Medical History:   Diagnosis Date    Diverticulosis     Erectile dysfunction     Gout     Hypertension     Osteoarthritis      Past Surgical History:   Procedure Laterality Date    CHOLECYSTECTOMY      CHOLECYSTECTOMY-LAPAROSCOPIC N/A 4/25/2017    Performed by [facility] at [facility] OR    COLONOSCOPY  2012    ESOPHAGOGASTRODUODENOSCOPY (EGD) N/A 12/21/2016    [facility] ([facility]    INCISION AND DRAINAGE LEFT LONG FINGER Left 7/3/2018    Performed by Perez Bowie MD at [facility] OR    UPPER GASTROINTESTINAL ENDOSCOPY       Family History   Problem Relation Age of Onset    Heart disease Mother     Cancer Mother         Breast    Diabetes Father     Hypertension Father     Ulcers Father     Anemia Sister      Social History     Tobacco Use    Smoking status: Never Smoker    Smokeless tobacco: Never Used   Substance Use Topics    Alcohol use: Yes     Comment: Ocassionally    Drug use: No        Review of Systems   Constitutional: Negative for chills, fever and malaise/fatigue.   HENT: Negative.    Respiratory: Negative.  Negative for cough and shortness of breath.    Cardiovascular: Negative.  Negative for chest pain.   Gastrointestinal: Negative.  Negative for abdominal pain, nausea and vomiting.   Genitourinary: Negative.    Neurological: Negative for weakness and headaches.   All other systems reviewed  and are negative.    OBJECTIVE:     Vitals:    02/06/19 1116   BP: 136/88   Pulse: 81   Temp: 98.8 °F (37.1 °C)     Body mass index is 57.99 kg/m².    Physical Exam   Constitutional: He is oriented to person, place, and time and well-developed, well-nourished, and in no distress. No distress.   HENT:   Head: Normocephalic and atraumatic.   Nose: Nose normal.   Eyes: Conjunctivae and EOM are normal.   Cardiovascular: Normal rate, regular rhythm and normal heart sounds. Exam reveals no gallop and no friction rub.   No murmur heard.  Pulmonary/Chest: Effort normal and breath sounds normal. No respiratory distress. He has no wheezes. He has no rales. He exhibits no tenderness.   Abdominal: Soft. Bowel sounds are normal. He exhibits no distension. There is no tenderness. There is no rebound.   Neurological: He is alert and oriented to person, place, and time.   Skin: Skin is warm. He is not diaphoretic.       Old records were reviewed. Labs and/or images were independently reviewed.    ASSESSMENT     1. Chronic gout without tophus, unspecified cause, unspecified site    2. Essential hypertension    3. Morbid obesity with BMI of 50.0-59.9, adult        PLAN:     Chronic gout without tophus, unspecified cause, unspecified site  -     Comprehensive metabolic panel; Future  -     Uric acid; Future; Expected date: 02/06/2019  -     Stable. Continue current regimen.    Essential hypertension   -     Stable. Continue current regimen.    Morbid obesity with BMI of 50.0-59.9, adult   -     Counseled patient about healthy diet, exercise habits, and to increase physical activity.      RTC PRN    More than 50% of the encounter was spent counseling patient about morbid obesity, in an outpatient setting. Total time spent counseling patient: 25.      Antwan Ambrosio MD  02/06/2019 1:14 PM

## 2019-02-20 ENCOUNTER — OFFICE VISIT (OUTPATIENT)
Dept: FAMILY MEDICINE | Facility: CLINIC | Age: 49
End: 2019-02-20
Payer: COMMERCIAL

## 2019-02-20 VITALS
BODY MASS INDEX: 38.36 KG/M2 | HEIGHT: 76 IN | WEIGHT: 315 LBS | DIASTOLIC BLOOD PRESSURE: 100 MMHG | SYSTOLIC BLOOD PRESSURE: 154 MMHG | HEART RATE: 95 BPM | OXYGEN SATURATION: 96 % | TEMPERATURE: 100 F

## 2019-02-20 DIAGNOSIS — I10 ESSENTIAL HYPERTENSION: Chronic | ICD-10-CM

## 2019-02-20 DIAGNOSIS — M10.9 ACUTE GOUT OF RIGHT HAND, UNSPECIFIED CAUSE: Primary | ICD-10-CM

## 2019-02-20 PROCEDURE — 3077F PR MOST RECENT SYSTOLIC BLOOD PRESSURE >= 140 MM HG: ICD-10-PCS | Mod: CPTII,S$GLB,, | Performed by: NURSE PRACTITIONER

## 2019-02-20 PROCEDURE — 3080F PR MOST RECENT DIASTOLIC BLOOD PRESSURE >= 90 MM HG: ICD-10-PCS | Mod: CPTII,S$GLB,, | Performed by: NURSE PRACTITIONER

## 2019-02-20 PROCEDURE — 99214 PR OFFICE/OUTPT VISIT, EST, LEVL IV, 30-39 MIN: ICD-10-PCS | Mod: S$GLB,,, | Performed by: NURSE PRACTITIONER

## 2019-02-20 PROCEDURE — 99999 PR PBB SHADOW E&M-EST. PATIENT-LVL IV: ICD-10-PCS | Mod: PBBFAC,,, | Performed by: NURSE PRACTITIONER

## 2019-02-20 PROCEDURE — 3008F BODY MASS INDEX DOCD: CPT | Mod: CPTII,S$GLB,, | Performed by: NURSE PRACTITIONER

## 2019-02-20 PROCEDURE — 3008F PR BODY MASS INDEX (BMI) DOCUMENTED: ICD-10-PCS | Mod: CPTII,S$GLB,, | Performed by: NURSE PRACTITIONER

## 2019-02-20 PROCEDURE — 99999 PR PBB SHADOW E&M-EST. PATIENT-LVL IV: CPT | Mod: PBBFAC,,, | Performed by: NURSE PRACTITIONER

## 2019-02-20 PROCEDURE — 3080F DIAST BP >= 90 MM HG: CPT | Mod: CPTII,S$GLB,, | Performed by: NURSE PRACTITIONER

## 2019-02-20 PROCEDURE — 3077F SYST BP >= 140 MM HG: CPT | Mod: CPTII,S$GLB,, | Performed by: NURSE PRACTITIONER

## 2019-02-20 PROCEDURE — 99214 OFFICE O/P EST MOD 30 MIN: CPT | Mod: S$GLB,,, | Performed by: NURSE PRACTITIONER

## 2019-02-20 RX ORDER — METHYLPREDNISOLONE 4 MG/1
TABLET ORAL
Qty: 1 PACKAGE | Refills: 0 | Status: SHIPPED | OUTPATIENT
Start: 2019-02-20 | End: 2019-02-28

## 2019-02-20 RX ORDER — COLCHICINE 0.6 MG/1
TABLET ORAL
Qty: 3 TABLET | Refills: 0 | Status: CANCELLED | OUTPATIENT
Start: 2019-02-20

## 2019-02-20 NOTE — PATIENT INSTRUCTIONS

## 2019-02-20 NOTE — PROGRESS NOTES
Subjective:       Patient ID: Roberta Ware Jr. is a 48 y.o. male.    Chief Complaint: swelling to finger (middle right hand )    Chief Complaint   Patient presents with    swelling to finger     middle right hand        HPI  Roberta Ware Jr. is a 48 y.o. male with multiple medical diagnoses as listed in the medical history and problem list that presents for suspected gout flare of right middle finger.  This patient is new to me. The has a history of gout flares and is on allopurinol 300mg daily for prevention. His last flare was last month in his right knee. He has been self-treating with cholchicine over the last 24 hours he has taken 4 0.6mg tablets but still has not achieved relief. He has been adhering to the gout diet, with the exception of a shrimp salad a few days ago. His uric acid level was just drawn on 2/6/2019, it was 7.5, which is down significantly over the last seven months.  He has been taking his BP medication fairly consistently, and he feels like pain is driving his BP up today.  He is otherwise feeling healthy and well today.          PAST MEDICAL HISTORY:  Past Medical History:   Diagnosis Date    Diverticulosis     Erectile dysfunction     Gout     Hypertension     Osteoarthritis        PAST SURGICAL HISTORY:  Past Surgical History:   Procedure Laterality Date    CHOLECYSTECTOMY      CHOLECYSTECTOMY-LAPAROSCOPIC N/A 4/25/2017    Performed [facility] at [facility] OR    COLONOSCOPY  2012    ESOPHAGOGASTRODUODENOSCOPY (EGD) N/A 12/21/2016    [facility]formed [facility] ENDO (2ND FLR)    INCISION AND DRAINAGE LEFT LONG FINGER Left 7/3/2018    Performed by Perez [facility] at [facility] OR    UPPER GASTROINTESTINAL ENDOSCOPY         SOCIAL HISTORY:  Social History     Socioeconomic History    Marital status:      Spouse name: Not on file    Number of children: Not on file    Years of education: Not on file    Highest education level: Not on file    Social Needs    Financial resource strain: Not on file    Food insecurity - worry: Not on file    Food insecurity - inability: Not on file    Transportation needs - medical: Not on file    Transportation needs - non-medical: Not on file   Occupational History    Not on file   Tobacco Use    Smoking status: Never Smoker    Smokeless tobacco: Never Used   Substance and Sexual Activity    Alcohol use: Yes     Comment: Ocassionally    Drug use: No    Sexual activity: Yes     Partners: Female   Other Topics Concern    Not on file   Social History Narrative    Not on file       FAMILY HISTORY:  Family History   Problem Relation Age of Onset    Heart disease Mother     Cancer Mother         Breast    Diabetes Father     Hypertension Father     Ulcers Father     Anemia Sister        ALLERGIES AND MEDICATIONS: updated and reviewed.  Review of patient's allergies indicates:   Allergen Reactions    Hydrochlorothiazide Other (See Comments)     Gout    Tramadol Swelling     Pt states make him jittery      Current Outpatient Medications   Medication Sig Dispense Refill    allopurinol (ZYLOPRIM) 300 MG tablet Take 1 tablet (300 mg total) by mouth once daily. 90 tablet 3    amLODIPine (NORVASC) 10 MG tablet TAKE 1 TABLET(10 MG) BY MOUTH EVERY DAY 90 tablet 0    chlorthalidone (HYGROTEN) 25 MG Tab chlorthalidone 25 mg tablet      HYDROcodone-acetaminophen (NORCO) 5-325 mg per tablet Take 1 tablet by mouth every 6 (six) hours as needed for Pain. 15 tablet 0    methocarbamol (ROBAXIN) 750 MG Tab       omeprazole (PRILOSEC) 40 MG capsule omeprazole 40 mg capsule,delayed release      sildenafil (VIAGRA) 50 MG tablet sildenafil 50 mg tablet      methylPREDNISolone (MEDROL DOSEPACK) 4 mg tablet use as directed 1 Package 0     No current facility-administered medications for this visit.        ROS  Review of Systems   Constitutional: Negative for activity change, appetite change, chills and fatigue.   Eyes:  "Negative for visual disturbance.   Respiratory: Negative for shortness of breath and wheezing.    Cardiovascular: Negative for chest pain.   Musculoskeletal: Positive for arthralgias and joint swelling (right hand middle finger PIP joint).   Neurological: Negative for headaches.   Psychiatric/Behavioral: Negative for behavioral problems and confusion.         Objective:     Physical Exam  Vitals:    02/20/19 1124   BP: (!) 154/100   Pulse: 95   Temp: 99.5 °F (37.5 °C)   TempSrc: Oral   SpO2: 96%   Weight: (!) 213.1 kg (469 lb 12.8 oz)   Height: 6' 4" (1.93 m)    Body mass index is 57.19 kg/m².  Weight: (!) 213.1 kg (469 lb 12.8 oz)   Height: 6' 4" (193 cm)   Physical Exam   Constitutional: He is oriented to person, place, and time. He appears well-developed and well-nourished.   HENT:   Head: Normocephalic and atraumatic.   Eyes: EOM are normal. Pupils are equal, round, and reactive to light.   Cardiovascular: Normal rate, regular rhythm and normal heart sounds.   Pulmonary/Chest: Effort normal and breath sounds normal.   Musculoskeletal: He exhibits edema (right middle finger PIP) and tenderness (right middle finger PIP). He exhibits no deformity.   Neurological: He is alert and oriented to person, place, and time.             Assessment:     1. Acute gout of right hand, unspecified cause    2. Essential hypertension      Plan:     Roberta was seen today for swelling to finger.    Diagnoses and all orders for this visit:    Acute gout of right hand, unspecified cause  -     methylPREDNISolone (MEDROL DOSEPACK) 4 mg tablet; use as directed  -     Complete entire pack. Discontinue cholchicine now. Continue allopurinol.    Essential hypertension  Enrolled in digital HTN program  BP controlled presently - reviewed anti-hypertensive regimen - continue current therapy      Health Maintenance       Date Due Completion Date    Lipid Panel 10/11/2022 10/11/2017    TETANUS VACCINE 05/25/2026 5/25/2016          Follow-up: " Follow-up if symptoms worsen or fail to improve.    The patient expressed understanding and no barriers to adherence were identified.     1. The patient indicates understanding of these issues and agrees with the plan. Brief care plan is updated and reviewed with the patient as applicable.     2. The patient is given an After Visit Summary that lists all medications with directions, allergies, orders placed during this encounter and follow-up instructions.     3. I have reviewed the patient's medical information including past medical, family, and social history sections including the medications and allergies.     4. We discussed the patient's current medications. I reconciled the patient's medication list and prepared and supplied needed refills.     Meg Yarbrough, DNP, APRN, FNP-c  Family Medicine    My collaborating physicians are Dr. Alcides Saavedra and Dr. Lila German

## 2019-02-28 ENCOUNTER — HOSPITAL ENCOUNTER (EMERGENCY)
Facility: HOSPITAL | Age: 49
Discharge: HOME | End: 2019-02-28
Attending: INTERNAL MEDICINE
Payer: COMMERCIAL

## 2019-02-28 VITALS
BODY MASS INDEX: 39.17 KG/M2 | TEMPERATURE: 100 F | RESPIRATION RATE: 18 BRPM | OXYGEN SATURATION: 99 % | WEIGHT: 315 LBS | SYSTOLIC BLOOD PRESSURE: 145 MMHG | DIASTOLIC BLOOD PRESSURE: 66 MMHG | HEART RATE: 88 BPM | HEIGHT: 75 IN

## 2019-02-28 DIAGNOSIS — M10.9 ACUTE GOUT OF RIGHT WRIST, UNSPECIFIED CAUSE: Primary | ICD-10-CM

## 2019-02-28 PROCEDURE — 63600175 PHARM REV CODE 636 W HCPCS: Mod: ER | Performed by: INTERNAL MEDICINE

## 2019-02-28 PROCEDURE — 96372 THER/PROPH/DIAG INJ SC/IM: CPT | Mod: ER

## 2019-02-28 PROCEDURE — 25000003 PHARM REV CODE 250: Mod: ER | Performed by: INTERNAL MEDICINE

## 2019-02-28 PROCEDURE — 99284 EMERGENCY DEPT VISIT MOD MDM: CPT | Mod: 25,ER

## 2019-02-28 RX ORDER — KETOROLAC TROMETHAMINE 30 MG/ML
60 INJECTION, SOLUTION INTRAMUSCULAR; INTRAVENOUS
Status: COMPLETED | OUTPATIENT
Start: 2019-02-28 | End: 2019-02-28

## 2019-02-28 RX ORDER — PROMETHAZINE HYDROCHLORIDE 25 MG/1
25 TABLET ORAL
Status: COMPLETED | OUTPATIENT
Start: 2019-02-28 | End: 2019-02-28

## 2019-02-28 RX ORDER — COLCHICINE 0.6 MG/1
1.2 TABLET ORAL ONCE
Qty: 10 TABLET | Refills: 0 | Status: SHIPPED | OUTPATIENT
Start: 2019-02-28 | End: 2019-02-28

## 2019-02-28 RX ORDER — IBUPROFEN 800 MG/1
800 TABLET ORAL EVERY 8 HOURS PRN
Qty: 30 TABLET | Refills: 0 | Status: SHIPPED | OUTPATIENT
Start: 2019-02-28 | End: 2019-05-27

## 2019-02-28 RX ORDER — PREDNISONE 20 MG/1
60 TABLET ORAL DAILY
Qty: 15 TABLET | Refills: 0 | Status: SHIPPED | OUTPATIENT
Start: 2019-02-28 | End: 2019-03-05

## 2019-02-28 RX ORDER — PREDNISONE 20 MG/1
60 TABLET ORAL
Status: COMPLETED | OUTPATIENT
Start: 2019-02-28 | End: 2019-02-28

## 2019-02-28 RX ORDER — KETOROLAC TROMETHAMINE 30 MG/ML
60 INJECTION, SOLUTION INTRAMUSCULAR; INTRAVENOUS
Status: DISCONTINUED | OUTPATIENT
Start: 2019-02-28 | End: 2019-02-28

## 2019-02-28 RX ADMIN — PROMETHAZINE HYDROCHLORIDE 25 MG: 25 TABLET ORAL at 10:02

## 2019-02-28 RX ADMIN — PREDNISONE 60 MG: 20 TABLET ORAL at 10:02

## 2019-02-28 RX ADMIN — KETOROLAC TROMETHAMINE 60 MG: 30 INJECTION, SOLUTION INTRAMUSCULAR at 10:02

## 2019-03-01 NOTE — ED PROVIDER NOTES
Encounter Date: 2/28/2019    SCRIBE #1 NOTE: I, Alyse Metcalf, am scribing for, and in the presence of,  Dr. Guerrero. I have scribed the following portions of the note - Other sections scribed: HPI, ROS, and PE.       History     Chief Complaint   Patient presents with    right wrist pain     pt c/o gout pain to right wrist x2 days, no relieve with otc medication, denies trauma injury or fall     Roberta Ware Jr. is a 48 y.o. male with a history of gout and HTN who presents to the ED complaining of right wrist pain for the past two days. Pt denies injury or trauma to wrist. Pt reports he took colchicine earlier today and twice yesterday (morning and night). Pt reports he had gout pain in bilateral knees two weeks ago and was given a steroid shot.      The history is provided by the patient. No  was used.     Review of patient's allergies indicates:   Allergen Reactions    Hydrochlorothiazide Other (See Comments)     Gout    Tramadol Swelling     Pt states make him jittery      Past Medical History:   Diagnosis Date    Diverticulosis     Erectile dysfunction     Gout     Hypertension     Osteoarthritis      Past Surgical History:   Procedure Laterality Date    CHOLECYSTECTOMY      CHOLECYSTECTOMY-LAPAROSCOPIC N/A 4/25/2017    Performed by [facility]io Solano MD at [facility] OR    COLONOSCOPY  2012    ESOPHAGOGASTRODUODENOSCOPY (EGD) N/A 12/21/2016    Performed by [facility] ENDO (2ND FLR)    INCISION AND DRAINAGE LEFT LONG FINGER Left 7/3/2018    Performed by Perez Bowie MD at [facility] OR    UPPER GASTROINTESTINAL ENDOSCOPY       Family History   Problem Relation Age of Onset    Heart disease Mother     Cancer Mother         Breast    Diabetes Father     Hypertension Father     Ulcers Father     Anemia Sister      Social History     Tobacco Use    Smoking status: Never Smoker    Smokeless tobacco: Never Used   Substance Use Topics    Alcohol use: Yes      Comment: Ocassionally    Drug use: No     Review of Systems   Constitutional: Negative for fever.   HENT: Negative for sore throat.    Respiratory: Negative for shortness of breath.    Cardiovascular: Negative for chest pain.   Gastrointestinal: Negative for nausea.   Genitourinary: Negative for dysuria.   Musculoskeletal: Positive for arthralgias (right wrist and hand). Negative for back pain.   Skin: Negative for rash.   Neurological: Negative for weakness.   Hematological: Does not bruise/bleed easily.   All other systems reviewed and are negative.      Physical Exam     Initial Vitals [02/28/19 2140]   BP Pulse Resp Temp SpO2   (!) 149/65 92 18 100.1 °F (37.8 °C) 100 %      MAP       --         Physical Exam    Nursing note and vitals reviewed.  Constitutional: He appears well-developed and well-nourished. He is Obese .   HENT:   Head: Normocephalic and atraumatic.   Eyes: Conjunctivae and EOM are normal. Pupils are equal, round, and reactive to light.   Cardiovascular: Normal rate, regular rhythm and normal heart sounds. Exam reveals no gallop and no friction rub.    No murmur heard.  Pulmonary/Chest: Breath sounds normal. He has no wheezes. He has no rhonchi. He has no rales.   Musculoskeletal:        Right wrist: He exhibits tenderness. He exhibits no deformity.   Right wrist pain upon movement, tenderness to palpation, increased warmth with no gross deformities noted.  Right upper extremity neurovascularly intact.   Neurological: He is alert and oriented to person, place, and time. GCS score is 15. GCS eye subscore is 4. GCS verbal subscore is 5. GCS motor subscore is 6.   Skin: Skin is warm and dry.         ED Course   Procedures  Labs Reviewed - No data to display       Imaging Results    None          Medical Decision Making:   History:   Old Medical Records: I decided to obtain old medical records.  Initial Assessment:   Roberta Ware Jr. is a 48 y.o. male with a history of gout and HTN who  presents to the ED complaining of right wrist pain for the past two days  ED Management:  Patient was given a refill for colchicine and received prednisone/Toradol in the emergency department.  Prescription for prednisone was also given and patient was given instructions for gout.  He was advised to follow up with primary care physician within the next 2 days for re-evaluation and to return to the emergency department if condition worsens.            Scribe Attestation:   Scribe #1: I performed the above scribed service and the documentation accurately describes the services I performed. I attest to the accuracy of the note.    This document was produced by a scribe under my direction and in my presence. I agree with the content of the note and have made any necessary edits.     Dr. Guerrero    03/01/2019 2:17 AM             Clinical Impression:     1. Acute gout of right wrist, unspecified cause            Disposition:   Disposition: Discharged  Condition: Stable                        Wesley Guerrero MD  03/01/19 0218

## 2019-04-08 ENCOUNTER — OFFICE VISIT (OUTPATIENT)
Dept: OTOLARYNGOLOGY | Facility: CLINIC | Age: 49
End: 2019-04-08
Payer: COMMERCIAL

## 2019-04-08 VITALS — DIASTOLIC BLOOD PRESSURE: 90 MMHG | SYSTOLIC BLOOD PRESSURE: 135 MMHG | HEART RATE: 87 BPM

## 2019-04-08 DIAGNOSIS — Z78.9 HISTORY OF EXCESSIVE CERUMEN: ICD-10-CM

## 2019-04-08 DIAGNOSIS — H61.23 HEARING LOSS DUE TO CERUMEN IMPACTION, BILATERAL: Primary | ICD-10-CM

## 2019-04-08 PROCEDURE — 99499 NO LOS: ICD-10-PCS | Mod: S$GLB,,, | Performed by: OTOLARYNGOLOGY

## 2019-04-08 PROCEDURE — 99499 UNLISTED E&M SERVICE: CPT | Mod: S$GLB,,, | Performed by: OTOLARYNGOLOGY

## 2019-04-08 PROCEDURE — 99999 PR PBB SHADOW E&M-EST. PATIENT-LVL III: CPT | Mod: PBBFAC,,, | Performed by: OTOLARYNGOLOGY

## 2019-04-08 PROCEDURE — 99999 PR PBB SHADOW E&M-EST. PATIENT-LVL III: ICD-10-PCS | Mod: PBBFAC,,, | Performed by: OTOLARYNGOLOGY

## 2019-04-08 PROCEDURE — 69210 PR REMOVAL IMPACTED CERUMEN REQUIRING INSTRUMENTATION, UNILATERAL: ICD-10-PCS | Mod: S$GLB,,, | Performed by: OTOLARYNGOLOGY

## 2019-04-08 PROCEDURE — 69210 REMOVE IMPACTED EAR WAX UNI: CPT | Mod: S$GLB,,, | Performed by: OTOLARYNGOLOGY

## 2019-04-08 NOTE — PATIENT INSTRUCTIONS
Large occlusive C.I.s removed from both eacs with suction  Audiometry offered/deferred  RTC yearly for  ear cleaning   Use of head phones ( rather than ear buds) may be helpful

## 2019-04-08 NOTE — PROGRESS NOTES
CC:  History of excessive cerumen; rule out cerumen impactions  HPI:Mr. Ware is a 48-year-old  male with a medical history of gout, chronic kidney disease stage 3, obesity  and allergic rhinitis indicates the need for ear cleaning procedures.    He wears blue tooth devices in his ears routinely.  He has worn headphones in the past.  Is ears were class cleaned by me in late October 2017.  Large cerumen impactions are carefully extracted from each ear canal at that visit.    Yearly ear cleaning was recommended at that time as well as audiometry prn.  He denies any significant history of hearing loss in general.  As indicate trouble hearing presently presumably due to cerumen impactions.    Past Medical History:   Diagnosis Date    Diverticulosis     Erectile dysfunction     Gout     Hypertension     Osteoarthritis     Allergies:  HCTZ, tramadol  Current Outpatient Medications on File Prior to Visit   Medication Sig Dispense Refill    allopurinol (ZYLOPRIM) 300 MG tablet Take 1 tablet (300 mg total) by mouth once daily. 90 tablet 3    amLODIPine (NORVASC) 10 MG tablet TAKE 1 TABLET(10 MG) BY MOUTH EVERY DAY 90 tablet 0    chlorthalidone (HYGROTEN) 25 MG Tab chlorthalidone 25 mg tablet      HYDROcodone-acetaminophen (NORCO) 5-325 mg per tablet Take 1 tablet by mouth every 6 (six) hours as needed for Pain. 15 tablet 0    ibuprofen (ADVIL,MOTRIN) 800 MG tablet Take 1 tablet (800 mg total) by mouth every 8 (eight) hours as needed for Pain. 30 tablet 0    methocarbamol (ROBAXIN) 750 MG Tab       omeprazole (PRILOSEC) 40 MG capsule omeprazole 40 mg capsule,delayed release      sildenafil (VIAGRA) 50 MG tablet sildenafil 50 mg tablet      colchicine (COLCRYS) 0.6 mg tablet Take 2 tablets (1.2 mg total) by mouth once. Take 2 tablets by mouth once; then 1 hour later, take an additional tablet.  Maximum of 3 tablets per gout attack. for 1 dose 10 tablet 0     No current facility-administered  medications on file prior to visit.          PE:  Blood pressure 135/90 pulse 87 height 6 ft 3 in weight 400 lb  General:  Alert and oriented bald gentleman in no acute distress  Both ears were examined under the microscope in the micro procedure room  A large occlusive cerumen impaction is removed in 2 pieces from the right ear canal with suction. Right eardrum is intact and clear as visualized.  A large cerumen impaction is carefully suction from left ear canal as well.  Left eardrum is intact and clear as visualized.  The patient is offered an audiogram which is deferred by him today.      DIAGNOSIS:     ICD-10-CM ICD-9-CM    1. Hearing loss due to cerumen impaction, bilateral H61.23 389.8      380.4    2. History of excessive cerumen Z78.9 V49.89      PLAN: Large occlusive C.I.s removed from both eacs with suction  Audiometry offered/deferred  RTC yearly for  ear cleaning   Use of head phones ( rather than ear buds) may be helpful

## 2019-04-12 DIAGNOSIS — I10 ESSENTIAL HYPERTENSION: ICD-10-CM

## 2019-04-12 RX ORDER — CHLORTHALIDONE 25 MG/1
TABLET ORAL
Qty: 90 TABLET | Refills: 3 | Status: SHIPPED | OUTPATIENT
Start: 2019-04-12 | End: 2020-04-15

## 2019-04-22 ENCOUNTER — TELEPHONE (OUTPATIENT)
Dept: FAMILY MEDICINE | Facility: CLINIC | Age: 49
End: 2019-04-22

## 2019-04-22 ENCOUNTER — OFFICE VISIT (OUTPATIENT)
Dept: FAMILY MEDICINE | Facility: CLINIC | Age: 49
End: 2019-04-22
Payer: COMMERCIAL

## 2019-04-22 ENCOUNTER — LAB VISIT (OUTPATIENT)
Dept: LAB | Facility: HOSPITAL | Age: 49
End: 2019-04-22
Attending: FAMILY MEDICINE
Payer: COMMERCIAL

## 2019-04-22 VITALS
WEIGHT: 315 LBS | DIASTOLIC BLOOD PRESSURE: 78 MMHG | BODY MASS INDEX: 38.36 KG/M2 | TEMPERATURE: 98 F | SYSTOLIC BLOOD PRESSURE: 134 MMHG | OXYGEN SATURATION: 97 % | HEIGHT: 76 IN | HEART RATE: 77 BPM

## 2019-04-22 DIAGNOSIS — R35.0 URINARY FREQUENCY: Primary | ICD-10-CM

## 2019-04-22 DIAGNOSIS — R31.9 HEMATURIA, UNSPECIFIED TYPE: Primary | ICD-10-CM

## 2019-04-22 DIAGNOSIS — R20.2 NUMBNESS AND TINGLING IN LEFT ARM: ICD-10-CM

## 2019-04-22 DIAGNOSIS — R35.0 URINARY FREQUENCY: ICD-10-CM

## 2019-04-22 DIAGNOSIS — R20.0 NUMBNESS AND TINGLING IN LEFT ARM: ICD-10-CM

## 2019-04-22 LAB
BILIRUB UR QL STRIP: NEGATIVE
CLARITY UR REFRACT.AUTO: CLEAR
COLOR UR AUTO: YELLOW
GLUCOSE UR QL STRIP: NEGATIVE
HGB UR QL STRIP: ABNORMAL
KETONES UR QL STRIP: NEGATIVE
LEUKOCYTE ESTERASE UR QL STRIP: ABNORMAL
MICROSCOPIC COMMENT: ABNORMAL
NITRITE UR QL STRIP: NEGATIVE
PH UR STRIP: 5 [PH] (ref 5–8)
PROT UR QL STRIP: NEGATIVE
RBC #/AREA URNS AUTO: 11 /HPF (ref 0–4)
SP GR UR STRIP: 1.01 (ref 1–1.03)
SQUAMOUS #/AREA URNS AUTO: 2 /HPF
URN SPEC COLLECT METH UR: ABNORMAL
WBC #/AREA URNS AUTO: 13 /HPF (ref 0–5)

## 2019-04-22 PROCEDURE — 3008F BODY MASS INDEX DOCD: CPT | Mod: CPTII,S$GLB,, | Performed by: FAMILY MEDICINE

## 2019-04-22 PROCEDURE — 3008F PR BODY MASS INDEX (BMI) DOCUMENTED: ICD-10-PCS | Mod: CPTII,S$GLB,, | Performed by: FAMILY MEDICINE

## 2019-04-22 PROCEDURE — 3075F PR MOST RECENT SYSTOLIC BLOOD PRESS GE 130-139MM HG: ICD-10-PCS | Mod: CPTII,S$GLB,, | Performed by: FAMILY MEDICINE

## 2019-04-22 PROCEDURE — 81001 URINALYSIS AUTO W/SCOPE: CPT

## 2019-04-22 PROCEDURE — 3078F PR MOST RECENT DIASTOLIC BLOOD PRESSURE < 80 MM HG: ICD-10-PCS | Mod: CPTII,S$GLB,, | Performed by: FAMILY MEDICINE

## 2019-04-22 PROCEDURE — 99999 PR PBB SHADOW E&M-EST. PATIENT-LVL III: ICD-10-PCS | Mod: PBBFAC,,, | Performed by: FAMILY MEDICINE

## 2019-04-22 PROCEDURE — 99214 OFFICE O/P EST MOD 30 MIN: CPT | Mod: S$GLB,,, | Performed by: FAMILY MEDICINE

## 2019-04-22 PROCEDURE — 3075F SYST BP GE 130 - 139MM HG: CPT | Mod: CPTII,S$GLB,, | Performed by: FAMILY MEDICINE

## 2019-04-22 PROCEDURE — 99214 PR OFFICE/OUTPT VISIT, EST, LEVL IV, 30-39 MIN: ICD-10-PCS | Mod: S$GLB,,, | Performed by: FAMILY MEDICINE

## 2019-04-22 PROCEDURE — 3078F DIAST BP <80 MM HG: CPT | Mod: CPTII,S$GLB,, | Performed by: FAMILY MEDICINE

## 2019-04-22 PROCEDURE — 99999 PR PBB SHADOW E&M-EST. PATIENT-LVL III: CPT | Mod: PBBFAC,,, | Performed by: FAMILY MEDICINE

## 2019-04-22 RX ORDER — COLCHICINE 0.6 MG/1
TABLET ORAL
Refills: 2 | COMMUNITY
Start: 2019-04-12 | End: 2020-04-09 | Stop reason: SDUPTHER

## 2019-04-22 NOTE — PROGRESS NOTES
[facility]  Progress Note    SUBJECTIVE:     Chief Complaint   Patient presents with    Numbness     in left arm, patient states it started 2 weeks ago    Urinary Frequency     only at night, started 2 months ago        HPI   Roberta Ware Jr.  is a 48 y.o. male here for 2 complaints.  1. Increased urinary frequency, for the past couple weeks. No dysuria, pelvic pressure, CVA tenderness. Drinks plenty of water during the day but none at night time. Urinary stream is strong. Wakes up multiple times at night.   2. Occasional L arm numbness. No chest pain, SOB. Happens sporadically.     Review of patient's allergies indicates:   Allergen Reactions    Hydrochlorothiazide Other (See Comments)     Gout    Tramadol Swelling     Pt states make him jittery        Past Medical History:   Diagnosis Date    Diverticulosis     Erectile dysfunction     Gout     Hypertension     Osteoarthritis      Past Surgical History:   Procedure Laterality Date    CHOLECYSTECTOMY      CHOLECYSTECTOMY-LAPAROSCOPIC N/A 4/25/2017    [facility]    COLONOSCOPY  2012    ESOPHAGOGASTRODUODENOSCOPY (EGD) N/A 12/21/2016    Performed by Luc[facility] at [facility] ENDO (2ND FLR)    INCISION AND DRAINAGE LEFT LONG FINGER Left 7/3/2018    [facility] OR    UPPER GASTROINTESTINAL ENDOSCOPY       Family History   Problem Relation Age of Onset    Heart disease Mother     Cancer Mother         Breast    Diabetes Father     Hypertension Father     Ulcers Father     Anemia Sister      Social History     Tobacco Use    Smoking status: Never Smoker    Smokeless tobacco: Never Used   Substance Use Topics    Alcohol use: Yes     Comment: Ocassionally    Drug use: No        Review of Systems   Constitutional: Negative for chills, fever and malaise/fatigue.   HENT: Negative.    Respiratory: Negative.  Negative for cough and shortness of breath.    Cardiovascular:  Negative.  Negative for chest pain.   Gastrointestinal: Negative.  Negative for abdominal pain, nausea and vomiting.   Genitourinary: Positive for frequency.   Neurological: Positive for tingling (L arm). Negative for weakness and headaches.   All other systems reviewed and are negative.    OBJECTIVE:     Vitals:    04/22/19 1103   BP: 134/78   Pulse: 77   Temp: 98.1 °F (36.7 °C)     Body mass index is 58.74 kg/m².    Physical Exam   Constitutional: He is oriented to person, place, and time and well-developed, well-nourished, and in no distress. No distress.   HENT:   Head: Normocephalic and atraumatic.   Nose: Nose normal.   Eyes: Conjunctivae and EOM are normal.   Cardiovascular: Normal rate, regular rhythm and normal heart sounds. Exam reveals no gallop and no friction rub.   No murmur heard.  Pulmonary/Chest: Effort normal and breath sounds normal. No respiratory distress. He has no wheezes. He has no rales. He exhibits no tenderness.   Abdominal: Soft. Bowel sounds are normal. He exhibits no distension. There is no tenderness. There is no rebound.   Neurological: He is alert and oriented to person, place, and time.   Negative lhermette, rose, spurling signs.   Skin: Skin is warm. He is not diaphoretic.       Old records were reviewed. Labs and/or images were independently reviewed.    ASSESSMENT     1. Urinary frequency    2. Numbness and tingling in left arm        PLAN:     Urinary frequency  -     Urinalysis; Future  -     Urine culture  -     Comprehensive metabolic panel; Future  -     Hemoglobin A1c; Future  -     R/o UTI, elevated sugars. No water past 8 pm. If none of that helps, consider flomax?    Numbness and tingling in left arm  -     Ambulatory referral to Neurology for further evaluation and treatment.     RTC SHAYNE Ambrosio MD  04/22/2019 11:22 AM

## 2019-04-26 ENCOUNTER — TELEPHONE (OUTPATIENT)
Dept: ADMINISTRATIVE | Facility: HOSPITAL | Age: 49
End: 2019-04-26

## 2019-05-27 ENCOUNTER — OFFICE VISIT (OUTPATIENT)
Dept: FAMILY MEDICINE | Facility: CLINIC | Age: 49
End: 2019-05-27
Payer: COMMERCIAL

## 2019-05-27 VITALS
TEMPERATURE: 98 F | HEART RATE: 85 BPM | DIASTOLIC BLOOD PRESSURE: 72 MMHG | SYSTOLIC BLOOD PRESSURE: 138 MMHG | BODY MASS INDEX: 38.36 KG/M2 | HEIGHT: 76 IN | RESPIRATION RATE: 16 BRPM | WEIGHT: 315 LBS | OXYGEN SATURATION: 97 %

## 2019-05-27 DIAGNOSIS — I10 ESSENTIAL HYPERTENSION: Chronic | ICD-10-CM

## 2019-05-27 DIAGNOSIS — S16.1XXA STRAIN OF NECK MUSCLE, INITIAL ENCOUNTER: Primary | ICD-10-CM

## 2019-05-27 PROCEDURE — 99214 OFFICE O/P EST MOD 30 MIN: CPT | Mod: S$GLB,,, | Performed by: NURSE PRACTITIONER

## 2019-05-27 PROCEDURE — 3078F PR MOST RECENT DIASTOLIC BLOOD PRESSURE < 80 MM HG: ICD-10-PCS | Mod: CPTII,S$GLB,, | Performed by: NURSE PRACTITIONER

## 2019-05-27 PROCEDURE — 99214 PR OFFICE/OUTPT VISIT, EST, LEVL IV, 30-39 MIN: ICD-10-PCS | Mod: S$GLB,,, | Performed by: NURSE PRACTITIONER

## 2019-05-27 PROCEDURE — 99999 PR PBB SHADOW E&M-EST. PATIENT-LVL IV: CPT | Mod: PBBFAC,,, | Performed by: NURSE PRACTITIONER

## 2019-05-27 PROCEDURE — 3075F PR MOST RECENT SYSTOLIC BLOOD PRESS GE 130-139MM HG: ICD-10-PCS | Mod: CPTII,S$GLB,, | Performed by: NURSE PRACTITIONER

## 2019-05-27 PROCEDURE — 3008F PR BODY MASS INDEX (BMI) DOCUMENTED: ICD-10-PCS | Mod: CPTII,S$GLB,, | Performed by: NURSE PRACTITIONER

## 2019-05-27 PROCEDURE — 3008F BODY MASS INDEX DOCD: CPT | Mod: CPTII,S$GLB,, | Performed by: NURSE PRACTITIONER

## 2019-05-27 PROCEDURE — 3078F DIAST BP <80 MM HG: CPT | Mod: CPTII,S$GLB,, | Performed by: NURSE PRACTITIONER

## 2019-05-27 PROCEDURE — 3075F SYST BP GE 130 - 139MM HG: CPT | Mod: CPTII,S$GLB,, | Performed by: NURSE PRACTITIONER

## 2019-05-27 PROCEDURE — 99999 PR PBB SHADOW E&M-EST. PATIENT-LVL IV: ICD-10-PCS | Mod: PBBFAC,,, | Performed by: NURSE PRACTITIONER

## 2019-05-27 RX ORDER — TIZANIDINE 2 MG/1
2-4 TABLET ORAL EVERY 6 HOURS PRN
Qty: 30 TABLET | Refills: 0 | Status: SHIPPED | OUTPATIENT
Start: 2019-05-27 | End: 2019-06-06

## 2019-05-27 RX ORDER — NAPROXEN 500 MG/1
500 TABLET ORAL 2 TIMES DAILY
Qty: 28 TABLET | Refills: 0 | Status: SHIPPED | OUTPATIENT
Start: 2019-05-27 | End: 2019-09-16 | Stop reason: SDUPTHER

## 2019-05-27 NOTE — PATIENT INSTRUCTIONS
1. Gentle stretching 1-2 times a day - see handout  2. Naproxen twice daily for 14 days  3. Tizanidine 1-2 tabs every 6 hours as needed for discomfort  4. Ice - 20 minutes every hour as able  5. Topical pain relief gel - biofreeze, icyhot

## 2019-05-27 NOTE — PROGRESS NOTES
Subjective:       Patient ID: Roberta Ware Jr. is a 48 y.o. male.    Chief Complaint: Chest Pain (Started about a week ago)    Chief Complaint   Patient presents with    Chest Pain     Started about a week ago       HPI  Roberta Ware Jr. is a 48 y.o. male with multiple medical diagnoses as listed in the medical history and problem list that presents for right side of neck/clavical pain for 2 weeks, he only feels the pain when he turns his head to the left. Describes the pain as dull. He denies that the pain radiates, he has any numbness or tingling, loss of bowel/bladder function. He has tried intermittent use of ibuprofen without relief. He does sleep on his side (alternating). 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  He denies any exact mechanism of injury. He is otherwise feeling healthy and well today.         PAST MEDICAL HISTORY:  Past Medical History:   Diagnosis Date    Diverticulosis     Erectile dysfunction     Gout     Hypertension     Osteoarthritis        PAST SURGICAL HISTORY:  Past Surgical History:   Procedure Laterality Date    CHOLECYSTECTOMY      CHOLECYSTECTOMY-LAPAROSCOPIC N/A 4/25/2017    [facility] at [facility] OR    COLONOSCOPY  2012    ESOPHAGOGASTRODUODENOSCOPY (EGD) N/A 12/21/2016    [facility] (2ND [facility])    INCISION AND DRAINAGE LEFT LONG FINGER Left 7/3/2018    [facility]formed by [facility] at [facility] OR    UPPER GASTROINTESTINAL ENDOSCOPY         SOCIAL HISTORY:  Social History     Socioeconomic History    Marital status:      Spouse name: Not on file    Number of children: Not on file    Years of education: Not on file    Highest education level: Not on file   Occupational History    Not on file   Social Needs    Financial resource strain: Not on file    Food insecurity:     Worry: Not on file     Inability: Not on file     Transportation needs:     Medical: Not on file     Non-medical: Not on file   Tobacco Use    Smoking status: Never Smoker    Smokeless tobacco: Never Used   Substance and Sexual Activity    Alcohol use: Yes     Comment: Ocassionally    Drug use: No    Sexual activity: Yes     Partners: Female   Lifestyle    Physical activity:     Days per week: Not on file     Minutes per session: Not on file    Stress: Not on file   Relationships    Social connections:     Talks on phone: Not on file     Gets together: Not on file     Attends Restorationist service: Not on file     Active member of club or organization: Not on file     Attends meetings of clubs or organizations: Not on file     Relationship status: Not on file   Other Topics Concern    Not on file   Social History Narrative    Not on file       FAMILY HISTORY:  Family History   Problem Relation Age of Onset    Heart disease Mother     Cancer Mother         Breast    Diabetes Father     Hypertension Father     Ulcers Father     Anemia Sister        ALLERGIES AND MEDICATIONS: updated and reviewed.  Review of patient's allergies indicates:   Allergen Reactions    Hydrochlorothiazide Other (See Comments)     Gout    Tramadol Swelling     Pt states make him jittery      Current Outpatient Medications   Medication Sig Dispense Refill    allopurinol (ZYLOPRIM) 300 MG tablet Take 1 tablet (300 mg total) by mouth once daily. 90 tablet 3    amLODIPine (NORVASC) 10 MG tablet TAKE 1 TABLET(10 MG) BY MOUTH EVERY DAY 90 tablet 0    chlorthalidone (HYGROTEN) 25 MG Tab TAKE 1 TABLET(25 MG) BY MOUTH EVERY DAY 90 tablet 3    colchicine (COLCRYS) 0.6 mg tablet   2    HYDROcodone-acetaminophen (NORCO) 5-325 mg per tablet Take 1 tablet by mouth every 6 (six) hours as needed for Pain. 15 tablet 0    omeprazole (PRILOSEC) 40 MG capsule omeprazole 40 mg capsule,delayed release      naproxen (NAPROSYN) 500 MG tablet Take 1 tablet (500 mg total) by mouth 2 (two)  "times daily. for 14 days 28 tablet 0    tiZANidine (ZANAFLEX) 2 MG tablet Take 1-2 tablets (2-4 mg total) by mouth every 6 (six) hours as needed. 30 tablet 0     No current facility-administered medications for this visit.          ROS  Review of Systems   Constitutional: Negative for activity change, appetite change, chills and fever.   Respiratory: Negative for shortness of breath.    Cardiovascular: Negative for chest pain.   Musculoskeletal: Positive for neck pain. Negative for back pain and neck stiffness.   Neurological: Negative for headaches.   Psychiatric/Behavioral: Negative for behavioral problems and confusion.       Objective:     Physical Exam  Vitals:    05/27/19 1233   BP: 138/72   BP Location: Left arm   Patient Position: Sitting   BP Method: Thigh Cuff (Manual)   Pulse: 85   Resp: 16   Temp: 98.4 °F (36.9 °C)   TempSrc: Oral   SpO2: 97%   Weight: (!) 216.2 kg (476 lb 10.2 oz)   Height: 6' 4" (1.93 m)    Body mass index is 58.02 kg/m².  Weight: (!) 216.2 kg (476 lb 10.2 oz)   Height: 6' 4" (193 cm)   Physical Exam   Constitutional: He appears well-developed and well-nourished. No distress.   HENT:   Head: Normocephalic and atraumatic.   Eyes: Pupils are equal, round, and reactive to light. Conjunctivae and EOM are normal.   Cardiovascular: Normal rate.   Musculoskeletal: He exhibits tenderness (right side of neck). He exhibits no edema or deformity.        Cervical back: He exhibits decreased range of motion and pain. He exhibits no tenderness, no bony tenderness, no swelling, no edema and no spasm.        Back:    Neurological: He is alert. He displays normal reflexes. No cranial nerve deficit or sensory deficit. He exhibits normal muscle tone.   Skin: Skin is warm and dry. No rash noted.   Psychiatric: He has a normal mood and affect. His behavior is normal.     Assessment:     1. Strain of neck muscle, initial encounter    2. Essential hypertension      Plan:     Roberta was seen today for chest " pain.    Diagnoses and all orders for this visit:    Strain of neck muscle, initial encounter  -     naproxen (NAPROSYN) 500 MG tablet; Take 1 tablet (500 mg total) by mouth 2 (two) times daily. for 14 days  -     tiZANidine (ZANAFLEX) 2 MG tablet; Take 1-2 tablets (2-4 mg total) by mouth every 6 (six) hours as needed.  -     Encouraged the use of ice, pain relief gels  -     Provided the patient with a handout for gentle stretching to be completed 1-2 times a day    Essential hypertension        -     BP controlled presently - reviewed anti-hypertensive regimen - continue current therapy    Health Maintenance       Date Due Completion Date    Lipid Panel 10/11/2022 10/11/2017    TETANUS VACCINE 05/25/2026 5/25/2016          Follow-up: Follow up in about 2 weeks (around 6/10/2019), or if symptoms worsen or fail to improve, for Re-evaluation.    The patient expressed understanding and no barriers to adherence were identified.     1. The patient indicates understanding of these issues and agrees with the plan. Brief care plan is updated and reviewed with the patient as applicable.     2. The patient is given an After Visit Summary that lists all medications with directions, allergies, orders placed during this encounter and follow-up instructions.     3. I have reviewed the patient's medical information including past medical, family, and social history sections including the medications and allergies.     4. We discussed the patient's current medications. I reconciled the patient's medication list and prepared and supplied needed refills.     Meg Yarbrough, DNP, APRN, FNP-c  Family Medicine    My collaborating physicians are Dr. Alcides Saavedra and Dr. Del Crockett

## 2019-06-07 ENCOUNTER — PATIENT MESSAGE (OUTPATIENT)
Dept: FAMILY MEDICINE | Facility: CLINIC | Age: 49
End: 2019-06-07

## 2019-06-24 DIAGNOSIS — I10 ESSENTIAL HYPERTENSION: ICD-10-CM

## 2019-06-24 RX ORDER — AMLODIPINE BESYLATE 10 MG/1
TABLET ORAL
Qty: 90 TABLET | Refills: 0 | Status: SHIPPED | OUTPATIENT
Start: 2019-06-24 | End: 2019-06-24 | Stop reason: SDUPTHER

## 2019-06-24 RX ORDER — AMLODIPINE BESYLATE 10 MG/1
TABLET ORAL
Qty: 90 TABLET | Refills: 3 | Status: SHIPPED | OUTPATIENT
Start: 2019-06-24 | End: 2019-10-04 | Stop reason: SDUPTHER

## 2019-06-26 ENCOUNTER — OFFICE VISIT (OUTPATIENT)
Dept: NEUROLOGY | Facility: CLINIC | Age: 49
End: 2019-06-26
Payer: COMMERCIAL

## 2019-06-26 VITALS
SYSTOLIC BLOOD PRESSURE: 142 MMHG | BODY MASS INDEX: 38.36 KG/M2 | HEART RATE: 76 BPM | HEIGHT: 76 IN | WEIGHT: 315 LBS | DIASTOLIC BLOOD PRESSURE: 86 MMHG

## 2019-06-26 DIAGNOSIS — M79.2 RADICULAR PAIN IN LEFT ARM: Primary | ICD-10-CM

## 2019-06-26 PROCEDURE — 99999 PR PBB SHADOW E&M-EST. PATIENT-LVL III: CPT | Mod: PBBFAC,,, | Performed by: PSYCHIATRY & NEUROLOGY

## 2019-06-26 PROCEDURE — 99204 OFFICE O/P NEW MOD 45 MIN: CPT | Mod: S$GLB,,, | Performed by: PSYCHIATRY & NEUROLOGY

## 2019-06-26 PROCEDURE — 99999 PR PBB SHADOW E&M-EST. PATIENT-LVL III: ICD-10-PCS | Mod: PBBFAC,,, | Performed by: PSYCHIATRY & NEUROLOGY

## 2019-06-26 PROCEDURE — 3079F DIAST BP 80-89 MM HG: CPT | Mod: CPTII,S$GLB,, | Performed by: PSYCHIATRY & NEUROLOGY

## 2019-06-26 PROCEDURE — 3077F SYST BP >= 140 MM HG: CPT | Mod: CPTII,S$GLB,, | Performed by: PSYCHIATRY & NEUROLOGY

## 2019-06-26 PROCEDURE — 3077F PR MOST RECENT SYSTOLIC BLOOD PRESSURE >= 140 MM HG: ICD-10-PCS | Mod: CPTII,S$GLB,, | Performed by: PSYCHIATRY & NEUROLOGY

## 2019-06-26 PROCEDURE — 99204 PR OFFICE/OUTPT VISIT, NEW, LEVL IV, 45-59 MIN: ICD-10-PCS | Mod: S$GLB,,, | Performed by: PSYCHIATRY & NEUROLOGY

## 2019-06-26 PROCEDURE — 3008F PR BODY MASS INDEX (BMI) DOCUMENTED: ICD-10-PCS | Mod: CPTII,S$GLB,, | Performed by: PSYCHIATRY & NEUROLOGY

## 2019-06-26 PROCEDURE — 3079F PR MOST RECENT DIASTOLIC BLOOD PRESSURE 80-89 MM HG: ICD-10-PCS | Mod: CPTII,S$GLB,, | Performed by: PSYCHIATRY & NEUROLOGY

## 2019-06-26 PROCEDURE — 3008F BODY MASS INDEX DOCD: CPT | Mod: CPTII,S$GLB,, | Performed by: PSYCHIATRY & NEUROLOGY

## 2019-06-26 NOTE — LETTER
July 4, 2019      Antwan Ambrosio MD  [address] [facility] [address]           [facility] [address] [facility] [address]  [facility] [address]  Phone: [phone]  Fax: [phone]          Patient: Roberta Ware Jr.   MR Number: [id number]   YOB: 1970   Date of Visit: 6/26/2019       Dear Dr. Antwan Ambrosio:    Thank you for referring Roberta Ware to me for evaluation. Attached you will find relevant portions of my assessment and plan of care.    If you have questions, please do not hesitate to call me. I look forward to following Roberta Ware along with you.    Sincerely,    Sonali Arzola, DO    Enclosure  CC:  No Recipients    If you would like to receive this communication electronically, please contact [email or web address] or [phone] to request more information on [address] [facility] access.    For providers and/or their staff who would like to refer a patient to [facility], please contact us through our one-stop-shop provider referral line, [facility], at [phone].    If you feel you have received this communication in error or would no longer like to receive these types of communications, please e-mail [email or web address]

## 2019-06-26 NOTE — PROGRESS NOTES
Neurology Consult Note    Chief Complaint: left arm tingling    HPI:   Roberta Ware Jr. is a 48 y.o. male with medical conditions as outlined below who presents for further evaluation of radicular left arm pain. Patient states for the past few weeks, he has had constant numbness and tingling which travels from his left shoulder down to his hand. He denies neck pain, weakness in his extremities, bowel or bladder problems or unsteady gait. He denies chest pain. He denies shortness or breath. He has no further complaints.     Past Medical History:  Past Medical History:   Diagnosis Date    Diverticulosis     Erectile dysfunction     Gout     Hypertension     Osteoarthritis        Past Surgical History:  Past Surgical History:   Procedure Laterality Date    CHOLECYSTECTOMY      CHOLECYSTECTOMY-LAPAROSCOPIC N/A 4/25/2017    [facility]    COLONOSCOPY  2012    ESOPHAGOGASTRODUODENOSCOPY (EGD) N/A 12/21/2016    Performed [facility] at [facility] ENDO (2ND FLR)    INCISION AND DRAINAGE LEFT LONG FINGER Left 7/3/2018    [facility] by [facility] at [facility] OR    UPPER GASTROINTESTINAL ENDOSCOPY         Social History:  Social History     Socioeconomic History    Marital status:      Spouse name: Not on file    Number of children: Not on file    Years of education: Not on file    Highest education level: Not on file   Occupational History    Not on file   Social Needs    Financial resource strain: Not on file    Food insecurity:     Worry: Not on file     Inability: Not on file    Transportation needs:     Medical: Not on file     Non-medical: Not on file   Tobacco Use    Smoking status: Never Smoker    Smokeless tobacco: Never Used   Substance and Sexual Activity    Alcohol use: Yes     Comment: Ocassionally    Drug use: No    Sexual activity: Yes     Partners: Female   Lifestyle    Physical activity:     Days per week: Not on file     Minutes  per session: Not on file    Stress: Not on file   Relationships    Social connections:     Talks on phone: Not on file     Gets together: Not on file     Attends Church service: Not on file     Active member of club or organization: Not on file     Attends meetings of clubs or organizations: Not on file     Relationship status: Not on file   Other Topics Concern    Not on file   Social History Narrative    Not on file       Family History:  Family History   Problem Relation Age of Onset    Heart disease Mother     Cancer Mother         Breast    Diabetes Father     Hypertension Father     Ulcers Father     Anemia Sister        Medications:  Current Outpatient Medications   Medication Sig Dispense Refill    allopurinol (ZYLOPRIM) 300 MG tablet Take 1 tablet (300 mg total) by mouth once daily. 90 tablet 3    amLODIPine (NORVASC) 10 MG tablet TAKE 1 TABLET(10 MG) BY MOUTH EVERY DAY 90 tablet 3    chlorthalidone (HYGROTEN) 25 MG Tab TAKE 1 TABLET(25 MG) BY MOUTH EVERY DAY 90 tablet 3    colchicine (COLCRYS) 0.6 mg tablet   2    HYDROcodone-acetaminophen (NORCO) 5-325 mg per tablet Take 1 tablet by mouth every 6 (six) hours as needed for Pain. 15 tablet 0    omeprazole (PRILOSEC) 40 MG capsule omeprazole 40 mg capsule,delayed release       No current facility-administered medications for this visit.        Allergies:  Review of patient's allergies indicates:   Allergen Reactions    Hydrochlorothiazide Other (See Comments)     Gout    Tramadol Swelling     Pt states make him jittery        ROS:  A 12 point review of system was negative aside from pertinent positives and negatives as outlined above.    Physical Exam  Vitals:    06/26/19 1119   BP: (!) 142/86   Pulse: 76       General: well nourished, well developed  Eyes: no scleral icterus   Nose: nasal turbinates intact  Neck: supple, ROM intact  Skin: no rash or ecchymosis  Joints: no swelling or erythema  Cardiac: regular rate and rhythm  Lungs:  clear to auscultation bilaterally    Neuro:  Mental status: AAO x 3, no dysarthria, no aphasia, communicating appropriately  CN: PERRL, EOMI, VFF, V1-V3 sensation intact, no facial asymmetry, hearing grossly intact, tongue midline  Motor:   RUE 5/5  RLE 5/5  LUE 5/5  LLE 5/5    Normal bulk and tone    Reflexes: 1+ throughout, toes equivocal bilaterally  Sensory: intact to light touch throughout  Coordination: no dysmetria on FTN  Gait: steady    Prior Imaging/Labs:  No recent neuroimaging available for review      Assessment and Plan:    48 y.o. male with radicular left arm pain likely 2/2 cervical radiculopathy vs mononeuropathy    1. Radicular pain in left arm  - MRI Cervical Spine Without Contrast; Future  - EMG W/ ULTRASOUND AND NERVE CONDUCTION TEST 2 Extremities; Future  - He was advised to confer with his PCP regarding symptoms as well and to discuss whether cardiac workup is necessary  He does not feel he needs medication for symptom management at this time.        Patient was advised to notify me for worsening symptoms. I will see patient back after MRI or sooner if necessary.     Thank you for this consultation.    Sonali Arzola DO  [facility]  [address] [facility] [address]  [place] [address] [phone]

## 2019-07-03 ENCOUNTER — TELEPHONE (OUTPATIENT)
Dept: NEUROLOGY | Facility: CLINIC | Age: 49
End: 2019-07-03

## 2019-07-03 NOTE — TELEPHONE ENCOUNTER
----- Message from Jessica Lundberg sent at 7/3/2019  9:37 AM CDT -----  Contact: Roberta [phone]  Type: Patient Call Back    Who called:[facility]    What is the request in detail: The patient is requesting a call back from the staff. He would like to reschedule his MRI appointment as well as the follow up appointment with Dr. Devlin. The patient stated that he would like to do the MRI, in an open MRI machine.    Can the clinic reply by [facility]?no    Would the patient rather a call back or a response via [facility]? Call back    Best call back number:[phone]    Notified patient we will send orders to [facility]

## 2019-07-05 ENCOUNTER — TELEPHONE (OUTPATIENT)
Dept: NEUROLOGY | Facility: CLINIC | Age: 49
End: 2019-07-05

## 2019-07-26 ENCOUNTER — TELEPHONE (OUTPATIENT)
Dept: NEUROLOGY | Facility: CLINIC | Age: 49
End: 2019-07-26

## 2019-07-26 NOTE — TELEPHONE ENCOUNTER
----- Message from Cindy Rouse sent at 7/26/2019  9:36 AM CDT -----  Contact: self [phone] or [phone]  .Type: Patient Call Back    Who called: self     What is the request in detail: Pt is requesting to reschedule is EMG for another date. Aug 6 would be a good day     Can the clinic reply by [facility]? Call back     Would the patient rather a call back or a response via [facility]? Call back     Best call back number:[phone] or [phone]      Left message to call office

## 2019-09-16 ENCOUNTER — TELEPHONE (OUTPATIENT)
Dept: FAMILY MEDICINE | Facility: CLINIC | Age: 49
End: 2019-09-16

## 2019-09-16 DIAGNOSIS — S16.1XXA STRAIN OF NECK MUSCLE, INITIAL ENCOUNTER: ICD-10-CM

## 2019-09-16 RX ORDER — NAPROXEN 500 MG/1
TABLET ORAL
Qty: 28 TABLET | Refills: 0 | Status: SHIPPED | OUTPATIENT
Start: 2019-09-16 | End: 2019-12-10

## 2019-09-16 NOTE — TELEPHONE ENCOUNTER
----- Message from Amanda Calix sent at 9/16/2019  2:53 PM CDT -----  Contact: Self [phone]  Type:  Patient Returning Call    Who Called: Self    Who Left Message for Patient: Amalia Mauricio    Does the patient know what this is regarding?: medication refill    Would the patient rather a call back or a response via [facility]? Call back    Best Call Back Number: [phone]

## 2019-09-16 NOTE — TELEPHONE ENCOUNTER
----- Message from Donya Magana sent at 9/16/2019  9:33 AM CDT -----  Contact: self  Type: RX Refill Request    Who Called:  Roberta    Refill or New Rx: refill    RX Name and Strength:naproxen (NAPROSYN) 500 MG tablet    Preferred Pharmacy with phone number:   [id number] - [facility] [address] [facility] [id number]  [place] [address]  Phone: [phone] Fax: [phone]      Ordering Provider: previously prescribed by Marianela Thornton    Would the patient rather a call back or a response via [facility]? [facility]    Best Call Back Number: [phone]    Thanks

## 2019-10-04 DIAGNOSIS — I10 ESSENTIAL HYPERTENSION: ICD-10-CM

## 2019-10-04 RX ORDER — AMLODIPINE BESYLATE 10 MG/1
TABLET ORAL
Qty: 90 TABLET | Refills: 1 | Status: SHIPPED | OUTPATIENT
Start: 2019-10-04 | End: 2020-08-12

## 2019-10-10 DIAGNOSIS — D50.9 IRON DEFICIENCY ANEMIA: Primary | ICD-10-CM

## 2019-10-10 DIAGNOSIS — D72.829 LEUKOCYTOSIS, UNSPECIFIED TYPE: ICD-10-CM

## 2019-10-12 ENCOUNTER — LAB VISIT (OUTPATIENT)
Dept: LAB | Facility: HOSPITAL | Age: 49
End: 2019-10-12
Attending: INTERNAL MEDICINE
Payer: COMMERCIAL

## 2019-10-12 DIAGNOSIS — D50.9 IRON DEFICIENCY ANEMIA: ICD-10-CM

## 2019-10-12 DIAGNOSIS — D72.829 LEUKOCYTOSIS, UNSPECIFIED TYPE: ICD-10-CM

## 2019-10-12 LAB
BASOPHILS # BLD AUTO: 0.07 K/UL (ref 0–0.2)
BASOPHILS NFR BLD: 0.4 % (ref 0–1.9)
DIFFERENTIAL METHOD: ABNORMAL
EOSINOPHIL # BLD AUTO: 0.5 K/UL (ref 0–0.5)
EOSINOPHIL NFR BLD: 3.2 % (ref 0–8)
ERYTHROCYTE [DISTWIDTH] IN BLOOD BY AUTOMATED COUNT: 16.6 % (ref 11.5–14.5)
ERYTHROCYTE [SEDIMENTATION RATE] IN BLOOD BY WESTERGREN METHOD: 34 MM/HR (ref 0–23)
FERRITIN SERPL-MCNC: 89 NG/ML (ref 20–300)
HCT VFR BLD AUTO: 42.2 % (ref 40–54)
HGB BLD-MCNC: 12.4 G/DL (ref 14–18)
IRON SERPL-MCNC: 40 UG/DL (ref 45–160)
LYMPHOCYTES # BLD AUTO: 4.8 K/UL (ref 1–4.8)
LYMPHOCYTES NFR BLD: 30.7 % (ref 18–48)
MCH RBC QN AUTO: 25.7 PG (ref 27–31)
MCHC RBC AUTO-ENTMCNC: 29.4 G/DL (ref 32–36)
MCV RBC AUTO: 88 FL (ref 82–98)
MONOCYTES # BLD AUTO: 0.7 K/UL (ref 0.3–1)
MONOCYTES NFR BLD: 4.7 % (ref 4–15)
NEUTROPHILS # BLD AUTO: 9.5 K/UL (ref 1.8–7.7)
NEUTROPHILS NFR BLD: 60.4 % (ref 38–73)
NRBC BLD-RTO: 0 /100 WBC
PLATELET # BLD AUTO: 368 K/UL (ref 150–350)
PMV BLD AUTO: 9.8 FL (ref 9.2–12.9)
RBC # BLD AUTO: 4.82 M/UL (ref 4.6–6.2)
SATURATED IRON: 12 % (ref 20–50)
TOTAL IRON BINDING CAPACITY: 332 UG/DL (ref 250–450)
TRANSFERRIN SERPL-MCNC: 224 MG/DL (ref 200–375)
WBC # BLD AUTO: 15.66 K/UL (ref 3.9–12.7)

## 2019-10-12 PROCEDURE — 83540 ASSAY OF IRON: CPT

## 2019-10-12 PROCEDURE — 82728 ASSAY OF FERRITIN: CPT

## 2019-10-12 PROCEDURE — 85025 COMPLETE CBC W/AUTO DIFF WBC: CPT

## 2019-10-12 PROCEDURE — 36415 COLL VENOUS BLD VENIPUNCTURE: CPT | Mod: PO

## 2019-10-12 PROCEDURE — 85652 RBC SED RATE AUTOMATED: CPT

## 2019-10-16 ENCOUNTER — OFFICE VISIT (OUTPATIENT)
Dept: HEMATOLOGY/ONCOLOGY | Facility: CLINIC | Age: 49
End: 2019-10-16
Payer: COMMERCIAL

## 2019-10-16 VITALS
OXYGEN SATURATION: 96 % | HEIGHT: 76 IN | HEART RATE: 88 BPM | DIASTOLIC BLOOD PRESSURE: 81 MMHG | TEMPERATURE: 99 F | WEIGHT: 315 LBS | SYSTOLIC BLOOD PRESSURE: 146 MMHG | BODY MASS INDEX: 38.36 KG/M2

## 2019-10-16 DIAGNOSIS — D75.839 THROMBOCYTOSIS: ICD-10-CM

## 2019-10-16 DIAGNOSIS — N18.30 CHRONIC KIDNEY DISEASE, STAGE III (MODERATE): Primary | ICD-10-CM

## 2019-10-16 DIAGNOSIS — D50.9 IRON DEFICIENCY ANEMIA, UNSPECIFIED IRON DEFICIENCY ANEMIA TYPE: ICD-10-CM

## 2019-10-16 PROCEDURE — 3008F BODY MASS INDEX DOCD: CPT | Mod: CPTII,S$GLB,, | Performed by: INTERNAL MEDICINE

## 2019-10-16 PROCEDURE — 3077F PR MOST RECENT SYSTOLIC BLOOD PRESSURE >= 140 MM HG: ICD-10-PCS | Mod: CPTII,S$GLB,, | Performed by: INTERNAL MEDICINE

## 2019-10-16 PROCEDURE — 3079F PR MOST RECENT DIASTOLIC BLOOD PRESSURE 80-89 MM HG: ICD-10-PCS | Mod: CPTII,S$GLB,, | Performed by: INTERNAL MEDICINE

## 2019-10-16 PROCEDURE — 99999 PR PBB SHADOW E&M-EST. PATIENT-LVL III: CPT | Mod: PBBFAC,,, | Performed by: INTERNAL MEDICINE

## 2019-10-16 PROCEDURE — 3077F SYST BP >= 140 MM HG: CPT | Mod: CPTII,S$GLB,, | Performed by: INTERNAL MEDICINE

## 2019-10-16 PROCEDURE — 3079F DIAST BP 80-89 MM HG: CPT | Mod: CPTII,S$GLB,, | Performed by: INTERNAL MEDICINE

## 2019-10-16 PROCEDURE — 99999 PR PBB SHADOW E&M-EST. PATIENT-LVL III: ICD-10-PCS | Mod: PBBFAC,,, | Performed by: INTERNAL MEDICINE

## 2019-10-16 PROCEDURE — 99214 PR OFFICE/OUTPT VISIT, EST, LEVL IV, 30-39 MIN: ICD-10-PCS | Mod: S$GLB,,, | Performed by: INTERNAL MEDICINE

## 2019-10-16 PROCEDURE — 3008F PR BODY MASS INDEX (BMI) DOCUMENTED: ICD-10-PCS | Mod: CPTII,S$GLB,, | Performed by: INTERNAL MEDICINE

## 2019-10-16 PROCEDURE — 99214 OFFICE O/P EST MOD 30 MIN: CPT | Mod: S$GLB,,, | Performed by: INTERNAL MEDICINE

## 2019-10-16 NOTE — PROGRESS NOTES
Subjective:       Patient ID: Roberta Ware Jr. is a 49 y.o. male.    Chief Complaint: Follow-up    HPI     Pt arrived >10 min late for visit   LOST TO FOLLOW_UP     HISTORY OF PRESENT ILLNESS:  The patient is a pleasant 49-year-old gentleman seen today for f/u for abnl blood counts. He has anemia and thrombocytosis  And intermittent leukocytosis. The patient was hospitalized 12/2016 with acute blood loss anemia.  The patient reported 1-1/2 week history of dark stools, lightheadedness, dizziness, fatigue and dyspnea on exertion.  Hemoglobin 6g/dl on admission. He underwent a total of 4 units of packed red blood cell transfusion during hospitalization.The patient was followed by GI during hospitalization.  EGD  performed and showed ulcerations without any evidence of bleeding.  The patient was followed by GI during hospitalization.  He takes indomethacin for gout approximately one time per week.  He also had been taking ibuprofen for headaches.  The patient reports an intermittent epigastric abdominal pain over the past year.  The patient was begun on a PPI since hospital discharge.  He has had no further episodes of melena.  Review of records reveals that the patient was previously followed by colleague, Dr. Paul.    Dr. Paul prescribed ferrous sulfate supplementation.  The patient did not take prescribed medication.  No history of alcohol abuse, chronic liver disease, clotting disorder, neuropathy or recent surgery.  He does have chronic kidney disease, stage III,HTN, gout and morbid obesity.  The patient previously  lost to follow up in this clinic and was last seen on 1/23/2015.  He underwent a colonoscopy approximately four years ago significant for diverticulosis.  He is a lifelong nonsmoker.  Workup to date revealed an abnormal flow cytometry, which revealed an atypical T-cell subset.  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  MICHAEL-2 mutation testing was negative. CBC from 12/27/2016  "reveals a [place] 15561, hemoglobin of 8.6 g/dL, hematocrit of 28.5%, MCV of 86 and platelet count of 502,000.    Last visit 2018     Today, he has no new issues  He has not been taking Fe supp  No fatigue  No SOB/CP  No cough  No melena, hematochezia or change in bowel habits   No recent infections  No rashes        PAST MEDICAL HISTORY:  Diverticulosis, ED, GERD, gout, essential hypertension, morbid obesity, osteoarthritis, ulcer.    PAST SURGICAL HISTORY:  Colonoscopy,EGD    FAMILY HISTORY: Mother diagnosed with breast CA. Maternal aunt diagnosed with breast CA. Paternal aunt diagnosed with lung cancer. Paternal aunt diagnosed with breast cancer        Review of Systems   Constitutional: Negative for appetite change, fatigue, fever and unexpected weight change.   HENT: Negative for mouth sores.    Eyes: Negative for visual disturbance.   Respiratory: Negative for cough and shortness of breath.    Cardiovascular: Negative for chest pain.   Gastrointestinal: Negative for abdominal pain, blood in stool and diarrhea.   Genitourinary: Negative for frequency and hematuria.   Musculoskeletal: Negative for back pain.   Skin: Negative for rash.   Neurological: Negative for headaches.   Hematological: Negative for adenopathy.   Psychiatric/Behavioral: The patient is not nervous/anxious.        Objective:       Vitals:    10/16/19 1440   BP: (!) 146/81   BP Location: Left arm   Patient Position: Sitting   BP Method: Large (Automatic)   Pulse: 88   Temp: 98.6 °F (37 °C)   TempSrc: Oral   SpO2: 96%   Weight: (!) 222.7 kg (490 lb 15.4 oz)   Height: 6' 4" (1.93 m)       Physical Exam   Constitutional: He is oriented to person, place, and time. He appears well-developed and well-nourished.   HENT:   Head: Normocephalic.   Mouth/Throat: Oropharynx is clear and moist. No oropharyngeal exudate.   Eyes: Pupils are equal, round, and reactive to light. Conjunctivae are normal. No scleral icterus.   Neck: Normal range " of motion. Neck supple. No thyromegaly present.   Cardiovascular: Normal rate and regular rhythm.   Murmur heard.  Pulmonary/Chest: Effort normal and breath sounds normal. He has no wheezes. He has no rales.   Abdominal: Soft. Bowel sounds are normal. He exhibits no distension and no mass. There is no hepatosplenomegaly. There is no tenderness. There is no rebound and no guarding.   Musculoskeletal: Normal range of motion. He exhibits no edema.   Lymphadenopathy:     He has no cervical adenopathy.     He has no axillary adenopathy.        Right: No supraclavicular adenopathy present.        Left: No supraclavicular adenopathy present.   Neurological: He is alert and oriented to person, place, and time. No cranial nerve deficit.   Skin: No rash noted. No erythema.   Psychiatric: He has a normal mood and affect.       Labs:   Lab Results   Component Value Date    WBC 15.66 (H) 10/12/2019    HGB 12.4 (L) 10/12/2019    HCT 42.2 10/12/2019    MCV 88 10/12/2019     (H) 10/12/2019       SPEP-  Normal total protein.   Increased gamma globulin, polyclonal.     Pathologist interpretation of peripheral blood smear:   Mild leukocytosis shows a mild absolute neutrophilia with some toxic   changes including cytoplasmic vacuoles.  The overall process appears   reactive.  Clinical correlation is required.   Additionally, there are scattered atypical lymphocytes, correlate   clinically in this patient with a history of a positive T-cell   receptor gene rearrangement by PCR.   Normocytic red cells appear mildly hypochromic.  Correlation with the   patient's iron status is recommended.   Increased platelets are noted.  This can be seen with iron   deficiency.  If the patient has not clinically iron deficient,     further evaluation may be indicated.       Lab Results   Component Value Date    IRON 40 (L) 10/12/2019    TIBC 332 10/12/2019    FERRITIN 89 10/12/2019         Assessment:       1. Chronic kidney disease, stage III  (moderate)    2. Thrombocytosis    3. Iron deficiency anemia, unspecified iron deficiency anemia type        Plan:   Pt clinically stable  49-year-old gentleman with a longstanding history a mild   Anemia,previously  lost to follow up, with  hospitalization 12/2016 secondary to   acute-on-chronic anemia related to acute blood loss.   SPEP-polyclonal   Fe parameters abnl  C-scope 2011- divertivuloisis  EGD 2016 - non-bleeding duodenal ulcer with no stigmata                         of bleeding.  Pt instructed to restart  OTC Fe supp qd with Vit C supp to improve absorption  Previous hematological workup to date revealed abnormal flow cytometry with an atypical   T-cell subset of unknown significance.    JAK2 mutation testing negative.    Plt ct slightly elevated  Discussed issue of bmbx for further evaluation  Pt thinking about it     Re Start Fe supp  Pt counseled on potential drug-induced constipation    F/u 2 mo with cbc, Fe studies sed rate prior to f/u     CC: ANTON Chau M.D.

## 2019-12-10 ENCOUNTER — HOSPITAL ENCOUNTER (EMERGENCY)
Facility: HOSPITAL | Age: 49
Discharge: HOME | End: 2019-12-11
Attending: EMERGENCY MEDICINE
Payer: COMMERCIAL

## 2019-12-10 VITALS
RESPIRATION RATE: 18 BRPM | WEIGHT: 315 LBS | BODY MASS INDEX: 59.64 KG/M2 | TEMPERATURE: 99 F | HEART RATE: 89 BPM | OXYGEN SATURATION: 100 % | DIASTOLIC BLOOD PRESSURE: 92 MMHG | SYSTOLIC BLOOD PRESSURE: 152 MMHG

## 2019-12-10 DIAGNOSIS — J40 BRONCHITIS: Primary | ICD-10-CM

## 2019-12-10 DIAGNOSIS — J01.00 ACUTE NON-RECURRENT MAXILLARY SINUSITIS: ICD-10-CM

## 2019-12-10 LAB
CTP QC/QA: YES
POC MOLECULAR INFLUENZA A AGN: NEGATIVE
POC MOLECULAR INFLUENZA B AGN: NEGATIVE

## 2019-12-10 PROCEDURE — 87502 INFLUENZA DNA AMP PROBE: CPT | Mod: 59,ER

## 2019-12-10 PROCEDURE — 99284 EMERGENCY DEPT VISIT MOD MDM: CPT | Mod: 25,ER

## 2019-12-10 PROCEDURE — 87804 INFLUENZA ASSAY W/OPTIC: CPT | Mod: ER

## 2019-12-10 PROCEDURE — 63600175 PHARM REV CODE 636 W HCPCS: Mod: ER | Performed by: NURSE PRACTITIONER

## 2019-12-10 PROCEDURE — 96372 THER/PROPH/DIAG INJ SC/IM: CPT | Mod: ER

## 2019-12-10 RX ORDER — DEXAMETHASONE SODIUM PHOSPHATE 4 MG/ML
10 INJECTION, SOLUTION INTRA-ARTICULAR; INTRALESIONAL; INTRAMUSCULAR; INTRAVENOUS; SOFT TISSUE
Status: COMPLETED | OUTPATIENT
Start: 2019-12-10 | End: 2019-12-10

## 2019-12-10 RX ORDER — CETIRIZINE HYDROCHLORIDE 10 MG/1
10 TABLET ORAL DAILY
Qty: 30 TABLET | Refills: 0 | Status: SHIPPED | OUTPATIENT
Start: 2019-12-10 | End: 2023-03-20

## 2019-12-10 RX ORDER — IBUPROFEN 600 MG/1
600 TABLET ORAL EVERY 6 HOURS PRN
Qty: 20 TABLET | Refills: 0 | Status: SHIPPED | OUTPATIENT
Start: 2019-12-10 | End: 2020-04-27

## 2019-12-10 RX ORDER — FLUTICASONE PROPIONATE 50 MCG
1 SPRAY, SUSPENSION (ML) NASAL 2 TIMES DAILY PRN
Qty: 15 G | Refills: 0 | Status: SHIPPED | OUTPATIENT
Start: 2019-12-10 | End: 2019-12-17

## 2019-12-10 RX ORDER — DEXTROMETHORPHAN HYDROBROMIDE, GUAIFENESIN 5; 100 MG/5ML; MG/5ML
650 LIQUID ORAL EVERY 8 HOURS
Qty: 20 TABLET | Refills: 0 | Status: SHIPPED | OUTPATIENT
Start: 2019-12-10 | End: 2021-01-03

## 2019-12-10 RX ORDER — AMOXICILLIN AND CLAVULANATE POTASSIUM 875; 125 MG/1; MG/1
1 TABLET, FILM COATED ORAL 2 TIMES DAILY
Qty: 14 TABLET | Refills: 0 | Status: SHIPPED | OUTPATIENT
Start: 2019-12-10 | End: 2020-07-06

## 2019-12-10 RX ORDER — PROMETHAZINE HYDROCHLORIDE AND DEXTROMETHORPHAN HYDROBROMIDE 6.25; 15 MG/5ML; MG/5ML
5 SYRUP ORAL NIGHTLY PRN
Qty: 180 ML | Refills: 0 | Status: SHIPPED | OUTPATIENT
Start: 2019-12-10 | End: 2019-12-20

## 2019-12-10 RX ADMIN — DEXAMETHASONE SODIUM PHOSPHATE 10 MG: 4 INJECTION, SOLUTION INTRA-ARTICULAR; INTRALESIONAL; INTRAMUSCULAR; INTRAVENOUS; SOFT TISSUE at 08:12

## 2019-12-11 NOTE — ED PROVIDER NOTES
Encounter Date: 12/10/2019       History     Chief Complaint   Patient presents with    sinus pressure     pt reports sinus pressure and congestion x's 5 days with home tx of [place] HBP with minimal relief. No contact with PCP     48 y/o male presents to the ED with complaints of sinus pressure and chest congestion for 5 days.  Patient also reports productive cough.  He has been taking OTC cough meds with minimal relief.  Patient denies rash, fever, chest pain, SOB, numbness, weakness, tingling, abdominal pain, back pain, dysuria, hematuria, nausea, vomiting, diarrhea, or any other complaints.  He rates his pain as 2/10 and has not taken any medications PTA.            Review of patient's allergies indicates:   Allergen Reactions    Hydrochlorothiazide Other (See Comments)     Gout    Tramadol Swelling     Pt states make him jittery      Past Medical History:   Diagnosis Date    Diverticulosis     Erectile dysfunction     Gout     Hypertension     Osteoarthritis      Past Surgical History:   Procedure Laterality Date    CHOLECYSTECTOMY      COLONOSCOPY  2012    UPPER GASTROINTESTINAL ENDOSCOPY       Family History   Problem Relation Age of Onset    Heart disease Mother     Cancer Mother         Breast    Diabetes Father     Hypertension Father     Ulcers Father     Anemia Sister      Social History     Tobacco Use    Smoking status: Never Smoker    Smokeless tobacco: Never Used   Substance Use Topics    Alcohol use: Yes     Comment: Ocassionally    Drug use: No     Review of Systems   Constitutional: Negative for chills and fever.   HENT: Positive for congestion and sinus pressure. Negative for ear pain, rhinorrhea and sore throat.    Eyes: Negative for pain, discharge and redness.   Respiratory: Positive for cough. Negative for shortness of breath.    Cardiovascular: Negative for chest pain.   Gastrointestinal: Negative for abdominal pain, diarrhea, nausea and vomiting.   Genitourinary:  Negative for dysuria.   Musculoskeletal: Negative for back pain, neck pain and neck stiffness.   Skin: Negative for rash.   Neurological: Negative for dizziness, weakness, light-headedness, numbness and headaches.   Psychiatric/Behavioral: Negative for confusion.       Physical Exam     Initial Vitals [12/10/19 1937]   BP Pulse Resp Temp SpO2   (!) 169/100 91 18 99.7 °F (37.6 °C) 95 %      MAP       --         Physical Exam    Nursing note and vitals reviewed.  Constitutional: Vital signs are normal. He appears well-developed. He is cooperative.  Non-toxic appearance. He does not appear ill.   HENT:   Head: Normocephalic and atraumatic.   Right Ear: Tympanic membrane normal.   Left Ear: Tympanic membrane normal.   Nose: Mucosal edema and rhinorrhea present. Right sinus exhibits maxillary sinus tenderness. Left sinus exhibits maxillary sinus tenderness.   Mouth/Throat: Uvula is midline, oropharynx is clear and moist and mucous membranes are normal.   Eyes: Conjunctivae are normal.   Neck: Normal range of motion.   Cardiovascular: Normal rate and regular rhythm.   Pulmonary/Chest: Effort normal and breath sounds normal.   Abdominal: Normal appearance.   Neurological: He is alert and oriented to person, place, and time. GCS eye subscore is 4. GCS verbal subscore is 5. GCS motor subscore is 6.   Skin: Skin is warm, dry and intact. No rash noted.   Psychiatric: He has a normal mood and affect. His speech is normal and behavior is normal. Thought content normal.         ED Course   Procedures  Labs Reviewed   POCT INFLUENZA A/B MOLECULAR          Imaging Results          X-Ray Chest PA And Lateral (Final result)  Result time 12/10/19 20:28:47    Final result by Ann Marie Singh MD (12/10/19 20:28:47)                 Impression:      No acute intrathoracic abnormality.      Electronically signed by: Ann Marie Singh  Date:    12/10/2019  Time:    20:28             Narrative:    EXAMINATION:  CHEST PA AND  LATERAL    CLINICAL HISTORY:  cough;    TECHNIQUE:  PA and lateral chest radiograph    COMPARISON:  04/27/2017    FINDINGS:  The cardiac silhouette is within normal limits. Mild vascular calcification is seen at the aortic knob.  There is no focal consolidation, pneumothorax, or pleural effusion.  Mild spondylitic changes are present.                                       APC / Resident Notes:   This is an evaluation of a 49 y.o. male that presents to the Emergency Department for Runny Nose, Nasal Congestion, chest congestion and Cough for 5 days. The patient is a non-toxic, afebrile, and well appearing male. On physical exam ears and pharynx are without evidence of infection. Appears well hydrated with moist mucus membranes. Neck soft and supple with no meningeal signs or cervical lymphadenopathy. Breath sounds are clear and equal bilaterally with no adventitious breath sounds, tachypnea or respiratory distress with room air pulse ox of 100% and no evidence of hypoxia. Mucosal edema and rhinorrhea; maxillary tenderness    Vital Signs Are Reassuring.  RESULTS: Influenza negative, CXR negative    My overall impression is Sinusitis, Bronchitis. I considered, but at this time, do not suspect OM, OE, strep pharyngitis, meningitis, pneumonia, or Influenza.    ED Course: Influenza, CXR, Decadron. D/C Meds: Augmentin, flonase, zyrtec, ibuprofen, tylenol, pherergan. Additional D/C Information: f/u, medications. The diagnosis, treatment plan, instructions for follow-up and reevaluation with PCP as well as ED return precautions were discussed and understanding was verbalized. All questions or concerns have been addressed.                                 Clinical Impression:       ICD-10-CM ICD-9-CM   1. Bronchitis J40 490   2. Acute non-recurrent maxillary sinusitis J01.00 461.0         Disposition:   Disposition: Discharged  Condition: Stable                     LATOSHA Sutherland  12/10/19 2137       Mary Alfaro  [facility]  12/10/19 2145

## 2019-12-14 ENCOUNTER — LAB VISIT (OUTPATIENT)
Dept: LAB | Facility: HOSPITAL | Age: 49
End: 2019-12-14
Attending: INTERNAL MEDICINE
Payer: COMMERCIAL

## 2019-12-14 DIAGNOSIS — D50.9 IRON DEFICIENCY ANEMIA, UNSPECIFIED IRON DEFICIENCY ANEMIA TYPE: ICD-10-CM

## 2019-12-14 DIAGNOSIS — D75.839 THROMBOCYTOSIS: ICD-10-CM

## 2019-12-14 LAB
BASOPHILS # BLD AUTO: 0.08 K/UL (ref 0–0.2)
BASOPHILS NFR BLD: 0.5 % (ref 0–1.9)
DIFFERENTIAL METHOD: ABNORMAL
EOSINOPHIL # BLD AUTO: 0.5 K/UL (ref 0–0.5)
EOSINOPHIL NFR BLD: 3 % (ref 0–8)
ERYTHROCYTE [DISTWIDTH] IN BLOOD BY AUTOMATED COUNT: 16.8 % (ref 11.5–14.5)
FERRITIN SERPL-MCNC: 67 NG/ML (ref 20–300)
HCT VFR BLD AUTO: 42.9 % (ref 40–54)
HGB BLD-MCNC: 12.6 G/DL (ref 14–18)
IMM GRANULOCYTES # BLD AUTO: 0.14 K/UL (ref 0–0.04)
IMM GRANULOCYTES NFR BLD AUTO: 0.8 % (ref 0–0.5)
IRON SERPL-MCNC: 42 UG/DL (ref 45–160)
LYMPHOCYTES # BLD AUTO: 6 K/UL (ref 1–4.8)
LYMPHOCYTES NFR BLD: 36.2 % (ref 18–48)
MCH RBC QN AUTO: 26.3 PG (ref 27–31)
MCHC RBC AUTO-ENTMCNC: 29.4 G/DL (ref 32–36)
MCV RBC AUTO: 90 FL (ref 82–98)
MONOCYTES # BLD AUTO: 1 K/UL (ref 0.3–1)
MONOCYTES NFR BLD: 6.2 % (ref 4–15)
NEUTROPHILS # BLD AUTO: 8.9 K/UL (ref 1.8–7.7)
NEUTROPHILS NFR BLD: 53.3 % (ref 38–73)
NRBC BLD-RTO: 0 /100 WBC
PLATELET # BLD AUTO: 316 K/UL (ref 150–350)
PMV BLD AUTO: 10 FL (ref 9.2–12.9)
RBC # BLD AUTO: 4.79 M/UL (ref 4.6–6.2)
SATURATED IRON: 14 % (ref 20–50)
TOTAL IRON BINDING CAPACITY: 308 UG/DL (ref 250–450)
TRANSFERRIN SERPL-MCNC: 208 MG/DL (ref 200–375)
WBC # BLD AUTO: 16.61 K/UL (ref 3.9–12.7)

## 2019-12-14 PROCEDURE — 82728 ASSAY OF FERRITIN: CPT

## 2019-12-14 PROCEDURE — 85025 COMPLETE CBC W/AUTO DIFF WBC: CPT

## 2019-12-14 PROCEDURE — 36415 COLL VENOUS BLD VENIPUNCTURE: CPT | Mod: PO

## 2019-12-14 PROCEDURE — 83540 ASSAY OF IRON: CPT

## 2019-12-17 ENCOUNTER — OFFICE VISIT (OUTPATIENT)
Dept: HEMATOLOGY/ONCOLOGY | Facility: CLINIC | Age: 49
End: 2019-12-17
Payer: COMMERCIAL

## 2019-12-17 VITALS
HEART RATE: 92 BPM | DIASTOLIC BLOOD PRESSURE: 81 MMHG | HEIGHT: 76 IN | TEMPERATURE: 99 F | OXYGEN SATURATION: 98 % | SYSTOLIC BLOOD PRESSURE: 156 MMHG | BODY MASS INDEX: 38.36 KG/M2 | WEIGHT: 315 LBS

## 2019-12-17 DIAGNOSIS — D75.839 THROMBOCYTOSIS: ICD-10-CM

## 2019-12-17 DIAGNOSIS — D50.9 IRON DEFICIENCY ANEMIA, UNSPECIFIED IRON DEFICIENCY ANEMIA TYPE: Primary | ICD-10-CM

## 2019-12-17 DIAGNOSIS — D72.829 LEUKOCYTOSIS, UNSPECIFIED TYPE: ICD-10-CM

## 2019-12-17 DIAGNOSIS — N18.30 CHRONIC KIDNEY DISEASE, STAGE III (MODERATE): ICD-10-CM

## 2019-12-17 DIAGNOSIS — I10 ESSENTIAL HYPERTENSION: ICD-10-CM

## 2019-12-17 PROCEDURE — 3077F PR MOST RECENT SYSTOLIC BLOOD PRESSURE >= 140 MM HG: ICD-10-PCS | Mod: CPTII,S$GLB,, | Performed by: INTERNAL MEDICINE

## 2019-12-17 PROCEDURE — 99214 PR OFFICE/OUTPT VISIT, EST, LEVL IV, 30-39 MIN: ICD-10-PCS | Mod: S$GLB,,, | Performed by: INTERNAL MEDICINE

## 2019-12-17 PROCEDURE — 3008F BODY MASS INDEX DOCD: CPT | Mod: CPTII,S$GLB,, | Performed by: INTERNAL MEDICINE

## 2019-12-17 PROCEDURE — 3079F DIAST BP 80-89 MM HG: CPT | Mod: CPTII,S$GLB,, | Performed by: INTERNAL MEDICINE

## 2019-12-17 PROCEDURE — 3077F SYST BP >= 140 MM HG: CPT | Mod: CPTII,S$GLB,, | Performed by: INTERNAL MEDICINE

## 2019-12-17 PROCEDURE — 99999 PR PBB SHADOW E&M-EST. PATIENT-LVL IV: ICD-10-PCS | Mod: PBBFAC,,, | Performed by: INTERNAL MEDICINE

## 2019-12-17 PROCEDURE — 99999 PR PBB SHADOW E&M-EST. PATIENT-LVL IV: CPT | Mod: PBBFAC,,, | Performed by: INTERNAL MEDICINE

## 2019-12-17 PROCEDURE — 3079F PR MOST RECENT DIASTOLIC BLOOD PRESSURE 80-89 MM HG: ICD-10-PCS | Mod: CPTII,S$GLB,, | Performed by: INTERNAL MEDICINE

## 2019-12-17 PROCEDURE — 3008F PR BODY MASS INDEX (BMI) DOCUMENTED: ICD-10-PCS | Mod: CPTII,S$GLB,, | Performed by: INTERNAL MEDICINE

## 2019-12-17 PROCEDURE — 99214 OFFICE O/P EST MOD 30 MIN: CPT | Mod: S$GLB,,, | Performed by: INTERNAL MEDICINE

## 2019-12-17 NOTE — PROGRESS NOTES
Subjective:       Patient ID: Roberta Ware Jr. is a 49 y.o. male.    Chief Complaint: Follow-up         LOST TO FOLLOW_UP     HISTORY OF PRESENT ILLNESS:  The patient is a pleasant 49-year-old gentleman seen today for f/u for abnl blood counts. He has anemia and thrombocytosis  And intermittent leukocytosis. The patient was hospitalized 12/2016 with acute blood loss anemia.  The patient reported 1-1/2 week history of dark stools, lightheadedness, dizziness, fatigue and dyspnea on exertion.  Hemoglobin 6g/dl on admission. He underwent a total of 4 units of packed red blood cell transfusion during hospitalization.The patient was followed by GI during hospitalization.  EGD  performed and showed ulcerations without any evidence of bleeding.  The patient was followed by GI during hospitalization.  He takes indomethacin for gout approximately one time per week.  He also had been taking ibuprofen for headaches.  The patient reports an intermittent epigastric abdominal pain over the past year.  The patient was begun on a PPI since hospital discharge.  He has had no further episodes of melena.  Review of records reveals that the patient was previously followed by colleague, Dr. Paul.    Dr. aPul prescribed ferrous sulfate supplementation.  The patient did not take prescribed medication.  No history of alcohol abuse, chronic liver disease, clotting disorder, neuropathy or recent surgery.  He does have chronic kidney disease, stage III,HTN, gout and morbid obesity.  The patient previously  lost to follow up in this clinic and was last seen on 1/23/2015.  He underwent a colonoscopy approximately four years ago significant for diverticulosis.  He is a lifelong nonsmoker.  Workup to date revealed an abnormal flow cytometry, which revealed an atypical T-cell subset.  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  MICHAEL-2 mutation testing was negative. CBC from 12/27/2016 reveals a white blood cell count of 33968,  "hemoglobin of 8.6 g/dL, hematocrit of 28.5%, MCV of 86 and platelet count of 502,000.        Today, he has no new issues  Doing well  He endorses compliance with  Fe supp qd  Less fatigued  No SOB/CP/cough  No melena, hematochezia or change in bowel habits   No recent infections  No rashes    He is planning on going on a cruise soon         PAST MEDICAL HISTORY:  Diverticulosis, ED, GERD, gout, essential hypertension, morbid obesity, osteoarthritis, ulcer.    PAST SURGICAL HISTORY:  Colonoscopy,EGD    FAMILY HISTORY: Mother diagnosed with breast CA. Maternal aunt diagnosed with breast CA. Paternal aunt diagnosed with lung cancer. Paternal aunt diagnosed with breast cancer        Review of Systems   Constitutional: Negative for appetite change, fatigue, fever and unexpected weight change.   HENT: Negative for mouth sores.    Eyes: Negative for visual disturbance.   Respiratory: Negative for cough and shortness of breath.    Cardiovascular: Negative for chest pain.   Gastrointestinal: Negative for abdominal pain, blood in stool and diarrhea.   Genitourinary: Negative for frequency and hematuria.   Musculoskeletal: Negative for back pain.   Skin: Negative for rash.   Neurological: Negative for headaches.   Hematological: Negative for adenopathy.   Psychiatric/Behavioral: The patient is not nervous/anxious.        Objective:       Vitals:    12/17/19 1321   BP: (!) 156/81   BP Location: Right arm   Patient Position: Sitting   BP Method: Large (Automatic)   Pulse: 92   Temp: 98.8 °F (37.1 °C)   TempSrc: Oral   SpO2: 98%   Weight: (!) 224.1 kg (494 lb 0.8 oz)   Height: 6' 4" (1.93 m)       Physical Exam   Constitutional: He is oriented to person, place, and time. He appears well-developed and well-nourished.   HENT:   Head: Normocephalic.   Mouth/Throat: Oropharynx is clear and moist. No oropharyngeal exudate.   Eyes: Pupils are equal, round, and reactive to light. Conjunctivae are normal. No scleral icterus.   Neck: " Normal range of motion. Neck supple. No thyromegaly present.   Cardiovascular: Normal rate and regular rhythm.   No murmur heard.  Pulmonary/Chest: Effort normal and breath sounds normal. He has no wheezes. He has no rales.   Abdominal: Soft. Bowel sounds are normal. He exhibits no distension and no mass. There is no hepatosplenomegaly. There is no tenderness. There is no rebound and no guarding.   Musculoskeletal: Normal range of motion. He exhibits no edema.   Lymphadenopathy:     He has no cervical adenopathy.     He has no axillary adenopathy.        Right: No supraclavicular adenopathy present.        Left: No supraclavicular adenopathy present.   Neurological: He is alert and oriented to person, place, and time. No cranial nerve deficit.   Skin: No rash noted. No erythema.   Psychiatric: He has a normal mood and affect.       Labs:   Lab Results   Component Value Date    WBC 16.61 (H) 12/14/2019    HGB 12.6 (L) 12/14/2019    HCT 42.9 12/14/2019    MCV 90 12/14/2019     12/14/2019       SPEP-  Normal total protein.   Increased gamma globulin, polyclonal.     Pathologist interpretation of peripheral blood smear:   Mild leukocytosis shows a mild absolute neutrophilia with some toxic   changes including cytoplasmic vacuoles.  The overall process appears   reactive.  Clinical correlation is required.   Additionally, there are scattered atypical lymphocytes, correlate   clinically in this patient with a history of a positive T-cell   receptor gene rearrangement by PCR.   Normocytic red cells appear mildly hypochromic.  Correlation with the   patient's iron status is recommended.   Increased platelets are noted.  This can be seen with iron   deficiency.  If the patient has not clinically iron deficient,     further evaluation may be indicated.       Lab Results   Component Value Date    IRON 42 (L) 12/14/2019    TIBC 308 12/14/2019    FERRITIN 67 12/14/2019     Results for TY CATES JR. (MRN [id number])  as of 12/17/2019 14:52   Ref. Range 7/1/2018 08:42 9/1/2018 10:12 10/12/2019 11:25   Sed Rate Latest Ref Range: 0 - 23 mm/Hr 49 (H) 26 (H) 34 (H)       Assessment:       1. Iron deficiency anemia, unspecified iron deficiency anemia type    2. Thrombocytosis    3. Leukocytosis, unspecified type    4. Chronic kidney disease, stage III (moderate)    5. Essential hypertension        Plan:   1-5 Pt clinically stable  49-year-old gentleman with a longstanding history a mild   Anemia,previously  lost to follow up, with  hospitalization 12/2016 secondary to   acute-on-chronic anemia related to acute blood loss.   SPEP-polyclonal   Fe parameters - borderline low serum Fe and decreased Fe sats  C-scope 2011- divertivuloisis  EGD 2016 - non-bleeding duodenal ulcer with no stigmata                         of bleeding.  Pt instructed to increase OTC Fe supp to bid prn tolerability and take with Vit C supp to improve absorption  Pt counseled on potential drug-induced constipation  He also has chronic mild leukocytosis >9 yrs  Previous hematological workup to date revealed abnormal flow cytometry with an atypical   T-cell subset of unknown significance.    JAK2 mutation testing negative.    Plan bcr/abl testing  Plt ct wnl  Previously Discussed issue of bmbx for further evaluation- pt elects to hold off     Follow-up with PCP for BP check      F/u 3 mo with cbc, Fe studies sed rate prior to f/u     CC: ANTON Chau M.D.

## 2020-03-09 DIAGNOSIS — M10.9 GOUT, UNSPECIFIED CAUSE, UNSPECIFIED CHRONICITY, UNSPECIFIED SITE: ICD-10-CM

## 2020-03-09 NOTE — TELEPHONE ENCOUNTER
----- Message from Kirsten Verdin sent at 3/9/2020  2:40 PM CDT -----  Contact: Self/  [phone]  Type: RX Refill Request    Who Called:   Patient      Refill or New Rx:  Refill    RX Name and Strength:  allopurinol (ZYLOPRIM) 300 MG tablet    Preferred Pharmacy with phone number: [id number] [place], [place] - [address] [facility]     Local or Mail Order:  Local    Ordering Provider:  Daily HEARD    Would the patient rather a call back or a response via [facility]?   Call back    Best Call Back Number:   [phone]

## 2020-03-11 DIAGNOSIS — M10.9 GOUT, UNSPECIFIED CAUSE, UNSPECIFIED CHRONICITY, UNSPECIFIED SITE: ICD-10-CM

## 2020-03-12 RX ORDER — ALLOPURINOL 300 MG/1
300 TABLET ORAL DAILY
Qty: 90 TABLET | Refills: 0 | Status: SHIPPED | OUTPATIENT
Start: 2020-03-12 | End: 2020-06-08

## 2020-03-12 RX ORDER — ALLOPURINOL 300 MG/1
300 TABLET ORAL DAILY
Qty: 90 TABLET | Refills: 3 | OUTPATIENT
Start: 2020-03-12

## 2020-03-12 NOTE — PROGRESS NOTES
Quick DC. Duplicate Request  Refill Authorization Note     is requesting a refill authorization.    Brief assessment and rationale for refill: QUICK DC: dupicate request          Medication Therapy Plan: Duplicate request, quick dc; Handled in a previous encounter                              Comments:   Last Prescribed Info:     Authorizing Provider: ISAURA Nunez NASIM #:  [id number] NPI:  [id number]    Ordering User:  ISAURA Nunez            Diagnosis Association: Gout, unspecified cause, unspecified chronicity, unspecified site (M10.9)      Original Order:  allopurinol (ZYLOPRIM) 300 MG tablet [[id number]]      Pharmacy:  [id number] - [place], [place] - [address] [place] [address]        allopurinoL (ZYLOPRIM) 300 MG tablet 90 tablet 0 3/12/2020     Sig - Route: Take 1 tablet (300 mg total) by mouth once daily. - Oral    Sent to pharmacy as: allopurinoL (ZYLOPRIM) 300 MG tablet    E-Prescribing Status: Receipt confirmed by pharmacy (3/12/2020  9:35 AM CDT)           Note composed:4:18 PM 03/12/2020

## 2020-04-06 ENCOUNTER — NURSE TRIAGE (OUTPATIENT)
Dept: ADMINISTRATIVE | Facility: CLINIC | Age: 50
End: 2020-04-06

## 2020-04-06 NOTE — TELEPHONE ENCOUNTER
"Pt states "I just wanted to call to make sure that I didn't have any symptoms or to see what the protocol was as far as testing"    Reason for Disposition   COVID-19, questions about    Additional Information   Negative: SEVERE difficulty breathing (e.g., struggling for each breath, speaks in single words)   Negative: Difficult to awaken or acting confused (e.g., disoriented, slurred speech)   Negative: Bluish (or gray) lips or face now   Negative: Shock suspected (e.g., cold/pale/clammy skin, too weak to stand, low BP, rapid pulse)   Negative: Sounds like a life-threatening emergency to the triager   Negative: [1] COVID-19 suspected (e.g., cough, fever, shortness of breath) AND [2] public health department recommends testing   Negative: [1] COVID-19 exposure AND [2] no symptoms   Negative: COVID-19 and Breastfeeding, questions about   Negative: SEVERE or constant chest pain (Exception: mild central chest pain, present only when coughing)   Negative: MODERATE difficulty breathing (e.g., speaks in phrases, SOB even at rest, pulse 100-120)   Negative: Patient sounds very sick or weak to the triager   Negative: MILD difficulty breathing (e.g., minimal/no SOB at rest, SOB with walking, pulse <100)   Negative: Chest pain   Negative: Fever > 103 F (39.4 C)   Negative: [1] Fever > 101 F (38.3 C) AND [2] age > 60   Negative: [1] Fever > 100.0 F (37.8 C) AND [2] bedridden (e.g., nursing home patient, CVA, chronic illness, recovering from surgery)   Negative: HIGH RISK patient (e.g., age > 64 years, diabetes, heart or lung disease, weak immune system)   Negative: Fever present > 3 days (72 hours)   Negative: [1] Fever returns after gone for over 24 hours AND [2] symptoms worse or not improved   Negative: [1] Continuous (nonstop) coughing interferes with work or school AND [2] no improvement using cough treatment per protocol   Negative: Cough present > 3 weeks   Negative: 1] COVID-19 infection diagnosed " or suspected AND [2] mild symptoms (fever, cough) AND [2] no trouble breathing or other complications    Protocols used: CORONAVIRUS (COVID-19) DIAGNOSED OR [id number]

## 2020-04-08 ENCOUNTER — CLINICAL SUPPORT (OUTPATIENT)
Dept: FAMILY MEDICINE | Facility: CLINIC | Age: 50
End: 2020-04-08
Payer: COMMERCIAL

## 2020-04-08 ENCOUNTER — OFFICE VISIT (OUTPATIENT)
Dept: FAMILY MEDICINE | Facility: CLINIC | Age: 50
End: 2020-04-08
Payer: COMMERCIAL

## 2020-04-08 DIAGNOSIS — Z20.822 SUSPECTED COVID-19 VIRUS INFECTION: ICD-10-CM

## 2020-04-08 DIAGNOSIS — Z20.822 SUSPECTED COVID-19 VIRUS INFECTION: Primary | ICD-10-CM

## 2020-04-08 PROCEDURE — U0002 COVID-19 LAB TEST NON-CDC: HCPCS

## 2020-04-08 PROCEDURE — 99214 OFFICE O/P EST MOD 30 MIN: CPT | Mod: 95,,, | Performed by: FAMILY MEDICINE

## 2020-04-08 PROCEDURE — 99214 PR OFFICE/OUTPT VISIT, EST, LEVL IV, 30-39 MIN: ICD-10-PCS | Mod: 95,,, | Performed by: FAMILY MEDICINE

## 2020-04-08 NOTE — PROGRESS NOTES
[facility]  Progress Note    SUBJECTIVE:     Chief Complaint   Patient presents with    COVID-19 Concerns    Headache    Cough       The patient location is: Home  The chief complaint leading to consultation is: COVID-19 Concerns; Headache; and Cough    Visit type: Virtual visit with synchronous audio and video  Total time spent with patient: 25  Each patient to whom he or she provides medical services by telemedicine is:  (1) informed of the relationship between the physician and patient and the respective role of any other health care provider with respect to management of the patient; and (2) notified that he or she may decline to receive medical services by telemedicine and may withdraw from such care at any time.      HPI: Patient is a 49 y.o. male via virtual visit, here for c/o cough, congestion, headaches. Grand daughter tested positive and has been exposing the family. His symptoms started couple days ago. Cough is slightly productive. No fevers, chills, SOB. Been taking otc meds with some relief.     Review of patient's allergies indicates:   Allergen Reactions    Hydrochlorothiazide Other (See Comments)     Gout    Tramadol Swelling     Pt states make him jittery        Past Medical History:   Diagnosis Date    Diverticulosis     Erectile dysfunction     Gout     Hypertension     Osteoarthritis      Past Surgical History:   Procedure Laterality Date    CHOLECYSTECTOMY      COLONOSCOPY  2012    UPPER GASTROINTESTINAL ENDOSCOPY       Family History   Problem Relation Age of Onset    Heart disease Mother     Cancer Mother         Breast    Diabetes Father     Hypertension Father     Ulcers Father     Anemia Sister      Social History     Tobacco Use    Smoking status: Never Smoker    Smokeless tobacco: Never Used   Substance Use Topics    Alcohol use: Yes     Comment: Ocassionally    Drug use: No        Review of Systems   Constitutional: Negative for chills, fever and  malaise/fatigue.   HENT: Negative.  Negative for congestion and sore throat.    Respiratory: Positive for cough and sputum production (mild). Negative for shortness of breath.    Cardiovascular: Negative.  Negative for chest pain.   Gastrointestinal: Negative.  Negative for abdominal pain, nausea and vomiting.   Genitourinary: Negative.    Musculoskeletal: Negative for myalgias and neck pain.   Neurological: Positive for headaches. Negative for weakness.   All other systems reviewed and are negative.    OBJECTIVE:   There were no vitals filed for this visit.  There is no height or weight on file to calculate BMI.    Physical Exam   Constitutional: He is oriented to person, place, and time and well-developed, well-nourished, and in no distress. He appears not lethargic.  Non-toxic appearance. No distress.   HENT:   Head: Normocephalic and atraumatic.   Eyes: Conjunctivae and EOM are normal. Right eye exhibits no discharge and no exudate. No foreign body present in the right eye. Left eye exhibits no discharge and no exudate. No foreign body present in the left eye. Right conjunctiva is not injected. Right conjunctiva has no hemorrhage. Left conjunctiva is not injected. Left conjunctiva has no hemorrhage. No scleral icterus.   Neck: Normal range of motion.   Pulmonary/Chest: Effort normal. No respiratory distress.   Neurological: He is oriented to person, place, and time. He appears not lethargic.   Skin: No rash noted. He is not diaphoretic.   Psychiatric: His affect is not blunt, not labile and not inappropriate. He is not agitated.       Old records were reviewed. Labs and/or images were independently reviewed.    ASSESSMENT     1. Suspected Covid-19 Virus Infection        PLAN:     Suspected Covid-19 Virus Infection  -     SARS- CoV-2 (COVID-19) QUALITATIVE PCR; Future; Expected date: 04/08/2020  -     Discussed COVID precautions. Wash hands frequently. All questions/concerns addressed.    More than 50% of the  encounter was spent counseling patient about COVID concerns, in an outpatient setting. Total time spent counseling patient: 25.        RTC SHAYNE Ambrosio MD  04/08/2020 2:40 PM

## 2020-04-09 DIAGNOSIS — M10.9 GOUT, UNSPECIFIED CAUSE, UNSPECIFIED CHRONICITY, UNSPECIFIED SITE: Primary | ICD-10-CM

## 2020-04-09 LAB — SARS-COV-2 RNA RESP QL NAA+PROBE: NOT DETECTED

## 2020-04-09 RX ORDER — COLCHICINE 0.6 MG/1
0.6 TABLET ORAL 2 TIMES DAILY PRN
Qty: 90 TABLET | Refills: 3 | Status: SHIPPED | OUTPATIENT
Start: 2020-04-09 | End: 2022-06-30 | Stop reason: SDUPTHER

## 2020-04-09 NOTE — TELEPHONE ENCOUNTER
----- Message from Sanjuana Ramírez sent at 4/9/2020 10:27 AM CDT -----  Contact: pt  Type: RX Refill Request    Who Called:pt    Have you contacted your pharmacy:    Refill or New Rx:colchicine (COLCRYS) 0.6 mg tablet     RX Name and Strength:    How is the patient currently taking it? (ex. 1XDay):    Is this a 30 day or 90 day RX:    Preferred Pharmacy with phone number:  [id number] [place] [id number]  [place] [id number]  Phone: [phone] Fax: [phone]        Local or Mail Order:    Ordering Provider:    Would the patient rather a call back or a response via [facility]? call    Best Call Back Number:[phone]    Additional Information:

## 2020-04-11 DIAGNOSIS — I10 ESSENTIAL HYPERTENSION: ICD-10-CM

## 2020-04-15 RX ORDER — CHLORTHALIDONE 25 MG/1
TABLET ORAL
Qty: 90 TABLET | Refills: 1 | Status: SHIPPED | OUTPATIENT
Start: 2020-04-15 | End: 2020-12-06

## 2020-04-15 NOTE — PROGRESS NOTES
Refill Authorization Note     is requesting a refill authorization.    Brief assessment and rationale for refill: DEFER: overdue labs          Medication Therapy Plan: Pt overdue for labs; seen last week to r/o COVID; test resulted negative; will queue chlorthalidone for 6 more if you are okay with pushing out labs                              Comments:     Refill Center Care Gap Closure protocols temporarily suspended.   Requested Prescriptions   Pending Prescriptions Disp Refills    chlorthalidone (HYGROTEN) 25 MG Tab [Pharmacy Med Name: CHLORTHALIDONE 25MG TABLETS] 90 tablet 1     Sig: TAKE 1 TABLET(25 MG) BY MOUTH EVERY DAY       Cardiovascular: Diuretics - Thiazide Failed - 4/15/2020  3:26 PM        Failed - Last BP in normal range within 360 days.     BP Readings from Last 3 Encounters:   12/17/19 (!) 156/81   12/10/19 (!) 152/92   10/16/19 (!) 146/81              Failed - Cr is 1.3 or below and within 180 days     Creatinine   Date Value Ref Range Status   04/22/2019 1.5 (H) 0.5 - 1.4 mg/dL Final   02/06/2019 1.4 0.5 - 1.4 mg/dL Final   09/01/2018 1.5 (H) 0.5 - 1.4 mg/dL Final     POC Creatinine   Date Value Ref Range Status   07/01/2018 1.5 (H) 0.6 - 1.2 mg/dL Final   04/24/2017 1.3 (H) 0.6 - 1.2 mg/dL Final              Failed - K in normal range and within 180 days     POC Potassium   Date Value Ref Range Status   07/01/2018 3.6 (L) 3.6 - 5.1 mmol/L Final   04/24/2017 4.0 3.6 - 5.1 mmol/L Final     Potassium   Date Value Ref Range Status   04/22/2019 4.0 3.5 - 5.1 mmol/L Final   02/06/2019 4.1 3.5 - 5.1 mmol/L Final   09/01/2018 4.1 3.5 - 5.1 mmol/L Final              Failed - Na is between 130 and 148 and within 180 days     POC Sodium   Date Value Ref Range Status   07/01/2018 143 128 - 145 mmol/L Final   04/24/2017 140 128 - 145 mmol/L Final     Sodium   Date Value Ref Range Status   04/22/2019 141 136 - 145 mmol/L Final   02/06/2019 140 136 - 145 mmol/L Final   09/01/2018 142 136 - 145 mmol/L  Final              Failed - eGFR within 180 days     eGFR if non    Date Value Ref Range Status   04/22/2019 54.7 (A) >60 mL/min/1.73 m^2 Final     Comment:     Calculation used to obtain the estimated glomerular filtration  rate (eGFR) is the CKD-EPI equation.      02/06/2019 59.4 (A) >60 mL/min/1.73 m^2 Final     Comment:     Calculation used to obtain the estimated glomerular filtration  rate (eGFR) is the CKD-EPI equation.      09/01/2018 54.7 (A) >60 mL/min/1.73 m^2 Final     Comment:     Calculation used to obtain the estimated glomerular filtration  rate (eGFR) is the CKD-EPI equation.        eGFR if    Date Value Ref Range Status   04/22/2019 >60.0 >60 mL/min/1.73 m^2 Final   02/06/2019 >60.0 >60 mL/min/1.73 m^2 Final   09/01/2018 >60.0 >60 mL/min/1.73 m^2 Final              Passed - Patient is at least 18 years old        Passed - Office visit in past 12 months or future 90 days.     Recent Outpatient Visits            1 week ago Suspected Covid-19 Virus Infection    [facility] Antwan Ambrosio MD    4 months ago Iron deficiency anemia, unspecified iron deficiency anemia type    [facility]    6 months ago Chronic kidney disease, stage III (moderate)    [facility]    9 months ago Radicular pain in left arm    [facility] Sonali Arzola DO    10 months ago Strain of neck muscle, initial encounter    [facility] Meg Yarbrough, DEWEY                    Passed - Ca in normal range and within 360 days     Calcium   Date Value Ref Range Status   04/22/2019 10.4 8.7 - 10.5 mg/dL Final   02/06/2019 10.2 8.7 - 10.5 mg/dL Final   09/01/2018 9.9 8.7 - 10.5 mg/dL Final     Calcium, POC   Date Value Ref Range Status   07/01/2018 9.7 8.0 - 10.3 mg/dL Final   04/24/2017 10.1 8.0 - 10.3 mg/dL Final               Appointments  past 12m or future 3m with PCP     Date Provider   Last Visit   4/8/2020 Antwan Ambrosio MD   Next Visit   Visit date not found Antwan Ambrosio MD   .  ED visits in past 90 days: 0       Note composed:3:44 PM 04/15/2020

## 2020-04-27 ENCOUNTER — TELEPHONE (OUTPATIENT)
Dept: FAMILY MEDICINE | Facility: CLINIC | Age: 50
End: 2020-04-27

## 2020-04-27 ENCOUNTER — OFFICE VISIT (OUTPATIENT)
Dept: FAMILY MEDICINE | Facility: CLINIC | Age: 50
End: 2020-04-27
Payer: COMMERCIAL

## 2020-04-27 DIAGNOSIS — M62.838 MUSCLE SPASMS OF NECK: ICD-10-CM

## 2020-04-27 DIAGNOSIS — M54.30 SCIATICA, UNSPECIFIED LATERALITY: Primary | ICD-10-CM

## 2020-04-27 PROCEDURE — 99214 OFFICE O/P EST MOD 30 MIN: CPT | Mod: 95,,, | Performed by: FAMILY MEDICINE

## 2020-04-27 PROCEDURE — 99214 PR OFFICE/OUTPT VISIT, EST, LEVL IV, 30-39 MIN: ICD-10-PCS | Mod: 95,,, | Performed by: FAMILY MEDICINE

## 2020-04-27 RX ORDER — TIZANIDINE 4 MG/1
4 TABLET ORAL EVERY 8 HOURS PRN
Qty: 30 TABLET | Refills: 0 | Status: SHIPPED | OUTPATIENT
Start: 2020-04-27 | End: 2020-05-07

## 2020-04-27 NOTE — TELEPHONE ENCOUNTER
Spoke with patient and he informed me that he has been having back, neck, left hip and both shoulder pain for the past 2 months. Patient is requesting office visit for possible xrays. Scheduled virtual visit for today with provider.

## 2020-04-27 NOTE — PROGRESS NOTES
[facility]  Progress Note    SUBJECTIVE:     Chief Complaint   Patient presents with    Neck Pain    Sciatica       The patient location is: Home  The chief complaint leading to consultation is: Neck Pain and Sciatica    Visit type: Virtual visit with synchronous audio and video  Total time spent with patient: 25  Each patient to whom he or she provides medical services by telemedicine is:  (1) informed of the relationship between the physician and patient and the respective role of any other health care provider with respect to management of the patient; and (2) notified that he or she may decline to receive medical services by telemedicine and may withdraw from such care at any time.      HPI: Patient is a 49 y.o. male via virtual visit, here for c/o neck pain and lower back pain for the past week. Onset was sudden. Rates pain as moderate. The neck pain radiates up towards the head, and the lower back pain radiates down right leg. No falls/trauma. Tried otc meds without relief.     Review of patient's allergies indicates:   Allergen Reactions    Hydrochlorothiazide Other (See Comments)     Gout    Tramadol Swelling     Pt states make him jittery        Past Medical History:   Diagnosis Date    Diverticulosis     Erectile dysfunction     Gout     Hypertension     Osteoarthritis      Past Surgical History:   Procedure Laterality Date    CHOLECYSTECTOMY      COLONOSCOPY  2012    UPPER GASTROINTESTINAL ENDOSCOPY       Family History   Problem Relation Age of Onset    Heart disease Mother     Cancer Mother         Breast    Diabetes Father     Hypertension Father     Ulcers Father     Anemia Sister      Social History     Tobacco Use    Smoking status: Never Smoker    Smokeless tobacco: Never Used   Substance Use Topics    Alcohol use: Yes     Comment: Ocassionally    Drug use: No        Review of Systems   Constitutional: Negative for chills, fever and malaise/fatigue.   HENT: Negative.     Respiratory: Negative.  Negative for cough and shortness of breath.    Cardiovascular: Negative.  Negative for chest pain.   Gastrointestinal: Negative.  Negative for abdominal pain, nausea and vomiting.   Genitourinary: Negative.    Musculoskeletal: Positive for back pain (sciatica) and neck pain.   Neurological: Negative for weakness and headaches.   All other systems reviewed and are negative.    OBJECTIVE:   There were no vitals filed for this visit.  There is no height or weight on file to calculate BMI.    Physical Exam   Constitutional: He is oriented to person, place, and time and well-developed, well-nourished, and in no distress. He appears not lethargic.  Non-toxic appearance. No distress.   HENT:   Head: Normocephalic and atraumatic.   Eyes: Conjunctivae and EOM are normal. Right eye exhibits no discharge and no exudate. No foreign body present in the right eye. Left eye exhibits no discharge and no exudate. No foreign body present in the left eye. Right conjunctiva is not injected. Right conjunctiva has no hemorrhage. Left conjunctiva is not injected. Left conjunctiva has no hemorrhage. No scleral icterus.   Neck: Normal range of motion.   Pulmonary/Chest: Effort normal. No respiratory distress.   Neurological: He is oriented to person, place, and time. He appears not lethargic.   Skin: No rash noted. He is not diaphoretic.   Psychiatric: His affect is not blunt, not labile and not inappropriate. He is not agitated.       Old records were reviewed. Labs and/or images were independently reviewed.    ASSESSMENT     1. Sciatica, unspecified laterality    2. Muscle spasms of neck        PLAN:     Sciatica, unspecified laterality  -     tiZANidine (ZANAFLEX) 4 MG tablet; Take 1 tablet (4 mg total) by mouth every 8 (eight) hours as needed.  Dispense: 30 tablet; Refill: 0  -     Patient counseled on good posture and stretching exercises. Continue to place ice packs on affected areas 3-4 times daily. Take  medications as prescribed. Instructed patient to call MD if symptoms persist or worsen.  -     No NSAIDs now due to CKD, and will not do steroids yet due to pre-diabetes. Discussed muscle relaxer and stretch. If it doesn't improve, will consider steroids.    Muscle spasms of neck  -     tiZANidine (ZANAFLEX) 4 MG tablet; Take 1 tablet (4 mg total) by mouth every 8 (eight) hours as needed.  Dispense: 30 tablet; Refill: 0      RTC PRN    Antwan Ambrosio MD  04/27/2020 11:13 AM

## 2020-04-27 NOTE — TELEPHONE ENCOUNTER
----- Message from Macarena Christiansen sent at 4/27/2020  9:15 AM CDT -----  Type: Patient Call Back    Who called: pt     What is the request in detail: pt states he has been having bad lower back, hip, and shoulder/ neck pain. He is asking for an in office visit or at least xrays ordered.     Can the clinic reply by [facility]? No     Would the patient rather a call back or a response via [facility]? Call back     Best call back number: [phone]    Additional Information:

## 2020-07-06 ENCOUNTER — OFFICE VISIT (OUTPATIENT)
Dept: FAMILY MEDICINE | Facility: CLINIC | Age: 50
End: 2020-07-06
Payer: COMMERCIAL

## 2020-07-06 VITALS — SYSTOLIC BLOOD PRESSURE: 130 MMHG | DIASTOLIC BLOOD PRESSURE: 83 MMHG

## 2020-07-06 DIAGNOSIS — M54.31 SCIATICA OF RIGHT SIDE: Primary | ICD-10-CM

## 2020-07-06 DIAGNOSIS — I10 ESSENTIAL HYPERTENSION: ICD-10-CM

## 2020-07-06 PROCEDURE — 3075F SYST BP GE 130 - 139MM HG: CPT | Mod: CPTII,,, | Performed by: FAMILY MEDICINE

## 2020-07-06 PROCEDURE — 3079F DIAST BP 80-89 MM HG: CPT | Mod: CPTII,,, | Performed by: FAMILY MEDICINE

## 2020-07-06 PROCEDURE — 3079F PR MOST RECENT DIASTOLIC BLOOD PRESSURE 80-89 MM HG: ICD-10-PCS | Mod: CPTII,,, | Performed by: FAMILY MEDICINE

## 2020-07-06 PROCEDURE — 99214 PR OFFICE/OUTPT VISIT, EST, LEVL IV, 30-39 MIN: ICD-10-PCS | Mod: 95,,, | Performed by: FAMILY MEDICINE

## 2020-07-06 PROCEDURE — 3075F PR MOST RECENT SYSTOLIC BLOOD PRESS GE 130-139MM HG: ICD-10-PCS | Mod: CPTII,,, | Performed by: FAMILY MEDICINE

## 2020-07-06 PROCEDURE — 99214 OFFICE O/P EST MOD 30 MIN: CPT | Mod: 95,,, | Performed by: FAMILY MEDICINE

## 2020-07-06 NOTE — PROGRESS NOTES
[facility]  Progress Note    SUBJECTIVE:     Chief Complaint   Patient presents with    Sciatica       The patient location is: Home  The chief complaint leading to consultation is: Sciatica    Visit type: Virtual visit with synchronous audio and video  Total time spent with patient: 25  Each patient to whom he or she provides medical services by telemedicine is:  (1) informed of the relationship between the physician and patient and the respective role of any other health care provider with respect to management of the patient; and (2) notified that he or she may decline to receive medical services by telemedicine and may withdraw from such care at any time.      HPI: Patient is a 49 y.o. male via virtual visit, here for c/o having a flare up of his sciatica on his right side. He has treated it with ICE, and exercise/stretches. It is getting much better. Rates pain as minimal. Also here for follow-up of his chronic conditions. SBP around 130, and has been eating healthier. Patient has no other new complaints/problems at this time.      Review of patient's allergies indicates:   Allergen Reactions    Hydrochlorothiazide Other (See Comments)     Gout    Tramadol Swelling     Pt states make him jittery        Past Medical History:   Diagnosis Date    Diverticulosis     Erectile dysfunction     Gout     Hypertension     Osteoarthritis      Past Surgical History:   Procedure Laterality Date    CHOLECYSTECTOMY      COLONOSCOPY  2012    UPPER GASTROINTESTINAL ENDOSCOPY       Family History   Problem Relation Age of Onset    Heart disease Mother     Cancer Mother         Breast    Diabetes Father     Hypertension Father     Ulcers Father     Anemia Sister      Social History     Tobacco Use    Smoking status: Never Smoker    Smokeless tobacco: Never Used   Substance Use Topics    Alcohol use: Yes     Comment: Ocassionally    Drug use: No        Review of Systems   Constitutional: Negative for  chills, fever and malaise/fatigue.   HENT: Negative for hearing loss.    Eyes: Negative for discharge.   Respiratory: Negative.  Negative for wheezing.    Cardiovascular: Negative.  Negative for chest pain and palpitations.   Gastrointestinal: Negative.  Negative for blood in stool, constipation, diarrhea and vomiting.   Genitourinary: Negative.  Negative for dysuria, flank pain, frequency, hematuria and urgency.   Musculoskeletal: Positive for back pain (lower buttock pain (right)). Negative for falls, joint pain and neck pain.   Neurological: Negative for dizziness, weakness and headaches.        + sciatica   Endo/Heme/Allergies: Negative for polydipsia.     OBJECTIVE:     Vitals:    07/06/20 1427   BP: 130/83     There is no height or weight on file to calculate BMI.    Physical Exam   Constitutional: He is oriented to person, place, and time and well-developed, well-nourished, and in no distress. He appears not lethargic.  Non-toxic appearance. No distress.   HENT:   Head: Normocephalic and atraumatic.   Eyes: Conjunctivae and EOM are normal. Right eye exhibits no discharge and no exudate. No foreign body present in the right eye. Left eye exhibits no discharge and no exudate. No foreign body present in the left eye. Right conjunctiva is not injected. Right conjunctiva has no hemorrhage. Left conjunctiva is not injected. Left conjunctiva has no hemorrhage. No scleral icterus.   Neck: Normal range of motion.   Pulmonary/Chest: Effort normal. No respiratory distress.   Neurological: He is oriented to person, place, and time. He appears not lethargic.   Skin: No rash noted. He is not diaphoretic.   Psychiatric: His affect is not blunt, not labile and not inappropriate. He is not agitated.       Old records were reviewed. Labs and/or images were independently reviewed.    ASSESSMENT     1. Sciatica of right side    2. Essential hypertension        PLAN:     Sciatica of right side   -     Patient counseled on good  posture and stretching exercises. Continue to place ice packs on affected areas 3-4 times daily. Take medications as prescribed. Instructed patient to call MD if symptoms persist or worsen.    Essential hypertension  -     CBC auto differential; Future  -     Comprehensive metabolic panel; Future  -     Hemoglobin A1C; Future  -     TSH; Future  -     T4, free; Future  -     Uric acid; Future; Expected date: 07/06/2020  -     Lipid Panel; Future  -     Stable. Continue current regimen.    RTC PRN    Antwan Ambrosio MD  07/06/2020 2:26 PM

## 2020-07-08 ENCOUNTER — LAB VISIT (OUTPATIENT)
Dept: LAB | Facility: HOSPITAL | Age: 50
End: 2020-07-08
Attending: FAMILY MEDICINE
Payer: COMMERCIAL

## 2020-07-08 DIAGNOSIS — I10 ESSENTIAL HYPERTENSION: ICD-10-CM

## 2020-07-08 LAB
ALBUMIN SERPL BCP-MCNC: 4 G/DL (ref 3.5–5.2)
ALP SERPL-CCNC: 80 U/L (ref 55–135)
ALT SERPL W/O P-5'-P-CCNC: 17 U/L (ref 10–44)
ANION GAP SERPL CALC-SCNC: 12 MMOL/L (ref 8–16)
AST SERPL-CCNC: 21 U/L (ref 10–40)
BASOPHILS # BLD AUTO: 0.06 K/UL (ref 0–0.2)
BASOPHILS NFR BLD: 0.4 % (ref 0–1.9)
BILIRUB SERPL-MCNC: 0.3 MG/DL (ref 0.1–1)
BUN SERPL-MCNC: 20 MG/DL (ref 6–20)
CALCIUM SERPL-MCNC: 9.7 MG/DL (ref 8.7–10.5)
CHLORIDE SERPL-SCNC: 100 MMOL/L (ref 95–110)
CHOLEST SERPL-MCNC: 156 MG/DL (ref 120–199)
CHOLEST/HDLC SERPL: 4.3 {RATIO} (ref 2–5)
CO2 SERPL-SCNC: 28 MMOL/L (ref 23–29)
CREAT SERPL-MCNC: 1.4 MG/DL (ref 0.5–1.4)
DIFFERENTIAL METHOD: ABNORMAL
EOSINOPHIL # BLD AUTO: 0.4 K/UL (ref 0–0.5)
EOSINOPHIL NFR BLD: 2.8 % (ref 0–8)
ERYTHROCYTE [DISTWIDTH] IN BLOOD BY AUTOMATED COUNT: 16.7 % (ref 11.5–14.5)
EST. GFR  (AFRICAN AMERICAN): >60 ML/MIN/1.73 M^2
EST. GFR  (NON AFRICAN AMERICAN): 59 ML/MIN/1.73 M^2
ESTIMATED AVG GLUCOSE: 137 MG/DL (ref 68–131)
GLUCOSE SERPL-MCNC: 88 MG/DL (ref 70–110)
HBA1C MFR BLD HPLC: 6.4 % (ref 4–5.6)
HCT VFR BLD AUTO: 44.8 % (ref 40–54)
HDLC SERPL-MCNC: 36 MG/DL (ref 40–75)
HDLC SERPL: 23.1 % (ref 20–50)
HGB BLD-MCNC: 12.8 G/DL (ref 14–18)
IMM GRANULOCYTES # BLD AUTO: 0.07 K/UL (ref 0–0.04)
IMM GRANULOCYTES NFR BLD AUTO: 0.5 % (ref 0–0.5)
LDLC SERPL CALC-MCNC: 100.4 MG/DL (ref 63–159)
LYMPHOCYTES # BLD AUTO: 5.2 K/UL (ref 1–4.8)
LYMPHOCYTES NFR BLD: 36.3 % (ref 18–48)
MCH RBC QN AUTO: 26 PG (ref 27–31)
MCHC RBC AUTO-ENTMCNC: 28.6 G/DL (ref 32–36)
MCV RBC AUTO: 91 FL (ref 82–98)
MONOCYTES # BLD AUTO: 0.6 K/UL (ref 0.3–1)
MONOCYTES NFR BLD: 4.4 % (ref 4–15)
NEUTROPHILS # BLD AUTO: 8 K/UL (ref 1.8–7.7)
NEUTROPHILS NFR BLD: 55.6 % (ref 38–73)
NONHDLC SERPL-MCNC: 120 MG/DL
NRBC BLD-RTO: 0 /100 WBC
PLATELET # BLD AUTO: 322 K/UL (ref 150–350)
PMV BLD AUTO: 10.2 FL (ref 9.2–12.9)
POTASSIUM SERPL-SCNC: 3.6 MMOL/L (ref 3.5–5.1)
PROT SERPL-MCNC: 8.2 G/DL (ref 6–8.4)
RBC # BLD AUTO: 4.92 M/UL (ref 4.6–6.2)
SODIUM SERPL-SCNC: 140 MMOL/L (ref 136–145)
T4 FREE SERPL-MCNC: 0.95 NG/DL (ref 0.71–1.51)
TRIGL SERPL-MCNC: 98 MG/DL (ref 30–150)
TSH SERPL DL<=0.005 MIU/L-ACNC: 1.2 UIU/ML (ref 0.4–4)
URATE SERPL-MCNC: 8.3 MG/DL (ref 3.4–7)
WBC # BLD AUTO: 14.44 K/UL (ref 3.9–12.7)

## 2020-07-08 PROCEDURE — 80053 COMPREHEN METABOLIC PANEL: CPT

## 2020-07-08 PROCEDURE — 85025 COMPLETE CBC W/AUTO DIFF WBC: CPT

## 2020-07-08 PROCEDURE — 84550 ASSAY OF BLOOD/URIC ACID: CPT

## 2020-07-08 PROCEDURE — 80061 LIPID PANEL: CPT

## 2020-07-08 PROCEDURE — 83036 HEMOGLOBIN GLYCOSYLATED A1C: CPT

## 2020-07-08 PROCEDURE — 84439 ASSAY OF FREE THYROXINE: CPT

## 2020-07-08 PROCEDURE — 84443 ASSAY THYROID STIM HORMONE: CPT

## 2020-07-08 PROCEDURE — 36415 COLL VENOUS BLD VENIPUNCTURE: CPT | Mod: PO

## 2020-08-12 DIAGNOSIS — I10 ESSENTIAL HYPERTENSION: ICD-10-CM

## 2020-08-12 RX ORDER — AMLODIPINE BESYLATE 10 MG/1
TABLET ORAL
Qty: 90 TABLET | Refills: 3 | Status: SHIPPED | OUTPATIENT
Start: 2020-08-12 | End: 2021-02-17 | Stop reason: SDUPTHER

## 2020-08-12 NOTE — TELEPHONE ENCOUNTER
No new care gaps identified.  Powered by [employer]. Reference number: [id number]. 8/12/2020 5:39:30 AM CDT

## 2020-08-13 NOTE — PROGRESS NOTES
Refill Authorization Note    is requesting a refill authorization.    Brief assessment and rationale for refill: APPROVE: prr               Medication reconciliation completed: No                         Comments:      Orders Placed This Encounter    amLODIPine (NORVASC) 10 MG tablet      Requested Prescriptions   Signed Prescriptions Disp Refills    amLODIPine (NORVASC) 10 MG tablet 90 tablet 3     Sig: TAKE 1 TABLET(10 MG) BY MOUTH EVERY DAY       Cardiovascular:  Calcium Channel Blockers Passed - 8/12/2020  5:39 AM        Passed - Patient is at least 18 years old        Passed - Last BP in normal range within 360 days.     BP Readings from Last 3 Encounters:   07/06/20 130/83   12/17/19 (!) 156/81   12/10/19 (!) 152/92              Passed - Office visit in past 12 months or future 90 days.     Recent Outpatient Visits            1 month ago Sciatica of right side    [facility] Antwan Ambrosio MD    3 months ago Sciatica, unspecified laterality    [facility] Antwan Ambrosio MD    4 months ago Suspected Covid-19 Virus Infection    [facility] Antwan Ambrosio MD    7 months ago Iron deficiency anemia, unspecified iron deficiency anemia type    [facility] Michelle Palmer MD    10 months ago Chronic kidney disease, stage III (moderate)    [facility]                        Appointments  past 12m or future 3m with PCP    Date Provider   Last Visit   7/6/2020 Antwan Ambrosio MD   Next Visit   Visit date not found Antwan Ambrosio MD   ED visits in past 90 days: 0     Note composed:10:49 PM 08/12/2020

## 2020-08-14 DIAGNOSIS — Z11.59 NEED FOR HEPATITIS C SCREENING TEST: ICD-10-CM

## 2020-08-26 NOTE — TELEPHONE ENCOUNTER
----- Message from Jeimy Neumann sent at 3/11/2020  4:13 PM CDT -----  Contact: TY CATES JR. [[id number]]  [facility] [phone]    Please refill the medication(s) listed below. The patient can be reached at this phone number once it is called into the pharmacy.     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 Pressure today is 15 by both applanation and icare. Microcystic edema resolved with improvement in acuity to 20/60.  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  -- Cystoid macular edema - has required chronic medication to control, next appointment Dr. Hernandez 9/9    RECOMMENDATIONS:  These findings were discussed with the patient.  -- Continue Cosopt twice a day  -- Continue Lotemax and Bromsite once a day for control of cystoid macular edema  -- Reviewed the habits of good ocular health and safety including the use of sunglasses for ultraviolet protection and safety eyewear when appropriate.  RTC one month IOP check - steroid responder, sooner as needed any problems.    Adelia Olvera MD      CHIEF COMPLAINT:   Chief Complaint   Patient presents with   • Glaucoma   • Office Visit       HISTORY OF PRESENT ILLNESS:   8/26/2020 - New to me is a 89 year old male who presents for an IOP check at Dr Delgadillo's request. He would like to continue care here in [place] due to living nearby. S/P DSEK right eye 12/12/2019 by Dr. Delgadillo. He is currently taking Lotemax, Bromsite, and Cosopt twice a day. He had been told at Dr. Hernandez's office to stop the Cosopt resulting in an increase in intraocular pressure to 50 on 8/20. Microcystic corneal edema also developed with the increase pressure.  No recent changes in health.     Ethnicity:    Alert and oriented x3          M&A normal  Last:    CEE 8/20/2020 for IOP check          Glasses   2-3 months old    EYE REVIEW OF SYSTEMS:  DRY   No     IRRITATION  No     ITCH   No     PAIN   No     RED   Yes - occ     PHOTOPHOBIA No     FLOATERS  No                  PHOTOPSIA            No    NEURO REVIEW OF SYSTEMS:   DIPLOPIA  Yes - LEFT  EYE   DIZZINESS  Yes - occ, vertigo  HEADACHE  No  NUMBNESS  No  TINGLING  No  SEIZURES  No    REVIEW OF SYSTEMS:  Positive - muscle and joint pain; Negative - systemic, ENT, cardiovascular, respiratory, gastrointestinal, genitourinary, musculoskeletal, dermatologic, hematologic, psychiatric    EXAMINATION:  See Ophthalmologic Exam section     PAST OCULAR HISTORY:  S/P DSEK, right eye on 12/12/2019      PAST MEDICAL HISTORY:  Past Medical History:   Diagnosis Date   • Adenomatous polyp of colon    • Benign hypertension with CKD (chronic kidney disease) stage III (CMS/HCC)    • Chronic kidney disease, stage III (moderate) (CMS/[facility])    • Closed fracture of ankle, right, initial encounter 02/15/2015   • Generalized osteoarthritis of multiple sites     DJD left hip   • Hearing loss in left ear    • History of fall     due to vertigo; no severe injuries (with exception of ankle fracture in 2015)   • Hypercholesterolemia    • Hyperlipidemia    • Hypoactive labyrinth, bilateral    • Use of cane as ambulatory aid    • Vertigo    • Wears glasses        PAST SURGICAL HISTORY:  Past Surgical History:   Procedure Laterality Date   • Ankle fracture surgery Right 02/16/2015    Dr. Victor: Right Ankle Bimall Fx ORIF   • Cataract extraction w/  intraocular lens implant Bilateral 1990   • Colonoscopy  09/2011    Dr. Navarro   • Eye surgery Right 12/12/2019    DSEK of right eye by Dr. Yamilet Delgadillo MD   • Hernia repair  2009    Patient reports 10+ years ago   • Prostate surgery  2009    Patient reports 10+ years ago       FAMILY MEDICAL HISTORY:  Family History   Problem Relation Age of Onset   • Cancer Father    • Cancer Sister        SOCIAL HISTORY:  Social History     Socioeconomic History   • Marital status: /Civil Union     Spouse name: Not on file   • Number of children: Not on file   • Years of education: Not on file   • Highest education level: Not on file   Occupational History   • Not on file   Social Needs   • Financial  resource strain: Not on file   • Food insecurity     Worry: Not on file     Inability: Not on file   • Transportation needs     Medical: Not on file     Non-medical: Not on file   Tobacco Use   • Smoking status: Former Smoker     Packs/day: 0.00     Types: Pipe     Quit date: 2009     Years since quittin.8   • Smokeless tobacco: Never Used   Substance and Sexual Activity   • Alcohol use: Yes     Comment: every other day   • Drug use: Never   • Sexual activity: Not on file   Lifestyle   • Physical activity     Days per week: Not on file     Minutes per session: Not on file   • Stress: Not on file   Relationships   • Social connections     Talks on phone: Not on file     Gets together: Not on file     Attends Hoahaoism service: Not on file     Active member of club or organization: Not on file     Attends meetings of clubs or organizations: Not on file     Relationship status: Not on file   • Intimate partner violence     Fear of current or ex partner: Not on file     Emotionally abused: Not on file     Physically abused: Not on file     Forced sexual activity: Not on file   Other Topics Concern   • Not on file   Social History Narrative   • Not on file       ALLERGIES:  No Known Allergies      Adelia Olvera MD

## 2020-12-05 DIAGNOSIS — M10.9 GOUT, UNSPECIFIED CAUSE, UNSPECIFIED CHRONICITY, UNSPECIFIED SITE: ICD-10-CM

## 2020-12-05 DIAGNOSIS — I10 ESSENTIAL HYPERTENSION: ICD-10-CM

## 2020-12-05 NOTE — TELEPHONE ENCOUNTER
No new care gaps identified.  Powered by [employer]. Reference number: [id number]. 12/05/2020 5:44:48 AM   CST

## 2020-12-05 NOTE — PROGRESS NOTES
Refill Authorization Note   Roberta Ware  is requesting a refill authorization.  Brief Assessment and Rationale for Refill:  Approve     Medication Therapy Plan:  CDMR. approve     Medication Reconciliation Completed: No   Comments:       Requested Prescriptions   Pending Prescriptions Disp Refills    chlorthalidone (HYGROTEN) 25 MG Tab [Pharmacy Med Name: CHLORTHALIDONE 25MG TABLETS] 90 tablet 1     Sig: TAKE 1 TABLET(25 MG) BY MOUTH EVERY DAY       Cardiovascular: Diuretics - Thiazide Passed - 12/5/2020  4:31 PM        Passed - Patient is at least 18 years old        Passed - Last BP in normal range within 360 days.     BP Readings from Last 3 Encounters:   07/06/20 130/83   12/17/19 (!) 156/81   12/10/19 (!) 152/92              Passed - Office visit in past 12 months or future 90 days     Recent Outpatient Visits            5 months ago Sciatica of right side    [facility] Antwan Ambrosio MD    7 months ago Sciatica, unspecified laterality    [facility] Antwan Ambrosio MD    8 months ago Suspected Covid-19 Virus Infection    [facility] Antwan Ambrosio MD    11 months ago Iron deficiency anemia, unspecified iron deficiency anemia type    [facility]    1 year ago Chronic kidney disease, stage III (moderate)    [facility]                    Passed - Cr is 1.4 or below and within 360 days     Creatinine   Date Value Ref Range Status   07/08/2020 1.4 0.5 - 1.4 mg/dL Final   04/22/2019 1.5 (H) 0.5 - 1.4 mg/dL Final   02/06/2019 1.4 0.5 - 1.4 mg/dL Final     POC Creatinine   Date Value Ref Range Status   07/01/2018 1.5 (H) 0.6 - 1.2 mg/dL Final   04/24/2017 1.3 (H) 0.6 - 1.2 mg/dL Final              Passed - K in normal range and within 360 days     POC Potassium   Date Value Ref Range Status   07/01/2018 3.6 (L) 3.6 - 5.1 mmol/L Final   04/24/2017 4.0 3.6 - 5.1 mmol/L Final     Potassium   Date  Value Ref Range Status   07/08/2020 3.6 3.5 - 5.1 mmol/L Final   04/22/2019 4.0 3.5 - 5.1 mmol/L Final   02/06/2019 4.1 3.5 - 5.1 mmol/L Final              Passed - Na is between 130 and 148 and within 360 days     POC Sodium   Date Value Ref Range Status   07/01/2018 143 128 - 145 mmol/L Final   04/24/2017 140 128 - 145 mmol/L Final     Sodium   Date Value Ref Range Status   07/08/2020 140 136 - 145 mmol/L Final   04/22/2019 141 136 - 145 mmol/L Final   02/06/2019 140 136 - 145 mmol/L Final              Passed - eGFR within 360 days     eGFR if non    Date Value Ref Range Status   07/08/2020 59.0 (A) >60 mL/min/1.73 m^2 Final     Comment:     Calculation used to obtain the estimated glomerular filtration  rate (eGFR) is the CKD-EPI equation.      04/22/2019 54.7 (A) >60 mL/min/1.73 m^2 Final     Comment:     Calculation used to obtain the estimated glomerular filtration  rate (eGFR) is the CKD-EPI equation.      02/06/2019 59.4 (A) >60 mL/min/1.73 m^2 Final     Comment:     Calculation used to obtain the estimated glomerular filtration  rate (eGFR) is the CKD-EPI equation.        eGFR if    Date Value Ref Range Status   07/08/2020 >60.0 >60 mL/min/1.73 m^2 Final   04/22/2019 >60.0 >60 mL/min/1.73 m^2 Final   02/06/2019 >60.0 >60 mL/min/1.73 m^2 Final                allopurinoL (ZYLOPRIM) 300 MG tablet [Pharmacy Med Name: ALLOPURINOL 300MG TABLETS] 90 tablet 0     Sig: TAKE 1 TABLET(300 MG) BY MOUTH EVERY DAY       Endocrinology:  Gout Agents - allopurinol Passed - 12/5/2020  4:31 PM        Passed - Patient is at least 18 years old        Passed - Office visit in past 12 months or future 90 days     Recent Outpatient Visits            5 months ago Sciatica of right side    [facility] Antwan Ambrosio MD    7 months ago Sciatica, unspecified laterality    [facility] Antwan Ambrosio MD    8 months ago Suspected Covid-19 Virus Infection    [facility] Antwan Ambrosio MD    11 months ago Iron deficiency anemia, unspecified iron deficiency anemia type    [facility]    1 year ago Chronic kidney disease, stage III (moderate)    [facility]                    Passed - Uric Acid within 360 days     Uric Acid   Date Value Ref Range Status   07/08/2020 8.3 (H) 3.4 - 7.0 mg/dL Final   02/06/2019 7.5 (H) 3.4 - 7.0 mg/dL Final   01/07/2019 11.9 (H) 3.4 - 7.0 mg/dL Final              Passed - Cr is 1.4 or below and within 360 days     Creatinine   Date Value Ref Range Status   07/08/2020 1.4 0.5 - 1.4 mg/dL Final   04/22/2019 1.5 (H) 0.5 - 1.4 mg/dL Final   02/06/2019 1.4 0.5 - 1.4 mg/dL Final     POC Creatinine   Date Value Ref Range Status   07/01/2018 1.5 (H) 0.6 - 1.2 mg/dL Final   04/24/2017 1.3 (H) 0.6 - 1.2 mg/dL Final              Passed - WBC within 360 days     WBC   Date Value Ref Range Status   07/08/2020 14.44 (H) 3.90 - 12.70 K/uL Final   12/14/2019 16.61 (H) 3.90 - 12.70 K/uL Final   10/12/2019 15.66 (H) 3.90 - 12.70 K/uL Final              Passed - RBC within 360 days     RBC   Date Value Ref Range Status   07/08/2020 4.92 4.60 - 6.20 M/uL Final   12/14/2019 4.79 4.60 - 6.20 M/uL Final   10/12/2019 4.82 4.60 - 6.20 M/uL Final              Passed - HGB within 360 days     Hemoglobin   Date Value Ref Range Status   07/08/2020 12.8 (L) 14.0 - 18.0 g/dL Final   12/14/2019 12.6 (L) 14.0 - 18.0 g/dL Final   10/12/2019 12.4 (L) 14.0 - 18.0 g/dL Final              Passed - HCT within 360 days     Hematocrit   Date Value Ref Range Status   07/08/2020 44.8 40.0 - 54.0 % Final   12/14/2019 42.9 40.0 - 54.0 % Final   10/12/2019 42.2 40.0 - 54.0 % Final              Passed - PLT within 360 days     Platelets   Date Value Ref Range Status   07/08/2020 322 150 - 350 K/uL Final   12/14/2019 316 150 - 350 K/uL Final   10/12/2019 368 (H) 150 - 350 K/uL Final              Passed - ALT is  94 or below and within 360 days     ALT   Date Value Ref Range Status   07/08/2020 17 10 - 44 U/L Final   04/22/2019 13 10 - 44 U/L Final   02/06/2019 14 10 - 44 U/L Final     ALT (SGPT), POC   Date Value Ref Range Status   07/01/2018 17 10 - 47 U/L Final   04/24/2017 11 10 - 47 U/L Final   04/24/2017 18 10 - 47 U/L Final              Passed - AST is 54 or below and within 360 days     AST   Date Value Ref Range Status   07/08/2020 21 10 - 40 U/L Final   04/22/2019 16 10 - 40 U/L Final   02/06/2019 15 10 - 40 U/L Final     AST (SGOT), POC   Date Value Ref Range Status   07/01/2018 22 11 - 38 U/L Final   04/24/2017 23 11 - 38 U/L Final   04/24/2017 21 11 - 38 U/L Final              Passed - eGFR is 60 or above and within 360 days     eGFR if non    Date Value Ref Range Status   07/08/2020 59.0 (A) >60 mL/min/1.73 m^2 Final     Comment:     Calculation used to obtain the estimated glomerular filtration  rate (eGFR) is the CKD-EPI equation.      04/22/2019 54.7 (A) >60 mL/min/1.73 m^2 Final     Comment:     Calculation used to obtain the estimated glomerular filtration  rate (eGFR) is the CKD-EPI equation.      02/06/2019 59.4 (A) >60 mL/min/1.73 m^2 Final     Comment:     Calculation used to obtain the estimated glomerular filtration  rate (eGFR) is the CKD-EPI equation.        eGFR if    Date Value Ref Range Status   07/08/2020 >60.0 >60 mL/min/1.73 m^2 Final   04/22/2019 >60.0 >60 mL/min/1.73 m^2 Final   02/06/2019 >60.0 >60 mL/min/1.73 m^2 Final                  Appointments  past 12m or future 3m with PCP    Date Provider   Last Visit   7/6/2020 Antwan Ambrosio MD   Next Visit   Visit date not found Antwan Ambrosio MD   ED visits in past 90 days: 0     Note composed:4:31 PM 12/05/2020

## 2020-12-06 RX ORDER — ALLOPURINOL 300 MG/1
TABLET ORAL
Qty: 90 TABLET | Refills: 0 | Status: SHIPPED | OUTPATIENT
Start: 2020-12-06 | End: 2021-02-17 | Stop reason: SDUPTHER

## 2020-12-06 RX ORDER — CHLORTHALIDONE 25 MG/1
TABLET ORAL
Qty: 90 TABLET | Refills: 1 | Status: SHIPPED | OUTPATIENT
Start: 2020-12-06 | End: 2021-02-17 | Stop reason: SDUPTHER

## 2020-12-21 ENCOUNTER — OFFICE VISIT (OUTPATIENT)
Dept: URGENT CARE | Facility: CLINIC | Age: 50
End: 2020-12-21
Payer: COMMERCIAL

## 2020-12-21 ENCOUNTER — TELEPHONE (OUTPATIENT)
Dept: FAMILY MEDICINE | Facility: CLINIC | Age: 50
End: 2020-12-21

## 2020-12-21 VITALS
OXYGEN SATURATION: 96 % | DIASTOLIC BLOOD PRESSURE: 64 MMHG | WEIGHT: 315 LBS | SYSTOLIC BLOOD PRESSURE: 166 MMHG | TEMPERATURE: 99 F | HEART RATE: 80 BPM | HEIGHT: 76 IN | BODY MASS INDEX: 38.36 KG/M2

## 2020-12-21 DIAGNOSIS — U07.1 COVID-19 VIRUS INFECTION: Primary | ICD-10-CM

## 2020-12-21 DIAGNOSIS — Z11.9 SCREENING EXAMINATION FOR UNSPECIFIED INFECTIOUS DISEASE: ICD-10-CM

## 2020-12-21 DIAGNOSIS — Z20.822 SUSPECTED COVID-19 VIRUS INFECTION: Primary | ICD-10-CM

## 2020-12-21 LAB
CTP QC/QA: YES
SARS-COV-2 RDRP RESP QL NAA+PROBE: POSITIVE

## 2020-12-21 PROCEDURE — U0002 COVID-19 LAB TEST NON-CDC: HCPCS | Mod: QW,S$GLB,, | Performed by: NURSE PRACTITIONER

## 2020-12-21 PROCEDURE — 3008F PR BODY MASS INDEX (BMI) DOCUMENTED: ICD-10-PCS | Mod: CPTII,S$GLB,, | Performed by: NURSE PRACTITIONER

## 2020-12-21 PROCEDURE — U0002: ICD-10-PCS | Mod: QW,S$GLB,, | Performed by: NURSE PRACTITIONER

## 2020-12-21 PROCEDURE — 99214 OFFICE O/P EST MOD 30 MIN: CPT | Mod: S$GLB,,, | Performed by: NURSE PRACTITIONER

## 2020-12-21 PROCEDURE — 99214 PR OFFICE/OUTPT VISIT, EST, LEVL IV, 30-39 MIN: ICD-10-PCS | Mod: S$GLB,,, | Performed by: NURSE PRACTITIONER

## 2020-12-21 PROCEDURE — 3008F BODY MASS INDEX DOCD: CPT | Mod: CPTII,S$GLB,, | Performed by: NURSE PRACTITIONER

## 2020-12-21 NOTE — PATIENT INSTRUCTIONS
You have tested positive for COVID-19 today.  Per the CDC, you are now in a 10 day isolation.         This isolation starts from the day you first developed symptoms, not the day of your positive test. For example, if your symptoms began on a Monday, and you waited until Friday of the same week to get tested, and your test was positive, your 10 day isolation begins from that Monday your symptoms started, not the Friday you tested positive.         However, if you are asymptomatic (a person who does not have any symptoms), and received a COVID-19 test that was positive, your 10 day isolation begins on the day you tested positive.  This is regardless if you were exposed to a known positive days earlier.  So for example, if you test positive as an asymptomatic on day 7 from exposure, you have now extended your 10 day quarantine to a 17 day quarantine.         Also, per the CDC guidelines, once your 10 days have passed, and you have not had fever greater than 100.4F in the last 24 hours without taking any fever reducers such as Tylenol (Acetaminophen) or Motrin (Ibuprofen), you may return to your normal activities including social distancing, wearing masks, and frequent handwashing - YOU DO NOT NEED ANOTHER TEST, OR TO TEST NEGATIVE, IN ORDER TO END YOUR QUARANTINE!      Instructions for Patients with Confirmed or Suspected COVID-19    If you are awaiting your test result, you will either be called or it will be released to the patient portal.  If you have any questions about your test, please visit [email or web address] or call our COVID-19 information line at [phone].      Instructions for non-hospitalized or discharged patients with confirmed or suspected COVID-19:       Stay home except to get medical care.    Separate yourself from other people and animals in your home.    Call ahead before visiting your doctor.    Wear a face mask.    Cover your coughs and sneezes.    Clean your hands often.     Avoid sharing personal household items.    Clean all high-touch surfaces every day.    Monitor your symptoms. Seek prompt medical attention if your illness is worsening (e.g., difficulty breathing). Before seeking care, call your healthcare provider.    If you have a medical emergency and must call 911, notify the dispatcher that you have or are being evaluated for COVID-19. If possible, put on a face mask before emergency medical services arrive.    Use the following symptom-based strategy to return to normal activity following a suspected or confirmed case of COVID-19. Continue isolation until:   o At least 3 days (72 hours) have passed since recovery defined as resolution of fever without the use of fever-reducing medications and improvement in respiratory symptoms (e.g. cough, shortness of breath), and   o At least 10 days have passed since the first positive test.       As one of the next steps, you will receive a call or text from the [place] Department of Health ([facility]) COVID-19 Tracing Team. See the contact information below so you know not to ignore the health departments call. It is important that you contact them back immediately so they can help.     Contact Tracer Number:  [phone]  Caller ID for most carriers: [facility]    What is contact tracing?   Contact tracing is a process that helps identify everyone who has been in close contact with an infected person. Contact tracers let those people know they may have been exposed and guide them on next steps. Confidentiality is important for everyone; no one will be told who may have exposed them to the virus.   Your involvement is important. The more we know about where and how this virus is spreading, the better chance we have at stopping it from spreading further.  What does exposure mean?   Exposure means you have been within 6 feet for more than 15 minutes with a person who has or had COVID-19.  What kind of questions do the  contact tracers ask?   A contact tracer will confirm your basic contact information including name, address, phone number, and next of kin, as well as asking about any symptoms you may have had. Theyll also ask you how you think you may have gotten sick, such as places where you may have been exposed to the virus, and people you were with. Those names will never be shared with anyone outside of that call, and will only be used to help trace and stop the spread of the virus.   I have privacy concerns. How will the state use my information?   Your privacy about your health is important. All calls are completed using call centers that use the appropriate health privacy protection measures (HIPAA compliance), meaning that your patient information is safe. No one will ever ask you any questions related to immigration status. Your health comes first.   Do I have to participate?   You do not have to participate, but we strongly encourage you to. Contact tracing can help us catch and control new outbreaks as theyre developing to keep your friends and family safe.   What if I dont hear from anyone?   If you dont receive a call within 24 hours, you can call the number above right away to inquire about your status. That line is open from 8:00 am - 8:00 p.m., 7 days a week.  Contact tracing saves lives! Together, we have the power to beat this virus and keep our loved ones and neighbors safe.       Instructions for household members, intimate partners and caregivers in a non-healthcare setting of a patient with confirmed or suspected COVID-19:         Close contacts should monitor their health and call their healthcare provider right away if they develop symptoms suggestive of COVID-19 (e.g., fever, cough, shortness of breath).    Stay home except to get medical care. Separate yourself from other people and animals in the home.   Monitor the patients symptoms. If the patient is getting sicker, call his or her  healthcare provider. If the patient has a medical emergency and you need to call 911, notify the dispatch personnel that the patient has or is being evaluated for COVID-19.    Wear a facemask when around other people such as sharing a room or vehicle and before entering a healthcare provider's office.   Cover coughs and sneezes with a tissue. Throw used tissues in a lined trash can immediately and wash hands.   Clean hands often with soap and water for at least 20 seconds or with an alcohol-based hand , rubbing hands together until they feel dry. Avoid touching your eyes, nose, and mouth with unwashed hands.   Clean all high-touch; surfaces every day, including counters, tabletops, doorknobs, bathroom fixtures, toilets, phones, keyboards, tablets, bedside tables, etc. Use a household cleaning spray or wipe according to label instructions.   Avoid sharing personal household items such as dishes, drinking glasses, cups, towels, bedding, etc. After these items are used, they should be washed thoroughly with soap and water.   Continue isolation until:   At least 3 days (72 hours) have passed since recovery defined as resolution of fever without the use of fever-reducing medications and improvement in respiratory symptoms (e.g. cough, shortness of breath), and    At least 10 days have passed since the patients first positive test.    https://www.cdc.gov/coronavirus/2019-ncov/your-health/index.htm

## 2020-12-21 NOTE — PROGRESS NOTES
"Subjective:       Patient ID: Roberta Ware Jr. is a 50 y.o. male.    Vitals:  height is 6' 4" (1.93 m) and weight is 224.1 kg (494 lb) (abnormal). His temperature is 98.5 °F (36.9 °C). His blood pressure is 166/64 (abnormal) and his pulse is 80. His oxygen saturation is 96%.     Chief Complaint: COVID-19 Concerns    Pt has no taste or smell, congested, cough, sore throat, and chills starting four days ago. Denies known covid exposure. No CP, SOB, abd pain, n/v/d, fever. No hx of asthma.     URI   This is a new problem. The current episode started in the past 7 days. The problem has been unchanged. There has been no fever. Associated symptoms include congestion, coughing and headaches. Pertinent negatives include no chest pain, diarrhea, dysuria, nausea, rash, sore throat or vomiting.       Constitution: Positive for fatigue. Negative for chills and fever.        No smell or taste   HENT: Positive for congestion. Negative for sore throat.    Neck: Negative for painful lymph nodes.   Cardiovascular: Negative for chest pain and leg swelling.   Eyes: Negative for double vision and blurred vision.   Respiratory: Positive for cough. Negative for shortness of breath.    Gastrointestinal: Negative for nausea, vomiting and diarrhea.   Genitourinary: Negative for dysuria, frequency and urgency.   Musculoskeletal: Negative for joint pain, joint swelling, muscle cramps and muscle ache.   Skin: Negative for color change, pale and rash.   Allergic/Immunologic: Negative for seasonal allergies.   Neurological: Positive for headaches. Negative for dizziness, history of vertigo, light-headedness and passing out.   Hematologic/Lymphatic: Negative for swollen lymph nodes, easy bruising/bleeding and history of blood clots. Does not bruise/bleed easily.   Psychiatric/Behavioral: Negative for nervous/anxious, sleep disturbance and depression. The patient is not nervous/anxious.        Objective:      Physical Exam   Constitutional: " He is oriented to person, place, and time. He appears well-developed. He is cooperative.  Non-toxic appearance. He does not appear ill. No distress.   HENT:   Head: Normocephalic and atraumatic.   Ears:   Right Ear: Hearing, tympanic membrane, external ear and ear canal normal.   Left Ear: Hearing, tympanic membrane, external ear and ear canal normal.   Nose: Nose normal. No mucosal edema, rhinorrhea or nasal deformity. No epistaxis. Right sinus exhibits no maxillary sinus tenderness and no frontal sinus tenderness. Left sinus exhibits no maxillary sinus tenderness and no frontal sinus tenderness.   Mouth/Throat: Uvula is midline, oropharynx is clear and moist and mucous membranes are normal. No trismus in the jaw. Normal dentition. No uvula swelling. No oropharyngeal exudate, posterior oropharyngeal edema or posterior oropharyngeal erythema.   Eyes: Conjunctivae and lids are normal. No scleral icterus.   Neck: Trachea normal, normal range of motion, full passive range of motion without pain and phonation normal. Neck supple. No neck rigidity. No edema and no erythema present.   Cardiovascular: Normal rate, regular rhythm, normal heart sounds and normal pulses.   Pulmonary/Chest: Effort normal and breath sounds normal. No respiratory distress. He has no decreased breath sounds. He has no wheezes. He has no rhonchi. He has no rales.   Abdominal: Normal appearance.   Musculoskeletal: Normal range of motion.         General: No deformity.   Lymphadenopathy:     He has no cervical adenopathy.   Neurological: He is alert and oriented to person, place, and time. He exhibits normal muscle tone. Coordination normal.   Skin: Skin is warm, dry, intact, not diaphoretic and not pale. Psychiatric: His speech is normal and behavior is normal. Judgment and thought content normal.   Nursing note and vitals reviewed.    Results for orders placed or performed in visit on 12/21/20   POCT COVID-19 Rapid Screening   Result Value Ref  Range    POC Rapid COVID Positive (A) Negative     Acceptable Yes            Assessment:       1. COVID-19 virus infection    2. Screening examination for unspecified infectious disease        Plan:         COVID-19 virus infection  -     Ambulatory referral/consult to [facility]  -     COVID-19 Home Symptom Monitoring  - Duration (days): 14    Screening examination for unspecified infectious disease  -     POCT COVID-19 Rapid Screening         Reviewed previous pertinent office visits, PMH, PSH, fam hx  We discussed that this is likely a viral illness and will not benefit from antibiotics. Symptomatic care recommended.   Recommended otc motrin or tylenol as needed for fever/aches unless contraindicated  Pt meets criteria for monoclonal antibody therapy. Referral order placed.  Advised on return/follow-up precautions. Advised on ER precautions. Answered all patient questions. Patient verbalized understanding and voiced agreement with current treatment plan.    Patient Instructions   You have tested positive for COVID-19 today.  Per the CDC, you are now in a 10 day isolation.         This isolation starts from the day you first developed symptoms, not the day of your positive test. For example, if your symptoms began on a Monday, and you waited until Friday of the same week to get tested, and your test was positive, your 10 day isolation begins from that Monday your symptoms started, not the Friday you tested positive.         However, if you are asymptomatic (a person who does not have any symptoms), and received a COVID-19 test that was positive, your 10 day isolation begins on the day you tested positive.  This is regardless if you were exposed to a known positive days earlier.  So for example, if you test positive as an asymptomatic on day 7 from exposure, you have now extended your 10 day quarantine to a 17 day quarantine.         Also, per the CDC guidelines, once your 10 days have passed, and  you have not had fever greater than 100.4F in the last 24 hours without taking any fever reducers such as Tylenol (Acetaminophen) or Motrin (Ibuprofen), you may return to your normal activities including social distancing, wearing masks, and frequent handwashing - YOU DO NOT NEED ANOTHER TEST, OR TO TEST NEGATIVE, IN ORDER TO END YOUR QUARANTINE!      Instructions for Patients with Confirmed or Suspected COVID-19    If you are awaiting your test result, you will either be called or it will be released to the patient portal.  If you have any questions about your test, please visit [email or web address] or call our COVID-19 information line at [phone].      Instructions for non-hospitalized or discharged patients with confirmed or suspected COVID-19:       Stay home except to get medical care.    Separate yourself from other people and animals in your home.    Call ahead before visiting your doctor.    Wear a face mask.    Cover your coughs and sneezes.    Clean your hands often.    Avoid sharing personal household items.    Clean all high-touch surfaces every day.    Monitor your symptoms. Seek prompt medical attention if your illness is worsening (e.g., difficulty breathing). Before seeking care, call your healthcare provider.    If you have a medical emergency and must call 911, notify the dispatcher that you have or are being evaluated for COVID-19. If possible, put on a face mask before emergency medical services arrive.    Use the following symptom-based strategy to return to normal activity following a suspected or confirmed case of COVID-19. Continue isolation until:   o At least 3 days (72 hours) have passed since recovery defined as resolution of fever without the use of fever-reducing medications and improvement in respiratory symptoms (e.g. cough, shortness of breath), and   o At least 10 days have passed since the first positive test.       As one of the next steps, you will  receive a call or text from the [place] Department of Health ([facility]) COVID-19 Tracing Team. See the contact information below so you know not to ignore the health departments call. It is important that you contact them back immediately so they can help.     Contact Tracer Number:  [phone]  Caller ID for most carriers: [place] Dept Health    What is contact tracing?   Contact tracing is a process that helps identify everyone who has been in close contact with an infected person. Contact tracers let those people know they may have been exposed and guide them on next steps. Confidentiality is important for everyone; no one will be told who may have exposed them to the virus.   Your involvement is important. The more we know about where and how this virus is spreading, the better chance we have at stopping it from spreading further.  What does exposure mean?   Exposure means you have been within 6 feet for more than 15 minutes with a person who has or had COVID-19.  What kind of questions do the contact tracers ask?   A contact tracer will confirm your basic contact information including name, address, phone number, and next of kin, as well as asking about any symptoms you may have had. Theyll also ask you how you think you may have gotten sick, such as places where you may have been exposed to the virus, and people you were with. Those names will never be shared with anyone outside of that call, and will only be used to help trace and stop the spread of the virus.   I have privacy concerns. How will the state use my information?   Your privacy about your health is important. All calls are completed using call centers that use the appropriate health privacy protection measures (HIPAA compliance), meaning that your patient information is safe. No one will ever ask you any questions related to immigration status. Your health comes first.   Do I have to participate?   You do not have to participate, but we  strongly encourage you to. Contact tracing can help us catch and control new outbreaks as theyre developing to keep your friends and family safe.   What if I dont hear from anyone?   If you dont receive a call within 24 hours, you can call the number above right away to inquire about your status. That line is open from 8:00 am - 8:00 p.m., 7 days a week.  Contact tracing saves lives! Together, we have the power to beat this virus and keep our loved ones and neighbors safe.       Instructions for household members, intimate partners and caregivers in a non-healthcare setting of a patient with confirmed or suspected COVID-19:         Close contacts should monitor their health and call their healthcare provider right away if they develop symptoms suggestive of COVID-19 (e.g., fever, cough, shortness of breath).    Stay home except to get medical care. Separate yourself from other people and animals in the home.   Monitor the patients symptoms. If the patient is getting sicker, call his or her healthcare provider. If the patient has a medical emergency and you need to call 911, notify the dispatch personnel that the patient has or is being evaluated for COVID-19.    Wear a facemask when around other people such as sharing a room or vehicle and before entering a healthcare provider's office.   Cover coughs and sneezes with a tissue. Throw used tissues in a lined trash can immediately and wash hands.   Clean hands often with soap and water for at least 20 seconds or with an alcohol-based hand , rubbing hands together until they feel dry. Avoid touching your eyes, nose, and mouth with unwashed hands.   Clean all high-touch; surfaces every day, including counters, tabletops, doorknobs, bathroom fixtures, toilets, phones, keyboards, tablets, bedside tables, etc. Use a household cleaning spray or wipe according to label instructions.   Avoid sharing personal household items such as dishes, drinking  glasses, cups, towels, bedding, etc. After these items are used, they should be washed thoroughly with soap and water.   Continue isolation until:   At least 3 days (72 hours) have passed since recovery defined as resolution of fever without the use of fever-reducing medications and improvement in respiratory symptoms (e.g. cough, shortness of breath), and    At least 10 days have passed since the patients first positive test.    https://www.cdc.gov/coronavirus/2019-ncov/your-health/index.htm

## 2020-12-21 NOTE — LETTER
[address], [address] ? [place], [address] ? Phone [phone] ? Fax [phone]           Return to Work/School    Patient: Roberta Ware Jr.  YOB: 1970   Date: 12/21/2020      To Whom It May Concern:     Roberta Ware Jr. was in contact with/seen in my office on 12/21/2020. COVID-19 is present in our communities across the state. Not all patients are eligible or appropriate to be tested. In this situation, your employee meets the following criteria:     Roberta Ware Jr. has met the criteria for COVID-19 testing and has a POSITIVE result. He can return to work once they are asymptomatic for 24 hours without the use of fever reducing medications AND at least ten days from the start of symptoms (or from the first positive result if they have no symptoms).      If you have any questions or concerns, or if I can be of further assistance, please do not hesitate to contact me.     Sincerely,      Daniela Marx NP

## 2020-12-21 NOTE — TELEPHONE ENCOUNTER
----- Message from Kriss Floyd sent at 12/21/2020  7:42 AM CST -----  Regarding: Patient Call Back  Who Called: TY CATES JR. [[id number]]    What is the request in detail:Would like a call back in regards to having sinus congestion and would like an order placed so he can get tested for covid-19. Please contact to further discuss.    Can the clinic reply by  [facility]? No    Best Call Back Number: [phone]

## 2020-12-22 ENCOUNTER — INFUSION (OUTPATIENT)
Dept: INFECTIOUS DISEASES | Facility: HOSPITAL | Age: 50
End: 2020-12-22
Attending: INTERNAL MEDICINE
Payer: COMMERCIAL

## 2020-12-22 VITALS
RESPIRATION RATE: 18 BRPM | HEART RATE: 68 BPM | BODY MASS INDEX: 38.36 KG/M2 | OXYGEN SATURATION: 97 % | SYSTOLIC BLOOD PRESSURE: 170 MMHG | WEIGHT: 315 LBS | DIASTOLIC BLOOD PRESSURE: 86 MMHG | TEMPERATURE: 99 F | HEIGHT: 76 IN

## 2020-12-22 DIAGNOSIS — U07.1 COVID-19: Primary | ICD-10-CM

## 2020-12-22 PROCEDURE — 63600175 PHARM REV CODE 636 W HCPCS: Performed by: INTERNAL MEDICINE

## 2020-12-22 PROCEDURE — M0239 BAMLANIVIMAB-XXXX INFUSION: HCPCS | Performed by: INTERNAL MEDICINE

## 2020-12-22 PROCEDURE — 25000003 PHARM REV CODE 250: Performed by: INTERNAL MEDICINE

## 2020-12-22 RX ORDER — SODIUM CHLORIDE 0.9 % (FLUSH) 0.9 %
10 SYRINGE (ML) INJECTION
Status: DISCONTINUED | OUTPATIENT
Start: 2020-12-22 | End: 2021-01-03

## 2020-12-22 RX ORDER — DIPHENHYDRAMINE HYDROCHLORIDE 50 MG/ML
25 INJECTION INTRAMUSCULAR; INTRAVENOUS ONCE AS NEEDED
Status: DISCONTINUED | OUTPATIENT
Start: 2020-12-22 | End: 2021-01-03

## 2020-12-22 RX ORDER — ACETAMINOPHEN 325 MG/1
650 TABLET ORAL ONCE AS NEEDED
Status: DISCONTINUED | OUTPATIENT
Start: 2020-12-22 | End: 2021-01-03

## 2020-12-22 RX ORDER — ONDANSETRON 4 MG/1
4 TABLET, ORALLY DISINTEGRATING ORAL ONCE AS NEEDED
Status: DISCONTINUED | OUTPATIENT
Start: 2020-12-22 | End: 2021-01-03

## 2020-12-22 RX ORDER — ALBUTEROL SULFATE 90 UG/1
2 AEROSOL, METERED RESPIRATORY (INHALATION)
Status: DISCONTINUED | OUTPATIENT
Start: 2020-12-22 | End: 2021-01-03

## 2020-12-22 RX ORDER — EPINEPHRINE 0.1 MG/ML
0.3 INJECTION INTRAVENOUS
Status: DISCONTINUED | OUTPATIENT
Start: 2020-12-22 | End: 2021-01-03

## 2020-12-22 RX ADMIN — SODIUM CHLORIDE 700 MG: 0.9 INJECTION, SOLUTION INTRAVENOUS at 11:12

## 2020-12-22 NOTE — PROGRESS NOTES
Patient arrives for bamlanivimab infusion  1127    Symptoms -   12/17 aches chills sinus pressure loss taste and smell Dx 12/21  Patient received infusion according to FDA recommendations and [facility] SOP without complications noted and left with mask in place and drug fact sheet provided

## 2021-01-03 ENCOUNTER — HOSPITAL ENCOUNTER (EMERGENCY)
Facility: HOSPITAL | Age: 51
Discharge: HOME | End: 2021-01-04
Attending: EMERGENCY MEDICINE
Payer: COMMERCIAL

## 2021-01-03 VITALS
OXYGEN SATURATION: 97 % | HEIGHT: 76 IN | WEIGHT: 315 LBS | SYSTOLIC BLOOD PRESSURE: 173 MMHG | HEART RATE: 83 BPM | BODY MASS INDEX: 38.36 KG/M2 | RESPIRATION RATE: 18 BRPM | TEMPERATURE: 99 F | DIASTOLIC BLOOD PRESSURE: 99 MMHG

## 2021-01-03 DIAGNOSIS — M10.9 ACUTE GOUTY ARTHRITIS: Primary | ICD-10-CM

## 2021-01-03 PROCEDURE — 99284 EMERGENCY DEPT VISIT MOD MDM: CPT | Mod: 25,ER

## 2021-01-03 PROCEDURE — 96372 THER/PROPH/DIAG INJ SC/IM: CPT | Mod: ER

## 2021-01-04 PROCEDURE — 63600175 PHARM REV CODE 636 W HCPCS: Mod: ER | Performed by: EMERGENCY MEDICINE

## 2021-01-04 RX ORDER — PREDNISONE 20 MG/1
40 TABLET ORAL DAILY
Qty: 10 TABLET | Refills: 0 | Status: SHIPPED | OUTPATIENT
Start: 2021-01-04 | End: 2021-01-09

## 2021-01-04 RX ORDER — DEXAMETHASONE SODIUM PHOSPHATE 4 MG/ML
8 INJECTION, SOLUTION INTRA-ARTICULAR; INTRALESIONAL; INTRAMUSCULAR; INTRAVENOUS; SOFT TISSUE
Status: COMPLETED | OUTPATIENT
Start: 2021-01-04 | End: 2021-01-04

## 2021-01-04 RX ORDER — INDOMETHACIN 25 MG/1
25 CAPSULE ORAL
Qty: 15 CAPSULE | Refills: 0 | Status: SHIPPED | OUTPATIENT
Start: 2021-01-04 | End: 2022-05-20

## 2021-01-04 RX ORDER — DICLOFENAC SODIUM 10 MG/G
2 GEL TOPICAL EVERY 6 HOURS PRN
Qty: 100 G | Refills: 0 | Status: SHIPPED | OUTPATIENT
Start: 2021-01-04 | End: 2022-05-20

## 2021-01-04 RX ORDER — KETOROLAC TROMETHAMINE 30 MG/ML
30 INJECTION, SOLUTION INTRAMUSCULAR; INTRAVENOUS
Status: COMPLETED | OUTPATIENT
Start: 2021-01-04 | End: 2021-01-04

## 2021-01-04 RX ADMIN — KETOROLAC TROMETHAMINE 30 MG: 30 INJECTION, SOLUTION INTRAMUSCULAR at 12:01

## 2021-01-04 RX ADMIN — DEXAMETHASONE SODIUM PHOSPHATE 8 MG: 4 INJECTION INTRA-ARTICULAR; INTRALESIONAL; INTRAMUSCULAR; INTRAVENOUS; SOFT TISSUE at 12:01

## 2021-01-05 ENCOUNTER — OFFICE VISIT (OUTPATIENT)
Dept: FAMILY MEDICINE | Facility: CLINIC | Age: 51
End: 2021-01-05
Payer: COMMERCIAL

## 2021-01-05 VITALS
SYSTOLIC BLOOD PRESSURE: 138 MMHG | TEMPERATURE: 99 F | WEIGHT: 315 LBS | HEIGHT: 76 IN | OXYGEN SATURATION: 97 % | BODY MASS INDEX: 38.36 KG/M2 | HEART RATE: 68 BPM | DIASTOLIC BLOOD PRESSURE: 86 MMHG

## 2021-01-05 DIAGNOSIS — D72.829 LEUKOCYTOSIS, UNSPECIFIED TYPE: ICD-10-CM

## 2021-01-05 DIAGNOSIS — D50.9 IRON DEFICIENCY ANEMIA, UNSPECIFIED IRON DEFICIENCY ANEMIA TYPE: ICD-10-CM

## 2021-01-05 DIAGNOSIS — Z86.16 HISTORY OF COVID-19: Primary | ICD-10-CM

## 2021-01-05 DIAGNOSIS — R73.03 PREDIABETES: ICD-10-CM

## 2021-01-05 DIAGNOSIS — R09.82 POST-NASAL DRIP: ICD-10-CM

## 2021-01-05 DIAGNOSIS — M54.50 ACUTE BILATERAL LOW BACK PAIN WITHOUT SCIATICA: ICD-10-CM

## 2021-01-05 DIAGNOSIS — M1A.09X0 CHRONIC GOUT OF MULTIPLE SITES, UNSPECIFIED CAUSE: ICD-10-CM

## 2021-01-05 DIAGNOSIS — I10 ESSENTIAL HYPERTENSION: Chronic | ICD-10-CM

## 2021-01-05 DIAGNOSIS — E66.01 MORBID OBESITY WITH BMI OF 50.0-59.9, ADULT: ICD-10-CM

## 2021-01-05 DIAGNOSIS — Z11.4 ENCOUNTER FOR SCREENING FOR HIV: ICD-10-CM

## 2021-01-05 PROCEDURE — 99999 PR PBB SHADOW E&M-EST. PATIENT-LVL IV: CPT | Mod: PBBFAC,,, | Performed by: NURSE PRACTITIONER

## 2021-01-05 PROCEDURE — 99999 PR PBB SHADOW E&M-EST. PATIENT-LVL IV: ICD-10-PCS | Mod: PBBFAC,,, | Performed by: NURSE PRACTITIONER

## 2021-01-05 PROCEDURE — 3008F PR BODY MASS INDEX (BMI) DOCUMENTED: ICD-10-PCS | Mod: CPTII,S$GLB,, | Performed by: NURSE PRACTITIONER

## 2021-01-05 PROCEDURE — 99214 OFFICE O/P EST MOD 30 MIN: CPT | Mod: S$GLB,,, | Performed by: NURSE PRACTITIONER

## 2021-01-05 PROCEDURE — 3079F PR MOST RECENT DIASTOLIC BLOOD PRESSURE 80-89 MM HG: ICD-10-PCS | Mod: CPTII,S$GLB,, | Performed by: NURSE PRACTITIONER

## 2021-01-05 PROCEDURE — 3075F PR MOST RECENT SYSTOLIC BLOOD PRESS GE 130-139MM HG: ICD-10-PCS | Mod: CPTII,S$GLB,, | Performed by: NURSE PRACTITIONER

## 2021-01-05 PROCEDURE — 3075F SYST BP GE 130 - 139MM HG: CPT | Mod: CPTII,S$GLB,, | Performed by: NURSE PRACTITIONER

## 2021-01-05 PROCEDURE — 3079F DIAST BP 80-89 MM HG: CPT | Mod: CPTII,S$GLB,, | Performed by: NURSE PRACTITIONER

## 2021-01-05 PROCEDURE — 1125F PR PAIN SEVERITY QUANTIFIED, PAIN PRESENT: ICD-10-PCS | Mod: S$GLB,,, | Performed by: NURSE PRACTITIONER

## 2021-01-05 PROCEDURE — 3008F BODY MASS INDEX DOCD: CPT | Mod: CPTII,S$GLB,, | Performed by: NURSE PRACTITIONER

## 2021-01-05 PROCEDURE — 1125F AMNT PAIN NOTED PAIN PRSNT: CPT | Mod: S$GLB,,, | Performed by: NURSE PRACTITIONER

## 2021-01-05 PROCEDURE — 99214 PR OFFICE/OUTPT VISIT, EST, LEVL IV, 30-39 MIN: ICD-10-PCS | Mod: S$GLB,,, | Performed by: NURSE PRACTITIONER

## 2021-01-05 RX ORDER — LEVOCETIRIZINE DIHYDROCHLORIDE 5 MG/1
5 TABLET, FILM COATED ORAL NIGHTLY
Qty: 30 TABLET | Refills: 5 | Status: SHIPPED | OUTPATIENT
Start: 2021-01-05 | End: 2022-05-20

## 2021-01-05 RX ORDER — FLUTICASONE PROPIONATE 50 MCG
1 SPRAY, SUSPENSION (ML) NASAL DAILY
Qty: 16 G | Refills: 5 | Status: SHIPPED | OUTPATIENT
Start: 2021-01-05 | End: 2022-05-20

## 2021-01-19 ENCOUNTER — LAB VISIT (OUTPATIENT)
Dept: LAB | Facility: HOSPITAL | Age: 51
End: 2021-01-19
Attending: NURSE PRACTITIONER
Payer: COMMERCIAL

## 2021-01-19 DIAGNOSIS — Z11.4 ENCOUNTER FOR SCREENING FOR HIV: ICD-10-CM

## 2021-01-19 DIAGNOSIS — R73.03 PREDIABETES: ICD-10-CM

## 2021-01-19 DIAGNOSIS — D50.9 IRON DEFICIENCY ANEMIA, UNSPECIFIED IRON DEFICIENCY ANEMIA TYPE: ICD-10-CM

## 2021-01-19 DIAGNOSIS — D72.829 LEUKOCYTOSIS, UNSPECIFIED TYPE: ICD-10-CM

## 2021-01-19 DIAGNOSIS — I10 ESSENTIAL HYPERTENSION: Chronic | ICD-10-CM

## 2021-01-19 LAB
ANION GAP SERPL CALC-SCNC: 9 MMOL/L (ref 8–16)
BASOPHILS # BLD AUTO: 0.07 K/UL (ref 0–0.2)
BASOPHILS NFR BLD: 0.4 % (ref 0–1.9)
BUN SERPL-MCNC: 18 MG/DL (ref 6–20)
CALCIUM SERPL-MCNC: 9.9 MG/DL (ref 8.7–10.5)
CHLORIDE SERPL-SCNC: 102 MMOL/L (ref 95–110)
CO2 SERPL-SCNC: 30 MMOL/L (ref 23–29)
CREAT SERPL-MCNC: 1.3 MG/DL (ref 0.5–1.4)
DIFFERENTIAL METHOD: ABNORMAL
EOSINOPHIL # BLD AUTO: 0.4 K/UL (ref 0–0.5)
EOSINOPHIL NFR BLD: 2.4 % (ref 0–8)
ERYTHROCYTE [DISTWIDTH] IN BLOOD BY AUTOMATED COUNT: 17.1 % (ref 11.5–14.5)
EST. GFR  (AFRICAN AMERICAN): >60 ML/MIN/1.73 M^2
EST. GFR  (NON AFRICAN AMERICAN): >60 ML/MIN/1.73 M^2
ESTIMATED AVG GLUCOSE: 120 MG/DL (ref 68–131)
GLUCOSE SERPL-MCNC: 97 MG/DL (ref 70–110)
HBA1C MFR BLD HPLC: 5.8 % (ref 4–5.6)
HCT VFR BLD AUTO: 43.2 % (ref 40–54)
HGB BLD-MCNC: 12.8 G/DL (ref 14–18)
IMM GRANULOCYTES # BLD AUTO: 0.08 K/UL (ref 0–0.04)
IMM GRANULOCYTES NFR BLD AUTO: 0.5 % (ref 0–0.5)
LYMPHOCYTES # BLD AUTO: 6.6 K/UL (ref 1–4.8)
LYMPHOCYTES NFR BLD: 37 % (ref 18–48)
MCH RBC QN AUTO: 25.8 PG (ref 27–31)
MCHC RBC AUTO-ENTMCNC: 29.6 G/DL (ref 32–36)
MCV RBC AUTO: 87 FL (ref 82–98)
MONOCYTES # BLD AUTO: 0.8 K/UL (ref 0.3–1)
MONOCYTES NFR BLD: 4.5 % (ref 4–15)
NEUTROPHILS # BLD AUTO: 9.8 K/UL (ref 1.8–7.7)
NEUTROPHILS NFR BLD: 55.2 % (ref 38–73)
NRBC BLD-RTO: 0 /100 WBC
PLATELET # BLD AUTO: 297 K/UL (ref 150–350)
PMV BLD AUTO: 10.2 FL (ref 9.2–12.9)
POTASSIUM SERPL-SCNC: 3.7 MMOL/L (ref 3.5–5.1)
RBC # BLD AUTO: 4.96 M/UL (ref 4.6–6.2)
SODIUM SERPL-SCNC: 141 MMOL/L (ref 136–145)
WBC # BLD AUTO: 17.72 K/UL (ref 3.9–12.7)

## 2021-01-19 PROCEDURE — 80048 BASIC METABOLIC PNL TOTAL CA: CPT

## 2021-01-19 PROCEDURE — 36415 COLL VENOUS BLD VENIPUNCTURE: CPT | Mod: PO

## 2021-01-19 PROCEDURE — 85025 COMPLETE CBC W/AUTO DIFF WBC: CPT

## 2021-01-19 PROCEDURE — 86703 HIV-1/HIV-2 1 RESULT ANTBDY: CPT

## 2021-01-19 PROCEDURE — 83036 HEMOGLOBIN GLYCOSYLATED A1C: CPT

## 2021-01-20 LAB — HIV 1+2 AB+HIV1 P24 AG SERPL QL IA: NEGATIVE

## 2021-02-17 ENCOUNTER — OFFICE VISIT (OUTPATIENT)
Dept: FAMILY MEDICINE | Facility: CLINIC | Age: 51
End: 2021-02-17
Payer: COMMERCIAL

## 2021-02-17 VITALS
BODY MASS INDEX: 38.36 KG/M2 | DIASTOLIC BLOOD PRESSURE: 80 MMHG | TEMPERATURE: 98 F | SYSTOLIC BLOOD PRESSURE: 130 MMHG | HEIGHT: 76 IN | WEIGHT: 315 LBS | OXYGEN SATURATION: 97 % | HEART RATE: 74 BPM

## 2021-02-17 DIAGNOSIS — I10 ESSENTIAL HYPERTENSION: ICD-10-CM

## 2021-02-17 DIAGNOSIS — D72.829 LEUKOCYTOSIS, UNSPECIFIED TYPE: ICD-10-CM

## 2021-02-17 DIAGNOSIS — M10.9 GOUT, UNSPECIFIED CAUSE, UNSPECIFIED CHRONICITY, UNSPECIFIED SITE: ICD-10-CM

## 2021-02-17 DIAGNOSIS — Z00.00 ROUTINE PHYSICAL EXAMINATION: Primary | ICD-10-CM

## 2021-02-17 DIAGNOSIS — M1A.09X0 CHRONIC GOUT OF MULTIPLE SITES, UNSPECIFIED CAUSE: ICD-10-CM

## 2021-02-17 DIAGNOSIS — R73.03 PRE-DIABETES: ICD-10-CM

## 2021-02-17 DIAGNOSIS — N52.9 ERECTILE DYSFUNCTION, UNSPECIFIED ERECTILE DYSFUNCTION TYPE: ICD-10-CM

## 2021-02-17 DIAGNOSIS — E66.01 MORBID OBESITY WITH BMI OF 50.0-59.9, ADULT: ICD-10-CM

## 2021-02-17 PROBLEM — D64.9 ANEMIA: Status: RESOLVED | Noted: 2018-02-21 | Resolved: 2021-02-17

## 2021-02-17 PROBLEM — D75.839 THROMBOCYTOSIS: Status: RESOLVED | Noted: 2017-03-10 | Resolved: 2021-02-17

## 2021-02-17 PROCEDURE — 3075F PR MOST RECENT SYSTOLIC BLOOD PRESS GE 130-139MM HG: ICD-10-PCS | Mod: CPTII,S$GLB,, | Performed by: FAMILY MEDICINE

## 2021-02-17 PROCEDURE — 99999 PR PBB SHADOW E&M-EST. PATIENT-LVL III: ICD-10-PCS | Mod: PBBFAC,,, | Performed by: FAMILY MEDICINE

## 2021-02-17 PROCEDURE — 3075F SYST BP GE 130 - 139MM HG: CPT | Mod: CPTII,S$GLB,, | Performed by: FAMILY MEDICINE

## 2021-02-17 PROCEDURE — 1125F PR PAIN SEVERITY QUANTIFIED, PAIN PRESENT: ICD-10-PCS | Mod: S$GLB,,, | Performed by: FAMILY MEDICINE

## 2021-02-17 PROCEDURE — 3079F DIAST BP 80-89 MM HG: CPT | Mod: CPTII,S$GLB,, | Performed by: FAMILY MEDICINE

## 2021-02-17 PROCEDURE — 99396 PR PREVENTIVE VISIT,EST,40-64: ICD-10-PCS | Mod: S$GLB,,, | Performed by: FAMILY MEDICINE

## 2021-02-17 PROCEDURE — 99396 PREV VISIT EST AGE 40-64: CPT | Mod: S$GLB,,, | Performed by: FAMILY MEDICINE

## 2021-02-17 PROCEDURE — 1125F AMNT PAIN NOTED PAIN PRSNT: CPT | Mod: S$GLB,,, | Performed by: FAMILY MEDICINE

## 2021-02-17 PROCEDURE — 3008F BODY MASS INDEX DOCD: CPT | Mod: CPTII,S$GLB,, | Performed by: FAMILY MEDICINE

## 2021-02-17 PROCEDURE — 3079F PR MOST RECENT DIASTOLIC BLOOD PRESSURE 80-89 MM HG: ICD-10-PCS | Mod: CPTII,S$GLB,, | Performed by: FAMILY MEDICINE

## 2021-02-17 PROCEDURE — 99999 PR PBB SHADOW E&M-EST. PATIENT-LVL III: CPT | Mod: PBBFAC,,, | Performed by: FAMILY MEDICINE

## 2021-02-17 PROCEDURE — 3008F PR BODY MASS INDEX (BMI) DOCUMENTED: ICD-10-PCS | Mod: CPTII,S$GLB,, | Performed by: FAMILY MEDICINE

## 2021-02-17 RX ORDER — AMLODIPINE BESYLATE 10 MG/1
10 TABLET ORAL DAILY
Qty: 90 TABLET | Refills: 3 | Status: SHIPPED | OUTPATIENT
Start: 2021-02-17 | End: 2022-03-21

## 2021-02-17 RX ORDER — NAPROXEN 500 MG/1
TABLET ORAL
COMMUNITY
End: 2021-02-17 | Stop reason: SDUPTHER

## 2021-02-17 RX ORDER — NAPROXEN 500 MG/1
500 TABLET ORAL 2 TIMES DAILY PRN
Qty: 30 TABLET | Refills: 1 | Status: SHIPPED | OUTPATIENT
Start: 2021-02-17 | End: 2022-06-30

## 2021-02-17 RX ORDER — CHLORTHALIDONE 25 MG/1
25 TABLET ORAL DAILY
Qty: 90 TABLET | Refills: 3 | Status: SHIPPED | OUTPATIENT
Start: 2021-02-17 | End: 2022-03-21

## 2021-02-17 RX ORDER — ALLOPURINOL 300 MG/1
300 TABLET ORAL DAILY
Qty: 90 TABLET | Refills: 3 | Status: SHIPPED | OUTPATIENT
Start: 2021-02-17 | End: 2022-03-21

## 2021-02-17 RX ORDER — SILDENAFIL 100 MG/1
100 TABLET, FILM COATED ORAL DAILY PRN
Qty: 30 TABLET | Refills: 3 | Status: SHIPPED | OUTPATIENT
Start: 2021-02-17 | End: 2023-03-20

## 2021-02-17 RX ORDER — LIDOCAINE AND PRILOCAINE 25; 25 MG/G; MG/G
CREAM TOPICAL
COMMUNITY
End: 2022-05-20

## 2021-03-17 ENCOUNTER — LAB VISIT (OUTPATIENT)
Dept: LAB | Facility: HOSPITAL | Age: 51
End: 2021-03-17
Attending: INTERNAL MEDICINE
Payer: COMMERCIAL

## 2021-03-17 ENCOUNTER — OFFICE VISIT (OUTPATIENT)
Dept: HEMATOLOGY/ONCOLOGY | Facility: CLINIC | Age: 51
End: 2021-03-17
Payer: COMMERCIAL

## 2021-03-17 VITALS
SYSTOLIC BLOOD PRESSURE: 139 MMHG | HEIGHT: 76 IN | DIASTOLIC BLOOD PRESSURE: 89 MMHG | OXYGEN SATURATION: 98 % | BODY MASS INDEX: 38.36 KG/M2 | WEIGHT: 315 LBS | HEART RATE: 85 BPM | TEMPERATURE: 99 F

## 2021-03-17 DIAGNOSIS — D64.9 ANEMIA, UNSPECIFIED TYPE: ICD-10-CM

## 2021-03-17 DIAGNOSIS — D72.829 LEUKOCYTOSIS, UNSPECIFIED TYPE: ICD-10-CM

## 2021-03-17 DIAGNOSIS — I10 ESSENTIAL HYPERTENSION: ICD-10-CM

## 2021-03-17 DIAGNOSIS — D72.829 LEUKOCYTOSIS, UNSPECIFIED TYPE: Primary | ICD-10-CM

## 2021-03-17 LAB
BASOPHILS # BLD AUTO: 0.07 K/UL (ref 0–0.2)
BASOPHILS NFR BLD: 0.5 % (ref 0–1.9)
DIFFERENTIAL METHOD: ABNORMAL
EOSINOPHIL # BLD AUTO: 0.4 K/UL (ref 0–0.5)
EOSINOPHIL NFR BLD: 3.3 % (ref 0–8)
ERYTHROCYTE [DISTWIDTH] IN BLOOD BY AUTOMATED COUNT: 16.3 % (ref 11.5–14.5)
HCT VFR BLD AUTO: 41.9 % (ref 40–54)
HGB BLD-MCNC: 13 G/DL (ref 14–18)
IMM GRANULOCYTES # BLD AUTO: 0.04 K/UL (ref 0–0.04)
IMM GRANULOCYTES NFR BLD AUTO: 0.3 % (ref 0–0.5)
LYMPHOCYTES # BLD AUTO: 4.7 K/UL (ref 1–4.8)
LYMPHOCYTES NFR BLD: 35 % (ref 18–48)
MCH RBC QN AUTO: 26.3 PG (ref 27–31)
MCHC RBC AUTO-ENTMCNC: 31 G/DL (ref 32–36)
MCV RBC AUTO: 85 FL (ref 82–98)
MONOCYTES # BLD AUTO: 0.6 K/UL (ref 0.3–1)
MONOCYTES NFR BLD: 4.8 % (ref 4–15)
NEUTROPHILS # BLD AUTO: 7.5 K/UL (ref 1.8–7.7)
NEUTROPHILS NFR BLD: 56.1 % (ref 38–73)
NRBC BLD-RTO: 0 /100 WBC
PLATELET # BLD AUTO: 308 K/UL (ref 150–350)
PMV BLD AUTO: 10.2 FL (ref 9.2–12.9)
RBC # BLD AUTO: 4.94 M/UL (ref 4.6–6.2)
WBC # BLD AUTO: 13.4 K/UL (ref 3.9–12.7)

## 2021-03-17 PROCEDURE — 99214 OFFICE O/P EST MOD 30 MIN: CPT | Mod: S$GLB,,, | Performed by: INTERNAL MEDICINE

## 2021-03-17 PROCEDURE — 3079F DIAST BP 80-89 MM HG: CPT | Mod: CPTII,S$GLB,, | Performed by: INTERNAL MEDICINE

## 2021-03-17 PROCEDURE — 3075F SYST BP GE 130 - 139MM HG: CPT | Mod: CPTII,S$GLB,, | Performed by: INTERNAL MEDICINE

## 2021-03-17 PROCEDURE — 1125F PR PAIN SEVERITY QUANTIFIED, PAIN PRESENT: ICD-10-PCS | Mod: S$GLB,,, | Performed by: INTERNAL MEDICINE

## 2021-03-17 PROCEDURE — 99999 PR PBB SHADOW E&M-EST. PATIENT-LVL IV: CPT | Mod: PBBFAC,,, | Performed by: INTERNAL MEDICINE

## 2021-03-17 PROCEDURE — 3008F BODY MASS INDEX DOCD: CPT | Mod: CPTII,S$GLB,, | Performed by: INTERNAL MEDICINE

## 2021-03-17 PROCEDURE — 1125F AMNT PAIN NOTED PAIN PRSNT: CPT | Mod: S$GLB,,, | Performed by: INTERNAL MEDICINE

## 2021-03-17 PROCEDURE — 3008F PR BODY MASS INDEX (BMI) DOCUMENTED: ICD-10-PCS | Mod: CPTII,S$GLB,, | Performed by: INTERNAL MEDICINE

## 2021-03-17 PROCEDURE — 99214 PR OFFICE/OUTPT VISIT, EST, LEVL IV, 30-39 MIN: ICD-10-PCS | Mod: S$GLB,,, | Performed by: INTERNAL MEDICINE

## 2021-03-17 PROCEDURE — 99999 PR PBB SHADOW E&M-EST. PATIENT-LVL IV: ICD-10-PCS | Mod: PBBFAC,,, | Performed by: INTERNAL MEDICINE

## 2021-03-17 PROCEDURE — 3075F PR MOST RECENT SYSTOLIC BLOOD PRESS GE 130-139MM HG: ICD-10-PCS | Mod: CPTII,S$GLB,, | Performed by: INTERNAL MEDICINE

## 2021-03-17 PROCEDURE — 85025 COMPLETE CBC W/AUTO DIFF WBC: CPT | Performed by: INTERNAL MEDICINE

## 2021-03-17 PROCEDURE — 36415 COLL VENOUS BLD VENIPUNCTURE: CPT | Mod: PO | Performed by: INTERNAL MEDICINE

## 2021-03-17 PROCEDURE — 3079F PR MOST RECENT DIASTOLIC BLOOD PRESSURE 80-89 MM HG: ICD-10-PCS | Mod: CPTII,S$GLB,, | Performed by: INTERNAL MEDICINE

## 2021-03-22 ENCOUNTER — TELEPHONE (OUTPATIENT)
Dept: HEMATOLOGY/ONCOLOGY | Facility: CLINIC | Age: 51
End: 2021-03-22

## 2021-03-22 DIAGNOSIS — D72.829 LEUKOCYTOSIS, UNSPECIFIED TYPE: Primary | ICD-10-CM

## 2021-04-02 ENCOUNTER — IMMUNIZATION (OUTPATIENT)
Dept: PRIMARY CARE CLINIC | Facility: CLINIC | Age: 51
End: 2021-04-02
Payer: COMMERCIAL

## 2021-04-02 DIAGNOSIS — Z23 NEED FOR VACCINATION: Primary | ICD-10-CM

## 2021-04-02 PROCEDURE — 0001A PR IMMUNIZ ADMIN, SARS-COV-2 COVID-19 VACC, 30MCG/0.3ML, 1ST DOSE: CPT | Mod: CV19,S$GLB,, | Performed by: INTERNAL MEDICINE

## 2021-04-02 PROCEDURE — 91300 PR SARS-COV- 2 COVID-19 VACCINE, NO PRSV, 30MCG/0.3ML, IM: ICD-10-PCS | Mod: S$GLB,,, | Performed by: INTERNAL MEDICINE

## 2021-04-02 PROCEDURE — 0001A PR IMMUNIZ ADMIN, SARS-COV-2 COVID-19 VACC, 30MCG/0.3ML, 1ST DOSE: ICD-10-PCS | Mod: CV19,S$GLB,, | Performed by: INTERNAL MEDICINE

## 2021-04-02 PROCEDURE — 91300 PR SARS-COV- 2 COVID-19 VACCINE, NO PRSV, 30MCG/0.3ML, IM: CPT | Mod: S$GLB,,, | Performed by: INTERNAL MEDICINE

## 2021-04-02 RX ADMIN — Medication 0.3 ML: at 03:04

## 2021-04-05 ENCOUNTER — TELEPHONE (OUTPATIENT)
Dept: HEMATOLOGY/ONCOLOGY | Facility: CLINIC | Age: 51
End: 2021-04-05

## 2021-04-23 ENCOUNTER — IMMUNIZATION (OUTPATIENT)
Dept: PRIMARY CARE CLINIC | Facility: CLINIC | Age: 51
End: 2021-04-23
Payer: COMMERCIAL

## 2021-04-23 DIAGNOSIS — Z23 NEED FOR VACCINATION: Primary | ICD-10-CM

## 2021-04-23 PROCEDURE — 91300 PR SARS-COV- 2 COVID-19 VACCINE, NO PRSV, 30MCG/0.3ML, IM: CPT | Mod: S$GLB,,, | Performed by: INTERNAL MEDICINE

## 2021-04-23 PROCEDURE — 91300 PR SARS-COV- 2 COVID-19 VACCINE, NO PRSV, 30MCG/0.3ML, IM: ICD-10-PCS | Mod: S$GLB,,, | Performed by: INTERNAL MEDICINE

## 2021-04-23 PROCEDURE — 0002A PR IMMUNIZ ADMIN, SARS-COV-2 COVID-19 VACC, 30MCG/0.3ML, 2ND DOSE: CPT | Mod: CV19,S$GLB,, | Performed by: INTERNAL MEDICINE

## 2021-04-23 PROCEDURE — 0002A PR IMMUNIZ ADMIN, SARS-COV-2 COVID-19 VACC, 30MCG/0.3ML, 2ND DOSE: ICD-10-PCS | Mod: CV19,S$GLB,, | Performed by: INTERNAL MEDICINE

## 2021-04-23 RX ADMIN — Medication 0.3 ML: at 11:04

## 2021-07-09 ENCOUNTER — LAB VISIT (OUTPATIENT)
Dept: LAB | Facility: HOSPITAL | Age: 51
End: 2021-07-09
Attending: INTERNAL MEDICINE
Payer: COMMERCIAL

## 2021-07-09 DIAGNOSIS — D72.829 LEUKOCYTOSIS, UNSPECIFIED TYPE: ICD-10-CM

## 2021-07-09 LAB
BASOPHILS # BLD AUTO: 0.07 K/UL (ref 0–0.2)
BASOPHILS NFR BLD: 0.5 % (ref 0–1.9)
DIFFERENTIAL METHOD: ABNORMAL
EOSINOPHIL # BLD AUTO: 0.5 K/UL (ref 0–0.5)
EOSINOPHIL NFR BLD: 3.2 % (ref 0–8)
ERYTHROCYTE [DISTWIDTH] IN BLOOD BY AUTOMATED COUNT: 16.3 % (ref 11.5–14.5)
HCT VFR BLD AUTO: 42.5 % (ref 40–54)
HGB BLD-MCNC: 13.8 G/DL (ref 14–18)
IMM GRANULOCYTES # BLD AUTO: 0.04 K/UL (ref 0–0.04)
IMM GRANULOCYTES NFR BLD AUTO: 0.3 % (ref 0–0.5)
LYMPHOCYTES # BLD AUTO: 6.2 K/UL (ref 1–4.8)
LYMPHOCYTES NFR BLD: 40.5 % (ref 18–48)
MCH RBC QN AUTO: 27.2 PG (ref 27–31)
MCHC RBC AUTO-ENTMCNC: 32.5 G/DL (ref 32–36)
MCV RBC AUTO: 84 FL (ref 82–98)
MONOCYTES # BLD AUTO: 0.8 K/UL (ref 0.3–1)
MONOCYTES NFR BLD: 5.1 % (ref 4–15)
NEUTROPHILS # BLD AUTO: 7.7 K/UL (ref 1.8–7.7)
NEUTROPHILS NFR BLD: 50.4 % (ref 38–73)
NRBC BLD-RTO: 0 /100 WBC
PLATELET # BLD AUTO: 319 K/UL (ref 150–450)
PMV BLD AUTO: 10.4 FL (ref 9.2–12.9)
RBC # BLD AUTO: 5.07 M/UL (ref 4.6–6.2)
WBC # BLD AUTO: 15.19 K/UL (ref 3.9–12.7)

## 2021-07-09 PROCEDURE — 85025 COMPLETE CBC W/AUTO DIFF WBC: CPT | Performed by: INTERNAL MEDICINE

## 2021-07-09 PROCEDURE — 36415 COLL VENOUS BLD VENIPUNCTURE: CPT | Mod: PO | Performed by: INTERNAL MEDICINE

## 2021-07-12 ENCOUNTER — OFFICE VISIT (OUTPATIENT)
Dept: HEMATOLOGY/ONCOLOGY | Facility: CLINIC | Age: 51
End: 2021-07-12
Payer: COMMERCIAL

## 2021-07-12 ENCOUNTER — LAB VISIT (OUTPATIENT)
Dept: LAB | Facility: HOSPITAL | Age: 51
End: 2021-07-12
Attending: INTERNAL MEDICINE
Payer: COMMERCIAL

## 2021-07-12 VITALS
WEIGHT: 315 LBS | TEMPERATURE: 98 F | OXYGEN SATURATION: 97 % | SYSTOLIC BLOOD PRESSURE: 151 MMHG | DIASTOLIC BLOOD PRESSURE: 95 MMHG | HEIGHT: 76 IN | BODY MASS INDEX: 38.36 KG/M2 | HEART RATE: 73 BPM

## 2021-07-12 DIAGNOSIS — D72.829 LEUKOCYTOSIS, UNSPECIFIED TYPE: Primary | ICD-10-CM

## 2021-07-12 DIAGNOSIS — I10 ESSENTIAL HYPERTENSION: ICD-10-CM

## 2021-07-12 DIAGNOSIS — D64.9 ANEMIA, UNSPECIFIED TYPE: ICD-10-CM

## 2021-07-12 DIAGNOSIS — D72.820 LYMPHOCYTOSIS: ICD-10-CM

## 2021-07-12 DIAGNOSIS — D72.829 LEUKOCYTOSIS, UNSPECIFIED TYPE: ICD-10-CM

## 2021-07-12 PROCEDURE — 88189 PR  FLOWCYTOMETRY/READ, 16 & > MARKERS: ICD-10-PCS | Mod: ,,, | Performed by: PATHOLOGY

## 2021-07-12 PROCEDURE — 3008F PR BODY MASS INDEX (BMI) DOCUMENTED: ICD-10-PCS | Mod: CPTII,S$GLB,, | Performed by: INTERNAL MEDICINE

## 2021-07-12 PROCEDURE — 99999 PR PBB SHADOW E&M-EST. PATIENT-LVL III: CPT | Mod: PBBFAC,,, | Performed by: INTERNAL MEDICINE

## 2021-07-12 PROCEDURE — 1126F AMNT PAIN NOTED NONE PRSNT: CPT | Mod: S$GLB,,, | Performed by: INTERNAL MEDICINE

## 2021-07-12 PROCEDURE — 3080F PR MOST RECENT DIASTOLIC BLOOD PRESSURE >= 90 MM HG: ICD-10-PCS | Mod: CPTII,S$GLB,, | Performed by: INTERNAL MEDICINE

## 2021-07-12 PROCEDURE — 99214 OFFICE O/P EST MOD 30 MIN: CPT | Mod: S$GLB,,, | Performed by: INTERNAL MEDICINE

## 2021-07-12 PROCEDURE — 1126F PR PAIN SEVERITY QUANTIFIED, NO PAIN PRESENT: ICD-10-PCS | Mod: S$GLB,,, | Performed by: INTERNAL MEDICINE

## 2021-07-12 PROCEDURE — 99999 PR PBB SHADOW E&M-EST. PATIENT-LVL III: ICD-10-PCS | Mod: PBBFAC,,, | Performed by: INTERNAL MEDICINE

## 2021-07-12 PROCEDURE — 3077F SYST BP >= 140 MM HG: CPT | Mod: CPTII,S$GLB,, | Performed by: INTERNAL MEDICINE

## 2021-07-12 PROCEDURE — 88189 FLOWCYTOMETRY/READ 16 & >: CPT | Mod: ,,, | Performed by: PATHOLOGY

## 2021-07-12 PROCEDURE — 82728 ASSAY OF FERRITIN: CPT | Performed by: INTERNAL MEDICINE

## 2021-07-12 PROCEDURE — 3080F DIAST BP >= 90 MM HG: CPT | Mod: CPTII,S$GLB,, | Performed by: INTERNAL MEDICINE

## 2021-07-12 PROCEDURE — 36415 COLL VENOUS BLD VENIPUNCTURE: CPT | Mod: PO | Performed by: INTERNAL MEDICINE

## 2021-07-12 PROCEDURE — 99214 PR OFFICE/OUTPT VISIT, EST, LEVL IV, 30-39 MIN: ICD-10-PCS | Mod: S$GLB,,, | Performed by: INTERNAL MEDICINE

## 2021-07-12 PROCEDURE — 88185 FLOWCYTOMETRY/TC ADD-ON: CPT | Mod: 59 | Performed by: PATHOLOGY

## 2021-07-12 PROCEDURE — 3008F BODY MASS INDEX DOCD: CPT | Mod: CPTII,S$GLB,, | Performed by: INTERNAL MEDICINE

## 2021-07-12 PROCEDURE — 3077F PR MOST RECENT SYSTOLIC BLOOD PRESSURE >= 140 MM HG: ICD-10-PCS | Mod: CPTII,S$GLB,, | Performed by: INTERNAL MEDICINE

## 2021-07-12 PROCEDURE — 88184 FLOWCYTOMETRY/ TC 1 MARKER: CPT | Performed by: PATHOLOGY

## 2021-07-13 LAB — FERRITIN SERPL-MCNC: 94 NG/ML (ref 20–300)

## 2021-07-14 LAB
FLOW CYTOMETRY ANTIBODIES ANALYZED - BLOOD: NORMAL
FLOW CYTOMETRY COMMENT - BLOOD: NORMAL
FLOW CYTOMETRY INTERPRETATION - BLOOD: NORMAL

## 2021-07-22 ENCOUNTER — TELEPHONE (OUTPATIENT)
Dept: FAMILY MEDICINE | Facility: CLINIC | Age: 51
End: 2021-07-22

## 2021-07-26 ENCOUNTER — TELEPHONE (OUTPATIENT)
Dept: FAMILY MEDICINE | Facility: CLINIC | Age: 51
End: 2021-07-26

## 2021-08-13 ENCOUNTER — TELEPHONE (OUTPATIENT)
Dept: HEMATOLOGY/ONCOLOGY | Facility: CLINIC | Age: 51
End: 2021-08-13

## 2021-11-08 ENCOUNTER — LAB VISIT (OUTPATIENT)
Dept: LAB | Facility: HOSPITAL | Age: 51
End: 2021-11-08
Attending: INTERNAL MEDICINE
Payer: COMMERCIAL

## 2021-11-08 DIAGNOSIS — D72.829 LEUKOCYTOSIS, UNSPECIFIED TYPE: ICD-10-CM

## 2021-11-08 DIAGNOSIS — D64.9 ANEMIA, UNSPECIFIED TYPE: ICD-10-CM

## 2021-11-08 LAB
ALBUMIN SERPL BCP-MCNC: 4 G/DL (ref 3.5–5.2)
ALP SERPL-CCNC: 68 U/L (ref 55–135)
ALT SERPL W/O P-5'-P-CCNC: 18 U/L (ref 10–44)
ANION GAP SERPL CALC-SCNC: 11 MMOL/L (ref 8–16)
AST SERPL-CCNC: 16 U/L (ref 10–40)
BASOPHILS # BLD AUTO: 0.06 K/UL (ref 0–0.2)
BASOPHILS NFR BLD: 0.5 % (ref 0–1.9)
BILIRUB SERPL-MCNC: 0.3 MG/DL (ref 0.1–1)
BUN SERPL-MCNC: 18 MG/DL (ref 6–20)
CALCIUM SERPL-MCNC: 10.1 MG/DL (ref 8.7–10.5)
CHLORIDE SERPL-SCNC: 101 MMOL/L (ref 95–110)
CO2 SERPL-SCNC: 29 MMOL/L (ref 23–29)
CREAT SERPL-MCNC: 1.4 MG/DL (ref 0.5–1.4)
DIFFERENTIAL METHOD: ABNORMAL
EOSINOPHIL # BLD AUTO: 0.6 K/UL (ref 0–0.5)
EOSINOPHIL NFR BLD: 4.1 % (ref 0–8)
ERYTHROCYTE [DISTWIDTH] IN BLOOD BY AUTOMATED COUNT: 15.8 % (ref 11.5–14.5)
EST. GFR  (AFRICAN AMERICAN): >60 ML/MIN/1.73 M^2
EST. GFR  (NON AFRICAN AMERICAN): 58 ML/MIN/1.73 M^2
GIANT PLATELETS BLD QL SMEAR: PRESENT
GLUCOSE SERPL-MCNC: 87 MG/DL (ref 70–110)
HCT VFR BLD AUTO: 43.2 % (ref 40–54)
HGB BLD-MCNC: 13.4 G/DL (ref 14–18)
IMM GRANULOCYTES # BLD AUTO: 0.04 K/UL (ref 0–0.04)
IMM GRANULOCYTES NFR BLD AUTO: 0.3 % (ref 0–0.5)
IRON SERPL-MCNC: 41 UG/DL (ref 45–160)
LYMPHOCYTES # BLD AUTO: 5.4 K/UL (ref 1–4.8)
LYMPHOCYTES NFR BLD: 40.3 % (ref 18–48)
MCH RBC QN AUTO: 27.2 PG (ref 27–31)
MCHC RBC AUTO-ENTMCNC: 31 G/DL (ref 32–36)
MCV RBC AUTO: 88 FL (ref 82–98)
MONOCYTES # BLD AUTO: 0.7 K/UL (ref 0.3–1)
MONOCYTES NFR BLD: 5.3 % (ref 4–15)
NEUTROPHILS # BLD AUTO: 6.6 K/UL (ref 1.8–7.7)
NEUTROPHILS NFR BLD: 49.5 % (ref 38–73)
NRBC BLD-RTO: 0 /100 WBC
PLATELET # BLD AUTO: 306 K/UL (ref 150–450)
PLATELET BLD QL SMEAR: ABNORMAL
PMV BLD AUTO: 10.1 FL (ref 9.2–12.9)
POTASSIUM SERPL-SCNC: 3.8 MMOL/L (ref 3.5–5.1)
PROT SERPL-MCNC: 8.3 G/DL (ref 6–8.4)
RBC # BLD AUTO: 4.93 M/UL (ref 4.6–6.2)
SATURATED IRON: 12 % (ref 20–50)
SODIUM SERPL-SCNC: 141 MMOL/L (ref 136–145)
TOTAL IRON BINDING CAPACITY: 334 UG/DL (ref 250–450)
TRANSFERRIN SERPL-MCNC: 226 MG/DL (ref 200–375)
WBC # BLD AUTO: 13.33 K/UL (ref 3.9–12.7)

## 2021-11-08 PROCEDURE — 84466 ASSAY OF TRANSFERRIN: CPT | Performed by: INTERNAL MEDICINE

## 2021-11-08 PROCEDURE — 85025 COMPLETE CBC W/AUTO DIFF WBC: CPT | Performed by: INTERNAL MEDICINE

## 2021-11-08 PROCEDURE — 36415 COLL VENOUS BLD VENIPUNCTURE: CPT | Mod: PN | Performed by: INTERNAL MEDICINE

## 2021-11-08 PROCEDURE — 80053 COMPREHEN METABOLIC PANEL: CPT | Performed by: INTERNAL MEDICINE

## 2021-11-10 ENCOUNTER — PATIENT OUTREACH (OUTPATIENT)
Dept: ADMINISTRATIVE | Facility: OTHER | Age: 51
End: 2021-11-10
Payer: COMMERCIAL

## 2021-11-11 ENCOUNTER — TELEPHONE (OUTPATIENT)
Dept: UROLOGY | Facility: CLINIC | Age: 51
End: 2021-11-11
Payer: COMMERCIAL

## 2021-11-11 ENCOUNTER — OFFICE VISIT (OUTPATIENT)
Dept: UROLOGY | Facility: CLINIC | Age: 51
End: 2021-11-11
Payer: COMMERCIAL

## 2021-11-11 ENCOUNTER — LAB VISIT (OUTPATIENT)
Dept: LAB | Facility: HOSPITAL | Age: 51
End: 2021-11-11
Attending: UROLOGY
Payer: COMMERCIAL

## 2021-11-11 VITALS
DIASTOLIC BLOOD PRESSURE: 117 MMHG | WEIGHT: 315 LBS | HEIGHT: 76 IN | HEART RATE: 73 BPM | SYSTOLIC BLOOD PRESSURE: 190 MMHG | BODY MASS INDEX: 38.36 KG/M2

## 2021-11-11 DIAGNOSIS — N52.9 ERECTILE DYSFUNCTION, UNSPECIFIED ERECTILE DYSFUNCTION TYPE: ICD-10-CM

## 2021-11-11 DIAGNOSIS — N52.9 ERECTILE DYSFUNCTION, UNSPECIFIED ERECTILE DYSFUNCTION TYPE: Primary | ICD-10-CM

## 2021-11-11 LAB — TESTOST SERPL-MCNC: 337 NG/DL (ref 304–1227)

## 2021-11-11 PROCEDURE — 1160F PR REVIEW ALL MEDS BY PRESCRIBER/CLIN PHARMACIST DOCUMENTED: ICD-10-PCS | Mod: CPTII,S$GLB,, | Performed by: UROLOGY

## 2021-11-11 PROCEDURE — 99999 PR PBB SHADOW E&M-EST. PATIENT-LVL IV: ICD-10-PCS | Mod: PBBFAC,,, | Performed by: UROLOGY

## 2021-11-11 PROCEDURE — 3080F DIAST BP >= 90 MM HG: CPT | Mod: CPTII,S$GLB,, | Performed by: UROLOGY

## 2021-11-11 PROCEDURE — 1159F MED LIST DOCD IN RCRD: CPT | Mod: CPTII,S$GLB,, | Performed by: UROLOGY

## 2021-11-11 PROCEDURE — 3008F BODY MASS INDEX DOCD: CPT | Mod: CPTII,S$GLB,, | Performed by: UROLOGY

## 2021-11-11 PROCEDURE — 99204 PR OFFICE/OUTPT VISIT, NEW, LEVL IV, 45-59 MIN: ICD-10-PCS | Mod: S$GLB,,, | Performed by: UROLOGY

## 2021-11-11 PROCEDURE — 99999 PR PBB SHADOW E&M-EST. PATIENT-LVL IV: CPT | Mod: PBBFAC,,, | Performed by: UROLOGY

## 2021-11-11 PROCEDURE — 84403 ASSAY OF TOTAL TESTOSTERONE: CPT | Performed by: UROLOGY

## 2021-11-11 PROCEDURE — 3077F SYST BP >= 140 MM HG: CPT | Mod: CPTII,S$GLB,, | Performed by: UROLOGY

## 2021-11-11 PROCEDURE — 99204 OFFICE O/P NEW MOD 45 MIN: CPT | Mod: S$GLB,,, | Performed by: UROLOGY

## 2021-11-11 PROCEDURE — 1159F PR MEDICATION LIST DOCUMENTED IN MEDICAL RECORD: ICD-10-PCS | Mod: CPTII,S$GLB,, | Performed by: UROLOGY

## 2021-11-11 PROCEDURE — 3008F PR BODY MASS INDEX (BMI) DOCUMENTED: ICD-10-PCS | Mod: CPTII,S$GLB,, | Performed by: UROLOGY

## 2021-11-11 PROCEDURE — 3077F PR MOST RECENT SYSTOLIC BLOOD PRESSURE >= 140 MM HG: ICD-10-PCS | Mod: CPTII,S$GLB,, | Performed by: UROLOGY

## 2021-11-11 PROCEDURE — 36415 COLL VENOUS BLD VENIPUNCTURE: CPT | Performed by: UROLOGY

## 2021-11-11 PROCEDURE — 1160F RVW MEDS BY RX/DR IN RCRD: CPT | Mod: CPTII,S$GLB,, | Performed by: UROLOGY

## 2021-11-11 PROCEDURE — 3044F HG A1C LEVEL LT 7.0%: CPT | Mod: CPTII,S$GLB,, | Performed by: UROLOGY

## 2021-11-11 PROCEDURE — 3044F PR MOST RECENT HEMOGLOBIN A1C LEVEL <7.0%: ICD-10-PCS | Mod: CPTII,S$GLB,, | Performed by: UROLOGY

## 2021-11-11 PROCEDURE — 3080F PR MOST RECENT DIASTOLIC BLOOD PRESSURE >= 90 MM HG: ICD-10-PCS | Mod: CPTII,S$GLB,, | Performed by: UROLOGY

## 2021-11-11 RX ORDER — TADALAFIL 20 MG/1
20 TABLET ORAL EVERY OTHER DAY
Qty: 10 TABLET | Refills: 11 | Status: SHIPPED | OUTPATIENT
Start: 2021-11-11 | End: 2023-03-20 | Stop reason: SDUPTHER

## 2021-11-12 DIAGNOSIS — E29.1 HYPOGONADISM MALE: Primary | ICD-10-CM

## 2021-11-16 ENCOUNTER — IMMUNIZATION (OUTPATIENT)
Dept: OBSTETRICS AND GYNECOLOGY | Facility: CLINIC | Age: 51
End: 2021-11-16
Payer: COMMERCIAL

## 2021-11-16 DIAGNOSIS — Z23 NEED FOR VACCINATION: Primary | ICD-10-CM

## 2021-11-16 PROCEDURE — 0004A COVID-19, MRNA, LNP-S, PF, 30 MCG/0.3 ML DOSE VACCINE: CPT | Mod: PBBFAC | Performed by: FAMILY MEDICINE

## 2021-11-17 ENCOUNTER — TELEPHONE (OUTPATIENT)
Dept: HEMATOLOGY/ONCOLOGY | Facility: CLINIC | Age: 51
End: 2021-11-17

## 2021-11-17 ENCOUNTER — PATIENT MESSAGE (OUTPATIENT)
Dept: ENDOSCOPY | Facility: HOSPITAL | Age: 51
End: 2021-11-17
Payer: COMMERCIAL

## 2021-11-17 ENCOUNTER — OFFICE VISIT (OUTPATIENT)
Dept: HEMATOLOGY/ONCOLOGY | Facility: CLINIC | Age: 51
End: 2021-11-17
Payer: COMMERCIAL

## 2021-11-17 VITALS
SYSTOLIC BLOOD PRESSURE: 137 MMHG | WEIGHT: 315 LBS | OXYGEN SATURATION: 98 % | BODY MASS INDEX: 38.36 KG/M2 | TEMPERATURE: 99 F | HEART RATE: 106 BPM | HEIGHT: 76 IN | DIASTOLIC BLOOD PRESSURE: 74 MMHG

## 2021-11-17 DIAGNOSIS — Z12.11 SPECIAL SCREENING FOR MALIGNANT NEOPLASMS, COLON: Primary | ICD-10-CM

## 2021-11-17 DIAGNOSIS — I10 ESSENTIAL HYPERTENSION: ICD-10-CM

## 2021-11-17 DIAGNOSIS — D64.9 ANEMIA, UNSPECIFIED TYPE: ICD-10-CM

## 2021-11-17 DIAGNOSIS — D72.829 LEUKOCYTOSIS, UNSPECIFIED TYPE: Primary | ICD-10-CM

## 2021-11-17 PROCEDURE — 99999 PR PBB SHADOW E&M-EST. PATIENT-LVL IV: CPT | Mod: PBBFAC,,, | Performed by: INTERNAL MEDICINE

## 2021-11-17 PROCEDURE — 99999 PR PBB SHADOW E&M-EST. PATIENT-LVL IV: ICD-10-PCS | Mod: PBBFAC,,, | Performed by: INTERNAL MEDICINE

## 2021-11-17 PROCEDURE — 99214 OFFICE O/P EST MOD 30 MIN: CPT | Mod: S$GLB,,, | Performed by: INTERNAL MEDICINE

## 2021-11-17 PROCEDURE — 99214 PR OFFICE/OUTPT VISIT, EST, LEVL IV, 30-39 MIN: ICD-10-PCS | Mod: S$GLB,,, | Performed by: INTERNAL MEDICINE

## 2021-11-17 RX ORDER — POLYETHYLENE GLYCOL 3350, SODIUM SULFATE ANHYDROUS, SODIUM BICARBONATE, SODIUM CHLORIDE, POTASSIUM CHLORIDE 236; 22.74; 6.74; 5.86; 2.97 G/4L; G/4L; G/4L; G/4L; G/4L
4 POWDER, FOR SOLUTION ORAL ONCE
Qty: 4000 ML | Refills: 0 | Status: SHIPPED | OUTPATIENT
Start: 2021-11-17 | End: 2021-11-17

## 2021-11-18 DIAGNOSIS — D50.9 IRON DEFICIENCY ANEMIA, UNSPECIFIED IRON DEFICIENCY ANEMIA TYPE: Primary | ICD-10-CM

## 2021-12-26 ENCOUNTER — ANESTHESIA EVENT (OUTPATIENT)
Dept: ENDOSCOPY | Facility: HOSPITAL | Age: 51
End: 2021-12-26
Payer: COMMERCIAL

## 2021-12-26 RX ORDER — LIDOCAINE HYDROCHLORIDE 10 MG/ML
1 INJECTION, SOLUTION EPIDURAL; INFILTRATION; INTRACAUDAL; PERINEURAL ONCE
Status: CANCELLED | OUTPATIENT
Start: 2021-12-26 | End: 2021-12-26

## 2021-12-26 RX ORDER — SODIUM CHLORIDE 9 MG/ML
INJECTION, SOLUTION INTRAVENOUS CONTINUOUS
Status: CANCELLED | OUTPATIENT
Start: 2021-12-26

## 2021-12-26 RX ORDER — LIDOCAINE HYDROCHLORIDE 10 MG/ML
1 INJECTION, SOLUTION EPIDURAL; INFILTRATION; INTRACAUDAL; PERINEURAL ONCE AS NEEDED
Status: CANCELLED | OUTPATIENT
Start: 2021-12-26 | End: 2033-05-24

## 2021-12-27 ENCOUNTER — ANESTHESIA (OUTPATIENT)
Dept: ENDOSCOPY | Facility: HOSPITAL | Age: 51
End: 2021-12-27
Payer: COMMERCIAL

## 2021-12-27 ENCOUNTER — HOSPITAL ENCOUNTER (OUTPATIENT)
Facility: HOSPITAL | Age: 51
Discharge: HOME | End: 2021-12-27
Attending: INTERNAL MEDICINE | Admitting: INTERNAL MEDICINE
Payer: COMMERCIAL

## 2021-12-27 VITALS
DIASTOLIC BLOOD PRESSURE: 63 MMHG | SYSTOLIC BLOOD PRESSURE: 137 MMHG | TEMPERATURE: 99 F | HEART RATE: 78 BPM | OXYGEN SATURATION: 97 % | RESPIRATION RATE: 20 BRPM

## 2021-12-27 DIAGNOSIS — Z12.11 SPECIAL SCREENING FOR MALIGNANT NEOPLASMS, COLON: ICD-10-CM

## 2021-12-27 PROCEDURE — 25000003 PHARM REV CODE 250: Performed by: STUDENT IN AN ORGANIZED HEALTH CARE EDUCATION/TRAINING PROGRAM

## 2021-12-27 PROCEDURE — 45380 PR COLONOSCOPY,BIOPSY: ICD-10-PCS | Mod: 33,,, | Performed by: INTERNAL MEDICINE

## 2021-12-27 PROCEDURE — 25000003 PHARM REV CODE 250: Performed by: INTERNAL MEDICINE

## 2021-12-27 PROCEDURE — D9220A PRA ANESTHESIA: ICD-10-PCS | Mod: 33,CRNA,, | Performed by: NURSE ANESTHETIST, CERTIFIED REGISTERED

## 2021-12-27 PROCEDURE — 45380 COLONOSCOPY AND BIOPSY: CPT | Mod: 33,,, | Performed by: INTERNAL MEDICINE

## 2021-12-27 PROCEDURE — 88305 TISSUE EXAM BY PATHOLOGIST: CPT | Performed by: PATHOLOGY

## 2021-12-27 PROCEDURE — 63600175 PHARM REV CODE 636 W HCPCS: Performed by: STUDENT IN AN ORGANIZED HEALTH CARE EDUCATION/TRAINING PROGRAM

## 2021-12-27 PROCEDURE — 27201012 HC FORCEPS, HOT/COLD, DISP: Performed by: INTERNAL MEDICINE

## 2021-12-27 PROCEDURE — D9220A PRA ANESTHESIA: ICD-10-PCS | Mod: 33,ANES,, | Performed by: ANESTHESIOLOGY

## 2021-12-27 PROCEDURE — 37000008 HC ANESTHESIA 1ST 15 MINUTES: Performed by: INTERNAL MEDICINE

## 2021-12-27 PROCEDURE — 88305 TISSUE EXAM BY PATHOLOGIST: ICD-10-PCS | Mod: 26,,, | Performed by: PATHOLOGY

## 2021-12-27 PROCEDURE — 45380 COLONOSCOPY AND BIOPSY: CPT | Mod: PT | Performed by: INTERNAL MEDICINE

## 2021-12-27 PROCEDURE — D9220A PRA ANESTHESIA: Mod: 33,ANES,, | Performed by: ANESTHESIOLOGY

## 2021-12-27 PROCEDURE — D9220A PRA ANESTHESIA: Mod: 33,CRNA,, | Performed by: NURSE ANESTHETIST, CERTIFIED REGISTERED

## 2021-12-27 PROCEDURE — 37000009 HC ANESTHESIA EA ADD 15 MINS: Performed by: INTERNAL MEDICINE

## 2021-12-27 PROCEDURE — 88305 TISSUE EXAM BY PATHOLOGIST: CPT | Mod: 26,,, | Performed by: PATHOLOGY

## 2021-12-27 RX ORDER — KETAMINE HYDROCHLORIDE 100 MG/ML
INJECTION, SOLUTION INTRAMUSCULAR; INTRAVENOUS
Status: DISCONTINUED | OUTPATIENT
Start: 2021-12-27 | End: 2021-12-27

## 2021-12-27 RX ORDER — PROPOFOL 10 MG/ML
INJECTION, EMULSION INTRAVENOUS
Status: DISCONTINUED
Start: 2021-12-27 | End: 2021-12-27 | Stop reason: HOSPADM

## 2021-12-27 RX ORDER — PROPOFOL 10 MG/ML
VIAL (ML) INTRAVENOUS
Status: DISCONTINUED | OUTPATIENT
Start: 2021-12-27 | End: 2021-12-27

## 2021-12-27 RX ORDER — SODIUM CHLORIDE 9 MG/ML
INJECTION, SOLUTION INTRAVENOUS CONTINUOUS
Status: CANCELLED | OUTPATIENT
Start: 2021-12-27

## 2021-12-27 RX ORDER — LIDOCAINE HYDROCHLORIDE 20 MG/ML
INJECTION, SOLUTION EPIDURAL; INFILTRATION; INTRACAUDAL; PERINEURAL
Status: DISCONTINUED
Start: 2021-12-27 | End: 2021-12-27 | Stop reason: HOSPADM

## 2021-12-27 RX ORDER — LIDOCAINE HYDROCHLORIDE 20 MG/ML
INJECTION INTRAVENOUS
Status: DISCONTINUED | OUTPATIENT
Start: 2021-12-27 | End: 2021-12-27

## 2021-12-27 RX ORDER — SODIUM CHLORIDE 9 MG/ML
INJECTION, SOLUTION INTRAVENOUS CONTINUOUS
Status: DISCONTINUED | OUTPATIENT
Start: 2021-12-27 | End: 2021-12-27 | Stop reason: HOSPADM

## 2021-12-27 RX ADMIN — LIDOCAINE HYDROCHLORIDE 100 MG: 20 INJECTION, SOLUTION INTRAVENOUS at 02:12

## 2021-12-27 RX ADMIN — PROPOFOL 20 MG: 10 INJECTION, EMULSION INTRAVENOUS at 03:12

## 2021-12-27 RX ADMIN — PROPOFOL 80 MG: 10 INJECTION, EMULSION INTRAVENOUS at 02:12

## 2021-12-27 RX ADMIN — SODIUM CHLORIDE: 0.9 INJECTION, SOLUTION INTRAVENOUS at 02:12

## 2021-12-27 RX ADMIN — PROPOFOL 40 MG: 10 INJECTION, EMULSION INTRAVENOUS at 03:12

## 2021-12-27 RX ADMIN — PROPOFOL 20 MG: 10 INJECTION, EMULSION INTRAVENOUS at 02:12

## 2021-12-27 RX ADMIN — KETAMINE HYDROCHLORIDE 25 MG: 100 INJECTION, SOLUTION, CONCENTRATE INTRAMUSCULAR; INTRAVENOUS at 02:12

## 2021-12-27 RX ADMIN — PROPOFOL 30 MG: 10 INJECTION, EMULSION INTRAVENOUS at 02:12

## 2021-12-27 RX ADMIN — GLYCOPYRROLATE 0.2 MG: 0.2 INJECTION, SOLUTION INTRAMUSCULAR; INTRAVITREAL at 02:12

## 2021-12-27 NOTE — PR[employer]ATION PATIENT INSTRUCTIONS
Discharge Summary/Instructions after an Endoscopic Procedure  Patient Name: Roberta Ware  Patient MRN: [id number]  Patient YOB: 1970 Monday, December 27, 2021  Solis Mendez MD  Dear patient,  As a result of recent federal legislation (The Federal Cures Act), you may   receive lab or pathology results from your procedure in your [facility]   account before your physician is able to contact you. Your physician or   their representative will relay the results to you with their   recommendations at their soonest availability.  Thank you,  RESTRICTIONS:  During your procedure today, you received medications for sedation.  These   medications may affect your judgment, balance and coordination.  Therefore,   for 24 hours, you have the following restrictions:   - DO NOT drive a car, operate machinery, make legal/financial decisions,   sign important papers or drink alcohol.    ACTIVITY:  Today: no heavy lifting, straining or running due to procedural   sedation/anesthesia.  The following day: return to full activity including work.  DIET:  Eat and drink normally unless instructed otherwise.     TREATMENT FOR COMMON SIDE EFFECTS:  - Mild abdominal pain, nausea, belching, bloating or excessive gas:  rest,   eat lightly and use a heating pad.  - Sore Throat: treat with throat lozenges and/or gargle with warm salt   water.  - Because air was used during the procedure, expelling large amounts of air   from your rectum or belching is normal.  - If a bowel prep was taken, you may not have a bowel movement for 1-3 days.    This is normal.  SYMPTOMS TO WATCH FOR AND REPORT TO YOUR PHYSICIAN:  1. Abdominal pain or bloating, other than gas cramps.  2. Chest pain.  3. Back pain.  4. Signs of infection such as: chills or fever occurring within 24 hours   after the procedure.  5. Rectal bleeding, which would show as bright red, maroon, or black stools.   (A tablespoon of blood from the rectum is not serious, especially if    hemorrhoids are present.)  6. Vomiting.  7. Weakness or dizziness.  GO DIRECTLY TO THE NEAREST EMERGENCY ROOM IF YOU HAVE ANY OF THE FOLLOWING:      Difficulty breathing              Chills and/or fever over 101 F   Persistent vomiting and/or vomiting blood   Severe abdominal pain   Severe chest pain   Black, tarry stools   Bleeding- more than one tablespoon   Any other symptom or condition that you feel may need urgent attention  Your doctor recommends these additional instructions:  If any biopsies were taken, your doctors clinic will contact you in 1 to 2   weeks with any results.  - Patient has a contact number available for emergencies.  The signs and   symptoms of potential delayed complications were discussed with the   patient.  Return to normal activities tomorrow.  Written discharge   instructions were provided to the patient.   - Discharge patient to home (ambulatory).   - Resume previous diet.   - Continue present medications.   - Await pathology results.   - Repeat colonoscopy in 5 years for surveillance.  For questions, problems or results please call your physician - Solis Mendez MD at Work:  [phone].  [facility] can be reached at [phone]     IF A COMPLICATION OR EMERGENCY SITUATION ARISES AND YOU ARE UNABLE TO REACH   YOUR PHYSICIAN - GO DIRECTLY TO THE EMERGENCY ROOM.  Solis Mendez MD  12/27/2021 3:12:59 PM  This report has been verified and signed electronically.  Dear patient,  As a result of recent federal legislation (The Federal Cures Act), you may   receive lab or pathology results from your procedure in your [facility]   account before your physician is able to contact you. Your physician or   their representative will relay the results to you with their   recommendations at their soonest availability.  Thank you,  [employer]ATION

## 2021-12-27 NOTE — PLAN OF CARE
Procedure and recovery complete. Awake and alert. No c/o pain or discomfort. Family at bedside. Discharge instructions given. Verbalized understanding. No acute distress noted.

## 2021-12-27 NOTE — DISCHARGE INSTRUCTIONS
Patient Education       High Fiber Diet   About this topic   Dietary fiber helps many illnesses. It can help you if you cannot have a bowel movement or if you have loose stools. Fiber can also lower your risk of diabetes and heart disease. Fiber can help with weight loss by helping you feel anderson after meals.  You can find fiber in fruits, vegetables, nuts and seeds, whole grains, and legumes. The fiber is the part of the plant food that your body cannot break down and absorb. It passes through your stomach, small bowel, colon, and out your body.  There are two kinds of fiber: Insoluble and soluble fiber. Insoluble fiber helps you pass foods through your digestive system. Insoluble fiber can help you with hard stools. Soluble fiber draws water in and turns it into a gel-like form making digestion slow down. Both are important.       What will the results be?   A high fiber diet can help you with bowel problems like stools that are too hard or too loose. It can also help prevent hemorrhoids and other colon problems. A high fiber diet can also help control your weight and lower blood sugar and cholesterol levels.  What changes to diet are needed?   · The amount of fiber you need is based on your age, gender, and health.  · Try to get 20 to 35 grams of fiber in your diet each day. Most people in the [place] only eat 15 grams of fiber daily.  · Drink at least 8 cups (1920 mL) of fluid each day.  When is this diet used?   Your doctor may talk with you about this diet if you have belly problems.  Who should use this diet?   Older children, young people, and adults can have this diet.   Who should not use this diet?   Some people should not use this diet. Check with your doctor if you have:  · Diverticulitis  · Active Crohn's disease  · Ulcerative colitis  · Bowel inflammation  · Certain types of GI surgery  Talk to your child's doctor before starting your child on a high fiber diet.  What foods are good to eat?   To get the  most from fiber in your diet, eat a wide variety of high fiber foods. Some examples are:  · Vegetables like:  ? Spinach  ? Peas  ? Artichoke  ? Sweet potatoes with skin  ? Broccoli  · Fruits like:  ? Raspberries  ? Blueberries  ? Blackberries  ? Apples with skin  ? Dried fruits  · Grains like:  ? Oat bran  ? Barley  ? Whole wheat products  ? Wheat bran  · Dried beans and nuts like:  ? Sunflower seeds  ? Almonds  ? Black beans  ? Chickpeas  What problems could happen?   · Sudden increase of fiber intake can lead to gas, pain, fullness in your belly, and loose stools. Increase fiber gradually while drinking plenty of fluids.  · Do not eat too much fiber. Your body will not take in vitamins and minerals as well if you eat too much fiber.  When do I need to call the doctor?   Health problem is not better or you are feeling worse.  Helpful tips   · Start slow as you add more fiber to your diet. This may help prevent gas or cramps.  · Try to eat the same amount of fiber each day. Aim to get your fiber from nutritious foods. Supplements do not offer the same benefits as food.  · Read food labels with care to learn how much fiber is in the food you are eating.  · If possible, do not peel fruits or vegetables before you eat them. Eating the peel gives you more fiber.  Where can I learn more?   Eat Right  https://www.eatright.org/food/vitamins-and-supplements/types-of-vitamins-and-nutrients/easy-ways-to-boost-fiber-in-your-daily-diet   Last Reviewed Date   2021-09-23  Consumer Information Use and Disclaimer   This information is not specific medical advice and does not replace information you receive from your health care provider. This is only a brief summary of general information. It does NOT include all information about conditions, illnesses, injuries, tests, procedures, treatments, therapies, discharge instructions or life-style choices that may apply to you. You must talk with your health care provider for complete  "information about your health and treatment options. This information should not be used to decide whether or not to accept your health care providers advice, instructions or recommendations. Only your health care provider has the knowledge and training to provide advice that is right for you.  Copyright   Copyright © 2021 Up[employer], Inc. and its affiliates and/or licensors. All rights reserved.  Patient Education       Diverticulosis Discharge Instructions   About this topic   Diverticulosis is a problem of the large bowel or colon. The wall of the bowel becomes weak and pushes outward. They form balloon-like pouches called diverticula or "tics." When you have hard stool, you strain to have a bowel movement. This raises the pressure in the bowel and causes pouches or bulges to form. Most often, they do not cause a problem. If they become infected, you have diverticulitis. If you have both bleeding and infection it is diverticular disease.     What care is needed at home?   · Ask your doctor what you need to do when you go home. Make sure you ask questions if you do not understand what the doctor says.  · Eat more whole grains, vegetables, and fruits.  · Do not wait to have a bowel movement. Go as soon as you have the urge.  · Drink 8 to 10 glasses of water each day. Talk to your doctor if you are drinking less fluids due to a health problem.  · Be active. Walk, garden, or do something active for 30 minutes or more on most days of the week.  What follow-up care is needed?   Your doctor may ask you to make visits to the office to check on your progress. Be sure to keep these visits.  What drugs may be needed?   Most often with diverticulosis you will not need to take any drugs.  Will physical activity be limited?   When you are in pain, you may need to rest in bed. To ease the pain, use a heat compress on your belly. This should last only for a few days.  What changes to diet are needed?   Talk to your doctor about " any changes you need to make to your diet.  · You do not need to avoid seeds, nuts, corn, or other similar foods.   · You will need to eat food rich in fiber and drink more water.  ? Eat 5 or more servings of fresh fruits and vegetables every day.  ? Eat 6 or more servings of whole-wheat grain breads and cereals.  ? Try to get 25 to 30 grams of fiber every day. Read the labels to learn how much fiber is in foods.   · Do not drink coffee, tea, or beer, wine, and mixed drinks (alcohol).  What problems could happen?   You may develop diverticulitis, which may cause:  · Pockets or pouches in your bowel may be infected or filled with pus.  · Hole or tear in your bowel  · Part of your bowel to become narrow  · You to need surgery  What can be done to prevent this health problem?   The best way to keep from having diverticulosis is to keep your bowel movements soft and normal. To keep more pouches from forming:  · Talk with your doctor about adding an over-the-counter (OTC) fiber product to keep your stools soft.  · Limit how much pain drugs you take. Overuse of some pain drugs can cause hard stools; talk with your doctor.  When do I need to call the doctor?   · Signs of infection. These include a fever of 100.4°F (38°C) or higher, chills.  · Mild pain or cramping in the lower part of the belly  · A feeling of bloating in the belly  · Belly pain that gets worse  · Blood in your stool  · Upset stomach or throwing up  · Stools get too loose or too hard  · Long-term hard stools  Teach Back: Helping You Understand   The Teach Back Method helps you understand the information we are giving you. After you talk with the staff, tell them in your own words what you learned. This helps to make sure the staff has described each thing clearly. It also helps to explain things that may have been confusing. Before going home, make sure you are able to do these:  · I can tell you about my condition.  · I can tell you what changes I need to  make with my diet or drugs.  · I can tell you what I will do if I have pain or cramping in my lower belly or I have more belly pain.  Where can I learn more?   FamilyDoctor.org  http://familydoctor.org/familydoctor/en/diseases-conditions/diverticular-disease.html   [facility]  https://www.nhs.[place]/conditions/diverticular-disease-and-diverticulitis/   Last Reviewed Date   2021-04-13  Consumer Information Use and Disclaimer   This information is not specific medical advice and does not replace information you receive from your health care provider. This is only a brief summary of general information. It does NOT include all information about conditions, illnesses, injuries, tests, procedures, treatments, therapies, discharge instructions or life-style choices that may apply to you. You must talk with your health care provider for complete information about your health and treatment options. This information should not be used to decide whether or not to accept your health care providers advice, instructions or recommendations. Only your health care provider has the knowledge and training to provide advice that is right for you.  Copyright   Copyright © 2021 UpToDate, Inc. and its affiliates and/or licensors. All rights reserved.  Patient Education       Colon Polyps   The Basics   Written by the doctors and editors at [employer] [facility]   What are colon polyps? -- Colon polyps are tiny growths that form on the inside of the large intestine (also known as the colon) (figure 1). Polyps are very common. About one-third to one-half of all adults have them by the time they are 50 years old. They do not usually cause symptoms. But some polyps can be or become cancer, so doctors sometimes remove them.  What are the symptoms of colon polyps? -- Colon polyps do not usually cause symptoms.  How do doctors find colon polyps? -- Doctors usually find colon polyps when they are doing screening tests to check for colon or rectal cancer. Cancer  "screening tests are tests that are done to try and find cancer early, before a person has symptoms. The screening tests for colon and rectal cancer include:  · Colonoscopy - Before having a colonoscopy, you will get medicine to help you relax. Then a doctor will put a thin tube into your anus and advance it into your colon (figure 2). The tube has a camera attached to it, so the doctor can look inside your colon. The tube also has tools on the end, so the doctor can remove pieces of tissue, including polyps. After polyps are removed, they usually go to a lab to be tested for cancer and other problems.  · Sigmoidoscopy - A sigmoidoscopy is very similar to a colonoscopy. The only difference is that this test looks only at the first part of the colon, and a colonoscopy looks at the whole colon.  · CT colonography (also known as virtual colonoscopy) - For a virtual colonoscopy, you have a special kind of X-ray taken, called a "CT scan." This test creates pictures of the colon.  · Stool test - "Stool" is another word for "bowel movements." Stool tests check for blood or abnormal genes in samples of stool. If a stool test indicates that something might be wrong with the colon, doctors usually follow up with a colonoscopy. Then doctors find polyps, if they are there.  · Capsule colonoscopy - Rarely, your doctor might do something called a "capsule" colonoscopy. For this test, you swallow a special capsule that contains tiny wireless video cameras.   How are colon polyps treated? -- Doctors remove polyps using the same tools they use for a colonoscopy. They can remove polyps either by snipping them off with a special cutting tool, or by catching the polyps in a noose (figure 3). Most polyps can be removed during a colonoscopy. But sometimes, large polyps need to be removed at a later time, either with another colonoscopy or with surgery.  What happens after I have polyps removed? -- You might need to have a colonoscopy " "every few years to check for more polyps. In some people polyps come back. And if you had the kind of polyps that could become cancer, your doctor will want to remove them as they appear. Also, if the polyps you had removed were the kind that could become cancer, people in your family might need to be checked for polyps and colon cancer earlier than if you did not have polyps.  Depending on your situation, your doctor might suggest genetic testing. This can show if your polyps are related to a specific gene that runs in families. If this turns out to be the case, they might recommend other tests that can be done to prevent cancer or find it early.  Can colon polyps be prevented? -- To reduce your chances of getting polyps or colon cancer:  · Eat a diet that is low in fat and high in fruits, vegetables, and fiber  · Lose weight, if you are overweight  · Do not smoke  · Limit the amount of alcohol you drink  All topics are updated as new evidence becomes available and our peer review process is complete.  This topic retrieved from [employer] on: Sep 21, 2021.  Topic 85905 Version 8.0  Release: 29.4.2 - C29.263  © 2021 UpToDate, Inc. and/or its affiliates. All rights reserved.  figure 1: Digestive system     This drawing shows the organs in the body that process food. Together these organs are called "the digestive system," or "digestive tract." As food travels through this system, the body absorbs nutrients and water.  Graphic 79867 Version 4.0    figure 2: Colonoscopy     During a colonoscopy, you lie on your side and the doctor puts a thin tube with a camera into your anus (from behind). Then the doctor advances the tube into the rectum and colon. The camera sends pictures from inside your colon to a television screen.  Graphic 16138 Version 6.0    figure 3: Removing a colon polyp     One way doctors remove colon polyps is to use a noose as a tool. They loop a wire around the polyp and squeeze the loop tight. When the " polyp comes off, the doctor sucks it up into the endoscope, so that it can go to the lab for tests.  Graphic 99110 Version 5.0    Consumer Information Use and Disclaimer   This information is not specific medical advice and does not replace information you receive from your health care provider. This is only a brief summary of general information. It does NOT include all information about conditions, illnesses, injuries, tests, procedures, treatments, therapies, discharge instructions or life-style choices that may apply to you. You must talk with your health care provider for complete information about your health and treatment options. This information should not be used to decide whether or not to accept your health care provider's advice, instructions or recommendations. Only your health care provider has the knowledge and training to provide advice that is right for you. The use of this information is governed by the [employer] User License Agreement, available at https://www.[employer]/en/solutions/[employer]/about/virgilio.The use of [employer] content is governed by the [employer] Terms of Use. ©2021 [employer] All rights reserved.  Copyright   © 2021 UpToDate, Inc. and/or its affiliates. All rights reserved.

## 2021-12-27 NOTE — H&P
[facility] Endoscopy  Gastroenterology  H&P    Patient Name: Roberta Ware Jr.  MRN: [id number]  Admission Date: 12/27/2021  Code Status: Prior    Attending Provider: delores messina  Primary Care Physician: Antwan Ambrosio MD  Principal Problem:<principal problem not specified>    Subjective:     History of Present Illness: colon cancer screen    Past Medical History:   Diagnosis Date    Diverticulosis     Erectile dysfunction     Gout     Hypertension     Osteoarthritis        Past Surgical History:   Procedure Laterality Date    CHOLECYSTECTOMY      COLONOSCOPY  2012    UPPER GASTROINTESTINAL ENDOSCOPY         Review of patient's allergies indicates:   Allergen Reactions    Hydrochlorothiazide Other (See Comments)     Gout    Tramadol Swelling     Pt states make him jittery      Family History     Problem Relation (Age of Onset)    Anemia Sister    Cancer Mother    Diabetes Father    Heart disease Mother    Hypertension Father    Ulcers Father        Tobacco Use    Smoking status: Never Smoker    Smokeless tobacco: Never Used   Substance and Sexual Activity    Alcohol use: Yes     Comment: Ocassionally    Drug use: No    Sexual activity: Yes     Partners: Female     Review of Systems   Respiratory: Negative.    Cardiovascular: Negative.      Objective:     Vital Signs (Most Recent):    Vital Signs (24h Range):           There is no height or weight on file to calculate BMI.    No intake or output data in the 24 hours ending 12/27/21 1407    Lines/Drains/Airways     None                 Physical Exam  Cardiovascular:      Rate and Rhythm: Normal rate and regular rhythm.   Pulmonary:      Effort: Pulmonary effort is normal.      Breath sounds: Normal breath sounds.         Significant Labs:      Significant Imaging:      Assessment/Plan:     There are no hospital problems to display for this patient.      Indication for procedure:    ASA:II  Airway normal  Malampati class:    Personal and family  history negative for anesthesia problems    Plan:colon  Anesthesia plan: general      Solis Mendez MD  Gastroenterology  [facility] [place] [facility]

## 2021-12-29 ENCOUNTER — PATIENT MESSAGE (OUTPATIENT)
Dept: GASTROENTEROLOGY | Facility: HOSPITAL | Age: 51
End: 2021-12-29
Payer: COMMERCIAL

## 2021-12-29 LAB
FINAL PATHOLOGIC DIAGNOSIS: NORMAL
GROSS: NORMAL
Lab: NORMAL

## 2022-02-11 ENCOUNTER — TELEPHONE (OUTPATIENT)
Dept: HEMATOLOGY/ONCOLOGY | Facility: CLINIC | Age: 52
End: 2022-02-11
Payer: COMMERCIAL

## 2022-02-11 ENCOUNTER — LAB VISIT (OUTPATIENT)
Dept: LAB | Facility: HOSPITAL | Age: 52
End: 2022-02-11
Attending: INTERNAL MEDICINE
Payer: COMMERCIAL

## 2022-02-11 DIAGNOSIS — D72.829 LEUKOCYTOSIS, UNSPECIFIED TYPE: ICD-10-CM

## 2022-02-11 DIAGNOSIS — D50.9 IRON DEFICIENCY ANEMIA, UNSPECIFIED IRON DEFICIENCY ANEMIA TYPE: ICD-10-CM

## 2022-02-11 LAB
BASOPHILS # BLD AUTO: 0.06 K/UL (ref 0–0.2)
BASOPHILS NFR BLD: 0.4 % (ref 0–1.9)
DIFFERENTIAL METHOD: ABNORMAL
EOSINOPHIL # BLD AUTO: 0.4 K/UL (ref 0–0.5)
EOSINOPHIL NFR BLD: 2.8 % (ref 0–8)
ERYTHROCYTE [DISTWIDTH] IN BLOOD BY AUTOMATED COUNT: 15.5 % (ref 11.5–14.5)
FERRITIN SERPL-MCNC: 126 NG/ML (ref 20–300)
HCT VFR BLD AUTO: 42.8 % (ref 40–54)
HGB BLD-MCNC: 13.6 G/DL (ref 14–18)
IMM GRANULOCYTES # BLD AUTO: 0.05 K/UL (ref 0–0.04)
IMM GRANULOCYTES NFR BLD AUTO: 0.4 % (ref 0–0.5)
IRON SERPL-MCNC: 52 UG/DL (ref 45–160)
IRON SERPL-MCNC: 52 UG/DL (ref 45–160)
LYMPHOCYTES # BLD AUTO: 5.3 K/UL (ref 1–4.8)
LYMPHOCYTES NFR BLD: 37.5 % (ref 18–48)
MCH RBC QN AUTO: 27.5 PG (ref 27–31)
MCHC RBC AUTO-ENTMCNC: 31.8 G/DL (ref 32–36)
MCV RBC AUTO: 87 FL (ref 82–98)
MONOCYTES # BLD AUTO: 0.7 K/UL (ref 0.3–1)
MONOCYTES NFR BLD: 4.7 % (ref 4–15)
NEUTROPHILS # BLD AUTO: 7.6 K/UL (ref 1.8–7.7)
NEUTROPHILS NFR BLD: 54.2 % (ref 38–73)
NRBC BLD-RTO: 0 /100 WBC
PLATELET # BLD AUTO: 301 K/UL (ref 150–450)
PMV BLD AUTO: 10.3 FL (ref 9.2–12.9)
RBC # BLD AUTO: 4.95 M/UL (ref 4.6–6.2)
SATURATED IRON: 16 % (ref 20–50)
TOTAL IRON BINDING CAPACITY: 320 UG/DL (ref 250–450)
TRANSFERRIN SERPL-MCNC: 216 MG/DL (ref 200–375)
WBC # BLD AUTO: 14.03 K/UL (ref 3.9–12.7)

## 2022-02-11 PROCEDURE — 36415 COLL VENOUS BLD VENIPUNCTURE: CPT | Mod: PN | Performed by: INTERNAL MEDICINE

## 2022-02-11 PROCEDURE — 81340 TRB@ GENE REARRANGE AMPLIFY: CPT | Performed by: INTERNAL MEDICINE

## 2022-02-11 PROCEDURE — 81342 TRG GENE REARRANGEMENT ANAL: CPT | Performed by: INTERNAL MEDICINE

## 2022-02-11 PROCEDURE — 85025 COMPLETE CBC W/AUTO DIFF WBC: CPT | Performed by: INTERNAL MEDICINE

## 2022-02-11 PROCEDURE — 82728 ASSAY OF FERRITIN: CPT | Performed by: INTERNAL MEDICINE

## 2022-02-11 PROCEDURE — 84466 ASSAY OF TRANSFERRIN: CPT | Performed by: INTERNAL MEDICINE

## 2022-02-11 NOTE — TELEPHONE ENCOUNTER
Spoke with patient regarding scheduled clinic appointment on Monday, 2/14/2022 @ 10:20 am. Patient made aware that labs scheduled on 1/14/2022 were not done for scheduled clinic appointment. Patient instructed that labs need to be prior to schedule appointments in the near further. Patient verbalized understanding and agreed to do labs today for appointment on Monday.

## 2022-02-14 ENCOUNTER — OFFICE VISIT (OUTPATIENT)
Dept: HEMATOLOGY/ONCOLOGY | Facility: CLINIC | Age: 52
End: 2022-02-14
Payer: COMMERCIAL

## 2022-02-14 VITALS
HEART RATE: 73 BPM | TEMPERATURE: 98 F | WEIGHT: 315 LBS | DIASTOLIC BLOOD PRESSURE: 85 MMHG | HEIGHT: 76 IN | OXYGEN SATURATION: 99 % | SYSTOLIC BLOOD PRESSURE: 146 MMHG | BODY MASS INDEX: 38.36 KG/M2

## 2022-02-14 DIAGNOSIS — D72.829 LEUKOCYTOSIS, UNSPECIFIED TYPE: ICD-10-CM

## 2022-02-14 DIAGNOSIS — D50.9 IRON DEFICIENCY ANEMIA, UNSPECIFIED IRON DEFICIENCY ANEMIA TYPE: Primary | ICD-10-CM

## 2022-02-14 DIAGNOSIS — I10 ESSENTIAL HYPERTENSION: ICD-10-CM

## 2022-02-14 DIAGNOSIS — D72.820 LYMPHOCYTOSIS: ICD-10-CM

## 2022-02-14 PROCEDURE — 99999 PR PBB SHADOW E&M-EST. PATIENT-LVL V: ICD-10-PCS | Mod: PBBFAC,,, | Performed by: INTERNAL MEDICINE

## 2022-02-14 PROCEDURE — 3008F PR BODY MASS INDEX (BMI) DOCUMENTED: ICD-10-PCS | Mod: CPTII,S$GLB,, | Performed by: INTERNAL MEDICINE

## 2022-02-14 PROCEDURE — 3077F SYST BP >= 140 MM HG: CPT | Mod: CPTII,S$GLB,, | Performed by: INTERNAL MEDICINE

## 2022-02-14 PROCEDURE — 1159F MED LIST DOCD IN RCRD: CPT | Mod: CPTII,S$GLB,, | Performed by: INTERNAL MEDICINE

## 2022-02-14 PROCEDURE — 99214 OFFICE O/P EST MOD 30 MIN: CPT | Mod: S$GLB,,, | Performed by: INTERNAL MEDICINE

## 2022-02-14 PROCEDURE — 1159F PR MEDICATION LIST DOCUMENTED IN MEDICAL RECORD: ICD-10-PCS | Mod: CPTII,S$GLB,, | Performed by: INTERNAL MEDICINE

## 2022-02-14 PROCEDURE — 3079F PR MOST RECENT DIASTOLIC BLOOD PRESSURE 80-89 MM HG: ICD-10-PCS | Mod: CPTII,S$GLB,, | Performed by: INTERNAL MEDICINE

## 2022-02-14 PROCEDURE — 99214 PR OFFICE/OUTPT VISIT, EST, LEVL IV, 30-39 MIN: ICD-10-PCS | Mod: S$GLB,,, | Performed by: INTERNAL MEDICINE

## 2022-02-14 PROCEDURE — 3077F PR MOST RECENT SYSTOLIC BLOOD PRESSURE >= 140 MM HG: ICD-10-PCS | Mod: CPTII,S$GLB,, | Performed by: INTERNAL MEDICINE

## 2022-02-14 PROCEDURE — 3008F BODY MASS INDEX DOCD: CPT | Mod: CPTII,S$GLB,, | Performed by: INTERNAL MEDICINE

## 2022-02-14 PROCEDURE — 99999 PR PBB SHADOW E&M-EST. PATIENT-LVL V: CPT | Mod: PBBFAC,,, | Performed by: INTERNAL MEDICINE

## 2022-02-14 PROCEDURE — 3079F DIAST BP 80-89 MM HG: CPT | Mod: CPTII,S$GLB,, | Performed by: INTERNAL MEDICINE

## 2022-02-14 NOTE — PROGRESS NOTES
Subjective:       Patient ID: Roberta Ware Jr. is a 51 y.o. male.    Chief Complaint: leukocytosis     Diagnosis: 1. Leukocytosis                     2. Anemia        HISTORY OF PRESENT ILLNESS:  The patient is a pleasant 51-year-old gentleman seen today for f/u for abnl blood counts. He has anemia and thrombocytosis  And intermittent leukocytosis. The patient was hospitalized 12/2016 with acute blood loss anemia.  The patient reported 1-1/2 week history of dark stools, lightheadedness, dizziness, fatigue and dyspnea on exertion.  Hemoglobin 6g/dl on admission. He underwent a total of 4 units of packed red blood cell transfusion during hospitalization.The patient was followed by GI during hospitalization.  EGD  performed and showed ulcerations without any evidence of bleeding.  The patient was followed by GI during hospitalization.  He takes indomethacin for gout approximately one time per week.  He also had been taking ibuprofen for headaches.  The patient reports an intermittent epigastric abdominal pain over the past year.  The patient was begun on a PPI since hospital discharge.  He has had no further episodes of melena.  Review of records reveals that the patient was previously followed by colleague, Dr. Paul.    Dr. Paul prescribed ferrous sulfate supplementation.  The patient did not take prescribed medication.  No history of alcohol abuse, chronic liver disease, clotting disorder, neuropathy or recent surgery.  He does have chronic kidney disease, stage III,HTN, gout and morbid obesity.  The patient previously  lost to follow up in this clinic and was last seen on 1/23/2015.  He underwent a colonoscopy approximately four years ago significant for diverticulosis.  He is a lifelong nonsmoker.  Workup to date revealed an abnormal flow cytometry, which revealed an atypical T-cell subset.  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  MICHAEL-2 mutation testing was negative. CBC from 12/27/2016  "reveals [place] 39[address], hemoglobin of 8.6 g/dL, hematocrit of 28.5%, MCV of 86 and platelet count of 502,000.Pt was previously lost to follow-up       Interval Hx:   Doing well  No new issues  No fevers  No SOB/CP/cough  No melena, hematochezia or change in bowel habits   No recent infections    A 4 mm polyp was found in the transverse colon  CBC shows wbc [id number] Hb 13.6g/dL Hct 42.8 % plt ct 301k.       PAST MEDICAL HISTORY:  Diverticulosis, ED, GERD, gout, essential hypertension, morbid obesity, osteoarthritis, ulcer.    PAST SURGICAL HISTORY:  Colonoscopy,EGD    FAMILY HISTORY: Mother diagnosed with breast CA. Maternal aunt diagnosed with breast CA. Paternal aunt diagnosed with lung cancer. Paternal aunt diagnosed with breast cancer        Review of Systems   Constitutional: Negative for appetite change, fatigue, fever and unexpected weight change.   HENT: Negative for mouth sores.    Eyes: Negative for visual disturbance.   Respiratory: Negative for cough and shortness of breath.    Cardiovascular: Negative for chest pain.   Gastrointestinal: Negative for abdominal pain, blood in stool and diarrhea.   Genitourinary: Negative for frequency and hematuria.   Musculoskeletal: Negative for back pain.   Skin: Negative for rash.   Neurological: Negative for headaches.   Hematological: Negative for adenopathy.   Psychiatric/Behavioral: The patient is not nervous/anxious.        Objective:       Vitals:    02/14/22 1033   BP: (!) 146/85   BP Location: Left arm   Patient Position: Sitting   BP Method: Large (Automatic)   Pulse: 73   Temp: 98.2 °F (36.8 °C)   TempSrc: Oral   SpO2: 99%   Weight: (!) 211.4 kg (466 lb 0.8 oz)   Height: 6' 3.5" (1.918 m)       Physical Exam  Constitutional:       Appearance: He is well-developed.   HENT:      Head: Normocephalic.   Eyes:      General: No scleral icterus.     Conjunctiva/sclera: Conjunctivae normal.   Neck:      Thyroid: No thyromegaly.   Cardiovascular:    "   Rate and Rhythm: Normal rate and regular rhythm.      Heart sounds: No murmur heard.      Pulmonary:      Effort: Pulmonary effort is normal.      Breath sounds: Normal breath sounds. No wheezing or rales.   Chest:   Breasts:      Right: No supraclavicular adenopathy.      Left: No supraclavicular adenopathy.       Abdominal:      General: Bowel sounds are normal.      Palpations: Abdomen is soft.   Musculoskeletal:         General: Normal range of motion.      Cervical back: Normal range of motion and neck supple.   Lymphadenopathy:      Cervical: No cervical adenopathy.      Upper Body:      Right upper body: No supraclavicular adenopathy.      Left upper body: No supraclavicular adenopathy.   Skin:     Findings: No erythema or rash.   Neurological:      Mental Status: He is alert and oriented to person, place, and time.      Cranial Nerves: No cranial nerve deficit.         Labs:   Lab Results   Component Value Date    WBC 14.03 (H) 02/11/2022    HGB 13.6 (L) 02/11/2022    HCT 42.8 02/11/2022    MCV 87 02/11/2022     02/11/2022       SPEP 1/16/17  Normal total protein.   Increased gamma globulin, polyclonal.     Pathologist interpretation of peripheral blood smear:   Mild leukocytosis shows a mild absolute neutrophilia with some toxic   changes including cytoplasmic vacuoles.  The overall process appears   reactive.  Clinical correlation is required.   Additionally, there are scattered atypical lymphocytes, correlate   clinically in this patient with a history of a positive T-cell   receptor gene rearrangement by PCR.   Normocytic red cells appear mildly hypochromic.  Correlation with the   patient's iron status is recommended.   Increased platelets are noted.  This can be seen with iron   deficiency.  If the patient has not clinically iron deficient,     further evaluation may be indicated.       Interpretation Atypical T-cell subset.  See comment.  Correlation with peripheral blood   morphology and  with T-cell receptor gene rearrangements by PCR recommended.    Comment: Interp By Swati Dominguez MD, Signed on 07/14/2021 at 13:49   Blood Comment Flow cytometric analysis of  peripheral blood shows populations of   polyclonal B lymphocytes and T lymphocytes that are immunophenotypically   unremarkable.  However, there is a subset of T-cells showing expression of   CD3, dim CD7, CD2 and CD5 with coexpression of CD4 and dim CD8 and aberrant   expression of CD57.  CD16 is negative in this population.  This is an   atypical T-cell subset and could be reactive versus other.  The differential   diagnosis is broad and includes a reactive subset verses T-LGL leukemia   versus others.  Correlation with peripheral blood morphology and T-cell   receptor gene rearrangements by PCR is recommended.   The blast gate is not increased.   Flow differential:  Lymphocytes 33.3%, Monocytes 2.8%, Granulocytes  55.0%,   Blast  0.9%, Debris/nRBC 0.9%,  Viability 94.2%           Lab Results   Component Value Date    WBC 14.03 (H) 02/11/2022    HGB 13.6 (L) 02/11/2022    HCT 42.8 02/11/2022    MCV 87 02/11/2022     02/11/2022           Lab Results   Component Value Date    IRON 52 02/11/2022    IRON 52 02/11/2022    TIBC 320 02/11/2022    FERRITIN 126 02/11/2022     Results for TY CATES JRLo (MRN [id number]) as of 12/17/2019 14:52   Ref. Range 7/1/2018 08:42 9/1/2018 10:12 10/12/2019 11:25   Sed Rate Latest Ref Range: 0 - 23 mm/Hr 49 (H) 26 (H) 34 (H)       Assessment:       1. Iron deficiency anemia, unspecified iron deficiency anemia type    2. Leukocytosis, unspecified type    3. Essential hypertension        Plan:   1-2 Pt clinically stable  51-year-old gentleman with a longstanding history a mild   Anemia,previously  lost to follow up, with  hospitalization 12/2016 secondary to   acute-on-chronic anemia related to acute blood loss.   SPEP-polyclonal   Fe parameters - borderline low serum Fe and decreased Fe  sats  C-scope 2011- divertivuloisis  EGD 2016 - non-bleeding duodenal ulcer with no stigmata                         of bleeding.  Take OTC Fe supp to bid prn tolerability and take with Vit C supp to improve absorption (sub compliant)  Pt counseled on potential drug-induced constipation  He also has chronic mild leukocytosis >9 yrs  Previous hematological workup to date revealed abnormal flow cytometry with an atypical T-cell subset of unknown significance.    JAK2 mutation testing negative.    Plt ct wnl   T-cell receptor gene rearrangement pending   Previously Discussed issue of bmbx for further evaluation- pt now agreeable  CBC today shows relative chronic mild leukocytosis with assoc lymphocytosis  colonoscopy 12/27/21 shows  4 mm polyp was found in the transverse colon  Fe studies 126   Plan bmbx   Informed consent obtained  Send FISH HOLD  3. Well-controlled  Cont BP meds      bmbx - zyra to discuss - pt undecided  Cbc,cmp prior to f/u 3mos      CC: Antwan Ambrosio M.D.

## 2022-02-15 ENCOUNTER — TELEPHONE (OUTPATIENT)
Dept: HEMATOLOGY/ONCOLOGY | Facility: CLINIC | Age: 52
End: 2022-02-15
Payer: COMMERCIAL

## 2022-02-15 LAB
PATHOLOGIST NAME: NORMAL
T CELL RECEPTOR GENE REARRANGEMENT, BLOOD: NORMAL

## 2022-02-15 NOTE — TELEPHONE ENCOUNTER
TC to IR  Spoke w/ [facility] requesting patient be scheduled for BM bx in IR  He will contact patient   Pt advised of change in location for bone marrow biopsy due to table in clinic not being able to safely accommodate him

## 2022-02-17 DIAGNOSIS — D72.829 LEUKOCYTOSIS, UNSPECIFIED TYPE: Primary | ICD-10-CM

## 2022-02-17 DIAGNOSIS — D72.820 LYMPHOCYTOSIS: ICD-10-CM

## 2022-02-23 ENCOUNTER — TELEPHONE (OUTPATIENT)
Dept: INTERVENTIONAL RADIOLOGY/VASCULAR | Facility: HOSPITAL | Age: 52
End: 2022-02-23

## 2022-02-23 ENCOUNTER — PATIENT OUTREACH (OUTPATIENT)
Dept: ADMINISTRATIVE | Facility: OTHER | Age: 52
End: 2022-02-23
Payer: COMMERCIAL

## 2022-02-23 DIAGNOSIS — D72.829 LEUKOCYTOSIS, UNSPECIFIED TYPE: Primary | ICD-10-CM

## 2022-02-23 NOTE — TELEPHONE ENCOUNTER
Pt called back, he had some concerns so he told me he will call his doctor and address his concerns and will call us back afterwards.

## 2022-02-23 NOTE — TELEPHONE ENCOUNTER
Attempted to call patient to schedule IR procedure. Unable to reach patient, a voicemail was left with our contact information ([phone]).

## 2022-02-28 ENCOUNTER — OFFICE VISIT (OUTPATIENT)
Dept: OTOLARYNGOLOGY | Facility: CLINIC | Age: 52
End: 2022-02-28
Payer: COMMERCIAL

## 2022-02-28 VITALS — SYSTOLIC BLOOD PRESSURE: 158 MMHG | HEART RATE: 70 BPM | DIASTOLIC BLOOD PRESSURE: 96 MMHG

## 2022-02-28 DIAGNOSIS — H61.23 HEARING LOSS DUE TO CERUMEN IMPACTION, BILATERAL: ICD-10-CM

## 2022-02-28 DIAGNOSIS — H92.03 EAR DISCOMFORT, BILATERAL: Primary | ICD-10-CM

## 2022-02-28 PROCEDURE — 99999 PR PBB SHADOW E&M-EST. PATIENT-LVL III: ICD-10-PCS | Mod: PBBFAC,,, | Performed by: OTOLARYNGOLOGY

## 2022-02-28 PROCEDURE — 3077F PR MOST RECENT SYSTOLIC BLOOD PRESSURE >= 140 MM HG: ICD-10-PCS | Mod: CPTII,S$GLB,, | Performed by: OTOLARYNGOLOGY

## 2022-02-28 PROCEDURE — 69210 PR REMOVAL IMPACTED CERUMEN REQUIRING INSTRUMENTATION, UNILATERAL: ICD-10-PCS | Mod: S$GLB,,, | Performed by: OTOLARYNGOLOGY

## 2022-02-28 PROCEDURE — 99212 OFFICE O/P EST SF 10 MIN: CPT | Mod: 25,S$GLB,, | Performed by: OTOLARYNGOLOGY

## 2022-02-28 PROCEDURE — 99999 PR PBB SHADOW E&M-EST. PATIENT-LVL III: CPT | Mod: PBBFAC,,, | Performed by: OTOLARYNGOLOGY

## 2022-02-28 PROCEDURE — 3077F SYST BP >= 140 MM HG: CPT | Mod: CPTII,S$GLB,, | Performed by: OTOLARYNGOLOGY

## 2022-02-28 PROCEDURE — 99212 PR OFFICE/OUTPT VISIT, EST, LEVL II, 10-19 MIN: ICD-10-PCS | Mod: 25,S$GLB,, | Performed by: OTOLARYNGOLOGY

## 2022-02-28 PROCEDURE — 1159F MED LIST DOCD IN RCRD: CPT | Mod: CPTII,S$GLB,, | Performed by: OTOLARYNGOLOGY

## 2022-02-28 PROCEDURE — 69210 REMOVE IMPACTED EAR WAX UNI: CPT | Mod: S$GLB,,, | Performed by: OTOLARYNGOLOGY

## 2022-02-28 PROCEDURE — 3080F DIAST BP >= 90 MM HG: CPT | Mod: CPTII,S$GLB,, | Performed by: OTOLARYNGOLOGY

## 2022-02-28 PROCEDURE — 1159F PR MEDICATION LIST DOCUMENTED IN MEDICAL RECORD: ICD-10-PCS | Mod: CPTII,S$GLB,, | Performed by: OTOLARYNGOLOGY

## 2022-02-28 PROCEDURE — 3080F PR MOST RECENT DIASTOLIC BLOOD PRESSURE >= 90 MM HG: ICD-10-PCS | Mod: CPTII,S$GLB,, | Performed by: OTOLARYNGOLOGY

## 2022-02-28 NOTE — PATIENT INSTRUCTIONS
Large occlusive cerumen impactions removed from the lengths of both eacs   RTC yearly for ear cleaning

## 2022-02-28 NOTE — PROGRESS NOTES
CC:  HPI:Mr. Ware is a 51 year old AAM with a history of gouty arthritis, hypertension anemia, thrombocytosis and  intermittent leukocytosis. He works from home now. He uses bluetooth devices in his ears.  He presents today for ear cleaning procedures.   His ears were last cleaned by me in April 2019; large cerumen impactions were removed from both eacs at that visit.    Past Medical History:   Diagnosis Date    Diverticulosis     Erectile dysfunction     Gout     Hypertension     Osteoarthritis    ALL; HCTZ, Tramadol      PE: Gen.: alert and oriented AAM in no acute distress  Both ears are examined under the micrscope.  Procedures:  A large volume of occlusive cerumen is removed from the larynx both ear canals with use of a blunt curette and suction.  Both TMs are clear and intact as visualized.  The patient's hearing is improved after the procedures.  Audiology was not completed today      DIAGNOSIS:     ICD-10-CM ICD-9-CM    1. Ear discomfort, bilateral  H92.03 388.70    2. Hearing loss due to cerumen impaction, bilateral  H61.23 389.8      380.4      PLAN: Large occlusive cerumen impactions removed from the lengths of both eacs   RTC yearly for ear cleaning

## 2022-03-02 NOTE — TELEPHONE ENCOUNTER
Rec'd incoming call from patient regarding scheduling bone marrow biopsy. Patient stated he received a call from [facility] IR department to schedule, but patient was not sure if he was suppose to have done at [facility]. Patient called to speak with Allie, patient was advised the Allie is out of the office until Monday,3/7/2022 and upon return Allie will call to discuss. Patient verbalized understanding.

## 2022-03-03 ENCOUNTER — PATIENT MESSAGE (OUTPATIENT)
Dept: UROLOGY | Facility: CLINIC | Age: 52
End: 2022-03-03
Payer: COMMERCIAL

## 2022-03-07 ENCOUNTER — TELEPHONE (OUTPATIENT)
Dept: INTERVENTIONAL RADIOLOGY/VASCULAR | Facility: HOSPITAL | Age: 52
End: 2022-03-07

## 2022-03-07 ENCOUNTER — TELEPHONE (OUTPATIENT)
Dept: HEMATOLOGY/ONCOLOGY | Facility: CLINIC | Age: 52
End: 2022-03-07
Payer: COMMERCIAL

## 2022-03-07 NOTE — TELEPHONE ENCOUNTER
Rec'd call from IR department [facility]olen regarding patient does not want to have bone [facility] biopsy [facility], patient wants done at [facility]. Message was related to Allie to contact IR department at [facility] for scheduling.

## 2022-03-07 NOTE — TELEPHONE ENCOUNTER
Tc to IR  Spoke with Harley       Advised him a BM was requested on 2-15 and patient  sated he has not  received call   Harley stated they spoke w/ him on 2/23 and at that time he declined to schedule as he was undecided  Harley  Stated he would get BM approved by Dr Greenfield tomorrow and  He will contact patient again to schedule     TC rec'd from IR   Patient was contacted  and he is now requesting BM be performed at [facility]    TC to Devora Advised her that patient was requesting BM be performed at [facility]  She will contact patient to schedule    TC rec'd from pt  He would like BM Bx scheduled on [facility]  He was somewhat confused as to if it could be done on [facility] Explained in detail to him that it will be performed in IR  And they will contact him directly to schedule  Devora at [facility] advised that she does not need to schedule pt at this time as he has requested it be performed on [facility]    TC to Harley in IR advising him that patient now elects for procedure  to be performed at the [facility] location  He will conatct patient and schedule

## 2022-03-07 NOTE — TELEPHONE ENCOUNTER
I called the patient this morning to schedule his procedure. Pt told me he wants the procedure done at [facility]. I called the  Office and told them he wanted [facility].

## 2022-03-08 ENCOUNTER — LAB VISIT (OUTPATIENT)
Dept: LAB | Facility: HOSPITAL | Age: 52
End: 2022-03-08
Attending: INTERNAL MEDICINE
Payer: COMMERCIAL

## 2022-03-08 DIAGNOSIS — E29.1 HYPOGONADISM MALE: ICD-10-CM

## 2022-03-08 LAB — TESTOST SERPL-MCNC: 226 NG/DL (ref 304–1227)

## 2022-03-08 PROCEDURE — 84403 ASSAY OF TOTAL TESTOSTERONE: CPT | Performed by: UROLOGY

## 2022-03-08 PROCEDURE — 36415 COLL VENOUS BLD VENIPUNCTURE: CPT | Mod: PN | Performed by: UROLOGY

## 2022-03-10 ENCOUNTER — TELEPHONE (OUTPATIENT)
Dept: HEMATOLOGY/ONCOLOGY | Facility: CLINIC | Age: 52
End: 2022-03-10
Payer: COMMERCIAL

## 2022-03-10 ENCOUNTER — PATIENT MESSAGE (OUTPATIENT)
Dept: UROLOGY | Facility: CLINIC | Age: 52
End: 2022-03-10
Payer: COMMERCIAL

## 2022-03-10 NOTE — TELEPHONE ENCOUNTER
Tc attempted to patient to schedule an appointment for bone marrow results  VM full  Appointment letter mailed to pt re: cortest on this date

## 2022-03-16 DIAGNOSIS — Z11.59 NEED FOR HEPATITIS C SCREENING TEST: ICD-10-CM

## 2022-04-12 ENCOUNTER — TELEPHONE (OUTPATIENT)
Dept: HEMATOLOGY/ONCOLOGY | Facility: CLINIC | Age: 52
End: 2022-04-12
Payer: COMMERCIAL

## 2022-04-12 NOTE — TELEPHONE ENCOUNTER
Pt informed IR will contacrt him with another appointment for a BM Bx he acknowledged understanding  He was advsied to contact office once he receives his appt date and time so he can be scheduled for a follow up visit for results   He agreed

## 2022-05-20 ENCOUNTER — HOSPITAL ENCOUNTER (OUTPATIENT)
Dept: PREADMISSION TESTING | Facility: HOSPITAL | Age: 52
Discharge: HOME | End: 2022-05-20
Attending: INTERNAL MEDICINE
Payer: COMMERCIAL

## 2022-05-20 VITALS
TEMPERATURE: 98 F | HEIGHT: 76 IN | WEIGHT: 315 LBS | RESPIRATION RATE: 19 BRPM | DIASTOLIC BLOOD PRESSURE: 88 MMHG | HEART RATE: 71 BPM | BODY MASS INDEX: 38.36 KG/M2 | SYSTOLIC BLOOD PRESSURE: 153 MMHG | OXYGEN SATURATION: 96 %

## 2022-05-20 DIAGNOSIS — Z01.818 PREOPERATIVE TESTING: Primary | ICD-10-CM

## 2022-05-20 LAB
ALBUMIN SERPL BCP-MCNC: 3.8 G/DL (ref 3.5–5.2)
ALP SERPL-CCNC: 66 U/L (ref 55–135)
ALT SERPL W/O P-5'-P-CCNC: 18 U/L (ref 10–44)
ANION GAP SERPL CALC-SCNC: 11 MMOL/L (ref 8–16)
AST SERPL-CCNC: 16 U/L (ref 10–40)
BASOPHILS # BLD AUTO: 0.07 K/UL (ref 0–0.2)
BASOPHILS NFR BLD: 0.6 % (ref 0–1.9)
BILIRUB SERPL-MCNC: 0.3 MG/DL (ref 0.1–1)
BUN SERPL-MCNC: 16 MG/DL (ref 6–20)
CALCIUM SERPL-MCNC: 9.4 MG/DL (ref 8.7–10.5)
CHLORIDE SERPL-SCNC: 101 MMOL/L (ref 95–110)
CO2 SERPL-SCNC: 29 MMOL/L (ref 23–29)
CREAT SERPL-MCNC: 1.4 MG/DL (ref 0.5–1.4)
DIFFERENTIAL METHOD: ABNORMAL
EOSINOPHIL # BLD AUTO: 0.5 K/UL (ref 0–0.5)
EOSINOPHIL NFR BLD: 4.2 % (ref 0–8)
ERYTHROCYTE [DISTWIDTH] IN BLOOD BY AUTOMATED COUNT: 15.4 % (ref 11.5–14.5)
EST. GFR  (AFRICAN AMERICAN): >60 ML/MIN/1.73 M^2
EST. GFR  (NON AFRICAN AMERICAN): 58 ML/MIN/1.73 M^2
GLUCOSE SERPL-MCNC: 96 MG/DL (ref 70–110)
HCT VFR BLD AUTO: 42.1 % (ref 40–54)
HGB BLD-MCNC: 12.9 G/DL (ref 14–18)
IMM GRANULOCYTES # BLD AUTO: 0.04 K/UL (ref 0–0.04)
IMM GRANULOCYTES NFR BLD AUTO: 0.3 % (ref 0–0.5)
INR PPP: 1 (ref 0.8–1.2)
LYMPHOCYTES # BLD AUTO: 3.5 K/UL (ref 1–4.8)
LYMPHOCYTES NFR BLD: 30.7 % (ref 18–48)
MCH RBC QN AUTO: 26.6 PG (ref 27–31)
MCHC RBC AUTO-ENTMCNC: 30.6 G/DL (ref 32–36)
MCV RBC AUTO: 87 FL (ref 82–98)
MONOCYTES # BLD AUTO: 0.5 K/UL (ref 0.3–1)
MONOCYTES NFR BLD: 4.5 % (ref 4–15)
NEUTROPHILS # BLD AUTO: 6.8 K/UL (ref 1.8–7.7)
NEUTROPHILS NFR BLD: 59.7 % (ref 38–73)
NRBC BLD-RTO: 0 /100 WBC
PLATELET # BLD AUTO: 315 K/UL (ref 150–450)
PMV BLD AUTO: 9.7 FL (ref 9.2–12.9)
POTASSIUM SERPL-SCNC: 3.5 MMOL/L (ref 3.5–5.1)
PROT SERPL-MCNC: 8 G/DL (ref 6–8.4)
PROTHROMBIN TIME: 11.1 SEC (ref 9–12.5)
RBC # BLD AUTO: 4.85 M/UL (ref 4.6–6.2)
SODIUM SERPL-SCNC: 141 MMOL/L (ref 136–145)
WBC # BLD AUTO: 11.45 K/UL (ref 3.9–12.7)

## 2022-05-20 PROCEDURE — 36415 COLL VENOUS BLD VENIPUNCTURE: CPT | Performed by: RADIOLOGY

## 2022-05-20 PROCEDURE — 85025 COMPLETE CBC W/AUTO DIFF WBC: CPT | Performed by: RADIOLOGY

## 2022-05-20 PROCEDURE — 85610 PROTHROMBIN TIME: CPT | Performed by: RADIOLOGY

## 2022-05-20 PROCEDURE — 80053 COMPREHEN METABOLIC PANEL: CPT | Performed by: RADIOLOGY

## 2022-05-20 RX ORDER — PREDNISONE 50 MG/1
50 TABLET ORAL DAILY PRN
COMMUNITY
Start: 2022-03-10 | End: 2023-07-25

## 2022-05-20 NOTE — PRE-PROCEDURE INSTRUCTIONS
Pre-operative instructions, medication directives and pain scales reviewed with patient. All questions the patient had were answered. Re-assurance about surgical procedure and day of surgery routine given as needed, patient verbalized understanding of the pre-op instructions.     Pt instructed to maintain NPO status starting the midnight before surgery (AM meds OK with a sip of water) and to avoid NSAIDs and vitamin/herbal supplements until after his bone marrow biopsy. He verbalized understanding of these instructions. Pre-Op Center contact info has been provided for any additional questions. His wife Cynthia will accompany him on the day of surgery.

## 2022-05-20 NOTE — DISCHARGE INSTRUCTIONS
Before 7 AM, enter through the Emergency Entrance..   After 7 AM enter through the Main Entrance.      Your procedure  is scheduled for Wednesday, May 25, 2022    Call [phone] between 2pm and 5pm on Tuesday, May 24, 2022 to find out your arrival time for the day of surgery.    You may use the main entrance to the hospital on the [facility] side, or the entrance that is next to the garage.    You may have two visitors.  Visiting hours for non-COVID-19 patients expanded to 24/7 (still restricted to one visitor)  Youth visitation changed from age 18 to age 12.      You will be going to the Same Day Surgery Unit on the 2nd floor of the hospital.    Important instructions:  Do not eat anything after midnight.  You may have plain water, non carbonated.  You may also have Gatorade or Powerade after midnight.    Stop all fluids 2 hours before your surgery.    It is okay to brush your teeth.  Do not have gum, candy or mints.    SEE MEDICATION SHEET.   TAKE MEDICATIONS AS DIRECTED WITH SIPS OF WATER.      STOP taking Aspirin, Ibuprofen,  Advil, Motrin, Mobic(meloxicam), Aleve (naproxen), Fish oil, and Vitamin E for at least 7 days before your surgery.     You may take Tylenol if needed which is not a blood thinner.    Please shower the night before and the morning of your surgery.      No shaving of procedural area at least 4-5 days before surgery due to increased risk of skin irritation and/or possible infection.    Contact lenses and removable denture work may not be worn during your procedure.    You may wear deodorant only. If you are having breast surgery, do not wear deodorant on the operative side.    Do not wear powder, body lotion, perfume/cologne or make-up.    Do not wear any jewelry or have any metal on your body.    You will be asked to remove any dentures or partials for the procedure.    If you are going home on the same day of surgery, you must arrange for a family member or a friend to drive  you home.  Public transportation is prohibited.  You will not be able to drive home if you were given anesthesia or sedation.    Patients who want to have their Post-op prescriptions filled from our in-house [facility], bring a Credit/Debit Card  or cash with you. A co-pay may be required.  The pharmacy closes at 5:30 pm.    Children under 18 years of age require a parent/guardian present the entire time that they are here.    Wear loose fitting clothes allowing for bandages.    Please leave money and valuables home.      You may bring your cell phone.    Call the doctor if fever or illness should occur before your surgery.    Call [phone] to contact us here if needed.

## 2022-05-25 ENCOUNTER — HOSPITAL ENCOUNTER (OUTPATIENT)
Dept: INTERVENTIONAL RADIOLOGY/VASCULAR | Facility: HOSPITAL | Age: 52
Discharge: HOME | End: 2022-05-25
Attending: INTERNAL MEDICINE | Admitting: RADIOLOGY
Payer: COMMERCIAL

## 2022-05-25 ENCOUNTER — HOSPITAL ENCOUNTER (OUTPATIENT)
Dept: RADIOLOGY | Facility: HOSPITAL | Age: 52
Discharge: HOME | End: 2022-05-25
Attending: RADIOLOGY | Admitting: RADIOLOGY
Payer: COMMERCIAL

## 2022-05-25 VITALS
OXYGEN SATURATION: 96 % | RESPIRATION RATE: 18 BRPM | SYSTOLIC BLOOD PRESSURE: 168 MMHG | DIASTOLIC BLOOD PRESSURE: 79 MMHG | BODY MASS INDEX: 57.1 KG/M2 | TEMPERATURE: 98 F | WEIGHT: 315 LBS | HEART RATE: 73 BPM

## 2022-05-25 DIAGNOSIS — D72.829 LEUKOCYTOSIS, UNSPECIFIED TYPE: ICD-10-CM

## 2022-05-25 PROCEDURE — 88311 DECALCIFY TISSUE: CPT | Performed by: PATHOLOGY

## 2022-05-25 PROCEDURE — 88305 TISSUE EXAM BY PATHOLOGIST: ICD-10-PCS | Mod: 26,,, | Performed by: PATHOLOGY

## 2022-05-25 PROCEDURE — 88313 SPECIAL STAINS GROUP 2: CPT | Mod: 59 | Performed by: PATHOLOGY

## 2022-05-25 PROCEDURE — 88311 DECALCIFY TISSUE: CPT | Mod: 26,,, | Performed by: PATHOLOGY

## 2022-05-25 PROCEDURE — 88189 PR  FLOWCYTOMETRY/READ, 16 & > MARKERS: ICD-10-PCS | Mod: ,,, | Performed by: PATHOLOGY

## 2022-05-25 PROCEDURE — 88341 IMHCHEM/IMCYTCHM EA ADD ANTB: CPT | Mod: 26,,, | Performed by: PATHOLOGY

## 2022-05-25 PROCEDURE — 99152 MOD SED SAME PHYS/QHP 5/>YRS: CPT | Mod: ,,, | Performed by: RADIOLOGY

## 2022-05-25 PROCEDURE — 88365 INSITU HYBRIDIZATION (FISH): CPT | Mod: 26,,, | Performed by: PATHOLOGY

## 2022-05-25 PROCEDURE — 85097 PR  BONE MARROW,SMEAR INTERPRETATION: ICD-10-PCS | Mod: ,,, | Performed by: PATHOLOGY

## 2022-05-25 PROCEDURE — 38222 IR BIOPSY BONE DEEP: ICD-10-PCS | Mod: ,,, | Performed by: RADIOLOGY

## 2022-05-25 PROCEDURE — 88311 PR  DECALCIFY TISSUE: ICD-10-PCS | Mod: 26,,, | Performed by: PATHOLOGY

## 2022-05-25 PROCEDURE — 77012 CT SCAN FOR NEEDLE BIOPSY: CPT | Mod: TC

## 2022-05-25 PROCEDURE — 88185 FLOWCYTOMETRY/TC ADD-ON: CPT | Mod: 59 | Performed by: PATHOLOGY

## 2022-05-25 PROCEDURE — 85097 BONE MARROW INTERPRETATION: CPT | Mod: ,,, | Performed by: PATHOLOGY

## 2022-05-25 PROCEDURE — 38222 DX BONE MARROW BX & ASPIR: CPT | Mod: ,,, | Performed by: RADIOLOGY

## 2022-05-25 PROCEDURE — 99152 MOD SED SAME PHYS/QHP 5/>YRS: CPT | Performed by: RADIOLOGY

## 2022-05-25 PROCEDURE — 99152 MOD SED SAME PHYS/QHP 5/>YRS: CPT

## 2022-05-25 PROCEDURE — 88237 TISSUE CULTURE BONE MARROW: CPT | Performed by: INTERNAL MEDICINE

## 2022-05-25 PROCEDURE — 88341 IMHCHEM/IMCYTCHM EA ADD ANTB: CPT | Mod: 59 | Performed by: PATHOLOGY

## 2022-05-25 PROCEDURE — C1830 POWER BONE MARROW BX NEEDLE: HCPCS

## 2022-05-25 PROCEDURE — 81342 TRG GENE REARRANGEMENT ANAL: CPT | Performed by: INTERNAL MEDICINE

## 2022-05-25 PROCEDURE — 88305 TISSUE EXAM BY PATHOLOGIST: CPT | Mod: 26,,, | Performed by: PATHOLOGY

## 2022-05-25 PROCEDURE — 88365 INSITU HYBRIDIZATION (FISH): CPT | Performed by: PATHOLOGY

## 2022-05-25 PROCEDURE — 63600175 PHARM REV CODE 636 W HCPCS: Performed by: RADIOLOGY

## 2022-05-25 PROCEDURE — 25000003 PHARM REV CODE 250: Performed by: RADIOLOGY

## 2022-05-25 PROCEDURE — 88341 PR IHC OR ICC EACH ADD'L SINGLE ANTIBODY  STAINPR: ICD-10-PCS | Mod: 26,,, | Performed by: PATHOLOGY

## 2022-05-25 PROCEDURE — 88365 PR  TISSUE HYBRIDIZATION: ICD-10-PCS | Mod: 26,,, | Performed by: PATHOLOGY

## 2022-05-25 PROCEDURE — 88305 TISSUE EXAM BY PATHOLOGIST: CPT | Performed by: PATHOLOGY

## 2022-05-25 PROCEDURE — 88264 CHROMOSOME ANALYSIS 20-25: CPT | Performed by: INTERNAL MEDICINE

## 2022-05-25 PROCEDURE — 81340 TRB@ GENE REARRANGE AMPLIFY: CPT | Performed by: INTERNAL MEDICINE

## 2022-05-25 PROCEDURE — 88342 CHG IMMUNOCYTOCHEMISTRY: ICD-10-PCS | Mod: 26,59,, | Performed by: PATHOLOGY

## 2022-05-25 PROCEDURE — 99153 MOD SED SAME PHYS/QHP EA: CPT

## 2022-05-25 PROCEDURE — 88313 PR  SPECIAL STAINS,GROUP II: ICD-10-PCS | Mod: 26,,, | Performed by: PATHOLOGY

## 2022-05-25 PROCEDURE — 88189 FLOWCYTOMETRY/READ 16 & >: CPT | Mod: ,,, | Performed by: PATHOLOGY

## 2022-05-25 PROCEDURE — 88342 IMHCHEM/IMCYTCHM 1ST ANTB: CPT | Mod: 59 | Performed by: PATHOLOGY

## 2022-05-25 PROCEDURE — 99152 PR MOD CONSCIOUS SEDATION, SAME PHYS, 5+ YRS, FIRST 15 MIN: ICD-10-PCS | Mod: ,,, | Performed by: RADIOLOGY

## 2022-05-25 PROCEDURE — 88342 IMHCHEM/IMCYTCHM 1ST ANTB: CPT | Mod: 26,59,, | Performed by: PATHOLOGY

## 2022-05-25 PROCEDURE — 88313 SPECIAL STAINS GROUP 2: CPT | Mod: 26,,, | Performed by: PATHOLOGY

## 2022-05-25 PROCEDURE — 88184 FLOWCYTOMETRY/ TC 1 MARKER: CPT | Performed by: PATHOLOGY

## 2022-05-25 PROCEDURE — 99153 MOD SED SAME PHYS/QHP EA: CPT | Performed by: RADIOLOGY

## 2022-05-25 RX ORDER — MIDAZOLAM HYDROCHLORIDE 1 MG/ML
INJECTION INTRAMUSCULAR; INTRAVENOUS CODE/TRAUMA/SEDATION MEDICATION
Status: COMPLETED | OUTPATIENT
Start: 2022-05-25 | End: 2022-05-25

## 2022-05-25 RX ORDER — HYDROCODONE BITARTRATE AND ACETAMINOPHEN 5; 325 MG/1; MG/1
1 TABLET ORAL EVERY 4 HOURS PRN
Status: DISCONTINUED | OUTPATIENT
Start: 2022-05-25 | End: 2022-05-26 | Stop reason: HOSPADM

## 2022-05-25 RX ORDER — FENTANYL CITRATE 50 UG/ML
INJECTION, SOLUTION INTRAMUSCULAR; INTRAVENOUS CODE/TRAUMA/SEDATION MEDICATION
Status: COMPLETED | OUTPATIENT
Start: 2022-05-25 | End: 2022-05-25

## 2022-05-25 RX ORDER — LIDOCAINE HYDROCHLORIDE 20 MG/ML
INJECTION, SOLUTION INFILTRATION; PERINEURAL CODE/TRAUMA/SEDATION MEDICATION
Status: COMPLETED | OUTPATIENT
Start: 2022-05-25 | End: 2022-05-25

## 2022-05-25 RX ADMIN — MIDAZOLAM HYDROCHLORIDE 0.5 MG: 1 INJECTION, SOLUTION INTRAMUSCULAR; INTRAVENOUS at 10:05

## 2022-05-25 RX ADMIN — FENTANYL CITRATE 25 MCG: 50 INJECTION, SOLUTION INTRAMUSCULAR; INTRAVENOUS at 10:05

## 2022-05-25 RX ADMIN — LIDOCAINE HYDROCHLORIDE 10 ML: 20 INJECTION, SOLUTION INFILTRATION; PERINEURAL at 10:05

## 2022-05-25 RX ADMIN — MIDAZOLAM HYDROCHLORIDE 1 MG: 1 INJECTION, SOLUTION INTRAMUSCULAR; INTRAVENOUS at 10:05

## 2022-05-25 RX ADMIN — FENTANYL CITRATE 50 MCG: 50 INJECTION, SOLUTION INTRAMUSCULAR; INTRAVENOUS at 10:05

## 2022-05-25 NOTE — BRIEF OP NOTE
Radiology Post-Procedure Note    Pre Op Diagnosis: Chronic leukocytosis/lymphocytosis and anemia of uncertain etiology  Post Op Diagnosis: Same    Procedure: 1. CT-guided percutaneous Rt-sided iliac bone marrow aspiration and 11-gauge core bone biopsy    Procedure performed by: Brian Greenfield MD    Written Informed Consent Obtained: Yes  Specimen Removed: YES, 20-cc BM aspirate and 1.2-cm core bone spicule biopsy sample  Estimated Blood Loss: less than 25.0-cc     Findings:   Successful CT-guided percutaneous Rt-sided iliac bone marrow aspiration and 11-gauge core bone biopsy with local anesthetic and moderate conscious sedation. Patient tolerated the procedure well. No immediate post-procedural complications noted.     Patient transferred back to [facility] for 1 hour post-op monitoring and bed rest prior to tentative discharge.    Thank you for considering IR for the care of your patient.     Brian Greenfield MD  Interventional Radiology

## 2022-05-25 NOTE — H&P
Radiology History & Physical      SUBJECTIVE:     Chief Complaint: Chronic leukocytosis/lymphocytosis and anemia of uncertain etiology    History of Present Illness:  Roberta Ware Jr. is a 51 y.o. male with pertinent PMHx of chronic leukocytosis/lymphocytosis and anemia of uncertain etiology requiring image-guided bone marrow sampling for diagnosis and treatment planning.    A new outpatient IR consult received for CT-guided percutaneous iliac bone marrow aspiration and 11-gauge core bone biopsy.    Past Medical History:   Diagnosis Date    Diverticulosis     Encounter for blood transfusion     Erectile dysfunction     Gout     Hypertension     Osteoarthritis      Past Surgical History:   Procedure Laterality Date    CHOLECYSTECTOMY      COLONOSCOPY  2012    COLONOSCOPY N/A 12/27/2021    Procedure: COLONOSCOPY;  Surgeon: Solis Mendez MD;  Location: [facility];  Service: Endoscopy;  Laterality: N/A;  11/16 bariatric stretcher; fully vaccinated; instr. to portal-st  12/23 pt confirmed arrival time and all prep instructions-ml    FINGER SURGERY Left 2017    left middle finger    UPPER GASTROINTESTINAL ENDOSCOPY       Home Meds:   Prior to Admission medications    Medication Sig Start Date End Date Taking? Authorizing Provider   allopurinoL (ZYLOPRIM) 300 MG tablet TAKE 1 TABLET(300 MG) BY MOUTH EVERY DAY 3/21/22  Yes Antwan Ambrosio MD   amLODIPine (NORVASC) 10 MG tablet TAKE 1 TABLET(10 MG) BY MOUTH EVERY DAY 3/21/22  Yes Antwan Ambrosio MD   chlorthalidone (HYGROTEN) 25 MG Tab TAKE 1 TABLET(25 MG) BY MOUTH EVERY DAY 3/21/22  Yes Antwan Ambrosio MD   naproxen (NAPROSYN) 500 MG tablet Take 1 tablet (500 mg total) by mouth 2 (two) times daily as needed. 2/17/21  Yes Antwan Ambrosio MD   predniSONE (DELTASONE) 50 MG Tab Take 50 mg by mouth daily as needed. 3/10/22  Yes Historical Provider   cetirizine (ZYRTEC) 10 MG tablet Take 1 tablet (10 mg total) by mouth once daily. 12/10/19 5/20/22  Mary Alfaro,  FNP   colchicine (COLCRYS) 0.6 mg tablet Take 1 tablet (0.6 mg total) by mouth 2 (two) times daily as needed (for gout).  Patient taking differently: Take 0.6 mg by mouth 2 (two) times daily as needed (for gout). 4/9/20 4/9/21  Antwan Ambrosio MD   sildenafiL (VIAGRA) 100 MG tablet Take 1 tablet (100 mg total) by mouth daily as needed for Erectile Dysfunction. 2/17/21 5/20/22  Antwan Ambrosio MD   tadalafiL (CIALIS) 20 MG Tab Take 1 tablet (20 mg total) by mouth every other day. Take every other day as needed 11/11/21 11/11/22  Stefan Cameron Jr., MD     Anticoagulants/Antiplatelets: no anticoagulation    Allergies:   Review of patient's allergies indicates:   Allergen Reactions    Hydrochlorothiazide Other (See Comments)     Gout    Tramadol Swelling     Pt states make him jittery      Sedation History:  no adverse reactions    Review of Systems:   Hematological: no known coagulopathies  Respiratory: no cough, shortness of breath, or wheezing  Cardiovascular: no chest pain or dyspnea on exertion  Gastrointestinal: no abdominal pain, change in bowel habits, or black or bloody stools  Genito-Urinary: no dysuria, trouble voiding, or hematuria  Musculoskeletal: negative  Neurological: no TIA or stroke symptoms       OBJECTIVE:     Vital Signs (Most Recent)     Physical Exam:  ASA: II  Mallampati: II    General: no acute distress  Mental Status: alert and oriented to person, place and time  HEENT: normocephalic, atraumatic  Chest: unlabored breathing  Heart: regular heart rate  Abdomen: nondistended  Extremity: moves all extremities    Laboratory  Lab Results   Component Value Date    INR 1.0 05/20/2022       Lab Results   Component Value Date    WBC 11.45 05/20/2022    HGB 12.9 (L) 05/20/2022    HCT 42.1 05/20/2022    MCV 87 05/20/2022     05/20/2022      Lab Results   Component Value Date    GLU 96 05/20/2022     05/20/2022    K 3.5 05/20/2022     05/20/2022    CO2 29 05/20/2022    BUN 16  05/20/2022    CREATININE 1.4 05/20/2022    CALCIUM 9.4 05/20/2022    MG 2.1 04/26/2017    ALT 18 05/20/2022    AST 16 05/20/2022    ALBUMIN 3.8 05/20/2022    BILITOT 0.3 05/20/2022     ASSESSMENT/PLAN:     51 y.o. male with pertinent PMHx of chronic leukocytosis/lymphocytosis and anemia of uncertain etiology requiring image-guided bone marrow sampling for diagnosis and treatment planning.    1. Chronic leukocytosis/lymphocytosis and anemia of uncertain etiology - Will attempt CT-guided percutaneous iliac bone marrow aspiration and 11-gauge core bone biopsy with local anesthetic and moderate conscious sedation.    Risks (including, but not limited to, pain, bleeding, infection, damage to nearby structures, failure to obtain sufficient material for a diagnosis, the need for additional procedures, and death), benefits, and alternatives were discussed with the patient. All questions were answered to the best of my abilities. The patient wishes to proceed with the procedure. Written informed consent was obtained.    Thank you for considering IR for the care of your patient.     Brian Greenfield MD  Interventional Radiology

## 2022-05-25 NOTE — PLAN OF CARE
Preop plan of care reviewed.  Questions encouraged and questions answered. Pt verbalized readiness to proceed. CHG wipes completed. VSS.

## 2022-05-25 NOTE — DISCHARGE INSTRUCTIONS
BATHING:  You may shower tomorrow.  DRESSING:  Remove dressing tomorrow.        ACTIVITY LEVEL: If you have received sedation or an anesthetic, you may feel sleepy for several hours. Rest until you are more awake. Gradually resume your normal activities    Do not drive, drink alcohol, or sign legal documents for 24 hours, or if taking narcotic pain medication.      DIET: You may resume your home diet. If nausea is present, increase your diet gradually with fluids and bland foods.    Medications: Pain medication should be taken only if needed and as directed. If antibiotics are prescribed, the medication should be taken until completed. You will be given an updated list of you medications.  No driving, alcoholic beverages or signing legal documents for next 24 hours if you have had sedation, or while taking pain medication    CALL THE DOCTOR:   For any obvious bleeding (some dried blood over the incision is normal).     Redness, swelling, foul smell around incision or fever over 101.  Shortness of breath.  Persistent pain or nausea not relieved by medication.  Call  [phone]     to speak with an Interventional Radiologist    If any unusual problems or difficulties occur contact your doctor. If you cannot contact your doctor but feel your signs and symptoms warrant a physicians attention return to the emergency room.    Fall Prevention  Millions of people fall every year and injure themselves. You may have had anesthesia or sedation which may increase your risk of falling. You may have health issues that put you at an increased risk of falling.     Here are ways to reduce your risk of falling.    Make your home safe by keeping walkways clear of objects you may trip over.  Use non-slip pads under rugs. Do not use area rugs or small throw rugs.  Use non-slip mats in bathtubs and showers.  Install handrails and lights on staircases.  Do not walk in poorly lit areas.  Do not stand on chairs or wobbly ladders.  Use  caution when reaching overhead or looking upward. This position can cause a loss of balance.  Be sure your shoes fit properly, have non-slip bottoms and are in good condition.   Wear shoes both inside and out. Avoid going barefoot or wearing slippers.  Be cautious when going up and down stairs, curbs, and when walking on uneven sidewalks.  If your balance is poor, consider using a cane or walker.  If your fall was related to alcohol use, stop or limit alcohol intake.   If your fall was related to use of sleeping medicines, talk to your doctor about this. You may need to reduce your dosage at bedtime if you awaken during the night to go to the bathroom.    To reduce the need for nighttime bathroom trips:  Avoid drinking fluids for several hours before going to bed  Empty your bladder before going to bed  Men can keep a urinal at the bedside  Stay as active as you can. Balance, flexibility, strength, and endurance all come from exercise. They all play a role in preventing falls. Ask your healthcare provider which types of activity are right for you.  Get your vision checked on a regular basis.  If you have pets, know where they are before you stand up or walk so you don't trip over them.  Use night lights.

## 2022-05-31 ENCOUNTER — PATIENT MESSAGE (OUTPATIENT)
Dept: ADMINISTRATIVE | Facility: HOSPITAL | Age: 52
End: 2022-05-31
Payer: COMMERCIAL

## 2022-05-31 LAB
BODY SITE - BONE MARROW: NORMAL
CLINICAL DIAGNOSIS - BONE MARROW: NORMAL
FLOW CYTOMETRY ANTIBODIES ANALYZED - BONE MARROW: NORMAL
FLOW CYTOMETRY COMMENT - BONE MARROW: NORMAL
FLOW CYTOMETRY INTERPRETATION - BONE MARROW: NORMAL

## 2022-06-02 LAB — T CELL GENE REARRANGEMENT, BM: NORMAL

## 2022-06-03 LAB
CHROM BANDING METHOD: NORMAL
CHROMOSOME ANALYSIS BM ADDITIONAL INFORMATION: NORMAL
CHROMOSOME ANALYSIS BM RELEASED BY: NORMAL
CHROMOSOME ANALYSIS BM RESULT SUMMARY: NORMAL
CLINICAL CYTOGENETICIST REVIEW: NORMAL
KARYOTYP MAR: NORMAL
REASON FOR REFERRAL (NARRATIVE): NORMAL
REF LAB TEST METHOD: NORMAL
SPECIMEN SOURCE: NORMAL
SPECIMEN: NORMAL

## 2022-06-06 LAB
COMMENT: NORMAL
FINAL PATHOLOGIC DIAGNOSIS: NORMAL
GROSS: NORMAL
Lab: NORMAL
MICROSCOPIC EXAM: NORMAL
SUPPLEMENTAL DIAGNOSIS: NORMAL

## 2022-06-08 ENCOUNTER — LAB VISIT (OUTPATIENT)
Dept: LAB | Facility: HOSPITAL | Age: 52
End: 2022-06-08
Attending: INTERNAL MEDICINE
Payer: COMMERCIAL

## 2022-06-08 ENCOUNTER — OFFICE VISIT (OUTPATIENT)
Dept: HEMATOLOGY/ONCOLOGY | Facility: CLINIC | Age: 52
End: 2022-06-08
Payer: COMMERCIAL

## 2022-06-08 VITALS
BODY MASS INDEX: 38.36 KG/M2 | WEIGHT: 315 LBS | DIASTOLIC BLOOD PRESSURE: 95 MMHG | HEART RATE: 79 BPM | SYSTOLIC BLOOD PRESSURE: 178 MMHG | TEMPERATURE: 99 F | HEIGHT: 76 IN | OXYGEN SATURATION: 97 %

## 2022-06-08 DIAGNOSIS — D72.829 LEUKOCYTOSIS, UNSPECIFIED TYPE: ICD-10-CM

## 2022-06-08 DIAGNOSIS — I10 ESSENTIAL HYPERTENSION: ICD-10-CM

## 2022-06-08 DIAGNOSIS — D72.820 LYMPHOCYTOSIS: ICD-10-CM

## 2022-06-08 DIAGNOSIS — D50.9 IRON DEFICIENCY ANEMIA, UNSPECIFIED IRON DEFICIENCY ANEMIA TYPE: ICD-10-CM

## 2022-06-08 DIAGNOSIS — D72.829 LEUKOCYTOSIS, UNSPECIFIED TYPE: Primary | ICD-10-CM

## 2022-06-08 PROCEDURE — 99999 PR PBB SHADOW E&M-EST. PATIENT-LVL III: ICD-10-PCS | Mod: PBBFAC,,, | Performed by: INTERNAL MEDICINE

## 2022-06-08 PROCEDURE — 36415 COLL VENOUS BLD VENIPUNCTURE: CPT | Performed by: INTERNAL MEDICINE

## 2022-06-08 PROCEDURE — 3077F SYST BP >= 140 MM HG: CPT | Mod: CPTII,S$GLB,, | Performed by: INTERNAL MEDICINE

## 2022-06-08 PROCEDURE — 3080F PR MOST RECENT DIASTOLIC BLOOD PRESSURE >= 90 MM HG: ICD-10-PCS | Mod: CPTII,S$GLB,, | Performed by: INTERNAL MEDICINE

## 2022-06-08 PROCEDURE — 3008F BODY MASS INDEX DOCD: CPT | Mod: CPTII,S$GLB,, | Performed by: INTERNAL MEDICINE

## 2022-06-08 PROCEDURE — 3008F PR BODY MASS INDEX (BMI) DOCUMENTED: ICD-10-PCS | Mod: CPTII,S$GLB,, | Performed by: INTERNAL MEDICINE

## 2022-06-08 PROCEDURE — 3080F DIAST BP >= 90 MM HG: CPT | Mod: CPTII,S$GLB,, | Performed by: INTERNAL MEDICINE

## 2022-06-08 PROCEDURE — 99214 OFFICE O/P EST MOD 30 MIN: CPT | Mod: S$GLB,,, | Performed by: INTERNAL MEDICINE

## 2022-06-08 PROCEDURE — 99214 PR OFFICE/OUTPT VISIT, EST, LEVL IV, 30-39 MIN: ICD-10-PCS | Mod: S$GLB,,, | Performed by: INTERNAL MEDICINE

## 2022-06-08 PROCEDURE — 3077F PR MOST RECENT SYSTOLIC BLOOD PRESSURE >= 140 MM HG: ICD-10-PCS | Mod: CPTII,S$GLB,, | Performed by: INTERNAL MEDICINE

## 2022-06-08 PROCEDURE — 99999 PR PBB SHADOW E&M-EST. PATIENT-LVL III: CPT | Mod: PBBFAC,,, | Performed by: INTERNAL MEDICINE

## 2022-06-08 PROCEDURE — 86038 ANTINUCLEAR ANTIBODIES: CPT | Performed by: INTERNAL MEDICINE

## 2022-06-08 NOTE — PROGRESS NOTES
Subjective:       Patient ID: Roberta Ware Jr. is a 51 y.o. male.    Chief Complaint: Results (Bone Marrow Results)     Diagnosis: 1. Leukocytosis                     2. Anemia        HISTORY OF PRESENT ILLNESS:  The patient is a pleasant 51-year-old gentleman seen today for f/u for abnl blood counts. He has anemia and thrombocytosis  And intermittent leukocytosis. The patient was hospitalized 12/2016 with acute blood loss anemia.  The patient reported 1-1/2 week history of dark stools, lightheadedness, dizziness, fatigue and dyspnea on exertion.  Hemoglobin 6g/dl on admission. He underwent a total of 4 units of packed red blood cell transfusion during hospitalization.The patient was followed by GI during hospitalization.  EGD  performed and showed ulcerations without any evidence of bleeding.  The patient was followed by GI during hospitalization.  He takes indomethacin for gout approximately one time per week.  He also had been taking ibuprofen for headaches.  The patient reports an intermittent epigastric abdominal pain over the past year.  The patient was begun on a PPI since hospital discharge.  He has had no further episodes of melena.  Review of records reveals that the patient was previously followed by colleague, Dr. Paul.    Dr. Paul prescribed ferrous sulfate supplementation.  The patient did not take prescribed medication.  No history of alcohol abuse, chronic liver disease, clotting disorder, neuropathy or recent surgery.  He does have chronic kidney disease, stage III,HTN, gout and morbid obesity.  The patient previously  lost to follow up in this clinic and was last seen on 1/23/2015.  He underwent a colonoscopy approximately four years ago significant for diverticulosis.  He is a lifelong nonsmoker.  Workup to date revealed an abnormal flow cytometry, which revealed an atypical T-cell subset.  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  MICHAEL-2 mutation testing was negative. CBC  from 12/27/2016 reveals a white blood cell count of 63458, hemoglobin of 8.6 g/dL, hematocrit of 28.5%, MCV of 86 and platelet count of 502,000.Pt was previously lost to follow-up       Interval Hx:   Doing well  No new issues  No SOB/CP/cough  No melena, hematochezia or change in bowel habits   No recent infections  CBC shows wbc [id number] Hb 12.9g/dL Hct 42.1% plt ct 315k.     He recently underwent bmbx  BONE MARROW, RIGHT ILIAC CREST (ASPIRATE SMEAR, TOUCH PREPARATION, CLOT SECTION,  AND CORE BIOPSY):  -- NORMOCELLULAR MARROW (60%) WITH TRILINEAGE HEMATOPOIESIS AND MILD  MEGAKARYOCYTIC HYPERPLASIA.  -- ATYPICAL T LYMPHOCYTIC POPULATION (APPROXIMATELY 5% OF MARROW CELLULARITY).  -- SEE COMMENT  Chromosome analysis, bone marrow ([facility] [place], [address] [place] [address]):  --Results: 46,XY[20].  --Interpretation: No clonal abnormality was apparent.  T-cell receptor gene rearrangement, bone marrow ([facility] [place], [address] [place] [address]):  --Final diagnosis:  Equivocal T-cell receptor gene rearrangement analysis. Overall polytypic T-cell pattern is present with  occasional atypical peak populations of uncertain significance. Skewed or slightly atypical T-cell gene  rearrangement patterns are particularly likely to occur in settings where there is physiologic restriction of the  T-cell laboratory, such is in autoimmune disorders, or with normal aging. Although a small clonal T-cell  subset population cannot be completely excluded, these results are not characteristic of monoclonal pattern  expected in the setting of T-cell neoplasia. The equivocal T-cell receptor gene rearrangement detected  appears identical to that identified in previous specimen (02/11/2022). Correlation with pathologic, clinical  and other supporting findings is required for final interpretations of the result.  Taken together, although there is no  definitive molecular evidence of clonal proliferation, a persistent  immunophenotypically abnormal T cell population (approximately 5% of marrow cellularity) is present. A Tcell  per lymphoproliferative disorder cannot be excluded at this time. Other causes of atypical T-cell  proliferation, such as autoimmune disorders, must be excluded.  Additional Sendout Report ot Test  ORDERING PHYSICIAN(S)  CLINICAL DIAGNOSIS / INFORMATION  SPECIMEN  Supplemental Diagnosis  History of mild anemia, hospitalization in 2016 for anemia secondary to acute blood loss, chronic mild  leukocytosis, history of thrombocytosis (JAK2 V617F mutation not detected).  1. Right Iliac Crest Aspirates  2. Right Iliac Crest Clot  3. Right Iliac Crest Core  Patient Name Accession # [id number]  Medical Record # [id number]  Date of Birth  Location [facility]  Billing # [id number]  Collection Date 5/25/2022 10:41  Received  Reported  5/25/2022 12:01  TY CATES  6/6/2022 13:02  1970 (51 Y M)  Page 1        PAST MEDICAL HISTORY:  Diverticulosis, ED, GERD, gout, essential hypertension, morbid obesity, osteoarthritis, ulcer.    PAST SURGICAL HISTORY:  Colonoscopy,EGD    FAMILY HISTORY: Mother diagnosed with breast CA. Maternal aunt diagnosed with breast CA. Paternal aunt diagnosed with lung cancer. Paternal aunt diagnosed with breast cancer        Review of Systems   Constitutional: Negative for appetite change, fatigue, fever and unexpected weight change.   HENT: Negative for mouth sores.    Eyes: Negative for visual disturbance.   Respiratory: Negative for cough and shortness of breath.    Cardiovascular: Negative for chest pain.   Gastrointestinal: Negative for abdominal pain, blood in stool and diarrhea.   Genitourinary: Negative for frequency and hematuria.   Musculoskeletal: Negative for back pain.   Skin: Negative for rash.   Neurological: Negative for headaches.   Hematological: Negative for adenopathy.   Psychiatric/Behavioral: The patient  "is not nervous/anxious.        Objective:       Vitals:    06/08/22 1107   BP: (!) 178/95   BP Location: Right arm   Patient Position: Sitting   Pulse: 79   Temp: 98.7 °F (37.1 °C)   TempSrc: Oral   SpO2: 97%   Weight: (!) 209.1 kg (460 lb 15.7 oz)   Height: 6' 3.5" (1.918 m)       Physical Exam  Constitutional:       Appearance: He is well-developed.   HENT:      Head: Normocephalic.   Eyes:      General: No scleral icterus.     Conjunctiva/sclera: Conjunctivae normal.   Neck:      Thyroid: No thyromegaly.   Cardiovascular:      Rate and Rhythm: Normal rate and regular rhythm.      Heart sounds: No murmur heard.  Pulmonary:      Effort: Pulmonary effort is normal.      Breath sounds: Normal breath sounds. No wheezing or rales.   Chest:   Breasts:      Right: No supraclavicular adenopathy.      Left: No supraclavicular adenopathy.       Abdominal:      General: Bowel sounds are normal.      Palpations: Abdomen is soft.   Musculoskeletal:         General: Normal range of motion.      Cervical back: Normal range of motion and neck supple.   Lymphadenopathy:      Cervical: No cervical adenopathy.      Upper Body:      Right upper body: No supraclavicular adenopathy.      Left upper body: No supraclavicular adenopathy.   Skin:     Findings: No erythema or rash.   Neurological:      Mental Status: He is alert and oriented to person, place, and time.      Cranial Nerves: No cranial nerve deficit.         Labs:   Lab Results   Component Value Date    WBC 11.45 05/20/2022    HGB 12.9 (L) 05/20/2022    HCT 42.1 05/20/2022    MCV 87 05/20/2022     05/20/2022       SPEP 1/16/17  Normal total protein.   Increased gamma globulin, polyclonal.     Pathologist interpretation of peripheral blood smear:   Mild leukocytosis shows a mild absolute neutrophilia with some toxic   changes including cytoplasmic vacuoles.  The overall process appears   reactive.  Clinical correlation is required.   Additionally, there are scattered " atypical lymphocytes, correlate   clinically in this patient with a history of a positive T-cell   receptor gene rearrangement by PCR.   Normocytic red cells appear mildly hypochromic.  Correlation with the   patient's iron status is recommended.   Increased platelets are noted.  This can be seen with iron   deficiency.  If the patient has not clinically iron deficient,     further evaluation may be indicated.       Interpretation Atypical T-cell subset.  See comment.  Correlation with peripheral blood   morphology and with T-cell receptor gene rearrangements by PCR recommended.    Comment: Interp By Swati Dominguez MD, Signed on 07/14/2021 at 13:49   Blood Comment Flow cytometric analysis of  peripheral blood shows populations of   polyclonal B lymphocytes and T lymphocytes that are immunophenotypically   unremarkable.  However, there is a subset of T-cells showing expression of   CD3, dim CD7, CD2 and CD5 with coexpression of CD4 and dim CD8 and aberrant   expression of CD57.  CD16 is negative in this population.  This is an   atypical T-cell subset and could be reactive versus other.  The differential   diagnosis is broad and includes a reactive subset verses T-LGL leukemia   versus others.  Correlation with peripheral blood morphology and T-cell   receptor gene rearrangements by PCR is recommended.   The blast gate is not increased.   Flow differential:  Lymphocytes 33.3%, Monocytes 2.8%, Granulocytes  55.0%,   Blast  0.9%, Debris/nRBC 0.9%,  Viability 94.2%           Lab Results   Component Value Date    WBC 11.45 05/20/2022    HGB 12.9 (L) 05/20/2022    HCT 42.1 05/20/2022    MCV 87 05/20/2022     05/20/2022           Lab Results   Component Value Date    IRON 52 02/11/2022    IRON 52 02/11/2022    TIBC 320 02/11/2022    FERRITIN 126 02/11/2022     Results for TY CATES JR. (MRN [id number]) as of 12/17/2019 14:52   Ref. Range 7/1/2018 08:42 9/1/2018 10:12 10/12/2019 11:25   Sed Rate Latest  Ref Range: 0 - 23 mm/Hr 49 (H) 26 (H) 34 (H)       Pathology   BONE MARROW, RIGHT ILIAC CREST (ASPIRATE SMEAR, TOUCH PREPARATION, CLOT SECTION,  AND CORE BIOPSY):  -- NORMOCELLULAR MARROW (60%) WITH TRILINEAGE HEMATOPOIESIS AND MILD  MEGAKARYOCYTIC HYPERPLASIA.  -- ATYPICAL T LYMPHOCYTIC POPULATION (APPROXIMATELY 5% OF MARROW CELLULARITY).  -- SEE COMMENT.  T-cell laboratory, such is in autoimmune disorders, or with normal aging. Although a small clonal T-cell  subset population cannot be completely excluded, these results are not characteristic of monoclonal pattern  expected in the setting of T-cell neoplasia. The equivocal T-cell receptor gene rearrangement detected  appears identical to that identified in previous specimen (02/11/2022). Correlation with pathologic, clinical  and other supporting findings is required for final interpretations of the result.  Taken together, although there is no definitive molecular evidence of clonal proliferation, a persistent  immunophenotypically abnormal T cell population (approximately 5% of marrow cellularity) is present. A Tcell  per lymphoproliferative disorder cannot be excluded at this time. Other causes of atypical T-cell  proliferation, such as autoimmune disorders, must be excluded.  Diagnosed by TABBY WILLAMS M.D. 6/6/2022 13:02  FINAL    Assessment:       1. Leukocytosis, unspecified type    2. Lymphocytosis    3. Iron deficiency anemia, unspecified iron deficiency anemia type    4. Essential hypertension        Plan:   1.,2.,3. Pt clinically stable  51-year-old gentleman with a longstanding history a mild   Anemia,previously  lost to follow up, with  hospitalization 12/2016 secondary to   acute-on-chronic anemia related to acute blood loss.   SPEP-polyclonal   Fe parameters - borderline low serum Fe and decreased Fe sats  C-scope 2011- divertivuloisis  EGD 2016 - non-bleeding duodenal ulcer with no stigmata                         of bleeding.  Take OTC  Fe supp to bid prn tolerability and take with Vit C supp to improve absorption (sub compliant)  Pt counseled on potential drug-induced constipation  He also has chronic mild leukocytosis >9 yrs  Previous hematological workup to date revealed abnormal flow cytometry with an atypical T-cell subset of unknown significance.    JAK2 mutation testing negative.     bmbx shows no definitive evidence A Tcellper lymphoproliferative disorder cannot be excluded at this time. Other causes of atypical T-cell   proliferation, such as autoimmune disorders, must be excluded.  Plan to cont to monitor     4. Cont BP meds      Advised on home BP recordings      F/u with PCP for mgmt    Cbc,cmp, NICHOLAS prior to f/u 3mos      I spent a total of  30 minutes on the day of the visit.This includes face to face time and non-face to face time preparing to see the patient (eg, review of tests), obtaining and/or reviewing separately obtained history, documenting clinical information in the electronic or other health record, independently interpreting results and communicating results to the patient/family/caregiver, and coordinating care.     CC: Antwan Ambrosio M.D.

## 2022-06-10 LAB — ANA SER QL IF: NORMAL

## 2022-06-15 ENCOUNTER — OFFICE VISIT (OUTPATIENT)
Dept: UROLOGY | Facility: CLINIC | Age: 52
End: 2022-06-15
Payer: COMMERCIAL

## 2022-06-15 VITALS
WEIGHT: 315 LBS | SYSTOLIC BLOOD PRESSURE: 142 MMHG | HEIGHT: 76 IN | BODY MASS INDEX: 38.36 KG/M2 | HEART RATE: 86 BPM | DIASTOLIC BLOOD PRESSURE: 87 MMHG

## 2022-06-15 DIAGNOSIS — N40.1 BPH WITH URINARY OBSTRUCTION: ICD-10-CM

## 2022-06-15 DIAGNOSIS — N13.8 BPH WITH URINARY OBSTRUCTION: ICD-10-CM

## 2022-06-15 DIAGNOSIS — N52.01 ERECTILE DYSFUNCTION DUE TO ARTERIAL INSUFFICIENCY: Primary | ICD-10-CM

## 2022-06-15 DIAGNOSIS — N52.9 ERECTILE DYSFUNCTION, UNSPECIFIED ERECTILE DYSFUNCTION TYPE: ICD-10-CM

## 2022-06-15 DIAGNOSIS — I10 ESSENTIAL HYPERTENSION: Chronic | ICD-10-CM

## 2022-06-15 DIAGNOSIS — N52.9 ERECTILE DYSFUNCTION, UNSPECIFIED ERECTILE DYSFUNCTION TYPE: Primary | ICD-10-CM

## 2022-06-15 PROCEDURE — 3079F DIAST BP 80-89 MM HG: CPT | Mod: CPTII,S$GLB,, | Performed by: UROLOGY

## 2022-06-15 PROCEDURE — 3008F BODY MASS INDEX DOCD: CPT | Mod: CPTII,S$GLB,, | Performed by: UROLOGY

## 2022-06-15 PROCEDURE — 3008F PR BODY MASS INDEX (BMI) DOCUMENTED: ICD-10-PCS | Mod: CPTII,S$GLB,, | Performed by: UROLOGY

## 2022-06-15 PROCEDURE — 1159F PR MEDICATION LIST DOCUMENTED IN MEDICAL RECORD: ICD-10-PCS | Mod: CPTII,S$GLB,, | Performed by: UROLOGY

## 2022-06-15 PROCEDURE — 1160F RVW MEDS BY RX/DR IN RCRD: CPT | Mod: CPTII,S$GLB,, | Performed by: UROLOGY

## 2022-06-15 PROCEDURE — 99214 OFFICE O/P EST MOD 30 MIN: CPT | Mod: S$GLB,,, | Performed by: UROLOGY

## 2022-06-15 PROCEDURE — 3077F PR MOST RECENT SYSTOLIC BLOOD PRESSURE >= 140 MM HG: ICD-10-PCS | Mod: CPTII,S$GLB,, | Performed by: UROLOGY

## 2022-06-15 PROCEDURE — 99999 PR PBB SHADOW E&M-EST. PATIENT-LVL IV: ICD-10-PCS | Mod: PBBFAC,,, | Performed by: UROLOGY

## 2022-06-15 PROCEDURE — 1159F MED LIST DOCD IN RCRD: CPT | Mod: CPTII,S$GLB,, | Performed by: UROLOGY

## 2022-06-15 PROCEDURE — 99214 PR OFFICE/OUTPT VISIT, EST, LEVL IV, 30-39 MIN: ICD-10-PCS | Mod: S$GLB,,, | Performed by: UROLOGY

## 2022-06-15 PROCEDURE — 1160F PR REVIEW ALL MEDS BY PRESCRIBER/CLIN PHARMACIST DOCUMENTED: ICD-10-PCS | Mod: CPTII,S$GLB,, | Performed by: UROLOGY

## 2022-06-15 PROCEDURE — 3079F PR MOST RECENT DIASTOLIC BLOOD PRESSURE 80-89 MM HG: ICD-10-PCS | Mod: CPTII,S$GLB,, | Performed by: UROLOGY

## 2022-06-15 PROCEDURE — 99999 PR PBB SHADOW E&M-EST. PATIENT-LVL IV: CPT | Mod: PBBFAC,,, | Performed by: UROLOGY

## 2022-06-15 PROCEDURE — 3077F SYST BP >= 140 MM HG: CPT | Mod: CPTII,S$GLB,, | Performed by: UROLOGY

## 2022-06-15 RX ORDER — BACLOFEN 20 MG/1
20 TABLET ORAL NIGHTLY PRN
COMMUNITY
Start: 2022-03-10 | End: 2022-06-30

## 2022-06-15 NOTE — PROGRESS NOTES
CHIEF COMPLAINT:    Mr. Ware is a 51 y.o. male presenting with ED.    PRESENTING ILLNESS:    Roberta Ware Jr. is a 51 y.o. male here for evaluation of erectile dysfunction. He has tried Viagra and Cialis and failed.     Had a T drawn x 1 that was low.  He has c/o decreased energy, low libido, weight gain.    He has LUTS. Nocturia x5. He is pleased with how he voids.      REVIEW OF SYSTEMS:    Roberta Ware Jr. denies headache, blurred vision, fever, nausea, vomiting, chills, abdominal pain, chest pain, sore throat, bleeding per rectum, cough, SOB, recent loss of consciousness, recent mental status changes, seizures, dizziness, or upper or lower extremity weakness.    POONAM  1. 3  2. 3  3. 2  4. 3  5. 1      PATIENT HISTORY:    Past Medical History:   Diagnosis Date    Diverticulosis     Encounter for blood transfusion     Erectile dysfunction     Gout     Hypertension     Osteoarthritis        Past Surgical History:   Procedure Laterality Date    CHOLECYSTECTOMY      COLONOSCOPY  2012    COLONOSCOPY N/A 12/27/2021    Procedure: COLONOSCOPY;  Surgeon: Solis Mendez MD;  Location: [facility];  Service: Endoscopy;  Laterality: N/A;  11/16 bariatric stretcher; fully vaccinated; instr. to portal-st  12/23 pt confirmed arrival time and all prep instructions-ml    FINGER SURGERY Left 2017    left middle finger    UPPER GASTROINTESTINAL ENDOSCOPY         Family History   Problem Relation Age of Onset    Heart disease Mother     Cancer Mother         Breast    Diabetes Father     Hypertension Father     Ulcers Father     Anemia Sister        Social History     Socioeconomic History    Marital status:    Tobacco Use    Smoking status: Never Smoker    Smokeless tobacco: Never Used   Substance and Sexual Activity    Alcohol use: Yes     Comment: Ocassionally    Drug use: No    Sexual activity: Yes     Partners: Female       Allergies:  Hydrochlorothiazide and  Tramadol    Medications:    Current Outpatient Medications:     allopurinoL (ZYLOPRIM) 300 MG tablet, TAKE 1 TABLET(300 MG) BY MOUTH EVERY DAY, Disp: 30 tablet, Rfl: 0    amLODIPine (NORVASC) 10 MG tablet, TAKE 1 TABLET(10 MG) BY MOUTH EVERY DAY, Disp: 30 tablet, Rfl: 0    baclofen (LIORESAL) 20 MG tablet, Take 20 mg by mouth nightly as needed., Disp: , Rfl:     chlorthalidone (HYGROTEN) 25 MG Tab, TAKE 1 TABLET(25 MG) BY MOUTH EVERY DAY, Disp: 30 tablet, Rfl: 0    naproxen (NAPROSYN) 500 MG tablet, Take 1 tablet (500 mg total) by mouth 2 (two) times daily as needed., Disp: 30 tablet, Rfl: 1    predniSONE (DELTASONE) 50 MG Tab, Take 50 mg by mouth daily as needed., Disp: , Rfl:     tadalafiL (CIALIS) 20 MG Tab, Take 1 tablet (20 mg total) by mouth every other day. Take every other day as needed, Disp: 10 tablet, Rfl: 11    cetirizine (ZYRTEC) 10 MG tablet, Take 1 tablet (10 mg total) by mouth once daily., Disp: 30 tablet, Rfl: 0    colchicine (COLCRYS) 0.6 mg tablet, Take 1 tablet (0.6 mg total) by mouth 2 (two) times daily as needed (for gout). (Patient taking differently: Take 0.6 mg by mouth 2 (two) times daily as needed (for gout).), Disp: 90 tablet, Rfl: 3    sildenafiL (VIAGRA) 100 MG tablet, Take 1 tablet (100 mg total) by mouth daily as needed for Erectile Dysfunction., Disp: 30 tablet, Rfl: 3    PHYSICAL EXAMINATION:    The patient generally appears in good health, is appropriately interactive, and is in no apparent distress.     Eyes: anicteric sclerae, moist conjunctivae; no lid-lag; PERRLA     HENT: Atraumatic; oropharynx clear with moist mucous membranes and no mucosal ulcerations;normal hard and soft palate.  No evidence of lymphadenopathy.    Neck: Trachea midline.  No thyromegaly.    Skin: No lesions.    Mental: Cooperative with normal affect.  Is oriented to time, place, and person.    Neuro: Grossly intact.    Chest: Normal inspiratory effort.   No accessory muscles.  No audible wheezes.   Respirations symmetric on inspiration and expiration.    Heart: Regular rhythm.      Abdomen:  Soft, non-tender. No masses or organomegaly. Bladder is not palpable. No evidence of flank discomfort. No evidence of inguinal hernia.    Genitourinary: The penis is not circumcised with no evidence of plaques or induration. The urethral meatus is normal. The testes, epididymides, and cord structures are normal in size and contour bilaterally. The scrotum is normal in size and contour.    Normal anal sphincter tone. No rectal mass.    The prostate is smooth. Normal landmarks. Lateral sulci. Median furrow intact.  No nodularity or induration. Seminal vesicles are normal.    Extremities: No clubbing, cyanosis, or edema      LABS:      Lab Results   Component Value Date    PSA 1.2 06/15/2015       IMPRESSION:    Encounter Diagnoses   Name Primary?    Erectile dysfunction due to arterial insufficiency Yes    Erectile dysfunction, unspecified erectile dysfunction type     BPH with urinary obstruction     Essential hypertension    HTN, stable      PLAN:    1. Will draw a PSA as this hasn't been done.  2. Will confirm the low T with an AM draw with LH and prolactin.  3. Will observe his LUTS as they don't bother him.  4. Discussed options for his ED (affected by above comorbidities).  He'd like to see what his T is before pursuing other therapies.  Continue Cialis for now.  5. Will base his follow up off the T.    Copy to: Nisha

## 2022-06-15 NOTE — LETTER
Eugenia 15, 2022        Stefan Cameron Jr., MD  [address] [address]             [facility] 4th Fl  [address]  [place] [address]  Phone: [phone]   Patient: Roberta Ware Jr.   MR Number: [id number]   YOB: 1970   Date of Visit: 6/15/2022       Dear Dr. Cameron:    Thank you for referring Roberta Ware to me for evaluation. Attached you will find relevant portions of my assessment and plan of care.    If you have questions, please do not hesitate to call me. I look forward to following Roberta Ware along with you.    Sincerely,      Aditya Dietz MD            CC  No Recipients    Enclosure

## 2022-06-16 ENCOUNTER — LAB VISIT (OUTPATIENT)
Dept: LAB | Facility: HOSPITAL | Age: 52
End: 2022-06-16
Attending: STUDENT IN AN ORGANIZED HEALTH CARE EDUCATION/TRAINING PROGRAM
Payer: COMMERCIAL

## 2022-06-16 DIAGNOSIS — M1A.09X0 CHRONIC GOUT OF MULTIPLE SITES, UNSPECIFIED CAUSE: ICD-10-CM

## 2022-06-16 DIAGNOSIS — M10.9 GOUT, UNSPECIFIED CAUSE, UNSPECIFIED CHRONICITY, UNSPECIFIED SITE: ICD-10-CM

## 2022-06-16 DIAGNOSIS — N52.01 ERECTILE DYSFUNCTION DUE TO ARTERIAL INSUFFICIENCY: ICD-10-CM

## 2022-06-16 LAB
COMPLEXED PSA SERPL-MCNC: 1 NG/ML (ref 0–4)
LH SERPL-ACNC: 3.5 MIU/ML (ref 0.6–12.1)
PROLACTIN SERPL IA-MCNC: 7.6 NG/ML (ref 3.5–19.4)
TESTOST SERPL-MCNC: 198 NG/DL (ref 304–1227)

## 2022-06-16 PROCEDURE — 84146 ASSAY OF PROLACTIN: CPT | Performed by: STUDENT IN AN ORGANIZED HEALTH CARE EDUCATION/TRAINING PROGRAM

## 2022-06-16 PROCEDURE — 84153 ASSAY OF PSA TOTAL: CPT | Performed by: STUDENT IN AN ORGANIZED HEALTH CARE EDUCATION/TRAINING PROGRAM

## 2022-06-16 PROCEDURE — 83002 ASSAY OF GONADOTROPIN (LH): CPT | Performed by: STUDENT IN AN ORGANIZED HEALTH CARE EDUCATION/TRAINING PROGRAM

## 2022-06-16 PROCEDURE — 84403 ASSAY OF TOTAL TESTOSTERONE: CPT | Performed by: STUDENT IN AN ORGANIZED HEALTH CARE EDUCATION/TRAINING PROGRAM

## 2022-06-16 PROCEDURE — 36415 COLL VENOUS BLD VENIPUNCTURE: CPT | Mod: PN | Performed by: STUDENT IN AN ORGANIZED HEALTH CARE EDUCATION/TRAINING PROGRAM

## 2022-06-16 RX ORDER — NAPROXEN 500 MG/1
TABLET ORAL
Qty: 30 TABLET | Refills: 1 | OUTPATIENT
Start: 2022-06-16

## 2022-06-16 RX ORDER — COLCHICINE 0.6 MG/1
0.6 TABLET ORAL 2 TIMES DAILY PRN
Qty: 90 TABLET | Refills: 3 | OUTPATIENT
Start: 2022-06-16 | End: 2023-06-16

## 2022-06-16 NOTE — TELEPHONE ENCOUNTER
Care Due:                  Date            Visit Type   Department     Provider  --------------------------------------------------------------------------------                                [facility]                              PRIMARY      MED/ INTERNAL  Last Visit: 02-      CARE ([facility])   MED/ PEDS      ROBERTO OROZCO                              [facility]                              PRIMARY      MED/ INTERNAL  Next Visit: 06-      CARE ([facility])   MED/ PEDS      ROBERTO OROZCO                                                            Last  Test          Frequency    Reason                     Performed    Due Date  --------------------------------------------------------------------------------    Uric Acid...  12 months..  allopurinoL..............  07- 07-    [employer] Embedded Care Gaps. Reference number: [id number]. 6/16/2022   3:38:40 PM CDT

## 2022-06-16 NOTE — TELEPHONE ENCOUNTER
----- Message from Ashanti Willard MA sent at 6/16/2022  3:27 PM CDT -----  Type: RX Refill Request    Who Called: self    Have you contacted your pharmacy:no    Refill or New Rx:refill    RX Name and Strength:colchicine (COLCRYS) 0.6 mg tablet 90 tablet     Preferred Pharmacy with phone number:[id number] - [place], [place] [address]   Phone:  [phone]  Fax:  [phone]          Local or Mail Order:local    Ordering Provider:Antwan    Would the patient rather a call back or a response via [facility]? yes    Best Call Back Number:

## 2022-06-17 ENCOUNTER — TELEPHONE (OUTPATIENT)
Dept: UROLOGY | Facility: CLINIC | Age: 52
End: 2022-06-17
Payer: COMMERCIAL

## 2022-06-17 NOTE — TELEPHONE ENCOUNTER
Call placed to placed to patient. Name and date of birth verified. Patient informed of the following:    Please notify T is low.  He should come in to discuss.  -Dr. Dietz    Patient appointment scheduled and noted in epic. Patient verbalized understanding.

## 2022-06-22 ENCOUNTER — OFFICE VISIT (OUTPATIENT)
Dept: UROLOGY | Facility: CLINIC | Age: 52
End: 2022-06-22
Payer: COMMERCIAL

## 2022-06-22 VITALS
WEIGHT: 315 LBS | HEIGHT: 76 IN | BODY MASS INDEX: 38.36 KG/M2 | DIASTOLIC BLOOD PRESSURE: 95 MMHG | HEART RATE: 82 BPM | SYSTOLIC BLOOD PRESSURE: 158 MMHG

## 2022-06-22 DIAGNOSIS — N52.01 ERECTILE DYSFUNCTION DUE TO ARTERIAL INSUFFICIENCY: ICD-10-CM

## 2022-06-22 DIAGNOSIS — N40.1 BPH WITH URINARY OBSTRUCTION: ICD-10-CM

## 2022-06-22 DIAGNOSIS — I10 ESSENTIAL HYPERTENSION: Chronic | ICD-10-CM

## 2022-06-22 DIAGNOSIS — E29.1 MALE HYPOGONADISM: Primary | ICD-10-CM

## 2022-06-22 DIAGNOSIS — N13.8 BPH WITH URINARY OBSTRUCTION: ICD-10-CM

## 2022-06-22 PROCEDURE — 3077F PR MOST RECENT SYSTOLIC BLOOD PRESSURE >= 140 MM HG: ICD-10-PCS | Mod: CPTII,S$GLB,, | Performed by: UROLOGY

## 2022-06-22 PROCEDURE — 99214 OFFICE O/P EST MOD 30 MIN: CPT | Mod: S$GLB,,, | Performed by: UROLOGY

## 2022-06-22 PROCEDURE — 3077F SYST BP >= 140 MM HG: CPT | Mod: CPTII,S$GLB,, | Performed by: UROLOGY

## 2022-06-22 PROCEDURE — 3080F DIAST BP >= 90 MM HG: CPT | Mod: CPTII,S$GLB,, | Performed by: UROLOGY

## 2022-06-22 PROCEDURE — 3008F PR BODY MASS INDEX (BMI) DOCUMENTED: ICD-10-PCS | Mod: CPTII,S$GLB,, | Performed by: UROLOGY

## 2022-06-22 PROCEDURE — 1159F MED LIST DOCD IN RCRD: CPT | Mod: CPTII,S$GLB,, | Performed by: UROLOGY

## 2022-06-22 PROCEDURE — 99214 PR OFFICE/OUTPT VISIT, EST, LEVL IV, 30-39 MIN: ICD-10-PCS | Mod: S$GLB,,, | Performed by: UROLOGY

## 2022-06-22 PROCEDURE — 1159F PR MEDICATION LIST DOCUMENTED IN MEDICAL RECORD: ICD-10-PCS | Mod: CPTII,S$GLB,, | Performed by: UROLOGY

## 2022-06-22 PROCEDURE — 3080F PR MOST RECENT DIASTOLIC BLOOD PRESSURE >= 90 MM HG: ICD-10-PCS | Mod: CPTII,S$GLB,, | Performed by: UROLOGY

## 2022-06-22 PROCEDURE — 99999 PR PBB SHADOW E&M-EST. PATIENT-LVL IV: ICD-10-PCS | Mod: PBBFAC,,, | Performed by: UROLOGY

## 2022-06-22 PROCEDURE — 99999 PR PBB SHADOW E&M-EST. PATIENT-LVL IV: CPT | Mod: PBBFAC,,, | Performed by: UROLOGY

## 2022-06-22 PROCEDURE — 3008F BODY MASS INDEX DOCD: CPT | Mod: CPTII,S$GLB,, | Performed by: UROLOGY

## 2022-06-22 RX ORDER — TESTOSTERONE 20.25 MG/1.25G
GEL TOPICAL
Qty: 1 EACH | Refills: 5 | Status: SHIPPED | OUTPATIENT
Start: 2022-06-22 | End: 2022-08-08

## 2022-06-22 NOTE — PROGRESS NOTES
CHIEF COMPLAINT:    Mr. Ware is a 51 y.o. male presenting with ED.    PRESENTING ILLNESS:    Roberta Ware Jr. is a 51 y.o. male here for evaluation of erectile dysfunction. He has tried and failed Viagra and Cialis.    He has hypogonadism.  He c/o decreased energy, low libido, weight gain.  Is here to discuss.    He has LUTS. Nocturia x5. He is pleased with how he voids.      REVIEW OF SYSTEMS:    Roberta Ware Jr. denies headache, blurred vision, fever, nausea, vomiting, chills, abdominal pain, chest pain, sore throat, bleeding per rectum, cough, SOB, recent loss of consciousness, recent mental status changes, seizures, dizziness, or upper or lower extremity weakness.    POONAM  1. 2  2. 5  3. 3  4. 2  5. 1     PATIENT HISTORY:    Past Medical History:   Diagnosis Date    Diverticulosis     Encounter for blood transfusion     Erectile dysfunction     Gout     Hypertension     Osteoarthritis        Past Surgical History:   Procedure Laterality Date    CHOLECYSTECTOMY      COLONOSCOPY  2012    COLONOSCOPY N/A 12/27/2021    Procedure: COLONOSCOPY;  Surgeon: Solis Mendez MD;  Location: [facility];  Service: Endoscopy;  Laterality: N/A;  11/16 bariatric stretcher; fully vaccinated; instr. to portal-st  12/23 pt confirmed arrival time and all prep instructions-ml    FINGER SURGERY Left 2017    left middle finger    UPPER GASTROINTESTINAL ENDOSCOPY         Family History   Problem Relation Age of Onset    Heart disease Mother     Cancer Mother         Breast    Diabetes Father     Hypertension Father     Ulcers Father     Anemia Sister        Social History     Socioeconomic History    Marital status:    Tobacco Use    Smoking status: Never Smoker    Smokeless tobacco: Never Used   Substance and Sexual Activity    Alcohol use: Yes     Comment: Ocassionally    Drug use: No    Sexual activity: Yes     Partners: Female       Allergies:  Hydrochlorothiazide and  Tramadol    Medications:    Current Outpatient Medications:     allopurinoL (ZYLOPRIM) 300 MG tablet, TAKE 1 TABLET(300 MG) BY MOUTH EVERY DAY, Disp: 30 tablet, Rfl: 0    amLODIPine (NORVASC) 10 MG tablet, TAKE 1 TABLET(10 MG) BY MOUTH EVERY DAY, Disp: 30 tablet, Rfl: 0    baclofen (LIORESAL) 20 MG tablet, Take 20 mg by mouth nightly as needed., Disp: , Rfl:     cetirizine (ZYRTEC) 10 MG tablet, Take 1 tablet (10 mg total) by mouth once daily., Disp: 30 tablet, Rfl: 0    chlorthalidone (HYGROTEN) 25 MG Tab, TAKE 1 TABLET(25 MG) BY MOUTH EVERY DAY, Disp: 30 tablet, Rfl: 0    colchicine (COLCRYS) 0.6 mg tablet, Take 1 tablet (0.6 mg total) by mouth 2 (two) times daily as needed (for gout). (Patient taking differently: Take 0.6 mg by mouth 2 (two) times daily as needed (for gout).), Disp: 90 tablet, Rfl: 3    naproxen (NAPROSYN) 500 MG tablet, Take 1 tablet (500 mg total) by mouth 2 (two) times daily as needed., Disp: 30 tablet, Rfl: 1    predniSONE (DELTASONE) 50 MG Tab, Take 50 mg by mouth daily as needed., Disp: , Rfl:     sildenafiL (VIAGRA) 100 MG tablet, Take 1 tablet (100 mg total) by mouth daily as needed for Erectile Dysfunction., Disp: 30 tablet, Rfl: 3    tadalafiL (CIALIS) 20 MG Tab, Take 1 tablet (20 mg total) by mouth every other day. Take every other day as needed, Disp: 10 tablet, Rfl: 11    PHYSICAL EXAMINATION:    The patient generally appears in good health, is appropriately interactive, and is in no apparent distress.     Eyes: anicteric sclerae, moist conjunctivae; no lid-lag; PERRLA     HENT: Atraumatic; oropharynx clear with moist mucous membranes and no mucosal ulcerations;normal hard and soft palate.  No evidence of lymphadenopathy.    Neck: Trachea midline.  No thyromegaly.    Skin: No lesions.    Mental: Cooperative with normal affect.  Is oriented to time, place, and person.    Neuro: Grossly intact.    Chest: Normal inspiratory effort.   No accessory muscles.  No audible wheezes.   Respirations symmetric on inspiration and expiration.    Heart: Regular rhythm.      Abdomen:  Soft, non-tender. No masses or organomegaly. Bladder is not palpable. No evidence of flank discomfort. No evidence of inguinal hernia.    Genitourinary: The penis is not circumcised with no evidence of plaques or induration. The urethral meatus is normal. The testes, epididymides, and cord structures are normal in size and contour bilaterally. The scrotum is normal in size and contour.    Normal anal sphincter tone. No rectal mass.    The prostate is smooth. Normal landmarks. Lateral sulci. Median furrow intact.  No nodularity or induration. Seminal vesicles are normal.    Extremities: No clubbing, cyanosis, or edema      LABS:      Lab Results   Component Value Date    PSA 1.2 06/15/2015    PSADIAG 1.0 06/16/2022       IMPRESSION:    Encounter Diagnoses   Name Primary?    Male hypogonadism Yes    Erectile dysfunction due to arterial insufficiency     BPH with urinary obstruction     Essential hypertension    HTN, stable      PLAN:    1. Will observe his LUTS as they don't bother him.  2. Discussed options for his ED (affected by above comorbidities).  He'd like to try Cialis with a normal T.  3. Discussed the risks and benefits of testosterone replacement today.  This included possible cardiac risks.  He would like to try this.  Will check a T in 2 weeks and adjust the dose of TRT if necessary.  He can then RTC 3 months with T, PSA, CBC, hepatic panel, lipid panel.  A new Rx for Androgel 1.62% was given today.      Copy to:

## 2022-06-30 ENCOUNTER — OFFICE VISIT (OUTPATIENT)
Dept: FAMILY MEDICINE | Facility: CLINIC | Age: 52
End: 2022-06-30
Payer: COMMERCIAL

## 2022-06-30 VITALS
OXYGEN SATURATION: 95 % | BODY MASS INDEX: 38.36 KG/M2 | SYSTOLIC BLOOD PRESSURE: 139 MMHG | HEIGHT: 76 IN | TEMPERATURE: 99 F | WEIGHT: 315 LBS | HEART RATE: 88 BPM | DIASTOLIC BLOOD PRESSURE: 89 MMHG

## 2022-06-30 DIAGNOSIS — Z00.00 ROUTINE PHYSICAL EXAMINATION: Primary | ICD-10-CM

## 2022-06-30 DIAGNOSIS — E66.01 MORBID OBESITY WITH BMI OF 50.0-59.9, ADULT: ICD-10-CM

## 2022-06-30 DIAGNOSIS — R73.03 PRE-DIABETES: ICD-10-CM

## 2022-06-30 DIAGNOSIS — M62.830 MUSCLE SPASM OF BACK: ICD-10-CM

## 2022-06-30 DIAGNOSIS — I10 ESSENTIAL HYPERTENSION: Chronic | ICD-10-CM

## 2022-06-30 DIAGNOSIS — M10.9 GOUT, UNSPECIFIED CAUSE, UNSPECIFIED CHRONICITY, UNSPECIFIED SITE: ICD-10-CM

## 2022-06-30 PROCEDURE — 3079F DIAST BP 80-89 MM HG: CPT | Mod: CPTII,S$GLB,, | Performed by: FAMILY MEDICINE

## 2022-06-30 PROCEDURE — 3075F PR MOST RECENT SYSTOLIC BLOOD PRESS GE 130-139MM HG: ICD-10-PCS | Mod: CPTII,S$GLB,, | Performed by: FAMILY MEDICINE

## 2022-06-30 PROCEDURE — 99396 PR PREVENTIVE VISIT,EST,40-64: ICD-10-PCS | Mod: S$GLB,,, | Performed by: FAMILY MEDICINE

## 2022-06-30 PROCEDURE — 99396 PREV VISIT EST AGE 40-64: CPT | Mod: S$GLB,,, | Performed by: FAMILY MEDICINE

## 2022-06-30 PROCEDURE — 1159F PR MEDICATION LIST DOCUMENTED IN MEDICAL RECORD: ICD-10-PCS | Mod: CPTII,S$GLB,, | Performed by: FAMILY MEDICINE

## 2022-06-30 PROCEDURE — 3008F BODY MASS INDEX DOCD: CPT | Mod: CPTII,S$GLB,, | Performed by: FAMILY MEDICINE

## 2022-06-30 PROCEDURE — 99999 PR PBB SHADOW E&M-EST. PATIENT-LVL III: CPT | Mod: PBBFAC,,, | Performed by: FAMILY MEDICINE

## 2022-06-30 PROCEDURE — 3079F PR MOST RECENT DIASTOLIC BLOOD PRESSURE 80-89 MM HG: ICD-10-PCS | Mod: CPTII,S$GLB,, | Performed by: FAMILY MEDICINE

## 2022-06-30 PROCEDURE — 3075F SYST BP GE 130 - 139MM HG: CPT | Mod: CPTII,S$GLB,, | Performed by: FAMILY MEDICINE

## 2022-06-30 PROCEDURE — 3008F PR BODY MASS INDEX (BMI) DOCUMENTED: ICD-10-PCS | Mod: CPTII,S$GLB,, | Performed by: FAMILY MEDICINE

## 2022-06-30 PROCEDURE — 1159F MED LIST DOCD IN RCRD: CPT | Mod: CPTII,S$GLB,, | Performed by: FAMILY MEDICINE

## 2022-06-30 PROCEDURE — 99999 PR PBB SHADOW E&M-EST. PATIENT-LVL III: ICD-10-PCS | Mod: PBBFAC,,, | Performed by: FAMILY MEDICINE

## 2022-06-30 RX ORDER — CHLORTHALIDONE 25 MG/1
25 TABLET ORAL DAILY
Qty: 90 TABLET | Refills: 3 | Status: SHIPPED | OUTPATIENT
Start: 2022-06-30 | End: 2023-07-25 | Stop reason: SDUPTHER

## 2022-06-30 RX ORDER — AMLODIPINE BESYLATE 10 MG/1
10 TABLET ORAL DAILY
Qty: 90 TABLET | Refills: 3 | Status: SHIPPED | OUTPATIENT
Start: 2022-06-30 | End: 2023-07-25 | Stop reason: SDUPTHER

## 2022-06-30 RX ORDER — TIZANIDINE 4 MG/1
4 TABLET ORAL EVERY 8 HOURS PRN
Qty: 30 TABLET | Refills: 0 | Status: SHIPPED | OUTPATIENT
Start: 2022-06-30 | End: 2022-07-10

## 2022-06-30 RX ORDER — ALLOPURINOL 300 MG/1
300 TABLET ORAL DAILY
Qty: 90 TABLET | Refills: 3 | Status: SHIPPED | OUTPATIENT
Start: 2022-06-30 | End: 2023-07-25 | Stop reason: SDUPTHER

## 2022-06-30 RX ORDER — AMLODIPINE BESYLATE 10 MG/1
10 TABLET ORAL DAILY
Qty: 90 TABLET | Refills: 3 | Status: SHIPPED | OUTPATIENT
Start: 2022-06-30 | End: 2022-06-30 | Stop reason: SDUPTHER

## 2022-06-30 RX ORDER — COLCHICINE 0.6 MG/1
0.6 TABLET ORAL 2 TIMES DAILY PRN
Qty: 90 TABLET | Refills: 3 | Status: SHIPPED | OUTPATIENT
Start: 2022-06-30 | End: 2023-07-25

## 2022-06-30 RX ORDER — NAPROXEN 500 MG/1
500 TABLET ORAL 2 TIMES DAILY PRN
Qty: 30 TABLET | Refills: 0 | Status: SHIPPED | OUTPATIENT
Start: 2022-06-30 | End: 2023-03-20

## 2022-06-30 NOTE — PROGRESS NOTES
[facility]  Progress Note    SUBJECTIVE:     Chief Complaint   Patient presents with    Annual Exam    Sciatica       HPI   Roberta Ware Jr.  is a 51 y.o. male here for routine physical exam. Also has lower back pain, mainly on L side. Patient has no other new complaints/problems at this time.      Review of patient's allergies indicates:   Allergen Reactions    Hydrochlorothiazide Other (See Comments)     Gout    Tramadol Swelling     Pt states make him jittery        Past Medical History:   Diagnosis Date    Diverticulosis     Encounter for blood transfusion     Erectile dysfunction     Gout     Hypertension     Osteoarthritis      Past Surgical History:   Procedure Laterality Date    BONE MARROW BIOPSY N/A     CHOLECYSTECTOMY      COLONOSCOPY  2012    COLONOSCOPY N/A 12/27/2021    Procedure: COLONOSCOPY;  Surgeon: Solis Mendez MD;  Location: [facility];  Service: Endoscopy;  Laterality: N/A;  11/16 bariatric stretcher; fully vaccinated; instr. to portal-st  12/23 pt confirmed arrival time and all prep instructions-ml    FINGER SURGERY Left 2017    left middle finger    UPPER GASTROINTESTINAL ENDOSCOPY       Family History   Problem Relation Age of Onset    Heart disease Mother     Cancer Mother         Breast    Diabetes Father     Hypertension Father     Ulcers Father     Anemia Sister      Social History     Tobacco Use    Smoking status: Never Smoker    Smokeless tobacco: Never Used   Substance Use Topics    Alcohol use: Yes     Comment: Ocassionally    Drug use: No        Review of Systems   Constitutional: Negative for chills, diaphoresis and fever.   HENT: Negative for congestion, ear pain and sore throat.    Eyes: Negative for photophobia and discharge.   Respiratory: Negative for cough, shortness of breath and wheezing.    Cardiovascular: Negative for chest pain and palpitations.   Gastrointestinal: Negative for abdominal pain, constipation, diarrhea, nausea and  vomiting.   Genitourinary: Negative for dysuria and hematuria.   Musculoskeletal: Positive for back pain (L lower back). Negative for myalgias.   Skin: Negative for itching and rash.   Neurological: Negative for dizziness, sensory change, focal weakness, weakness and headaches.   All other systems reviewed and are negative.    OBJECTIVE:     Vitals:    06/30/22 1507   BP: 139/89   Pulse: 88   Temp: 98.9 °F (37.2 °C)     Body mass index is 56.84 kg/m².    Physical Exam  Constitutional:       General: He is not in acute distress.     Appearance: He is not diaphoretic.   HENT:      Head: Normocephalic and atraumatic.      Right Ear: Tympanic membrane and ear canal normal. No hemotympanum. Tympanic membrane is not perforated, erythematous or bulging.      Left Ear: Tympanic membrane and ear canal normal. No hemotympanum. Tympanic membrane is not perforated, erythematous or bulging.      Mouth/Throat:      Pharynx: No oropharyngeal exudate.   Eyes:      Conjunctiva/sclera: Conjunctivae normal.      Pupils: Pupils are equal, round, and reactive to light.   Neck:      Thyroid: No thyromegaly.   Cardiovascular:      Rate and Rhythm: Normal rate and regular rhythm.      Heart sounds: Murmur (systolic murmur) heard.     No friction rub. No gallop.   Pulmonary:      Effort: Pulmonary effort is normal. No respiratory distress.      Breath sounds: Normal breath sounds. No wheezing or rales.   Abdominal:      General: Bowel sounds are normal. There is no distension.      Palpations: Abdomen is soft.      Tenderness: There is no abdominal tenderness. There is no guarding or rebound.   Musculoskeletal:         General: Tenderness (L lower paraspinal muscle tenderness.) present. Normal range of motion.   Lymphadenopathy:      Cervical: No cervical adenopathy.   Skin:     General: Skin is warm.      Findings: No erythema or rash.   Neurological:      Mental Status: He is alert and oriented to person, place, and time.         Old  records were reviewed. Labs and/or images were independently reviewed.    ASSESSMENT     1. Routine physical examination    2. Essential hypertension    3. Morbid obesity with BMI of 50.0-59.9, adult    4. Pre-diabetes    5. Muscle spasm of back    6. Gout, unspecified cause, unspecified chronicity, unspecified site        PLAN:     Routine physical examination  -     CBC Auto Differential; Future  -     Comprehensive Metabolic Panel; Future  -     Hemoglobin A1C; Future  -     Lipid Panel; Future  -     TSH; Future  -     T4, Free; Future  -     PSA, Screening; Future; Expected date: 06/30/2022  -     We briefly discussed diet, exercise, and routine preventive exams. All questions and comments addressed.    Essential hypertension  -     amLODIPine (NORVASC) 10 MG tablet; Take 1 tablet (10 mg total) by mouth once daily.  Dispense: 90 tablet; Refill: 3  -     Educated patient to take medications as prescribed, and to record bp logs daily to bring along to next visit. Instructed patient to decrease salt intake. Also told patient to call if any new signs or symptoms develop.    Morbid obesity with BMI of 50.0-59.9, adult/Pre-diabetes  -     Start semaglutide (OZEMPIC) 0.25 mg or 0.5 mg(2 mg/1.5 mL) pen injector; Inject 0.25 mg into the skin every 7 days.  Dispense: 4 pen; Refill: 5  -    I have discussed the common side effects of this medication with the patient and answered all of the questions they had at the time of this visit regarding this medication.    Muscle spasm of back   -     Tizanidine/naproxen as discussed   -     Patient counseled on good posture and stretching exercises. Continue to place ice packs on affected areas 3-4 times daily. Take medications as prescribed. Instructed patient to call MD if symptoms persist or worsen.    RTC SHAYNE Ambrosio MD  06/30/2022 3:23 PM

## 2022-07-07 ENCOUNTER — LAB VISIT (OUTPATIENT)
Dept: LAB | Facility: HOSPITAL | Age: 52
End: 2022-07-07
Attending: FAMILY MEDICINE
Payer: COMMERCIAL

## 2022-07-07 DIAGNOSIS — Z11.59 NEED FOR HEPATITIS C SCREENING TEST: ICD-10-CM

## 2022-07-07 DIAGNOSIS — Z00.00 ROUTINE PHYSICAL EXAMINATION: ICD-10-CM

## 2022-07-07 DIAGNOSIS — E29.1 MALE HYPOGONADISM: ICD-10-CM

## 2022-07-07 LAB
ALBUMIN SERPL BCP-MCNC: 3.8 G/DL (ref 3.5–5.2)
ALP SERPL-CCNC: 73 U/L (ref 55–135)
ALT SERPL W/O P-5'-P-CCNC: 22 U/L (ref 10–44)
ANION GAP SERPL CALC-SCNC: 10 MMOL/L (ref 8–16)
AST SERPL-CCNC: 18 U/L (ref 10–40)
BASOPHILS # BLD AUTO: 0.06 K/UL (ref 0–0.2)
BASOPHILS NFR BLD: 0.8 % (ref 0–1.9)
BILIRUB SERPL-MCNC: 0.4 MG/DL (ref 0.1–1)
BUN SERPL-MCNC: 18 MG/DL (ref 6–20)
CALCIUM SERPL-MCNC: 9.7 MG/DL (ref 8.7–10.5)
CHLORIDE SERPL-SCNC: 99 MMOL/L (ref 95–110)
CHOLEST SERPL-MCNC: 180 MG/DL (ref 120–199)
CHOLEST/HDLC SERPL: 4.7 {RATIO} (ref 2–5)
CO2 SERPL-SCNC: 30 MMOL/L (ref 23–29)
COMPLEXED PSA SERPL-MCNC: 0.96 NG/ML (ref 0–4)
CREAT SERPL-MCNC: 1.4 MG/DL (ref 0.5–1.4)
DIFFERENTIAL METHOD: ABNORMAL
EOSINOPHIL # BLD AUTO: 0.4 K/UL (ref 0–0.5)
EOSINOPHIL NFR BLD: 6 % (ref 0–8)
ERYTHROCYTE [DISTWIDTH] IN BLOOD BY AUTOMATED COUNT: 16 % (ref 11.5–14.5)
EST. GFR  (AFRICAN AMERICAN): >60 ML/MIN/1.73 M^2
EST. GFR  (NON AFRICAN AMERICAN): 58.2 ML/MIN/1.73 M^2
ESTIMATED AVG GLUCOSE: 126 MG/DL (ref 68–131)
GLUCOSE SERPL-MCNC: 107 MG/DL (ref 70–110)
HBA1C MFR BLD: 6 % (ref 4–5.6)
HCT VFR BLD AUTO: 43 % (ref 40–54)
HDLC SERPL-MCNC: 38 MG/DL (ref 40–75)
HDLC SERPL: 21.1 % (ref 20–50)
HGB BLD-MCNC: 13.3 G/DL (ref 14–18)
IMM GRANULOCYTES # BLD AUTO: 0.04 K/UL (ref 0–0.04)
IMM GRANULOCYTES NFR BLD AUTO: 0.5 % (ref 0–0.5)
LDLC SERPL CALC-MCNC: 115 MG/DL (ref 63–159)
LYMPHOCYTES # BLD AUTO: 4.7 K/UL (ref 1–4.8)
LYMPHOCYTES NFR BLD: 64.8 % (ref 18–48)
MCH RBC QN AUTO: 27.4 PG (ref 27–31)
MCHC RBC AUTO-ENTMCNC: 30.9 G/DL (ref 32–36)
MCV RBC AUTO: 89 FL (ref 82–98)
MONOCYTES # BLD AUTO: 0.9 K/UL (ref 0.3–1)
MONOCYTES NFR BLD: 12.8 % (ref 4–15)
NEUTROPHILS # BLD AUTO: 1.1 K/UL (ref 1.8–7.7)
NEUTROPHILS NFR BLD: 15.1 % (ref 38–73)
NONHDLC SERPL-MCNC: 142 MG/DL
NRBC BLD-RTO: 0 /100 WBC
PLATELET # BLD AUTO: 341 K/UL (ref 150–450)
PMV BLD AUTO: 9.9 FL (ref 9.2–12.9)
POTASSIUM SERPL-SCNC: 4 MMOL/L (ref 3.5–5.1)
PROT SERPL-MCNC: 7.6 G/DL (ref 6–8.4)
RBC # BLD AUTO: 4.86 M/UL (ref 4.6–6.2)
SODIUM SERPL-SCNC: 139 MMOL/L (ref 136–145)
T4 FREE SERPL-MCNC: 0.87 NG/DL (ref 0.71–1.51)
TESTOST SERPL-MCNC: 255 NG/DL (ref 304–1227)
TRIGL SERPL-MCNC: 135 MG/DL (ref 30–150)
TSH SERPL DL<=0.005 MIU/L-ACNC: 1.54 UIU/ML (ref 0.4–4)
WBC # BLD AUTO: 7.32 K/UL (ref 3.9–12.7)

## 2022-07-07 PROCEDURE — 85025 COMPLETE CBC W/AUTO DIFF WBC: CPT | Performed by: FAMILY MEDICINE

## 2022-07-07 PROCEDURE — 80061 LIPID PANEL: CPT | Performed by: FAMILY MEDICINE

## 2022-07-07 PROCEDURE — 83036 HEMOGLOBIN GLYCOSYLATED A1C: CPT | Performed by: FAMILY MEDICINE

## 2022-07-07 PROCEDURE — 84153 ASSAY OF PSA TOTAL: CPT | Performed by: FAMILY MEDICINE

## 2022-07-07 PROCEDURE — 84403 ASSAY OF TOTAL TESTOSTERONE: CPT | Performed by: UROLOGY

## 2022-07-07 PROCEDURE — 36415 COLL VENOUS BLD VENIPUNCTURE: CPT | Mod: PO | Performed by: UROLOGY

## 2022-07-07 PROCEDURE — 80053 COMPREHEN METABOLIC PANEL: CPT | Performed by: FAMILY MEDICINE

## 2022-07-07 PROCEDURE — 84443 ASSAY THYROID STIM HORMONE: CPT | Performed by: FAMILY MEDICINE

## 2022-07-07 PROCEDURE — 84439 ASSAY OF FREE THYROXINE: CPT | Performed by: FAMILY MEDICINE

## 2022-07-07 PROCEDURE — 86803 HEPATITIS C AB TEST: CPT | Performed by: FAMILY MEDICINE

## 2022-07-08 ENCOUNTER — TELEPHONE (OUTPATIENT)
Dept: UROLOGY | Facility: CLINIC | Age: 52
End: 2022-07-08
Payer: COMMERCIAL

## 2022-07-08 DIAGNOSIS — E29.1 MALE HYPOGONADISM: Primary | ICD-10-CM

## 2022-07-08 LAB — HCV AB SERPL QL IA: NEGATIVE

## 2022-07-08 NOTE — TELEPHONE ENCOUNTER
Call placed to patient. Name and date of birth verified. Patient informed of the following:    Please notify T is still low.  Verify he used androgel correctly and on the day of his draw. If so, increase to 3 pumps/day.  Recheck T in 1 week.  -Dr. Dietz    Patient stated he applies 2 pumps to shoulders daily and prior to lab draw. Patient instructed to increase application to 3 pumps daily, and reminded to apply prior to lab draw. Patient lab appointment scheduled and noted in epic. Patient verbalized understanding.

## 2022-07-08 NOTE — TELEPHONE ENCOUNTER
Please notify T is still low.  Verify he used androgel correctly and on the day of his draw. If so, increase to 3 pumps/day.  Recheck T in 1 week.

## 2022-07-18 ENCOUNTER — LAB VISIT (OUTPATIENT)
Dept: LAB | Facility: HOSPITAL | Age: 52
End: 2022-07-18
Attending: UROLOGY
Payer: COMMERCIAL

## 2022-07-18 DIAGNOSIS — E29.1 MALE HYPOGONADISM: ICD-10-CM

## 2022-07-18 LAB — TESTOST SERPL-MCNC: 270 NG/DL (ref 304–1227)

## 2022-07-18 PROCEDURE — 84403 ASSAY OF TOTAL TESTOSTERONE: CPT | Performed by: UROLOGY

## 2022-07-18 PROCEDURE — 36415 COLL VENOUS BLD VENIPUNCTURE: CPT | Mod: PO | Performed by: UROLOGY

## 2022-07-19 ENCOUNTER — TELEPHONE (OUTPATIENT)
Dept: UROLOGY | Facility: CLINIC | Age: 52
End: 2022-07-19
Payer: COMMERCIAL

## 2022-07-19 DIAGNOSIS — E29.1 MALE HYPOGONADISM: Primary | ICD-10-CM

## 2022-07-19 NOTE — TELEPHONE ENCOUNTER
Please notify T is still low.  Verify he used androgel correctly and on the day of his draw.  If so, increase to 4 pumps/day.  Recheck T in 1 week.

## 2022-07-19 NOTE — TELEPHONE ENCOUNTER
Call placed to patient. Name and date of birth verified. Patient informed of the following:    Please notify T is still low.  Verify he used androgel correctly and on the day of his draw.  If so, increase to 4 pumps/day.  Recheck T in 1 week.  -Dr. Dietz    Patient stated he has been applying 2 pumps to shoulders daily and prior to lab. Patient informed during lab phone conversations he was instructed to increase to 3 pumps to shoulders daily. Patient stated he forgot. Patient educated on the importance of following doctor orders and directive to obtain the full benefits for patient health. Patient instructed to increase to 3 pumps to shoulders daily and reminded to apply prior to lab draw. Lab appointment scheduled and noted in epic. Patient verbalized understanding.

## 2022-07-27 ENCOUNTER — LAB VISIT (OUTPATIENT)
Dept: LAB | Facility: HOSPITAL | Age: 52
End: 2022-07-27
Attending: UROLOGY
Payer: COMMERCIAL

## 2022-07-27 DIAGNOSIS — E29.1 MALE HYPOGONADISM: ICD-10-CM

## 2022-07-27 LAB — TESTOST SERPL-MCNC: 230 NG/DL (ref 304–1227)

## 2022-07-27 PROCEDURE — 36415 COLL VENOUS BLD VENIPUNCTURE: CPT | Mod: PO | Performed by: UROLOGY

## 2022-07-27 PROCEDURE — 84403 ASSAY OF TOTAL TESTOSTERONE: CPT | Performed by: UROLOGY

## 2022-07-28 ENCOUNTER — TELEPHONE (OUTPATIENT)
Dept: UROLOGY | Facility: CLINIC | Age: 52
End: 2022-07-28
Payer: COMMERCIAL

## 2022-07-28 DIAGNOSIS — E29.1 MALE HYPOGONADISM: Primary | ICD-10-CM

## 2022-07-28 NOTE — TELEPHONE ENCOUNTER
Please notify T is still low.  Verify he used androgel correctly and on the day of his labs.  If so, increase to 4 pumps/day.  Recheck T in 1 week.

## 2022-07-28 NOTE — TELEPHONE ENCOUNTER
Call placed to patient. Name and date of birth verified. Patient informed of the following:    Please notify T is still low.  Verify he used androgel correctly and on the day of his labs.  If so, increase to 4 pumps/day.  Recheck T in 1 week.  -Dr. Dietz    Patient stated he applies 3 pumps to shoulders daily and prior to lab draw. Patient instructed to increase to 4 pumps daily and reminded to apply prior to lab draw. Patient lab appointment scheduled and noted in epic. Patient verbalized understanding.

## 2022-08-05 ENCOUNTER — LAB VISIT (OUTPATIENT)
Dept: LAB | Facility: HOSPITAL | Age: 52
End: 2022-08-05
Attending: UROLOGY
Payer: COMMERCIAL

## 2022-08-05 DIAGNOSIS — E29.1 MALE HYPOGONADISM: ICD-10-CM

## 2022-08-05 LAB — TESTOST SERPL-MCNC: 279 NG/DL (ref 304–1227)

## 2022-08-05 PROCEDURE — 36415 COLL VENOUS BLD VENIPUNCTURE: CPT | Mod: PO | Performed by: UROLOGY

## 2022-08-05 PROCEDURE — 84403 ASSAY OF TOTAL TESTOSTERONE: CPT | Performed by: UROLOGY

## 2022-08-08 ENCOUNTER — TELEPHONE (OUTPATIENT)
Dept: UROLOGY | Facility: CLINIC | Age: 52
End: 2022-08-08
Payer: COMMERCIAL

## 2022-08-08 ENCOUNTER — OFFICE VISIT (OUTPATIENT)
Dept: UROLOGY | Facility: CLINIC | Age: 52
End: 2022-08-08
Payer: COMMERCIAL

## 2022-08-08 VITALS
DIASTOLIC BLOOD PRESSURE: 89 MMHG | HEART RATE: 79 BPM | WEIGHT: 315 LBS | SYSTOLIC BLOOD PRESSURE: 141 MMHG | HEIGHT: 76 IN | BODY MASS INDEX: 38.36 KG/M2

## 2022-08-08 DIAGNOSIS — E29.1 MALE HYPOGONADISM: Primary | ICD-10-CM

## 2022-08-08 PROCEDURE — 3077F SYST BP >= 140 MM HG: CPT | Mod: CPTII,S$GLB,, | Performed by: UROLOGY

## 2022-08-08 PROCEDURE — 1159F MED LIST DOCD IN RCRD: CPT | Mod: CPTII,S$GLB,, | Performed by: UROLOGY

## 2022-08-08 PROCEDURE — 3008F BODY MASS INDEX DOCD: CPT | Mod: CPTII,S$GLB,, | Performed by: UROLOGY

## 2022-08-08 PROCEDURE — 1160F RVW MEDS BY RX/DR IN RCRD: CPT | Mod: CPTII,S$GLB,, | Performed by: UROLOGY

## 2022-08-08 PROCEDURE — 3077F PR MOST RECENT SYSTOLIC BLOOD PRESSURE >= 140 MM HG: ICD-10-PCS | Mod: CPTII,S$GLB,, | Performed by: UROLOGY

## 2022-08-08 PROCEDURE — 3008F PR BODY MASS INDEX (BMI) DOCUMENTED: ICD-10-PCS | Mod: CPTII,S$GLB,, | Performed by: UROLOGY

## 2022-08-08 PROCEDURE — 99214 PR OFFICE/OUTPT VISIT, EST, LEVL IV, 30-39 MIN: ICD-10-PCS | Mod: S$GLB,,, | Performed by: UROLOGY

## 2022-08-08 PROCEDURE — 1159F PR MEDICATION LIST DOCUMENTED IN MEDICAL RECORD: ICD-10-PCS | Mod: CPTII,S$GLB,, | Performed by: UROLOGY

## 2022-08-08 PROCEDURE — 3079F PR MOST RECENT DIASTOLIC BLOOD PRESSURE 80-89 MM HG: ICD-10-PCS | Mod: CPTII,S$GLB,, | Performed by: UROLOGY

## 2022-08-08 PROCEDURE — 99214 OFFICE O/P EST MOD 30 MIN: CPT | Mod: S$GLB,,, | Performed by: UROLOGY

## 2022-08-08 PROCEDURE — 1160F PR REVIEW ALL MEDS BY PRESCRIBER/CLIN PHARMACIST DOCUMENTED: ICD-10-PCS | Mod: CPTII,S$GLB,, | Performed by: UROLOGY

## 2022-08-08 PROCEDURE — 3079F DIAST BP 80-89 MM HG: CPT | Mod: CPTII,S$GLB,, | Performed by: UROLOGY

## 2022-08-08 PROCEDURE — 3044F PR MOST RECENT HEMOGLOBIN A1C LEVEL <7.0%: ICD-10-PCS | Mod: CPTII,S$GLB,, | Performed by: UROLOGY

## 2022-08-08 PROCEDURE — 99999 PR PBB SHADOW E&M-EST. PATIENT-LVL IV: CPT | Mod: PBBFAC,,, | Performed by: UROLOGY

## 2022-08-08 PROCEDURE — 99999 PR PBB SHADOW E&M-EST. PATIENT-LVL IV: ICD-10-PCS | Mod: PBBFAC,,, | Performed by: UROLOGY

## 2022-08-08 PROCEDURE — 3044F HG A1C LEVEL LT 7.0%: CPT | Mod: CPTII,S$GLB,, | Performed by: UROLOGY

## 2022-08-08 NOTE — PROGRESS NOTES
CHIEF COMPLAINT:    Mr. Ware is a 51 y.o. male presenting with ED.    PRESENTING ILLNESS:    Roberta Ware Jr. is a 51 y.o. male here for evaluation of erectile dysfunction. He has tried and failed Viagra and Cialis.    He has hypogonadism.  He c/o decreased energy, low libido, weight gain.  He tried Androgel 1.62%, but his T didn't increase to > 300 despite 4 pumps.  Would like to discuss other options.    He has LUTS. Nocturia x5. He is pleased with how he voids.      REVIEW OF SYSTEMS:    Roberta Ware Jr. denies headache, blurred vision, fever, nausea, vomiting, chills, abdominal pain, chest pain, sore throat, bleeding per rectum, cough, SOB, recent loss of consciousness, recent mental status changes, seizures, dizziness, or upper or lower extremity weakness.    POONAM  1. 2  2. 5  3. 3  4. 2  5. 1     PATIENT HISTORY:    Past Medical History:   Diagnosis Date    Diverticulosis     Encounter for blood transfusion     Erectile dysfunction     Gout     Hypertension     Osteoarthritis        Past Surgical History:   Procedure Laterality Date    BONE MARROW BIOPSY N/A     CHOLECYSTECTOMY      COLONOSCOPY  2012    COLONOSCOPY N/A 12/27/2021    Procedure: COLONOSCOPY;  Surgeon: Solis Mendez MD;  Location: [facility];  Service: Endoscopy;  Laterality: N/A;  11/16 bariatric stretcher; fully vaccinated; instr. to portal-st  12/23 pt confirmed arrival time and all prep instructions-ml    FINGER SURGERY Left 2017    left middle finger    UPPER GASTROINTESTINAL ENDOSCOPY         Family History   Problem Relation Age of Onset    Heart disease Mother     Cancer Mother         Breast    Diabetes Father     Hypertension Father     Ulcers Father     Anemia Sister        Social History     Socioeconomic History    Marital status:    Tobacco Use    Smoking status: Never Smoker    Smokeless tobacco: Never Used   Substance and Sexual Activity    Alcohol use: Yes     Comment: Ocassionally     Drug use: No    Sexual activity: Yes     Partners: Female       Allergies:  Hydrochlorothiazide and Tramadol    Medications:    Current Outpatient Medications:     allopurinoL (ZYLOPRIM) 300 MG tablet, Take 1 tablet (300 mg total) by mouth once daily., Disp: 90 tablet, Rfl: 3    amLODIPine (NORVASC) 10 MG tablet, Take 1 tablet (10 mg total) by mouth once daily., Disp: 90 tablet, Rfl: 3    cetirizine (ZYRTEC) 10 MG tablet, Take 1 tablet (10 mg total) by mouth once daily., Disp: 30 tablet, Rfl: 0    chlorthalidone (HYGROTEN) 25 MG Tab, Take 1 tablet (25 mg total) by mouth once daily., Disp: 90 tablet, Rfl: 3    colchicine (COLCRYS) 0.6 mg tablet, Take 1 tablet (0.6 mg total) by mouth 2 (two) times daily as needed (for gout)., Disp: 90 tablet, Rfl: 3    naproxen (NAPROSYN) 500 MG tablet, Take 1 tablet (500 mg total) by mouth 2 (two) times daily as needed., Disp: 30 tablet, Rfl: 0    predniSONE (DELTASONE) 50 MG Tab, Take 50 mg by mouth daily as needed., Disp: , Rfl:     semaglutide (OZEMPIC) 0.25 mg or 0.5 mg(2 mg/1.5 mL) pen injector, Inject 0.25 mg into the skin every 7 days., Disp: 4 pen, Rfl: 5    sildenafiL (VIAGRA) 100 MG tablet, Take 1 tablet (100 mg total) by mouth daily as needed for Erectile Dysfunction., Disp: 30 tablet, Rfl: 3    tadalafiL (CIALIS) 20 MG Tab, Take 1 tablet (20 mg total) by mouth every other day. Take every other day as needed (Patient not taking: Reported on 6/30/2022), Disp: 10 tablet, Rfl: 11    testosterone (ANDROGEL) 20.25 mg/1.25 gram (1.62 %) GlPm, Apply 2 pumps to shoulders daily, Disp: 1 each, Rfl: 5    PHYSICAL EXAMINATION:    The patient generally appears in good health, is appropriately interactive, and is in no apparent distress.     Eyes: anicteric sclerae, moist conjunctivae; no lid-lag; PERRLA     HENT: Atraumatic; oropharynx clear with moist mucous membranes and no mucosal ulcerations;normal hard and soft palate.  No evidence of lymphadenopathy.    Neck: Trachea  midline.  No thyromegaly.    Skin: No lesions.    Mental: Cooperative with normal affect.  Is oriented to time, place, and person.    Neuro: Grossly intact.    Chest: Normal inspiratory effort.   No accessory muscles.  No audible wheezes.  Respirations symmetric on inspiration and expiration.    Heart: Regular rhythm.      Abdomen:  Soft, non-tender. No masses or organomegaly. Bladder is not palpable. No evidence of flank discomfort. No evidence of inguinal hernia.    Genitourinary: The penis is not circumcised with no evidence of plaques or induration. The urethral meatus is normal. The testes, epididymides, and cord structures are normal in size and contour bilaterally. The scrotum is normal in size and contour.    Normal anal sphincter tone. No rectal mass.    The prostate is smooth. Normal landmarks. Lateral sulci. Median furrow intact.  No nodularity or induration. Seminal vesicles are normal.    Extremities: No clubbing, cyanosis, or edema      LABS:      Lab Results   Component Value Date    PSA 0.96 07/07/2022    PSA 1.2 06/15/2015    PSADIAG 1.0 06/16/2022       IMPRESSION:    Encounter Diagnoses   Name Primary?    Male hypogonadism Yes   HTN, stable      PLAN:    1. Will observe his LUTS as they don't bother him.  2. Discussed options for his ED (affected by above comorbidities).  He'd like to try Cialis with a normal T.  3.  Discussed options for TRT.  He'd like Aveed.  Discussed the risks of POME and anaphylaxis amongst other risks of TRT.  He was given the chance to ask questions.  He'd like this.   Aveed ordered today.  4. RTC for Aveed.    Copy to:

## 2022-08-08 NOTE — TELEPHONE ENCOUNTER
Call placed to patient. Name and date of birth verified. Patient informed of the following:    Please notify his T is still low despite 4 pumps of androgel.  He should come in to discuss other options.  -Dr. Dietz    Patient appointment scheduled and noted in epic. Patient verbalized understanding.

## 2022-08-08 NOTE — TELEPHONE ENCOUNTER
Please notify his T is still low despite 4 pumps of androgel.  He should come in to discuss other options.

## 2022-08-30 DIAGNOSIS — E29.1 MALE HYPOGONADISM: Primary | ICD-10-CM

## 2022-08-30 NOTE — PROGRESS NOTES
Last seen in clinic with Dr. Dietz for hypogonadism  Failed topical  TRT; unable to achieve T levels > 300 with maxed doses.  Insurance denied his AVEED  Consult endocrine for improved management.

## 2022-09-06 ENCOUNTER — LAB VISIT (OUTPATIENT)
Dept: LAB | Facility: HOSPITAL | Age: 52
End: 2022-09-06
Attending: INTERNAL MEDICINE
Payer: COMMERCIAL

## 2022-09-06 DIAGNOSIS — D72.829 LEUKOCYTOSIS, UNSPECIFIED TYPE: ICD-10-CM

## 2022-09-06 LAB
ALBUMIN SERPL BCP-MCNC: 4.2 G/DL (ref 3.5–5.2)
ALP SERPL-CCNC: 78 U/L (ref 55–135)
ALT SERPL W/O P-5'-P-CCNC: 18 U/L (ref 10–44)
ANION GAP SERPL CALC-SCNC: 10 MMOL/L (ref 8–16)
AST SERPL-CCNC: 18 U/L (ref 10–40)
BASOPHILS # BLD AUTO: 0.05 K/UL (ref 0–0.2)
BASOPHILS NFR BLD: 0.4 % (ref 0–1.9)
BILIRUB SERPL-MCNC: 0.4 MG/DL (ref 0.1–1)
BUN SERPL-MCNC: 19 MG/DL (ref 6–20)
CALCIUM SERPL-MCNC: 10 MG/DL (ref 8.7–10.5)
CHLORIDE SERPL-SCNC: 102 MMOL/L (ref 95–110)
CO2 SERPL-SCNC: 29 MMOL/L (ref 23–29)
CREAT SERPL-MCNC: 1.5 MG/DL (ref 0.5–1.4)
DIFFERENTIAL METHOD: ABNORMAL
EOSINOPHIL # BLD AUTO: 0.5 K/UL (ref 0–0.5)
EOSINOPHIL NFR BLD: 3.2 % (ref 0–8)
ERYTHROCYTE [DISTWIDTH] IN BLOOD BY AUTOMATED COUNT: 16.4 % (ref 11.5–14.5)
EST. GFR  (NO RACE VARIABLE): 56 ML/MIN/1.73 M^2
GLUCOSE SERPL-MCNC: 87 MG/DL (ref 70–110)
HCT VFR BLD AUTO: 43.5 % (ref 40–54)
HGB BLD-MCNC: 14 G/DL (ref 14–18)
IMM GRANULOCYTES # BLD AUTO: 0.04 K/UL (ref 0–0.04)
IMM GRANULOCYTES NFR BLD AUTO: 0.3 % (ref 0–0.5)
LYMPHOCYTES # BLD AUTO: 4.9 K/UL (ref 1–4.8)
LYMPHOCYTES NFR BLD: 34.5 % (ref 18–48)
MCH RBC QN AUTO: 27.6 PG (ref 27–31)
MCHC RBC AUTO-ENTMCNC: 32.2 G/DL (ref 32–36)
MCV RBC AUTO: 86 FL (ref 82–98)
MONOCYTES # BLD AUTO: 0.7 K/UL (ref 0.3–1)
MONOCYTES NFR BLD: 4.6 % (ref 4–15)
NEUTROPHILS # BLD AUTO: 8.1 K/UL (ref 1.8–7.7)
NEUTROPHILS NFR BLD: 57 % (ref 38–73)
NRBC BLD-RTO: 0 /100 WBC
PLATELET # BLD AUTO: 304 K/UL (ref 150–450)
PMV BLD AUTO: 10 FL (ref 9.2–12.9)
POTASSIUM SERPL-SCNC: 3.6 MMOL/L (ref 3.5–5.1)
PROT SERPL-MCNC: 8.8 G/DL (ref 6–8.4)
RBC # BLD AUTO: 5.07 M/UL (ref 4.6–6.2)
SODIUM SERPL-SCNC: 141 MMOL/L (ref 136–145)
WBC # BLD AUTO: 14.17 K/UL (ref 3.9–12.7)

## 2022-09-06 PROCEDURE — 85025 COMPLETE CBC W/AUTO DIFF WBC: CPT | Performed by: INTERNAL MEDICINE

## 2022-09-06 PROCEDURE — 36415 COLL VENOUS BLD VENIPUNCTURE: CPT | Performed by: INTERNAL MEDICINE

## 2022-09-06 PROCEDURE — 80053 COMPREHEN METABOLIC PANEL: CPT | Performed by: INTERNAL MEDICINE

## 2022-10-24 ENCOUNTER — OFFICE VISIT (OUTPATIENT)
Dept: OTOLARYNGOLOGY | Facility: CLINIC | Age: 52
End: 2022-10-24
Payer: COMMERCIAL

## 2022-10-24 VITALS — SYSTOLIC BLOOD PRESSURE: 182 MMHG | DIASTOLIC BLOOD PRESSURE: 100 MMHG | HEART RATE: 72 BPM

## 2022-10-24 DIAGNOSIS — H61.21 IMPACTED CERUMEN OF RIGHT EAR: ICD-10-CM

## 2022-10-24 DIAGNOSIS — H93.8X9 EAR POPPING, UNSPECIFIED LATERALITY: Primary | ICD-10-CM

## 2022-10-24 DIAGNOSIS — H61.22 HEARING LOSS DUE TO CERUMEN IMPACTION, LEFT: ICD-10-CM

## 2022-10-24 DIAGNOSIS — Z78.9 HISTORY OF EXCESSIVE CERUMEN: ICD-10-CM

## 2022-10-24 PROCEDURE — 99999 PR PBB SHADOW E&M-EST. PATIENT-LVL III: ICD-10-PCS | Mod: PBBFAC,,, | Performed by: OTOLARYNGOLOGY

## 2022-10-24 PROCEDURE — 69210 REMOVE IMPACTED EAR WAX UNI: CPT | Mod: S$GLB,,, | Performed by: OTOLARYNGOLOGY

## 2022-10-24 PROCEDURE — 1159F MED LIST DOCD IN RCRD: CPT | Mod: CPTII,S$GLB,, | Performed by: OTOLARYNGOLOGY

## 2022-10-24 PROCEDURE — 99499 UNLISTED E&M SERVICE: CPT | Mod: S$GLB,,, | Performed by: OTOLARYNGOLOGY

## 2022-10-24 PROCEDURE — 99999 PR PBB SHADOW E&M-EST. PATIENT-LVL III: CPT | Mod: PBBFAC,,, | Performed by: OTOLARYNGOLOGY

## 2022-10-24 PROCEDURE — 3080F DIAST BP >= 90 MM HG: CPT | Mod: CPTII,S$GLB,, | Performed by: OTOLARYNGOLOGY

## 2022-10-24 PROCEDURE — 3044F PR MOST RECENT HEMOGLOBIN A1C LEVEL <7.0%: ICD-10-PCS | Mod: CPTII,S$GLB,, | Performed by: OTOLARYNGOLOGY

## 2022-10-24 PROCEDURE — 69210 PR REMOVAL IMPACTED CERUMEN REQUIRING INSTRUMENTATION, UNILATERAL: ICD-10-PCS | Mod: S$GLB,,, | Performed by: OTOLARYNGOLOGY

## 2022-10-24 PROCEDURE — 3044F HG A1C LEVEL LT 7.0%: CPT | Mod: CPTII,S$GLB,, | Performed by: OTOLARYNGOLOGY

## 2022-10-24 PROCEDURE — 99499 NO LOS: ICD-10-PCS | Mod: S$GLB,,, | Performed by: OTOLARYNGOLOGY

## 2022-10-24 PROCEDURE — 3080F PR MOST RECENT DIASTOLIC BLOOD PRESSURE >= 90 MM HG: ICD-10-PCS | Mod: CPTII,S$GLB,, | Performed by: OTOLARYNGOLOGY

## 2022-10-24 PROCEDURE — 3077F PR MOST RECENT SYSTOLIC BLOOD PRESSURE >= 140 MM HG: ICD-10-PCS | Mod: CPTII,S$GLB,, | Performed by: OTOLARYNGOLOGY

## 2022-10-24 PROCEDURE — 1159F PR MEDICATION LIST DOCUMENTED IN MEDICAL RECORD: ICD-10-PCS | Mod: CPTII,S$GLB,, | Performed by: OTOLARYNGOLOGY

## 2022-10-24 PROCEDURE — 3077F SYST BP >= 140 MM HG: CPT | Mod: CPTII,S$GLB,, | Performed by: OTOLARYNGOLOGY

## 2022-10-24 NOTE — PROGRESS NOTES
CC:  HPI:Mr. Ware is a 52-year-old  male presents today for ear cleaning procedures.  His ears were last cleaned by me in late February of this year.    He has a history of occlusive cerumen impactions recurrent from time to time requiring ear cleaning procedures.  His blood pressure is elevated today.  He has a history of hypertension and gout.  His PCP is JESE Ambrosio.    Past Medical History:   Diagnosis Date    Diverticulosis     Encounter for blood transfusion     Erectile dysfunction     Gout     Hypertension     Osteoarthritis        PE:  General:  Alert and oriented gentleman in no acute distress  Blood pressure 182/100 pulse 72 ht 6 ft 4 in weight 442 lb  Both ears were examined under the microscope.  Politzerization is attempted thru right nostril after neosynephrine decongestion    Procedures: Large occlusive dark brown cerumen impaction suctioned from AS eac ; small amount of cerumen removed from AS eac a with curette  Dark brown cerumen pieces removed from AD eac with curette.  Right TM is slightly retracted and a slightly dull in appearance.  Left TM is clear and intact.  Audiometry was not  performed today.      DIAGNOSIS:   1. Ear popping, unspecified laterality        2. History of excessive cerumen        3. Hearing loss due to cerumen impaction, left        4. Impacted cerumen of right ear        5.     Elevated blood the pressure reading    PLAN: Large occlusive C.I. extracted from AS eac with suction/curette  AD C.I. extracted from AD eac with curette  RTC prn ear cleaning of the  E.T. literature provided  Gentle middle ear insufflation  encouraged  RTC 3 weeks prn; audiometry/tympanometry on return prn  May use Flonase daily for rhinitis sx  Note elevated blood pressure; a monitor blood pressure/take BP medicine routinely  To ED for any change in neurologic status or chest pain

## 2022-10-24 NOTE — PATIENT INSTRUCTIONS
Large occlusive C.I. extracted from AS eac with suction/curette  AD C.I. extracted from AD eac with curette  RTC prn ear cleaning a  E.T. literature provided  Gentle middle ear insufflation  encouraged  RTC 3 weeks prn; audiometry/tympanometry on return prn  May use Flonase daily for rhinitis sx  Note elevated blood pressure; a monitor blood pressure/take BP medicine routinely  To ED for any change in neurologic status or chest pain

## 2023-03-01 RX ORDER — TADALAFIL 20 MG/1
20 TABLET ORAL EVERY OTHER DAY
Qty: 10 TABLET | Refills: 11 | OUTPATIENT
Start: 2023-03-01 | End: 2024-02-29

## 2023-03-06 ENCOUNTER — TELEPHONE (OUTPATIENT)
Dept: FAMILY MEDICINE | Facility: CLINIC | Age: 53
End: 2023-03-06
Payer: COMMERCIAL

## 2023-03-06 NOTE — TELEPHONE ENCOUNTER
----- Message from Brian Ace LPN sent at 3/6/2023  3:36 PM CST -----  Regarding: FW: [id number]    ----- Message -----  From: Piper Noland  Sent: 3/6/2023   3:28 PM CST  To: [facility]  Subject: [id number]                                   Type:  Sooner Appointment Request    Patient is requesting a sooner appointment.  Patient declined first available appointment listed as well as another facility and provider .  Patient will not accept being placed on the waitlist and is requesting a message be sent to doctor.    Name of Caller: self    When is the first available appointment? 3/13    Symptoms: head cold    Would the patient rather a call back or a response via [facility]? Call back    Best Call Back Number: [phone]      Additional Information: pt states he will like a yeimy from the nurse about being seen tomorrow, 3/7

## 2023-03-20 ENCOUNTER — TELEPHONE (OUTPATIENT)
Dept: FAMILY MEDICINE | Facility: CLINIC | Age: 53
End: 2023-03-20

## 2023-03-20 ENCOUNTER — OFFICE VISIT (OUTPATIENT)
Dept: FAMILY MEDICINE | Facility: CLINIC | Age: 53
End: 2023-03-20
Payer: COMMERCIAL

## 2023-03-20 VITALS
WEIGHT: 315 LBS | SYSTOLIC BLOOD PRESSURE: 144 MMHG | HEART RATE: 71 BPM | OXYGEN SATURATION: 95 % | BODY MASS INDEX: 56.25 KG/M2 | TEMPERATURE: 99 F | DIASTOLIC BLOOD PRESSURE: 86 MMHG

## 2023-03-20 DIAGNOSIS — I10 ESSENTIAL HYPERTENSION: Chronic | ICD-10-CM

## 2023-03-20 DIAGNOSIS — M62.830 MUSCLE SPASM OF BACK: ICD-10-CM

## 2023-03-20 DIAGNOSIS — M25.521 RIGHT ELBOW PAIN: ICD-10-CM

## 2023-03-20 DIAGNOSIS — N52.01 ERECTILE DYSFUNCTION DUE TO ARTERIAL INSUFFICIENCY: ICD-10-CM

## 2023-03-20 DIAGNOSIS — J30.9 ALLERGIC RHINITIS, UNSPECIFIED SEASONALITY, UNSPECIFIED TRIGGER: Primary | ICD-10-CM

## 2023-03-20 DIAGNOSIS — Z23 NEED FOR SHINGLES VACCINE: ICD-10-CM

## 2023-03-20 DIAGNOSIS — E66.01 MORBID OBESITY WITH BMI OF 50.0-59.9, ADULT: ICD-10-CM

## 2023-03-20 PROCEDURE — 1159F MED LIST DOCD IN RCRD: CPT | Mod: CPTII,S$GLB,, | Performed by: NURSE PRACTITIONER

## 2023-03-20 PROCEDURE — 99999 PR PBB SHADOW E&M-EST. PATIENT-LVL IV: CPT | Mod: PBBFAC,,, | Performed by: NURSE PRACTITIONER

## 2023-03-20 PROCEDURE — 4010F PR ACE/ARB THEARPY RXD/TAKEN: ICD-10-PCS | Mod: CPTII,S$GLB,, | Performed by: NURSE PRACTITIONER

## 2023-03-20 PROCEDURE — 3079F DIAST BP 80-89 MM HG: CPT | Mod: CPTII,S$GLB,, | Performed by: NURSE PRACTITIONER

## 2023-03-20 PROCEDURE — 3077F PR MOST RECENT SYSTOLIC BLOOD PRESSURE >= 140 MM HG: ICD-10-PCS | Mod: CPTII,S$GLB,, | Performed by: NURSE PRACTITIONER

## 2023-03-20 PROCEDURE — 3077F SYST BP >= 140 MM HG: CPT | Mod: CPTII,S$GLB,, | Performed by: NURSE PRACTITIONER

## 2023-03-20 PROCEDURE — 3008F BODY MASS INDEX DOCD: CPT | Mod: CPTII,S$GLB,, | Performed by: NURSE PRACTITIONER

## 2023-03-20 PROCEDURE — 4010F ACE/ARB THERAPY RXD/TAKEN: CPT | Mod: CPTII,S$GLB,, | Performed by: NURSE PRACTITIONER

## 2023-03-20 PROCEDURE — 99214 PR OFFICE/OUTPT VISIT, EST, LEVL IV, 30-39 MIN: ICD-10-PCS | Mod: S$GLB,,, | Performed by: NURSE PRACTITIONER

## 2023-03-20 PROCEDURE — 99214 OFFICE O/P EST MOD 30 MIN: CPT | Mod: S$GLB,,, | Performed by: NURSE PRACTITIONER

## 2023-03-20 PROCEDURE — 99999 PR PBB SHADOW E&M-EST. PATIENT-LVL IV: ICD-10-PCS | Mod: PBBFAC,,, | Performed by: NURSE PRACTITIONER

## 2023-03-20 PROCEDURE — 3079F PR MOST RECENT DIASTOLIC BLOOD PRESSURE 80-89 MM HG: ICD-10-PCS | Mod: CPTII,S$GLB,, | Performed by: NURSE PRACTITIONER

## 2023-03-20 PROCEDURE — 3008F PR BODY MASS INDEX (BMI) DOCUMENTED: ICD-10-PCS | Mod: CPTII,S$GLB,, | Performed by: NURSE PRACTITIONER

## 2023-03-20 PROCEDURE — 1159F PR MEDICATION LIST DOCUMENTED IN MEDICAL RECORD: ICD-10-PCS | Mod: CPTII,S$GLB,, | Performed by: NURSE PRACTITIONER

## 2023-03-20 RX ORDER — ZOSTER VACCINE RECOMBINANT, ADJUVANTED 50 MCG/0.5
0.5 KIT INTRAMUSCULAR ONCE
Qty: 1 EACH | Refills: 1 | Status: SHIPPED | OUTPATIENT
Start: 2023-03-20 | End: 2023-03-20

## 2023-03-20 RX ORDER — MINERAL OIL
180 ENEMA (ML) RECTAL DAILY
Qty: 30 TABLET | Refills: 3 | Status: SHIPPED | OUTPATIENT
Start: 2023-03-20 | End: 2023-07-25

## 2023-03-20 RX ORDER — LOSARTAN POTASSIUM 50 MG/1
50 TABLET ORAL DAILY
Qty: 30 TABLET | Refills: 3 | Status: SHIPPED | OUTPATIENT
Start: 2023-03-20 | End: 2023-07-25 | Stop reason: SDUPTHER

## 2023-03-20 RX ORDER — NAPROXEN 500 MG/1
500 TABLET ORAL DAILY PRN
Qty: 30 TABLET | Refills: 0 | Status: SHIPPED | OUTPATIENT
Start: 2023-03-20 | End: 2023-07-25 | Stop reason: SDUPTHER

## 2023-03-20 RX ORDER — TADALAFIL 20 MG/1
20 TABLET ORAL EVERY OTHER DAY
Qty: 15 TABLET | Refills: 2 | Status: SHIPPED | OUTPATIENT
Start: 2023-03-20

## 2023-03-20 RX ORDER — FLUTICASONE PROPIONATE 50 MCG
2 SPRAY, SUSPENSION (ML) NASAL DAILY
Qty: 11.1 ML | Refills: 0 | Status: SHIPPED | OUTPATIENT
Start: 2023-03-20 | End: 2023-07-25

## 2023-03-20 NOTE — PATIENT INSTRUCTIONS
//////////PHONE NUMBERS//////////    Bariatrics---[phone]  Breast Surgery---[phone]  Case Management---[phone]  Colonoscopy---[phone]  Gym/Silver Sneakers--[phone]  Imaging, Xray, CT, MRI, Ultrasound---[phone]  Infectious Disease---[phone]  Interventional Radiology---[phone]  Medical Records---[phone]  [facility] On [facility]---[phone]  DME---[phone]  Optometry/Ophthalmology---[phone]  [facility]---[phone]  Physical Therapy---[phone]  Psychiatry---[phone] or [phone]  Plastic Surgery---[phone]  Sleep Study---[phone]  Smoking Cessation---[phone]  Vaccine Hotline---[phone]  Wound Care---[phone]  COVID Vaccine center---[phone]  Referral Desk---[phone]    /////////////////////////////////////////////////    Patient Education       Obesity Discharge Instructions, Adult   About this topic   Obesity is a health problem where your weight is higher than it should be. Doctors use a method called body mass index or BMI to measure whether you are at a healthy weight for your height. The doctor uses your weight and your height to determine your BMI. A high BMI can be an indicator of high body fat. Obesity is different from being overweight. Being overweight means your BMI is 25 or higher. Being obese means your BMI is 30 or higher. If your BMI is 40 or higher, you are considered severely or morbidly obese. Being obese may lead to many health problems. It may make it hard for you to breathe and move easily. It may raise your risk for illnesses like high blood sugar and heart disease.  What care is needed at home?   Ask your doctor what you need to do when you go home. Make sure you understand everything the doctor says. This way you will know what you need to do.  Change your diet. Lower the calories in your diet. Ask your dietitian to help you set an eating plan that is right for you.  Get enough exercise. Talk to your doctor about  the right amount of exercise for you.  Do not smoke.  Limit the amount of beer, wine, and mixed drinks (alcohol) you drink.  Keep a record of your weight. Write down what you eat and drink each day. This may help you be more aware of the choices you are making.  What drugs may be needed?   The doctor may order drugs to:  Treat health problems that may cause weight gain  Lower your appetite  Help you lose weight  Will physical activity be limited?   Talk to your doctor about the right amount of exercise for you. This is especially important if you have heart problems, other illnesses, or if you recently had surgery.  Start slowly by doing more everyday activities like yard work or household chores.  Choose activities that you like to do.  What changes to diet are needed?   Whole grains are a good source of carbohydrates and fiber. Try to eat 3 to 5 servings of whole grain, high fiber foods each day. These are things like whole grain bread, bran cereals, brown rice, and whole wheat pasta.  Fruits and vegetables are good sources of fiber, vitamins, and minerals. Try to pick many kinds and colors. This will help you get different nutrients in your diet. Choose fresh, frozen, or canned fruits and vegetables. Buy plain, frozen fruits without added sugar. Buy plain, frozen vegetables without added salt and fat. Buy canned fruit in 100% juice or water. Avoid canned fruit in syrups. Buy canned vegetables with no salt added.  Milk is a good source of protein and some vitamins and minerals. Choose low-fat (1%) or fat-free milk. Eat nonfat or low-fat cheeses, ice creams, yogurt, and other dairy products.  Meats and beans are good sources of protein and iron. Eat more low-fat or lean meats like chicken without the skin, turkey without the skin, and fish. Eggs and peanut butter are good sources of protein as well. Dried peas, beans, and lentils are also good and contain fiber. Fatty fish, like salmon, tuna, mackerel, herring, and  trout, are good to eat and have healthy omega-3 fats.  Good fats can give you long-term energy. These are found in fish, nuts, seeds, and avocados. Try using olive oil, safflower oil, and low-sodium, low-fat salad dressing as a topping on foods. Use canola, olive, or peanut oil for cooking. Other healthy oils include corn, sunflower, and soybean oils.  Limit sweets such as candy and sugary drinks. Try to drink water instead. Drink diet or no calorie beverages when you want something other than water.  Cut back on solid fats, like butter, lard, and coconut oil.  Limit fatty foods such as desserts, fried foods, and chips.  Trans fats should be avoided. Most trans fats are found in processed foods, commercially baked goods, and fried foods and are very unhealthy. Saturated fat, which is different from trans fat, should be watched and limited if portions are too large. Saturated fat is found mainly in animal sources, such as meat and dairy products. Saturated fat is also found in coconut and palm oils.  Limit processed meats and most processed foods.  Limit eating out. If you choose to visit a restaurant, ask for the nutritional facts. You may also be able to find nutritional facts online. Then, you can make a plan and choose healthy items. Watch the portion size. Have large portions split and take part home for some other meal.  What problems could happen?   Low mood or self-esteem  Anxiety  Muscle pain, joint pain, or arthritis  Sleep apnea  Diabetes  High blood pressure  High cholesterol  Heart, lung, or breathing problems  Kidney or liver problems  Cancer  Gallstones  Increased sweating  Reduced fertility  Pregnancy complications  Gastroesophageal reflux disease  When do I need to call the doctor?   Signs of high or low blood sugar  Thoughts of hurting yourself  Teach Back: Helping You Understand   The Teach Back Method helps you understand the information we are giving you. After you talk with the staff, tell them  "in your own words what you learned. This helps to make sure the staff has described each thing clearly. It also helps to explain things that may have been confusing. Before going home, make sure you can do these:  I can tell you about my condition.  I can tell you what changes I need to make with my diet or drugs.  I can tell you what I will do if I have signs of a heart attack or stroke.  Where can I learn more?   Centers for Disease Control and Prevention  http://www.cdc.gov/obesity/adult/defining.html   [facility]HS  http://www.nhs.uk/Conditions/Obesity/Pages/obesityprevention.aspx   Last Reviewed Date   2021-06-23  Consumer Information Use and Disclaimer   This information is not specific medical advice and does not replace information you receive from your health care provider. This is only a brief summary of general information. It does NOT include all information about conditions, illnesses, injuries, tests, procedures, treatments, therapies, discharge instructions or life-style choices that may apply to you. You must talk with your health care provider for complete information about your health and treatment options. This information should not be used to decide whether or not to accept your health care providers advice, instructions or recommendations. Only your health care provider has the knowledge and training to provide advice that is right for you.  Copyright   Copyright © 2021 [employer]. and its affiliates and/or licensors. All rights reserved.  Patient Education       Weight Loss Diet   About this topic   There are many "trendy" weight loss diets that are popular today. Many of these diets can end up being more harmful than helpful. The healthiest way to lose weight is to burn more calories than you eat.  A weight loss diet should help you have a healthy view of eating. It is NOT healthy to stop eating to try and lose weight. A good diet plan will help you cut down your food intake and make healthy " choices.  A healthy weight loss goal is 1 to 2 pounds (0.5 to 1 kg) per week. Reducing calories in your diet, burning calories through exercise, or both can help you lose weight. Combining a healthy diet with regular physical activity can help you get the best results.  To cut calories in your diet you can:  Switch from whole milk to 1% or skim milk.  Switch from regular cheese to low-fat or fat-free cheese.  Use healthier condiment choices:  Fat-free or low-fat sour cream or salad dressings  Spray butter  Diet syrups or jellies over regular  Try frozen yogurt as a dessert rather than eating ice cream.  Skip the chips. Snack on carrots, vegetables, or fruit. If chips are a favorite of yours, try the baked style and watch portion size.  Eat grilled, roasted, boiled, broiled, or baked meats. Avoid deep-frying. Choose skinless poultry, lean red meat, lean cuts of pork, and fish for good protein sources.  Try flavored no-calorie rolle. Do not drink soda and juices that have many calories.  Choose fruit instead of sweets.  General   Eating smaller meals more often may be helpful. This will keep you from overeating at your next meal. Also, eating meals slowly helps you feel full faster.  If eating 3 meals is a part of your lifestyle, choose more lean proteins and higher fiber foods to fill you up at each meal.  Do not skip meals. Most often if you skip a meal, you eat too much at the next meal.  Eat smaller portions. Use a smaller plate or bowl for meals, and when you are eating out, eat half and take the rest home.  Plan ahead. Plan your meals and grocery list before going to the store. Planning will keep you from getting meals from restaurants.  Do not go to the grocery store hungry. You are more likely to buy snacks that are not good for you.  Portion out snacks. When you are having a snack, instead of grabbing the whole bag, portion a small amount out to give yourself a stopping point.  Drink water before and after  your meals to help fill you up without the calories.  When eating starchy foods, choose whole-grain products. These have a lot of fiber which will make you feel full. Fiber also helps lower cholesterol and helps with bowel function.  If you need a helpful start, ask your doctor to send you to a dietitian for weight loss help.         What will the results be?   Losing excess weight will make your whole body healthier. You will have more energy for your daily activities and lower your risk for health problems.  What lifestyle changes are needed?   Stay active. Eating healthy is not always enough to lose weight. Burning calories by exercising is a big part of weight loss.  What foods are good to eat?   The key is to watch your portion sizes. It is best to choose foods that are lower in fat and calories.  Choose lean meats:  Boneless, skinless chicken breast  Pork loin  90% lean beef  Lean turkey meat  Fresh fish (not fried)  Choose low-fat dairy products:  1% or skim milk  Spray butter or margarine  Low-fat or fat-free cheese  Frozen yogurt or low-calorie ice cream  Choose fresh fruits, vegetables, beans and lentils, and whole wheat products more often.  Choose water to drink more often. Drink diet or no-calorie beverages when you want something other than water. Aim to get your calories from the foods you eat.  Choose smart snacks:  Fruits  Vegetables  Low-fat or nonfat yogurt  Low-fat or no-fat cheese, such as cottage cheese  Unsalted nuts  Hard-boiled egg  Hummus  Guacamole  Natural peanut butter  Popcorn with no butter ? use pepper, garlic, or another spice to taste  Whole grain crackers  What foods should be limited or avoided?   Limit high-fat, high-sodium, and high-calorie foods like:  Fried foods  Processed meats  Whole-fat dairy products  Candy, cookies, chips, pastries  Sausage, echols, any full-fat meats  Soda, juice  Beer, wine, and mixed drinks (alcohol)  Will there be any other care needed?   What do I do  first before trying to lose weight?  Talk to your doctor and dietitian to see if you need to lose weight. Work with them to set your weight loss goals.  If you have a chronic illness, such as high blood sugar or high blood pressure, ask a doctor or dietitian what diet and exercise is right for you.  Ask your doctor about how much you are able to exercise and what type of exercise is good for you.  Helpful tips   Keep a food journal to help keep you on track.  Join a support group.  Tips for burning calories:  If your workplace is near your house, choose to walk or bike to work instead of driving.  Take 20-minute walks each day. Walk around during your lunch break. You will not only burn calories, but will raise your energy for the rest of the day.  Take the stairs over the elevator.  Join a gym or exercise class with a friend.  Try to exercise 30 minutes a day for overall health. Three 10-minute sessions work too. Aim for 60 to 90 minutes a day to lose weight.  Drink lots of water before, during, and after exercise.  Where can I learn more?   Academy of Nutrition and Dietetics  https://www.eatright.org/health/weight-loss/your-health-and-your-weight/back-to-basics-for-healthy-weight-loss   Centers for Disease Control and Prevention  https://www.cdc.gov/healthyweight/healthy_eating/index.html   Familydoctor.org  https://familydoctor.org/nutrition-weight-loss-need-know-fad-diets/   [facility]  https://www.nhs.uk/live-well/healthy-weight/12-tips-to-help-you-lose-weight/?tabname=you-and-your-weight   Last Reviewed Date   2021-06-24  Consumer Information Use and Disclaimer   This information is not specific medical advice and does not replace information you receive from your health care provider. This is only a brief summary of general information. It does NOT include all information about conditions, illnesses, injuries, tests, procedures, treatments, therapies, discharge instructions or life-style choices that may apply to  you. You must talk with your health care provider for complete information about your health and treatment options. This information should not be used to decide whether or not to accept your health care providers advice, instructions or recommendations. Only your health care provider has the knowledge and training to provide advice that is right for you.  Copyright   Copyright © 2021 UpToDate, Inc. and its affiliates and/or licensors. All rights reserved.    Patient Education       Viral Upper Respiratory Infection Discharge Instructions, Adult   About this topic   You have an upper respiratory infection or URI. A URI can affect your nose, throat, ears, and sinuses. A virus is the cause of almost all URIs and antibiotics will not help you feel better more quickly. The common cold is an example of a viral URI.  URIs are easy to spread from person to person, most often through coughing or sneezing. A URI will almost always get better in a week or two without any treatment.         What care is needed at home?   Ask your doctor what you need to do when you go home. Make sure you ask questions if you do not understand what the doctor says.  If you smoke, try to quit. Your doctor or nurse can help.  Drink lots of fluids like water, juice, or broth. This will help replace any fluids lost if you have a runny nose or fever. Warm tea or soup can help soothe a sore throat.  If the air in your home feels dry, use a cool mist humidifier. This can help a stuffy nose and make it easier to breathe.  You can also use saline nose drops to relieve stuffiness.  If you decide to take over-the-counter cough or cold medicines, follow the directions on the label carefully. Be sure you do not take more than 1 medicine that contains acetaminophen. Also, if you have a heart problem or high blood pressure, check with your doctor before you take any of these medicines.  Wash your hands often. Cough or sneeze into a tissue or your elbow  instead of your hands. This will help keep others healthy.  What follow-up care is needed?   Your doctor may ask you to make visits to the office to check on your progress. Be sure to keep these visits.  What drugs may be needed?   The doctor may order drugs to:  Open up the tubes of your lungs  Treat viral infection  Relieve or stop coughing  Help with pain from a sore throat  Relieve runny and stuffy nose  Provide oxygen  Will physical activity be limited?   You need to rest for a few days to let your body recover from the infection.  What changes to diet are needed?   Eat soft foods like soup if swallowing is too painful.  What problems could happen?   Asthma attack  Sinus infections  Lung problems like pneumonia and bronchitis  Severe fluid loss. This is dehydration.  What can be done to prevent this health problem?   Wash your hands often with soap and water for at least 20 seconds, especially after coughing or sneezing. Alcohol-based hand sanitizers also work to kill the virus.  If you are sick, cover your mouth and nose with tissue when you cough or sneeze. You can also cough into your elbow. Throw away tissues in the trash and wash your hands after touching used tissues.  Do not get too close (kissing, hugging) to people who are sick.  Do not share towels or hankies with anyone who is sick. Clean commonly handled things like door handles, remotes, toys, and phones. Wipe them with a disinfectant.  Stay away from crowded places.  Cover your nose and mouth when you sneeze or cough.  Take vitamin C to help build up your body's ability to fight disease.  Get a flu shot each year.  When do I need to call the doctor?   You have trouble breathing when talking or sitting still.  You have a fever of 100.4°F (38°C) or higher for several days, chills, a very bad sore throat, or ear or sinus pain.  You develop a new fever after several days of feeling the same or improving.  You develop chest pain when you cough.  You  have a cough that lasts more than 10 days.  You cough up blood, or the color of the mucus you cough up changes.  Teach Back: Helping You Understand   The Teach Back Method helps you understand the information we are giving you. After you talk with the staff, tell them in your own words what you learned. This helps to make sure the staff has described each thing clearly. It also helps to explain things that may have been confusing. Before going home, make sure you can do these:  I can tell you about my condition.  I can tell you what may help ease my signs.  I can tell you what I will do if I have a fever, chills, breathing very fast, or trouble breathing.  Where can I learn more?   American Lung Association  https://www.lung.org/blog/can-you-exercise-with-a-cold   American Lung Association  https://www.lung.org/lung-health-diseases/lung-disease-lookup/influenza/facts-about-the-common-cold   NHS Choices  https://www.nhs.uk/conditions/respiratory-tract-infection/   UpToDate  https://www.[employer] [facility]/contents/the-common-cold-in-adults-beyond-the-basics   Last Reviewed Date   2021-06-08  Consumer Information Use and Disclaimer   This information is not specific medical advice and does not replace information you receive from your health care provider. This is only a brief summary of general information. It does NOT include all information about conditions, illnesses, injuries, tests, procedures, treatments, therapies, discharge instructions or life-style choices that may apply to you. You must talk with your health care provider for complete information about your health and treatment options. This information should not be used to decide whether or not to accept your health care providers advice, instructions or recommendations. Only your health care provider has the knowledge and training to provide advice that is right for you.  Copyright   Copyright © 2021 UpToDate, Inc. and its affiliates and/or licensors. All  rights reserved.  Patient Education       Cough, Runny Nose, and the Common Cold Discharge Instructions   About this topic   The common cold is an infection in the nose and throat. A tiny germ, called a virus, causes this infection. It often affects your nose, throat, ears, and sinuses. A cold can easily spread from person to person. Coughing, sneezing, or touching something with the germ on it spreads the cold.  Most colds go away on their own without treatment. Antibiotics will not help a cold. Your doctor may order drugs to help with the signs of a cold. Even though you may feel very sick, the common cold is not a health emergency.         What care is needed at home?   Ask your doctor what you need to do when you go home. Make sure you ask questions if you do not understand what the doctor says.  To help you feel better:  Use a cool mist humidifier to avoid breathing dry air.  Use hard candy or cough drops to soothe sore throat and cough.  Gargle with salt water. Mix 1/2 teaspoon (2.5 grams) salt with a cup (240 mL) of warm water a few times a day.  Spray saltwater mist in each nostril. Any normal saline spray works.  Sip warm liquids to keep your throat moist.  Take warm, steamy showers to help soothe the cough.  Do not smoke or be in smoke-filled places. Avoid things that may cause breathing problems like vaping, fumes, pollution, dust, and other common allergens.  You may want to use over-the-counter medicines for allergies or acid reflux if your cough is due to one of these problems.  You can also use an over-the-counter cough medicine.  Drink plenty of fluids whenever you are thirsty.  What follow-up care is needed?   Your doctor may ask you to make visits to the office to check on your progress. Be sure to keep these visits.  What drugs may be needed?   Your doctor may order drugs to:  Help a stuffy nose  Lower a fever  Help with pain  Treat an allergy  Help with cough  Always talk to your doctor before  you take any drugs. This includes over-the-counter (OTC) drugs and herbal supplements. This is very important if you have high blood pressure.  Will physical activity be limited?   Get lots of rest. Sleep when you are feeling tired. Avoid doing tiring activities.  What changes to diet are needed?   Chicken soup may be helpful. Warm fluids help thin mucus and help the body get rid of it easier.  What problems could happen?   Colds may cause signs of asthma for people who have asthma.  Sinus or ear infection  Lung infections  Bronchitis  What can be done to prevent this health problem?   Wash your hands often with soap and water for at least 20 seconds, especially after coughing or sneezing. Alcohol-based hand sanitizers also work to kill the virus.  If you are sick, cover your mouth and nose with tissue when you cough or sneeze. You can also cough into your elbow. Throw away tissues in the trash and wash your hands after touching used tissues.  Clean items and surfaces you most often touch like door handles, remotes, phones, or toys. Wipe them with a disinfectant. This can help reduce the spread of infection.  Do not get too close (kissing, hugging) to people who are sick.  Do not share towels or hankies with anyone who is sick.  Stay away from crowded places.  Keep your hands away from your eyes and nose because the virus can enter easily to those body areas.  Get a flu shot each year.  When do I need to call the doctor?   You have chest pain when you cough or trouble breathing.  You start to cough up blood or yellow or green mucus.  You have a fever of 100.4°F (38°C) or higher or chills.  You cough so hard you throw up.  You are still coughing in 10 days.  Helpful tips   It is normal that mucus from your runny nose may become thicker and change color. Do not take an antibiotic. Instead, drink more water-based fluids, especially hot tea and soups.  Most colds go away within a week. If you are still sick after 14  days, see your doctor.  Teach Back: Helping You Understand   The Teach Back Method helps you understand the information we are giving you. After you talk with the staff, tell them in your own words what you learned. This helps to make sure the staff has described each thing clearly. It also helps to explain things that may have been confusing. Before going home, make sure you can do these:  I can tell you about my condition.  I can tell you what may help ease my breathing.  I can tell you what I can do to help avoid passing the infection to others.  I can tell you what I will do if I have a fever, chills, or trouble breathing.  Where can I learn more?   American Academy of Family Physicians  https://familydoctor.org/condition/colds-and-the-flu/   American Lung Association  http://www.lung.org/lung-health-and-diseases/lung-disease-lookup/influenza/facts-about-the-common-cold.html   Centers for Disease Control and Prevention  https://www.cdc.gov/features/rhinoviruses/   Kids Health  http://kidshealth.org/en/parents/cold.html?ref=search&WT.ac=nlr-o-cswe-hh-gkxzrm-tfr   Last Reviewed Date   2021-06-09  Consumer Information Use and Disclaimer   This information is not specific medical advice and does not replace information you receive from your health care provider. This is only a brief summary of general information. It does NOT include all information about conditions, illnesses, injuries, tests, procedures, treatments, therapies, discharge instructions or life-style choices that may apply to you. You must talk with your health care provider for complete information about your health and treatment options. This information should not be used to decide whether or not to accept your health care providers advice, instructions or recommendations. Only your health care provider has the knowledge and training to provide advice that is right for you.  Copyright   Copyright © 2021 [employer] and its affiliates and/or  licensors. All rights reserved.      Patient Education       High Blood Pressure Discharge Instructions   About this topic   The medical name for high blood pressure is hypertension. Your blood pressure is measured with 2 numbers. For example, you may hear the staff say your blood pressure is 130 over 80. High blood pressure cannot be cured. You must control it with drugs and lifestyle changes. High blood pressure puts you at risk for heart attack, stroke, and kidney disease.         What care is needed at home?   Ask your doctor what you need to do when you go home. Make sure you ask questions if you do not understand what the doctor says. This way you will know what you need to do.  Take all your medicines as ordered. Do not stop taking any of your regular medicines without talking to your doctor.  Learn how to check your blood pressure at home, if your doctor suggests you do this.  What follow-up care is needed?   Your doctor may ask you to make visits to the office to check on your progress. Be sure to keep these visits.  What drugs may be needed?   The doctor may order drugs to help control your high blood pressure. Take your drugs as ordered. Do not take other prescription drugs or over-the-counter (OTC) drugs without talking to your doctor first.  Will physical activity be limited?   Talk to your doctor about the right amount of activity for you. Exercise may help you lose weight and lower your blood pressure.  What changes to diet are needed?   Do not use salt on your food. Use herbs and spices to improve the taste.  Eat less than 1,500 mg of sodium a day. Read food labels to see how much sodium is in a food.  Limit coffee, tea, and soda to 2 cups (480 mL) a day.  Limit beer, wine, and mixed drinks (alcohol) to 1 drink a day for women and 2 drinks a day for men.  Eat lots of fruits, vegetables, and low-fat dairy products.  Avoid fatty foods like fried foods or chips.  Talk with your dietitian about the diet  changes you need and the number of calories you should eat each day.  What problems could happen?   Heart attack, heart failure, or other heart problems  Stroke  Swelling of blood vessels  Kidney failure  Loss of eyesight  Memory problems  Fluid in the lungs  Death  What can be done to prevent this health problem?   Exercise regularly. Try to get at least 30 minutes of exercise most days of the week.  Avoid weight gain.  Stop smoking. Your doctor can tell you about stop smoking programs.  Learn to lower stress.  When do I need to call the doctor?   You have signs of a heart attack, which may include:  Severe chest pain, pressure, or discomfort with:  Breathing trouble, sweating, upset stomach, or cold, clammy skin  Pain in your arms, back, or jaw  Worse pain with activity like walking up stairs  Fast or irregular heartbeat  Feeling dizzy, faint, or weak  You have signs of stroke like sudden:  Numbness or weakness of the face, arm, or leg, especially on one side of the body  Confusion, trouble speaking, or understanding  Trouble seeing in one or both eyes  Trouble walking, dizziness, loss of balance, or coordination  Severe headache with no known cause  You have a seizure or pass out.  You have a severe headache with an upset stomach or throwing up.  You have sudden, severe back pain.  You have 2 home blood pressure readings higher than 180/120.  Your urine is brown or bloody.  Teach Back: Helping You Understand   The Teach Back Method helps you understand the information we are giving you. After you talk with the staff, tell them in your own words what you learned. This helps to make sure the staff has described each thing clearly. It also helps to explain things that may have been confusing. Before going home, make sure you can do these:  I can tell you about my condition.  I can tell you what numbers are too high for my blood pressure.  I can tell you what I will do if I have signs of a heart attack or  stroke.  Where can I learn more?   American Academy of Family Physicians  https://familydoctor.org/condition/high-blood-pressure/   American Academy of Family Physicians  https://familydoctor.org/the-dash-diet-healthy-eating-to-control-your-blood-pressure/   American Heart Association  http://www.heart.org/HEARTORG/Conditions/HighBloodPressure/AboutHighBloodPressure/About-High-Blood-Pressure_UC_002050_Article.jsp#.T8rN5SiiB1b   NHS Choices  http://www.nhs.uk/conditions/blood-pressure-(high)/pages/introduction.aspx   [employer]  https://www.uptodate.com/contents/high-blood-pressure-in-adults-beyond-the-basics?source=see_link   Last Reviewed Date   2021-06-08  Consumer Information Use and Disclaimer   This information is not specific medical advice and does not replace information you receive from your health care provider. This is only a brief summary of general information. It does NOT include all information about conditions, illnesses, injuries, tests, procedures, treatments, therapies, discharge instructions or life-style choices that may apply to you. You must talk with your health care provider for complete information about your health and treatment options. This information should not be used to decide whether or not to accept your health care providers advice, instructions or recommendations. Only your health care provider has the knowledge and training to provide advice that is right for you.  Copyright   Copyright © 2021 [employer] and its affiliates and/or licensors. All rights reserved.  Patient Education       DASH Diet   About this topic   DASH stands for Dietary Approaches to Stop Hypertension. The DASH diet may help you lower blood pressure. It may also help keep you from getting high blood pressure. You will eat less fat and more fiber on the DASH diet.  This diet gives you more minerals that fight high blood pressure. Some nutrients in this diet are:  Potassium ? Acts to help you get rid of  salt. This may help to lower blood pressure.  Calcium ? Makes blood vessels and muscles work the right way  Magnesium - Helps blood vessels relax  Fiber ? Helps you feel full. It also helps digestion.  What will the results be?   The DASH diet may help you:  Lower your blood pressure and cholesterol  Lower your risk for cancer, heart disease, heart attack, and stroke. It may also lower your risk for heart failure, kidney stones, and diabetes.  Lose weight or keep a healthy weight  What lifestyle changes are needed?   Add regular exercise to get the most help from this diet.  Try to lower stress. Find ways to relax.  Stop smoking. Avoid secondhand smoke.  Limit alcohol intake.  What changes to diet are needed?   Know about poor eating habits. Then, you can fix them as you work with the program.  This diet encourages fruits and vegetables, whole grains, lean meats, healthy fats, and low-fat or fat-free dairy products.  This diet is lower in saturated fats, trans-fats, cholesterol, added sugars, and sodium.  Who should use this diet?   This eating plan is good for the whole family. It is also good for people with high blood pressure and those at risk for high blood pressure.  What foods are good to eat?   Grains: Try to eat 6 to 8 servings of whole grain, high fiber foods each day. These are bread, cereals, brown rice, or pasta.  Fruits and vegetables: Eat 4 to 5 servings each day. Try to pick many kinds and colors. Fresh or frozen are best. Look for low sodium or salt-free if you choose canned.  Dairy: Try to eat 2 to 3 servings of fat free and low fat milk products each day.  Lean meats, poultry, and seafood: Try to eat 6 servings or less of lean meats, poultry, and seafood each day. Try to choose more low fat or lean meats like chicken and turkey. Eat less red meat. Eat more fish instead.  Nuts, seeds, and legumes (dry beans and peas): Try to eat 4 to 5 servings each week. Try to pick nuts such as almonds and  walnuts, sunflower seeds, peanut butter, soy beans, lentils, kidney beans, and split peas.  Fats and oils: Try to eat 2 to 3 servings of fats and oils each day. Eat good fats found in fish, nuts, and avocados. Try using olive oil or vegetable oils such as canola oil. Other good oils to try are corn, safflower, sunflower, or soybean oils. Use low-sodium and low-fat salad dressing and mayonnaise.  Condiments: Pepper, herbs, spices, vinegar, lemon or lime juices are great for seasoning. Be careful to choose low-sodium or salt-free products if you use broths, soups, or soy sauce.  Sweets: Try to eat less than 5 servings each week. Choose low-fat and trans fat-free desserts. These are things like fruit flavored gelatin, sorbet, jelly beans, yissel crackers, animal crackers, low-fat fig bars, and hayden snaps. Eat fruit to satisfy your desire for sweets.     What foods should be limited or avoided?   Grains: Salted breads, rolls, crackers, quick breads, self-rising flours, biscuit mixes, regular bread crumbs, instant hot cereals, commercially-prepared rice, pasta, stuffing mixes  Fruits and vegetables: Commercially-prepared potatoes and vegetable mixes, regular canned vegetables and juices, vegetables frozen with sauce or pickled vegetables, processed fruits with salt or sodium  Milk: Whole milk, malted milk, chocolate milk, buttermilk, cheese, ice cream  Meats and beans: Smoked, cured, salted, or canned fish; meats or poultry such as echols, sausages, sardines; high-fat cuts of meat like beef, lamb, or pork; chicken with the skin on it  Fats: Cut back on solid fats like butter, lard, and margarine. Eat less food with high saturated fat, cholesterol and total fat.  Condiments and snacks: Salted and canned peas, beans, and olives; salted snack foods; fried foods; soda or other sweetened drinks  Sweets: High-fat baked goods such as muffins, donuts, pastries, commercial baked goods, candy bars  If you choose to drink alcohol,  limit the amount you drink. Women should have 1 drink or less per day and men should have 2 drinks or less per day.  Helpful tips   Avoid eating canned vegetables and processed foods. These have a lot of salt in them. Look for a low-salt or low-sodium choice.  Try baking or broiling instead of frying food.  Write down the foods you eat. This will help you track what you have eaten each week.  When you go to a grocery store, have a list or a meal plan. Do not shop when you are hungry to avoid cravings for foods.  Read food labels with care. They will show you how much is in a serving. The amount is given as a percentage of the total amount you need each day. Reading labels will help you make healthy food choices.       Avoid fast foods.  Talk to your doctor or dietitian to see if you need vitamin and mineral supplements to help you balance your diet.  Talk to a dietitian for help.  Where can I learn more?   Academy of Nutrition and Dietetics  https://www.eatright.org/health/wellness/heart-and-cardiovascular-health/dash-diet-reducing-hypertension-through-diet-and-lifestyle   FamilyDoctor.org  http://familydoctor.org/familydoctor/en/prevention-wellness/food-nutrition/weight-loss/the-dash-diet-healthy-eating-to-control-your-blood-pressure.html   Last Reviewed Date   2021-03-15  Consumer Information Use and Disclaimer   This information is not specific medical advice and does not replace information you receive from your health care provider. This is only a brief summary of general information. It does NOT include all information about conditions, illnesses, injuries, tests, procedures, treatments, therapies, discharge instructions or life-style choices that may apply to you. You must talk with your health care provider for complete information about your health and treatment options. This information should not be used to decide whether or not to accept your health care providers advice, instructions or  recommendations. Only your health care provider has the knowledge and training to provide advice that is right for you.  Copyright   Copyright © 2021 [employer] and its affiliates and/or licensors. All rights reserved.

## 2023-03-20 NOTE — TELEPHONE ENCOUNTER
----- Message from Luis Antonio Mendez NP sent at 3/20/2023  1:11 PM CDT -----  Please assist the patient in scheduling a follow up appointment with RN for BP 2 weeks from today.     Thank you for your help in this matter and all that you do for our patients. I hope you have a great day.

## 2023-03-20 NOTE — PROGRESS NOTES
HPI     Chief Complaint:  Chief Complaint   Patient presents with    Itchy Eye    Cough       Roberta Ware Jr. is a 52 y.o. male with multiple medical diagnoses as listed in the medical history and problem list that presents for cough.  Pt is new to me but is known to this clinic with his last appointment being Visit date not found.      Cough  This is a new problem. The current episode started in the past 7 days. The problem occurs hourly. The cough is Non-productive. Associated symptoms include myalgias and postnasal drip. Pertinent negatives include no chest pain, chills, ear pain, fever, rash, sore throat or shortness of breath. The symptoms are aggravated by dust, cold air and pollens. He has tried OTC cough suppressant and rest for the symptoms. The treatment provided moderate relief. His past medical history is significant for environmental allergies. There is no history of asthma.   Eye Problem   Both eyes are affected. This is a new problem. The current episode started in the past 7 days. The problem occurs 2 to 4 times per day. The problem has been gradually worsening. There was no injury mechanism. There is No known exposure to pink eye. He Does not wear contacts. Associated symptoms include an eye discharge and itching. Pertinent negatives include no fever or weakness. He has tried nothing for the symptoms. The treatment provided no relief.       he is compliant with medications daily without any adverse side effects.    Assessment & Plan     Problem List Items Addressed This Visit          ENT    AR (allergic rhinitis) - Primary    Noted with itchy, watery eyes and dry, persistent cough   for several weeks. Denies CP/SOB. No fever. States  He thinks it is allergies, as these symptoms recur with seasons and weather  Changing. Will order PO antihistamine, flonase.    Relevant Medications    fexofenadine (ALLEGRA) 180 MG tablet    fluticasone propionate (FLONASE) 50 mcg/actuation nasal spray        Cardiac/Vascular    Essential hypertension (Chronic)    BP Readings from Last 3 Encounters:   23 (!) 144/86   10/24/22 (!) 182/100   22 (!) 141/89     -start losartan 50 mg qd.   -continue current medication regimen  -DASH diet, regular cardiovascular exercises, portion control  - ?weight loss  -f/u with BP logs in 2 weeks if BP is not consistently <140/90      Relevant Medications    losartan (COZAAR) 50 MG tablet       Renal/    Erectile dysfunction due to arterial insufficiency    Reports good effect with medication. Denies AE.    Medication refilled.    Relevant Medications    tadalafiL (CIALIS) 20 MG Tab       Endocrine    Morbid obesity with BMI of 50.0-59.9, adult    We discussed weight issues and safe, effective ways of losing pounds, includin) diet:  low carbohydrate, low fat diet, stay away from fast food, fried and processed food, use whole grain, lot of fruits and vegetables, use healthy fat such as avocado, nuts and olive oil in reasonable quantity, stay away from sodas. Regular meals with lean proteins.  2) physical activity: ideally 150 min a week, with cardiovascular and resistance activity.  Patient was encouraged to set realistic attainable goals for weight loss, and we will follow up periodically.       Other Visit Diagnoses       Need for shingles vaccine        Relevant Medications    varicella-zoster gE-AS01B, PF, (SHINGRIX, PF,) 50 mcg/0.5 mL injection    Right elbow pain        The current medical regimen is effective;  continue present plan      Muscle spasm of back        The current medical regimen is effective;  continue present plan and medications.    The patient was advised that NSAID-type medications have two very important potential side effects: gastrointestinal irritation including hemorrhage and renal injuries. Take the medication with food and to stop if  experiencing any GI upset. Call for vomiting, abdominal pain or black/bloody stools. The patient expresses  understanding of these issues and questions were answered.      Relevant Medications    naproxen (NAPROSYN) 500 MG tablet                --------------------------------------------      Health Maintenance:  Health Maintenance         Date Due Completion Date    Shingles Vaccine (1 of 2) Never done ---    COVID-19 Vaccine (4 - Booster for Pfizer series) 01/11/2022 11/16/2021    Influenza Vaccine (1) Never done ---    Hemoglobin A1c (Prediabetes) 07/07/2023 7/7/2022    TETANUS VACCINE 05/25/2026 5/25/2016    Colorectal Cancer Screening 12/27/2026 12/27/2021    Lipid Panel 07/07/2027 7/7/2022            Health maintenance reviewed.  Due to cost, advised patient to obtain overdue vaccines at retail pharmacy.       Follow Up:  Follow up in about 2 weeks (around 4/3/2023), or if symptoms worsen or fail to improve.    Exam         Review of Systems   Constitutional:  Positive for fatigue. Negative for chills and fever.   HENT:  Positive for postnasal drip and sinus pressure/congestion. Negative for ear discharge, ear pain, sore throat and trouble swallowing.    Eyes:  Positive for discharge and itching.   Respiratory:  Positive for cough. Negative for chest tightness and shortness of breath.    Cardiovascular:  Negative for chest pain.   Musculoskeletal:  Positive for back pain and myalgias.   Integumentary:  Negative for rash.   Allergic/Immunologic: Positive for environmental allergies.   Neurological:  Negative for dizziness and weakness.   Psychiatric/Behavioral:  Negative for behavioral problems and confusion.      Physical Exam  Constitutional:       Appearance: Normal appearance. He is obese.   HENT:      Head: Normocephalic and atraumatic.      Right Ear: Tympanic membrane normal.      Left Ear: Tympanic membrane normal.      Nose: Rhinorrhea present.      Mouth/Throat:      Mouth: Mucous membranes are moist.      Pharynx: Posterior oropharyngeal erythema present.   Eyes:      General:         Right eye: Discharge  present.         Left eye: Discharge present.  Cardiovascular:      Rate and Rhythm: Normal rate and regular rhythm.      Pulses: Normal pulses.      Heart sounds: Normal heart sounds.   Pulmonary:      Effort: Pulmonary effort is normal. No respiratory distress.      Breath sounds: Normal breath sounds.   Musculoskeletal:         General: Normal range of motion.      Cervical back: Normal range of motion and neck supple.   Skin:     General: Skin is warm and dry.      Capillary Refill: Capillary refill takes 2 to 3 seconds.   Neurological:      General: No focal deficit present.      Mental Status: He is alert and oriented to person, place, and time.   Psychiatric:         Mood and Affect: Mood normal.         Behavior: Behavior normal.       Vitals:    03/20/23 1113 03/20/23 1149   BP: (!) 156/90 (!) 144/86   BP Location: Right arm    Patient Position: Sitting    BP Method: Thigh Cuff (Manual)    Pulse: 71    Temp: 98.7 °F (37.1 °C)    TempSrc: Oral    SpO2: 95%    Weight: (!) 209.6 kg (462 lb 1.4 oz)       Body mass index is 56.25 kg/m².        History     Past Medical History:  Past Medical History:   Diagnosis Date    Diverticulosis     Encounter for blood transfusion     Erectile dysfunction     Gout     Hypertension     Osteoarthritis        Past Surgical History:  Past Surgical History:   Procedure Laterality Date    BONE MARROW BIOPSY N/A     CHOLECYSTECTOMY      COLONOSCOPY  2012    COLONOSCOPY N/A 12/27/2021    Procedure: COLONOSCOPY;  Surgeon: Solis Mendez MD;  Location: [facility];  Service: Endoscopy;  Laterality: N/A;  11/16 bariatric stretcher; fully vaccinated; instr. to portal-st  12/23 pt confirmed arrival time and all prep instructions-ml    FINGER SURGERY Left 2017    left middle finger    UPPER GASTROINTESTINAL ENDOSCOPY         Social History:  Social History     Socioeconomic History    Marital status:    Tobacco Use    Smoking status: Never    Smokeless tobacco: Never   Substance  and Sexual Activity    Alcohol use: Yes     Comment: Ocassionally    Drug use: No    Sexual activity: Yes     Partners: Female       Family History:  Family History   Problem Relation Age of Onset    Heart disease Mother     Cancer Mother         Breast    Diabetes Father     Hypertension Father     Ulcers Father     Anemia Sister        Allergies and Medications: (updated and reviewed)  Review of patient's allergies indicates:   Allergen Reactions    Hydrochlorothiazide Other (See Comments)     Gout    Tramadol Swelling     Pt states make him jittery      Current Outpatient Medications   Medication Sig Dispense Refill    allopurinoL (ZYLOPRIM) 300 MG tablet Take 1 tablet (300 mg total) by mouth once daily. 90 tablet 3    amLODIPine (NORVASC) 10 MG tablet Take 1 tablet (10 mg total) by mouth once daily. 90 tablet 3    chlorthalidone (HYGROTEN) 25 MG Tab Take 1 tablet (25 mg total) by mouth once daily. 90 tablet 3    colchicine (COLCRYS) 0.6 mg tablet Take 1 tablet (0.6 mg total) by mouth 2 (two) times daily as needed (for gout). 90 tablet 3    predniSONE (DELTASONE) 50 MG Tab Take 50 mg by mouth daily as needed.      semaglutide (OZEMPIC) 0.25 mg or 0.5 mg(2 mg/1.5 mL) pen injector Inject 0.25 mg into the skin every 7 days. 4 pen 5    fexofenadine (ALLEGRA) 180 MG tablet Take 1 tablet (180 mg total) by mouth once daily. 30 tablet 3    fluticasone propionate (FLONASE) 50 mcg/actuation nasal spray 2 sprays (100 mcg total) by Each Nostril route once daily. 11.1 mL 0    losartan (COZAAR) 50 MG tablet Take 1 tablet (50 mg total) by mouth once daily. 30 tablet 3    naproxen (NAPROSYN) 500 MG tablet Take 1 tablet (500 mg total) by mouth daily as needed (joint pain). 30 tablet 0    tadalafiL (CIALIS) 20 MG Tab Take 1 tablet (20 mg total) by mouth every other day. Take every other day as needed 15 tablet 2    varicella-zoster gE-AS01B, PF, (SHINGRIX, PF,) 50 mcg/0.5 mL injection Inject 0.5 mLs into the muscle once. for 1  dose 1 each 1     No current facility-administered medications for this visit.       Patient Care Team:  Antwan Ambrosio MD as PCP - General (Family Medicine)  Ruby Ortez MA (Inactive) as Care Coordinator      The patient expressed understanding and no barriers to adherence were identified.      - The patient indicates understanding of these issues and agrees with the plan. Brief care plan is updated and reviewed with the patient as applicable.      - The patient is given an After Visit Summary that lists all medications with directions, allergies, education, orders placed during this encounter and follow-up instructions.      - I have reviewed the patient's medical information including past medical, family, and social history sections including the medications and allergies.      - We discussed the patient's current medications.     This note was created by combination of typed  and MModal dictation.  Transcription errors may be present.  If there are any questions, please contact me.       Luis Antonio Mendez NP

## 2023-06-21 ENCOUNTER — PATIENT MESSAGE (OUTPATIENT)
Dept: ADMINISTRATIVE | Facility: HOSPITAL | Age: 53
End: 2023-06-21
Payer: COMMERCIAL

## 2023-07-25 ENCOUNTER — LAB VISIT (OUTPATIENT)
Dept: LAB | Facility: HOSPITAL | Age: 53
End: 2023-07-25
Attending: FAMILY MEDICINE
Payer: COMMERCIAL

## 2023-07-25 ENCOUNTER — OFFICE VISIT (OUTPATIENT)
Dept: FAMILY MEDICINE | Facility: CLINIC | Age: 53
End: 2023-07-25
Payer: COMMERCIAL

## 2023-07-25 VITALS
WEIGHT: 315 LBS | OXYGEN SATURATION: 95 % | HEART RATE: 79 BPM | HEIGHT: 76 IN | SYSTOLIC BLOOD PRESSURE: 138 MMHG | BODY MASS INDEX: 38.36 KG/M2 | TEMPERATURE: 99 F | DIASTOLIC BLOOD PRESSURE: 86 MMHG

## 2023-07-25 DIAGNOSIS — I10 ESSENTIAL HYPERTENSION: Chronic | ICD-10-CM

## 2023-07-25 DIAGNOSIS — E66.01 MORBID OBESITY WITH BMI OF 50.0-59.9, ADULT: ICD-10-CM

## 2023-07-25 DIAGNOSIS — M10.9 GOUT, UNSPECIFIED CAUSE, UNSPECIFIED CHRONICITY, UNSPECIFIED SITE: ICD-10-CM

## 2023-07-25 DIAGNOSIS — Z00.00 ROUTINE PHYSICAL EXAMINATION: Primary | ICD-10-CM

## 2023-07-25 DIAGNOSIS — R73.03 PRE-DIABETES: Chronic | ICD-10-CM

## 2023-07-25 DIAGNOSIS — M62.830 MUSCLE SPASM OF BACK: ICD-10-CM

## 2023-07-25 DIAGNOSIS — Z00.00 ROUTINE PHYSICAL EXAMINATION: ICD-10-CM

## 2023-07-25 LAB
ALBUMIN SERPL BCP-MCNC: 3.8 G/DL (ref 3.5–5.2)
ALP SERPL-CCNC: 76 U/L (ref 55–135)
ALT SERPL W/O P-5'-P-CCNC: 23 U/L (ref 10–44)
ANION GAP SERPL CALC-SCNC: 14 MMOL/L (ref 8–16)
AST SERPL-CCNC: 15 U/L (ref 10–40)
BASOPHILS # BLD AUTO: 0.04 K/UL (ref 0–0.2)
BASOPHILS NFR BLD: 0.3 % (ref 0–1.9)
BILIRUB SERPL-MCNC: 0.2 MG/DL (ref 0.1–1)
BUN SERPL-MCNC: 17 MG/DL (ref 6–20)
CALCIUM SERPL-MCNC: 9.5 MG/DL (ref 8.7–10.5)
CHLORIDE SERPL-SCNC: 102 MMOL/L (ref 95–110)
CHOLEST SERPL-MCNC: 156 MG/DL (ref 120–199)
CHOLEST/HDLC SERPL: 4.5 {RATIO} (ref 2–5)
CO2 SERPL-SCNC: 27 MMOL/L (ref 23–29)
COMPLEXED PSA SERPL-MCNC: 1.1 NG/ML (ref 0–4)
CREAT SERPL-MCNC: 1.6 MG/DL (ref 0.5–1.4)
DIFFERENTIAL METHOD: ABNORMAL
EOSINOPHIL # BLD AUTO: 0.4 K/UL (ref 0–0.5)
EOSINOPHIL NFR BLD: 3.7 % (ref 0–8)
ERYTHROCYTE [DISTWIDTH] IN BLOOD BY AUTOMATED COUNT: 15.8 % (ref 11.5–14.5)
EST. GFR  (NO RACE VARIABLE): 51.8 ML/MIN/1.73 M^2
ESTIMATED AVG GLUCOSE: 120 MG/DL (ref 68–131)
GLUCOSE SERPL-MCNC: 117 MG/DL (ref 70–110)
HBA1C MFR BLD: 5.8 % (ref 4–5.6)
HCT VFR BLD AUTO: 40.3 % (ref 40–54)
HDLC SERPL-MCNC: 35 MG/DL (ref 40–75)
HDLC SERPL: 22.4 % (ref 20–50)
HGB BLD-MCNC: 12.5 G/DL (ref 14–18)
IMM GRANULOCYTES # BLD AUTO: 0.03 K/UL (ref 0–0.04)
IMM GRANULOCYTES NFR BLD AUTO: 0.3 % (ref 0–0.5)
LDLC SERPL CALC-MCNC: 92.2 MG/DL (ref 63–159)
LYMPHOCYTES # BLD AUTO: 4 K/UL (ref 1–4.8)
LYMPHOCYTES NFR BLD: 33.7 % (ref 18–48)
MCH RBC QN AUTO: 27.4 PG (ref 27–31)
MCHC RBC AUTO-ENTMCNC: 31 G/DL (ref 32–36)
MCV RBC AUTO: 88 FL (ref 82–98)
MONOCYTES # BLD AUTO: 0.6 K/UL (ref 0.3–1)
MONOCYTES NFR BLD: 5.3 % (ref 4–15)
NEUTROPHILS # BLD AUTO: 6.8 K/UL (ref 1.8–7.7)
NEUTROPHILS NFR BLD: 56.7 % (ref 38–73)
NONHDLC SERPL-MCNC: 121 MG/DL
NRBC BLD-RTO: 0 /100 WBC
PLATELET # BLD AUTO: 305 K/UL (ref 150–450)
PMV BLD AUTO: 10.4 FL (ref 9.2–12.9)
POTASSIUM SERPL-SCNC: 3.8 MMOL/L (ref 3.5–5.1)
PROT SERPL-MCNC: 7.6 G/DL (ref 6–8.4)
RBC # BLD AUTO: 4.56 M/UL (ref 4.6–6.2)
SODIUM SERPL-SCNC: 143 MMOL/L (ref 136–145)
T4 FREE SERPL-MCNC: 0.93 NG/DL (ref 0.71–1.51)
TRIGL SERPL-MCNC: 144 MG/DL (ref 30–150)
TSH SERPL DL<=0.005 MIU/L-ACNC: 2.04 UIU/ML (ref 0.4–4)
WBC # BLD AUTO: 12 K/UL (ref 3.9–12.7)

## 2023-07-25 PROCEDURE — 84153 ASSAY OF PSA TOTAL: CPT | Performed by: FAMILY MEDICINE

## 2023-07-25 PROCEDURE — 1159F MED LIST DOCD IN RCRD: CPT | Mod: CPTII,S$GLB,, | Performed by: FAMILY MEDICINE

## 2023-07-25 PROCEDURE — 3008F PR BODY MASS INDEX (BMI) DOCUMENTED: ICD-10-PCS | Mod: CPTII,S$GLB,, | Performed by: FAMILY MEDICINE

## 2023-07-25 PROCEDURE — 36415 COLL VENOUS BLD VENIPUNCTURE: CPT | Mod: PO | Performed by: FAMILY MEDICINE

## 2023-07-25 PROCEDURE — 3075F PR MOST RECENT SYSTOLIC BLOOD PRESS GE 130-139MM HG: ICD-10-PCS | Mod: CPTII,S$GLB,, | Performed by: FAMILY MEDICINE

## 2023-07-25 PROCEDURE — 99396 PR PREVENTIVE VISIT,EST,40-64: ICD-10-PCS | Mod: S$GLB,,, | Performed by: FAMILY MEDICINE

## 2023-07-25 PROCEDURE — 85025 COMPLETE CBC W/AUTO DIFF WBC: CPT | Performed by: FAMILY MEDICINE

## 2023-07-25 PROCEDURE — 3075F SYST BP GE 130 - 139MM HG: CPT | Mod: CPTII,S$GLB,, | Performed by: FAMILY MEDICINE

## 2023-07-25 PROCEDURE — 4010F ACE/ARB THERAPY RXD/TAKEN: CPT | Mod: CPTII,S$GLB,, | Performed by: FAMILY MEDICINE

## 2023-07-25 PROCEDURE — 84443 ASSAY THYROID STIM HORMONE: CPT | Performed by: FAMILY MEDICINE

## 2023-07-25 PROCEDURE — 3079F DIAST BP 80-89 MM HG: CPT | Mod: CPTII,S$GLB,, | Performed by: FAMILY MEDICINE

## 2023-07-25 PROCEDURE — 80061 LIPID PANEL: CPT | Performed by: FAMILY MEDICINE

## 2023-07-25 PROCEDURE — 99396 PREV VISIT EST AGE 40-64: CPT | Mod: S$GLB,,, | Performed by: FAMILY MEDICINE

## 2023-07-25 PROCEDURE — 3079F PR MOST RECENT DIASTOLIC BLOOD PRESSURE 80-89 MM HG: ICD-10-PCS | Mod: CPTII,S$GLB,, | Performed by: FAMILY MEDICINE

## 2023-07-25 PROCEDURE — 80053 COMPREHEN METABOLIC PANEL: CPT | Performed by: FAMILY MEDICINE

## 2023-07-25 PROCEDURE — 1159F PR MEDICATION LIST DOCUMENTED IN MEDICAL RECORD: ICD-10-PCS | Mod: CPTII,S$GLB,, | Performed by: FAMILY MEDICINE

## 2023-07-25 PROCEDURE — 83036 HEMOGLOBIN GLYCOSYLATED A1C: CPT | Performed by: FAMILY MEDICINE

## 2023-07-25 PROCEDURE — 4010F PR ACE/ARB THEARPY RXD/TAKEN: ICD-10-PCS | Mod: CPTII,S$GLB,, | Performed by: FAMILY MEDICINE

## 2023-07-25 PROCEDURE — 99999 PR PBB SHADOW E&M-EST. PATIENT-LVL III: CPT | Mod: PBBFAC,,, | Performed by: FAMILY MEDICINE

## 2023-07-25 PROCEDURE — 99999 PR PBB SHADOW E&M-EST. PATIENT-LVL III: ICD-10-PCS | Mod: PBBFAC,,, | Performed by: FAMILY MEDICINE

## 2023-07-25 PROCEDURE — 84439 ASSAY OF FREE THYROXINE: CPT | Performed by: FAMILY MEDICINE

## 2023-07-25 PROCEDURE — 3008F BODY MASS INDEX DOCD: CPT | Mod: CPTII,S$GLB,, | Performed by: FAMILY MEDICINE

## 2023-07-25 RX ORDER — AMLODIPINE BESYLATE 10 MG/1
10 TABLET ORAL DAILY
Qty: 90 TABLET | Refills: 3 | Status: SHIPPED | OUTPATIENT
Start: 2023-07-25

## 2023-07-25 RX ORDER — CHLORTHALIDONE 25 MG/1
TABLET ORAL
Qty: 90 TABLET | Refills: 3 | OUTPATIENT
Start: 2023-07-25

## 2023-07-25 RX ORDER — ALLOPURINOL 300 MG/1
TABLET ORAL
Qty: 90 TABLET | Refills: 3 | OUTPATIENT
Start: 2023-07-25

## 2023-07-25 RX ORDER — NAPROXEN 500 MG/1
500 TABLET ORAL DAILY PRN
Qty: 30 TABLET | Refills: 0 | Status: SHIPPED | OUTPATIENT
Start: 2023-07-25

## 2023-07-25 RX ORDER — ALLOPURINOL 300 MG/1
300 TABLET ORAL DAILY
Qty: 90 TABLET | Refills: 3 | Status: SHIPPED | OUTPATIENT
Start: 2023-07-25

## 2023-07-25 RX ORDER — CHLORTHALIDONE 25 MG/1
25 TABLET ORAL DAILY
Qty: 90 TABLET | Refills: 3 | Status: SHIPPED | OUTPATIENT
Start: 2023-07-25

## 2023-07-25 RX ORDER — LOSARTAN POTASSIUM 50 MG/1
50 TABLET ORAL DAILY
Qty: 90 TABLET | Refills: 3 | Status: SHIPPED | OUTPATIENT
Start: 2023-07-25

## 2023-07-25 NOTE — PROGRESS NOTES
[facility]  Progress Note    SUBJECTIVE:     Chief Complaint   Patient presents with    Annual Exam       HPI   Roberta Ware Jr.  is a 52 y.o. male here for physical exam. Patient has no other new complaints/problems at this time.        Review of patient's allergies indicates:   Allergen Reactions    Hydrochlorothiazide Other (See Comments)     Gout    Tramadol Swelling     Pt states make him jittery        Past Medical History:   Diagnosis Date    Diverticulosis     Encounter for blood transfusion     Erectile dysfunction     Gout     Hypertension     Osteoarthritis      Past Surgical History:   Procedure Laterality Date    BONE MARROW BIOPSY N/A     CHOLECYSTECTOMY      COLONOSCOPY  2012    COLONOSCOPY N/A 12/27/2021    Procedure: COLONOSCOPY;  Surgeon: Solis Mendez MD;  Location: [facility];  Service: Endoscopy;  Laterality: N/A;  11/16 bariatric stretcher; fully vaccinated; instr. to portal-st  12/23 pt confirmed arrival time and all prep instructions-ml    FINGER SURGERY Left 2017    left middle finger    UPPER GASTROINTESTINAL ENDOSCOPY       Family History   Problem Relation Age of Onset    Heart disease Mother     Cancer Mother         Breast    Diabetes Father     Hypertension Father     Ulcers Father     Anemia Sister      Social History     Tobacco Use    Smoking status: Never    Smokeless tobacco: Never   Substance Use Topics    Alcohol use: Yes     Comment: Ocassionally    Drug use: No        Review of Systems   Constitutional:  Negative for chills, diaphoresis and fever.   HENT:  Negative for congestion, ear pain and sore throat.    Eyes:  Negative for photophobia and discharge.   Respiratory:  Negative for cough, shortness of breath and wheezing.    Cardiovascular:  Negative for chest pain and palpitations.   Gastrointestinal:  Negative for abdominal pain, constipation, diarrhea, nausea and vomiting.   Genitourinary:  Negative for dysuria and hematuria.   Musculoskeletal:  Negative  for back pain and myalgias.   Skin:  Negative for itching and rash.   Neurological:  Negative for dizziness, sensory change, focal weakness, weakness and headaches.   All other systems reviewed and are negative.  OBJECTIVE:     Vitals:    07/25/23 0851   BP: 138/86   Pulse: 79   Temp: 98.8 °F (37.1 °C)     Body mass index is 56.97 kg/m².    Physical Exam  Constitutional:       General: He is not in acute distress.     Appearance: He is not diaphoretic.   HENT:      Head: Normocephalic and atraumatic.      Right Ear: Tympanic membrane and ear canal normal. No hemotympanum. Tympanic membrane is not perforated, erythematous or bulging.      Left Ear: Tympanic membrane and ear canal normal. No hemotympanum. Tympanic membrane is not perforated, erythematous or bulging.   Eyes:      Conjunctiva/sclera: Conjunctivae normal.      Pupils: Pupils are equal, round, and reactive to light.   Neck:      Thyroid: No thyromegaly.   Cardiovascular:      Rate and Rhythm: Normal rate and regular rhythm.      Heart sounds: Normal heart sounds. No murmur heard.    No friction rub. No gallop.   Pulmonary:      Effort: Pulmonary effort is normal. No respiratory distress.      Breath sounds: Normal breath sounds. No wheezing or rales.   Abdominal:      General: Bowel sounds are normal. There is no distension.      Palpations: Abdomen is soft.      Tenderness: There is no abdominal tenderness. There is no guarding or rebound.   Musculoskeletal:         General: No tenderness. Normal range of motion.   Skin:     General: Skin is warm.      Findings: No erythema or rash.   Neurological:      Mental Status: He is alert and oriented to person, place, and time.       Old records were reviewed. Labs and/or images were independently reviewed.    ASSESSMENT     1. Routine physical examination    2. Essential hypertension    3. Morbid obesity with BMI of 50.0-59.9, adult    4. Pre-diabetes    5. Gout, unspecified cause, unspecified chronicity,  unspecified site        PLAN:     Routine physical examination  -     CBC Auto Differential; Future  -     Comprehensive Metabolic Panel; Future  -     Hemoglobin A1C; Future  -     Lipid Panel; Future  -     TSH; Future  -     T4, Free; Future  -     PSA, Screening; Future; Expected date: 07/25/2023  -     We briefly discussed diet, exercise, and routine preventive exams. All questions and comments addressed.    Essential hypertension  -     chlorthalidone (HYGROTEN) 25 MG Tab; Take 1 tablet (25 mg total) by mouth once daily.  Dispense: 90 tablet; Refill: 3  -     amLODIPine (NORVASC) 10 MG tablet; Take 1 tablet (10 mg total) by mouth once daily.  Dispense: 90 tablet; Refill: 3  -     losartan (COZAAR) 50 MG tablet; Take 1 tablet (50 mg total) by mouth once daily.  Dispense: 90 tablet; Refill: 3  -     Educated patient to take medications as prescribed, and to record bp logs daily to bring along to next visit. Instructed patient to decrease salt intake. Also told patient to call if any new signs or symptoms develop.    Morbid obesity with BMI of 50.0-59.9, adult/Pre-diabetes   -     Counseled patient about healthy diet, exercise habits, and to increase physical activity.    Gout, unspecified cause, unspecified chronicity, unspecified site  -     allopurinoL (ZYLOPRIM) 300 MG tablet; Take 1 tablet (300 mg total) by mouth once daily.  Dispense: 90 tablet; Refill: 3  -     Stable. Continue current regimen.      RTC SHAYNE Ambrosio MD  07/25/2023 9:04 AM

## 2023-07-25 NOTE — TELEPHONE ENCOUNTER
Provider Staff:  Action required for this patient     Please see care gap opportunities below in Care Due Message: Labs (CBC, CMP, Uric acid) due/outdated.     Thanks!  [facility]     Appointments     Last Visit   6/30/2022 Antwan Ambrosio MD   Next Visit   7/25/2023 Antwan Ambrosio MD     Refill Decision Note   Roberta Ware  is requesting a refill authorization.  Brief Assessment and Rationale for Refill:  Quick Discontinue       Medication Therapy Plan:  Labs (CBC, CMP, Uric acid) due/outdated         Comments:     Note composed:1:39 PM 07/25/2023

## 2023-07-25 NOTE — TELEPHONE ENCOUNTER
Care Due:                  Date            Visit Type   Department     Provider  --------------------------------------------------------------------------------                                [facility]                              PRIMARY      MED/ INTERNAL  Last Visit: 06-      CARE ([facility])   MED/ PEDS      ROBERTO OROZCO  Next Visit: None Scheduled  None         None Found                                                            Last  Test          Frequency    Reason                     Performed    Due Date  --------------------------------------------------------------------------------    Office Visit  12 months..  allopurinoL, amLODIPine,   06- 06-                             chlorthalidone,                             colchicine...............    CBC.........  12 months..  allopurinoL..............  09- 09-    CMP.........  12 months..  allopurinoL,               09- 09-                             chlorthalidone,                             colchicine...............    Uric Acid...  12 months..  allopurinoL, colchicine..  Not Found    Overdue    [employer] Embedded Care Due Messages. Reference number: [id number].   7/25/2023 5:41:46 AM CDT

## 2024-05-01 NOTE — DISCHARGE SUMMARY
Please inform patient of results.    1. Spot glucose was 137 and A1c was 6.4- up from previous.  Possibly due to steroid use due to bronchitis. Watch diet/exercise. Monitor.   2. Bun is slightly elevated. Drink more water  3. TG are elevated and not at goal. Was this done fasting? HDL is improved but still not at goal.  4. Wbc is now wnl.   5. Urine microalbumin/creatinine is elevated. Encourage fluids. Monitor. Does she see renal?    I forgot to give her tubing for her nebulizer at her appt.  If she can come back by the office, she can . I'll have at the front for her.       Other labwork within acceptable ranges.  Radiology Discharge Summary      Hospital Course: No complications    Admit Date: 5/25/2022  Discharge Date: 05/25/2022     Instructions Given to Patient: Yes  Diet: Resume prior diet  Activity: activity as tolerated and no driving for today    Description of Condition on Discharge: Stable  Vital Signs (Most Recent): Temp: 98.3 °F (36.8 °C) (05/25/22 0844)  Pulse: 84 (05/25/22 1013)  Resp: 17 (05/25/22 1013)  BP: (!) 178/96 (05/25/22 1013)  SpO2: 96 % (05/25/22 1013)    Discharge Disposition: Home    Discharge Diagnosis:   51 y.o. male with chronic leukocytosis/lymphocytosis and anemia of uncertain etiology s/p successful CT-guided percutaneous Rt-sided iliac bone marrow aspiration and 11-gauge core bone biopsy with local anesthetic and moderate conscious sedation. Patient tolerated the procedure well. No immediate post-procedural complications noted.     Thank you for considering IR for the care of your patient.     Brian Greenfield MD  Interventional Radiology

## 2024-05-14 ENCOUNTER — OFFICE VISIT (OUTPATIENT)
Dept: FAMILY MEDICINE | Facility: CLINIC | Age: 54
End: 2024-05-14
Payer: COMMERCIAL

## 2024-05-14 VITALS
TEMPERATURE: 98 F | SYSTOLIC BLOOD PRESSURE: 138 MMHG | OXYGEN SATURATION: 96 % | HEART RATE: 71 BPM | DIASTOLIC BLOOD PRESSURE: 86 MMHG | WEIGHT: 315 LBS | HEIGHT: 76 IN | BODY MASS INDEX: 38.36 KG/M2

## 2024-05-14 DIAGNOSIS — M62.830 MUSCLE SPASM OF BACK: ICD-10-CM

## 2024-05-14 DIAGNOSIS — M54.31 RIGHT SIDED SCIATICA: ICD-10-CM

## 2024-05-14 DIAGNOSIS — E66.01 MORBID OBESITY WITH BMI OF 50.0-59.9, ADULT: ICD-10-CM

## 2024-05-14 DIAGNOSIS — I10 ESSENTIAL HYPERTENSION: Primary | Chronic | ICD-10-CM

## 2024-05-14 PROCEDURE — 3008F BODY MASS INDEX DOCD: CPT | Mod: CPTII,S$GLB,, | Performed by: FAMILY MEDICINE

## 2024-05-14 PROCEDURE — 96372 THER/PROPH/DIAG INJ SC/IM: CPT | Mod: S$GLB,,, | Performed by: FAMILY MEDICINE

## 2024-05-14 PROCEDURE — 99214 OFFICE O/P EST MOD 30 MIN: CPT | Mod: 25,S$GLB,, | Performed by: FAMILY MEDICINE

## 2024-05-14 PROCEDURE — 3079F DIAST BP 80-89 MM HG: CPT | Mod: CPTII,S$GLB,, | Performed by: FAMILY MEDICINE

## 2024-05-14 PROCEDURE — 3075F SYST BP GE 130 - 139MM HG: CPT | Mod: CPTII,S$GLB,, | Performed by: FAMILY MEDICINE

## 2024-05-14 PROCEDURE — 99999 PR PBB SHADOW E&M-EST. PATIENT-LVL IV: CPT | Mod: PBBFAC,,, | Performed by: FAMILY MEDICINE

## 2024-05-14 PROCEDURE — 4010F ACE/ARB THERAPY RXD/TAKEN: CPT | Mod: CPTII,S$GLB,, | Performed by: FAMILY MEDICINE

## 2024-05-14 PROCEDURE — 1159F MED LIST DOCD IN RCRD: CPT | Mod: CPTII,S$GLB,, | Performed by: FAMILY MEDICINE

## 2024-05-14 RX ORDER — METHOCARBAMOL 500 MG/1
500 TABLET, FILM COATED ORAL 2 TIMES DAILY PRN
Qty: 60 TABLET | Refills: 0 | Status: SHIPPED | OUTPATIENT
Start: 2024-05-14 | End: 2024-05-24

## 2024-05-14 RX ORDER — KETOROLAC TROMETHAMINE 30 MG/ML
30 INJECTION, SOLUTION INTRAMUSCULAR; INTRAVENOUS
Status: COMPLETED | OUTPATIENT
Start: 2024-05-14 | End: 2024-05-14

## 2024-05-14 RX ADMIN — KETOROLAC TROMETHAMINE 30 MG: 30 INJECTION, SOLUTION INTRAMUSCULAR; INTRAVENOUS at 09:05

## 2024-05-14 NOTE — PROGRESS NOTES
[facility]  Progress Note    SUBJECTIVE:     Chief Complaint   Patient presents with    Sciatica       HPI   Roberta Ware Jr.  is a 53 y.o. male here for c/o lower back pain and R sciatica. Rates pain as moderate. No falls/trauma. Certain positions make it worst. Patient has no other new complaints/problems at this time.      Review of patient's allergies indicates:   Allergen Reactions    Hydrochlorothiazide Other (See Comments)     Gout    Tramadol Swelling     Pt states make him jittery        Past Medical History:   Diagnosis Date    Diverticulosis     Encounter for blood transfusion     Erectile dysfunction     Gout     Hypertension     Osteoarthritis      Past Surgical History:   Procedure Laterality Date    BONE MARROW BIOPSY N/A     CHOLECYSTECTOMY      COLONOSCOPY  2012    COLONOSCOPY N/A 12/27/2021    Procedure: COLONOSCOPY;  Surgeon: Solis Mendez MD;  Location: [facility];  Service: Endoscopy;  Laterality: N/A;  11/16 bariatric stretcher; fully vaccinated; instr. to portal-st  12/23 pt confirmed arrival time and all prep instructions-ml    FINGER SURGERY Left 2017    left middle finger    UPPER GASTROINTESTINAL ENDOSCOPY       Family History   Problem Relation Name Age of Onset    Heart disease Mother      Cancer Mother          Breast    Diabetes Father      Hypertension Father      Ulcers Father      Anemia Sister       Social History     Tobacco Use    Smoking status: Never    Smokeless tobacco: Never   Substance Use Topics    Alcohol use: Yes     Comment: Ocassionally    Drug use: No        Review of Systems   Constitutional:  Negative for chills, fever and malaise/fatigue.   Respiratory: Negative.     Cardiovascular: Negative.    Gastrointestinal:  Negative for abdominal pain, constipation and diarrhea.   Musculoskeletal:  Positive for back pain. Negative for falls, myalgias and neck pain.   Neurological:  Negative for weakness and headaches.     OBJECTIVE:     Vitals:    05/14/24  0843   BP: 138/86   Pulse: 71   Temp: 98 °F (36.7 °C)     Body mass index is 57.53 kg/m².    Physical Exam  Constitutional:       General: He is in acute distress (mild).      Appearance: He is not diaphoretic.   HENT:      Head: Normocephalic and atraumatic.   Musculoskeletal:         General: Tenderness (to palpation of paraspinal muscles) present.      Cervical back: Normal range of motion and neck supple.   Skin:     General: Skin is warm.   Neurological:      Mental Status: He is alert and oriented to person, place, and time.      Cranial Nerves: No cranial nerve deficit.         Old records were reviewed. Labs and/or images were independently reviewed.    ASSESSMENT     1. Essential hypertension    2. Morbid obesity with BMI of 50.0-59.9, adult    3. Right sided sciatica    4. Muscle spasm of back        PLAN:     Essential hypertension   -     Educated patient to take medications as prescribed, and to record bp logs daily to bring along to next visit. Instructed patient to decrease salt intake. Also told patient to call if any new signs or symptoms develop.    Morbid obesity with BMI of 50.0-59.9, adult   -     Counseled patient about healthy diet, exercise habits, and to increase physical activity.    Right sided sciatica  -     ketorolac injection 30 mg  -     methocarbamoL (ROBAXIN) 500 MG Tab; Take 1 tablet (500 mg total) by mouth 2 (two) times daily as needed.  Dispense: 60 tablet; Refill: 0  -     Ambulatory referral/consult to Physical/Occupational Therapy; Future; Expected date: 05/21/2024    Muscle spasm of back  -     ketorolac injection 30 mg  -     methocarbamoL (ROBAXIN) 500 MG Tab; Take 1 tablet (500 mg total) by mouth 2 (two) times daily as needed.  Dispense: 60 tablet; Refill: 0  -     Ambulatory referral/consult to Physical/Occupational Therapy; Future; Expected date: 05/21/2024  -     Patient counseled on good posture and stretching exercises. Continue to place ice packs on affected areas  3-4 times daily. Take medications as prescribed. Instructed patient to call MD if symptoms persist or worsen.      RTC PRN    Antwan Ambrosio MD  05/14/2024 8:57 AM

## 2024-05-15 ENCOUNTER — PATIENT MESSAGE (OUTPATIENT)
Dept: FAMILY MEDICINE | Facility: CLINIC | Age: 54
End: 2024-05-15
Payer: COMMERCIAL

## 2024-05-24 ENCOUNTER — OFFICE VISIT (OUTPATIENT)
Dept: PAIN MEDICINE | Facility: CLINIC | Age: 54
End: 2024-05-24
Payer: COMMERCIAL

## 2024-05-24 VITALS
OXYGEN SATURATION: 98 % | HEART RATE: 63 BPM | SYSTOLIC BLOOD PRESSURE: 156 MMHG | DIASTOLIC BLOOD PRESSURE: 82 MMHG | RESPIRATION RATE: 18 BRPM

## 2024-05-24 DIAGNOSIS — M54.16 LUMBAR RADICULOPATHY: ICD-10-CM

## 2024-05-24 DIAGNOSIS — M47.816 LUMBAR SPONDYLOSIS: ICD-10-CM

## 2024-05-24 DIAGNOSIS — M51.36 DDD (DEGENERATIVE DISC DISEASE), LUMBAR: Primary | ICD-10-CM

## 2024-05-24 PROBLEM — M51.369 DDD (DEGENERATIVE DISC DISEASE), LUMBAR: Status: ACTIVE | Noted: 2024-05-24

## 2024-05-24 PROCEDURE — 3079F DIAST BP 80-89 MM HG: CPT | Mod: CPTII,S$GLB,, | Performed by: PAIN MEDICINE

## 2024-05-24 PROCEDURE — 4010F ACE/ARB THERAPY RXD/TAKEN: CPT | Mod: CPTII,S$GLB,, | Performed by: PAIN MEDICINE

## 2024-05-24 PROCEDURE — 1160F RVW MEDS BY RX/DR IN RCRD: CPT | Mod: CPTII,S$GLB,, | Performed by: PAIN MEDICINE

## 2024-05-24 PROCEDURE — 99999 PR PBB SHADOW E&M-EST. PATIENT-LVL IV: CPT | Mod: PBBFAC,,, | Performed by: PAIN MEDICINE

## 2024-05-24 PROCEDURE — 3077F SYST BP >= 140 MM HG: CPT | Mod: CPTII,S$GLB,, | Performed by: PAIN MEDICINE

## 2024-05-24 PROCEDURE — 99204 OFFICE O/P NEW MOD 45 MIN: CPT | Mod: S$GLB,,, | Performed by: PAIN MEDICINE

## 2024-05-24 PROCEDURE — 1159F MED LIST DOCD IN RCRD: CPT | Mod: CPTII,S$GLB,, | Performed by: PAIN MEDICINE

## 2024-05-24 RX ORDER — GABAPENTIN 300 MG/1
600 CAPSULE ORAL 3 TIMES DAILY
Qty: 180 CAPSULE | Refills: 5 | Status: SHIPPED | OUTPATIENT
Start: 2024-05-24 | End: 2024-11-20

## 2024-05-24 RX ORDER — METHYLPREDNISOLONE 4 MG/1
TABLET ORAL
Qty: 21 EACH | Refills: 0 | Status: SHIPPED | OUTPATIENT
Start: 2024-05-24 | End: 2024-06-14

## 2024-05-24 NOTE — PROGRESS NOTES
Subjective:     Patient ID: Roberta Ware Jr. is a 53 y.o. male    Chief Complaint: Low-back Pain (Right sided stabbing pain)      Referred by: Self, Aaareferral      HPI:    Initial Encounter (5/24/24):  Roberta Ware Jr. is a 53 y.o. male who presents today with acute right-sided low back and lower extremity pain.  This pain has been present for roughly 3 weeks.  No specific inciting events or injuries noted.  Patient does report previous episodes of pain that were less severe and self-limited.  Current pain is located in the right lower lumbar/lumbosacral region radiates to the right lower extremity all the way to his foot with associated paresthesias.  He denies any focal weakness or bowel bladder dysfunction.  Pain is constant and worsened with activity.   This pain is described in detail below.    Physical Therapy:  No.    Non-pharmacologic Treatment:  Rest helps         TENS?  No    Pain Medications:         Currently taking:  Naproxen, methocarbamol    Has tried in the past:  Tylenol    Has not tried:  Opioids, TCAs, SNRIs, anticonvulsants, topical creams    Blood thinners:  None    Interventional Therapies:  None    Relevant Surgeries:  None    Affecting sleep?  Yes    Affecting daily activities? yes    Depressive symptoms? No          SI/HI? No    Work status: Employed    Pain Scores:    Best:       7/10  Worst:     8/10  Usually:   7/10  Today:    7/10    Pain Disability Index  Family/Home Responsibilities:: 7  Social Activity:: 7  Occupation:: 7  Sexual Behavior:: 0  Self Care:: 8  Life-Support Activities:: 5    Review of Systems   Constitutional:  Negative for activity change, appetite change, chills, fatigue, fever and unexpected weight change.   HENT:  Negative for hearing loss.    Eyes:  Negative for visual disturbance.   Respiratory:  Negative for chest tightness and shortness of breath.    Cardiovascular:  Negative for chest pain.   Gastrointestinal:  Negative for abdominal pain,  constipation, diarrhea, nausea and vomiting.   Genitourinary:  Negative for difficulty urinating.   Musculoskeletal:  Positive for back pain, gait problem and myalgias. Negative for neck pain.   Skin:  Negative for rash.   Neurological:  Positive for numbness. Negative for dizziness, weakness, light-headedness and headaches.   Psychiatric/Behavioral:  Positive for sleep disturbance. Negative for hallucinations and suicidal ideas. The patient is not nervous/anxious.        Past Medical History:   Diagnosis Date    Diverticulosis     Encounter for blood transfusion     Erectile dysfunction     Gout     Hypertension     Osteoarthritis        Past Surgical History:   Procedure Laterality Date    BONE MARROW BIOPSY N/A     CHOLECYSTECTOMY      COLONOSCOPY  2012    COLONOSCOPY N/A 12/27/2021    Procedure: COLONOSCOPY;  Surgeon: Solis Mendez MD;  Location: [facility];  Service: Endoscopy;  Laterality: N/A;  11/16 bariatric stretcher; fully vaccinated; instr. to portal-st  12/23 pt confirmed arrival time and all prep instructions-ml    FINGER SURGERY Left 2017    left middle finger    UPPER GASTROINTESTINAL ENDOSCOPY         Social History     Socioeconomic History    Marital status:    Tobacco Use    Smoking status: Never    Smokeless tobacco: Never   Substance and Sexual Activity    Alcohol use: Yes     Comment: Ocassionally    Drug use: No    Sexual activity: Yes     Partners: Female     Social Determinants of Health     Financial Resource Strain: Low Risk  (5/20/2024)    Overall Financial Resource Strain (CARDIA)     Difficulty of Paying Living Expenses: Not hard at all   Food Insecurity: No Food Insecurity (5/20/2024)    Hunger Vital Sign     Worried About Running Out of Food in the Last Year: Never true     Ran Out of Food in the Last Year: Never true   Physical Activity: Insufficiently Active (5/20/2024)    Exercise Vital Sign     Days of Exercise per Week: 3 days     Minutes of Exercise per Session: 30 min    Stress: No Stress Concern Present (5/20/2024)    [employer] - Occupational Stress Questionnaire     Feeling of Stress : Only a little   Housing Stability: Unknown (5/20/2024)    Housing Stability Vital Sign     Unable to Pay for Housing in the Last Year: No       Review of patient's allergies indicates:   Allergen Reactions    Hydrochlorothiazide Other (See Comments)     Gout    Tramadol Swelling     Pt states make him jittery        Current Outpatient Medications on File Prior to Visit   Medication Sig Dispense Refill    allopurinoL (ZYLOPRIM) 300 MG tablet Take 1 tablet (300 mg total) by mouth once daily. 90 tablet 3    amLODIPine (NORVASC) 10 MG tablet Take 1 tablet (10 mg total) by mouth once daily. 90 tablet 3    chlorthalidone (HYGROTEN) 25 MG Tab Take 1 tablet (25 mg total) by mouth once daily. 90 tablet 3    losartan (COZAAR) 50 MG tablet Take 1 tablet (50 mg total) by mouth once daily. 90 tablet 3    methocarbamoL (ROBAXIN) 500 MG Tab Take 1 tablet (500 mg total) by mouth 2 (two) times daily as needed. 60 tablet 0    naproxen (NAPROSYN) 500 MG tablet Take 1 tablet (500 mg total) by mouth daily as needed (joint pain). 30 tablet 0    colchicine (COLCRYS) 0.6 mg tablet Take 1 tablet (0.6 mg total) by mouth 2 (two) times daily as needed (for gout). 90 tablet 3    [DISCONTINUED] tadalafiL (CIALIS) 20 MG Tab Take 1 tablet (20 mg total) by mouth every other day. Take every other day as needed (Patient not taking: Reported on 5/14/2024) 15 tablet 2     No current facility-administered medications on file prior to visit.       Objective:      BP (!) 156/82 (BP Location: Left arm, Patient Position: Sitting, BP Method: Large (Manual))   Pulse 63   Resp 18   SpO2 98%     Exam:  GEN:  Well developed, well nourished.  No acute distress.  Normal pain behavior.  HEENT:  No trauma.  Mucous membranes moist.  Nares patent bilaterally.  PSYCH: Normal affect. Thought content  appropriate.  CHEST:  Breathing symmetric.  No audible wheezing.  ABD: Soft, non-distended.  SKIN:  Warm, pink, dry.  No rash on exposed areas.    EXT:  No cyanosis, clubbing, or edema.  No color change or changes in nail or hair growth.  NEURO/MUSCULOSKELETAL:  Fully alert, oriented, and appropriate. Speech normal nicko. No cranial nerve deficits.   Gait:  Normal.  No trendelenburg sign bilaterally.   Motor Strength:  5/5 motor strength throughout lower extremities.   Sensory:  No sensory deficit in the lower extremities.   Reflexes:  2+ and symmetric throughout.  Downgoing Babinski's bilaterally.  No clonus or spasticity.  L-Spine:  Full ROM with pain on extension more than flexion.  Negative pain with axial/facet loading bilaterally.  Positive SLR on the right.    No TTP over lumbar paraspinals, bilateral SI joints, hips, piriformis muscles, or GTB.        Imaging:  No pertinent imaging at this time    Assessment:       Encounter Diagnoses   Name Primary?    DDD (degenerative disc disease), lumbar Yes    Lumbar spondylosis     Lumbar radiculopathy          Plan:       Roberta was seen today for low-back pain.    Diagnoses and all orders for this visit:    DDD (degenerative disc disease), lumbar  -     gabapentin (NEURONTIN) 300 MG capsule; Take 2 capsules (600 mg total) by mouth 3 (three) times daily.  -     methylPREDNISolone (MEDROL DOSEPACK) 4 mg tablet; use as directed    Lumbar spondylosis  -     gabapentin (NEURONTIN) 300 MG capsule; Take 2 capsules (600 mg total) by mouth 3 (three) times daily.  -     methylPREDNISolone (MEDROL DOSEPACK) 4 mg tablet; use as directed    Lumbar radiculopathy  -     gabapentin (NEURONTIN) 300 MG capsule; Take 2 capsules (600 mg total) by mouth 3 (three) times daily.  -     methylPREDNISolone (MEDROL DOSEPACK) 4 mg tablet; use as directed        Roberta Jose Ware Jr. is a 53 y.o. male with right-sided low back and lower extremity pain.  Concerning for right L5 radiculopathy.   No imaging for review today, but he was seen by a chiropractor who took x-rays.  He was told that he has a disc pinching a nerve.  No overt neurologic deficits on examination today..    Start gabapentin 300 mg q.h.s..  Gradually increase to 600 mg t.i.d. as tolerated/needed.  Patient was given instructions on how to do this.  Medrol Dosepak.  Patient does not need to take this, but if pain becomes very severe he can.  Proceed with physical therapy.  Patient already scheduled to start on May 31st.  Return to clinic in 8 weeks or sooner if needed.  At that time we will discuss efficacy of physical therapy/home exercise program and gabapentin.  May consider lumbar MRI if pain persists or worsens.            This note was created by combination of typed  and M-Modal dictation. Transcription and phonetic errors may be present.  If there are any questions, please contact me.

## 2024-05-31 ENCOUNTER — CLINICAL SUPPORT (OUTPATIENT)
Dept: REHABILITATION | Facility: HOSPITAL | Age: 54
End: 2024-05-31
Attending: FAMILY MEDICINE
Payer: COMMERCIAL

## 2024-05-31 DIAGNOSIS — M62.830 MUSCLE SPASM OF BACK: ICD-10-CM

## 2024-05-31 DIAGNOSIS — R29.3 ABNORMAL POSTURE: Primary | ICD-10-CM

## 2024-05-31 DIAGNOSIS — M54.31 RIGHT SIDED SCIATICA: ICD-10-CM

## 2024-05-31 DIAGNOSIS — R26.9 ABNORMAL GAIT: ICD-10-CM

## 2024-05-31 PROCEDURE — 97161 PT EVAL LOW COMPLEX 20 MIN: CPT | Mod: PN

## 2024-05-31 PROCEDURE — 97112 NEUROMUSCULAR REEDUCATION: CPT | Mod: PN
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
## 2024-06-04 ENCOUNTER — CLINICAL SUPPORT (OUTPATIENT)
Dept: REHABILITATION | Facility: HOSPITAL | Age: 54
End: 2024-06-04
Payer: COMMERCIAL

## 2024-06-04 DIAGNOSIS — R26.9 ABNORMAL GAIT: ICD-10-CM

## 2024-06-04 DIAGNOSIS — R29.3 ABNORMAL POSTURE: Primary | ICD-10-CM

## 2024-06-04 PROCEDURE — 97530 THERAPEUTIC ACTIVITIES: CPT | Mod: PN

## 2024-06-04 PROCEDURE — 97112 NEUROMUSCULAR REEDUCATION: CPT | Mod: PN

## 2024-06-04 PROCEDURE — 97110 THERAPEUTIC EXERCISES: CPT | Mod: PN

## 2024-06-04 NOTE — PROGRESS NOTES
[facility]   Physical Therapy Treatment Note      Name: Roberta Ware Jr.  Clinic Number: [id number]    Therapy Diagnosis:   Encounter Diagnoses   Name Primary?    Abnormal posture Yes    Abnormal gait      Physician: Antwan Ambrosio MD    Visit Date: 6/4/2024    Physician Orders: PT Eval and Treat   Medical Diagnosis from Referral:   M54.31 (ICD-10-CM) - Right sided sciatica   M62.830 (ICD-10-CM) - Muscle spasm of back      Evaluation Date: 5/31/2024  Authorization Period Expiration: 12/31/2024  Plan of Care Expiration: 7/26/2024  Progress Note Due: 7/1/2024  Visit # / Visits authorized: 1/ 12 +eval   FOTO: First 5/31/2024        Time In: 1:01pm  Time Out: 1:56pm  Total Appointment Time (timed & untimed codes): 55 minutes     Precautions: Obesity    Subjective     Pt reports: Tried the walking thing and made it half way around his block when he got the twinge. He leaned on the counter and the table and that seemed to help. The pain is touch and go, sometimes it hurts and sometimes it doesn't.   He was compliant with home exercise program.  Response to previous treatment: last was evaluation  Functional change: Felt ok    Pain: 0/10  Location: right back and down the Right leg      Objective      Objective Measures updated at progress report unless specified.     + SLR Right    Treatment     Roberta received the treatments listed below:      Bold = Performed    Roberta received therapeutic exercises to develop strength, endurance, ROM, flexibility, and posture for 17 minutes including:  Double knee to chest 2x20  Hand heel rocking 2x20     Roberta participated in neuromuscular re-education activities to improve: Coordination, Kinesthetic, Sense, Proprioception, and Posture for 15 minutes. The following activities were included:  FMP lumbar flexion 2x10   supine Sciatic nerve slider 3x1min  Pt education     Roberta participated in dynamic functional therapeutic activities to improve  functional performance for 23  minutes, includin in box sit to stand with 8# medicine ball 3x12  Treadmill incline 3, 2mph 7min - had return of twinge in Right low back but it was minimal     Patient Education and Home Exercises       Education provided:   - Pt educated on slight alterations to HEP program    Written Home Exercises Provided: Patient instructed to cont prior HEP. Exercises were reviewed and Roberta was able to demonstrate them prior to the end of the session.  Roberta demonstrated good  understanding of the education provided. See EMR under Patient Instructions for exercises provided during therapy sessions    Assessment     Roberta presents to PT today with compliance with HEP. He continues to have Right sided low back pain with radiating pain. Pain only elicited today with SLR testing and end of treatment treadmill walking. Flexion based activities continues to reduce pain down the leg. PT focused on reducing pain followed by generalized strengthening with a flexion biased and then walking.     Roberta Is progressing well towards his goals.   Pt prognosis is Fair.     Pt will continue to benefit from skilled outpatient physical therapy to address the deficits listed in the problem list box on initial evaluation, provide pt/family education and to maximize pt's level of independence in the home and community environment.     Pt's spiritual, cultural and educational needs considered and pt agreeable to plan of care and goals.     Anticipated barriers to physical therapy: Morbid obesity    Goals:   STG (4 Weeks)  Pt will be independent with Initial home exercise program in order to facilitate Physical Therapy treatment  Pt will reduce worst pain to 5/10 in order to perform ADLs  Pt will be able to walk for >20min without increased pain down the leg   Pt will improve hip abduction strength to 4-/5 in order to improve gait mechanics     LTG(8weeks)  Pt will be independent c final home exercise program  in order to DC to home program  Pt will improve FOTO limitation to 81% indicative of overall improvement in function   Pt will improve worst pain to 1/10 in order to return to previous level of function   Pt will be able to walk for >40min without any pain       Plan     Continue with flexion based activities followed by nerve mobility and generalized strengthening.     Karen Cespedes, PT, DPT, OCS

## 2024-06-18 ENCOUNTER — CLINICAL SUPPORT (OUTPATIENT)
Dept: REHABILITATION | Facility: HOSPITAL | Age: 54
End: 2024-06-18
Payer: COMMERCIAL

## 2024-06-18 DIAGNOSIS — R29.3 ABNORMAL POSTURE: Primary | ICD-10-CM

## 2024-06-18 DIAGNOSIS — R26.9 ABNORMAL GAIT: ICD-10-CM

## 2024-06-18 PROCEDURE — 97112 NEUROMUSCULAR REEDUCATION: CPT | Mod: PN

## 2024-06-18 PROCEDURE — 97530 THERAPEUTIC ACTIVITIES: CPT | Mod: PN

## 2024-06-18 PROCEDURE — 97110 THERAPEUTIC EXERCISES: CPT | Mod: PN

## 2024-06-18 NOTE — PROGRESS NOTES
[facility]   Physical Therapy Treatment Note      Name: Roberta Ware Jr.  Clinic Number: [id number]    Therapy Diagnosis:   No diagnosis found.    Physician: Antwan Ambrosio MD    Visit Date: 2024    Physician Orders: PT Eval and Treat   Medical Diagnosis from Referral:   M54.31 (ICD-10-CM) - Right sided sciatica   M62.830 (ICD-10-CM) - Muscle spasm of back      Evaluation Date: 2024  Authorization Period Expiration: 2024  Plan of Care Expiration: 2024  Progress Note Due: 2024  Visit # / Visits authorized:  +eval   FOTO: First 2024        Time In: 1:01pm  Time Out: 1:56pm  Total Appointment Time (timed & untimed codes): 55 minutes     Precautions: Obesity    Subjective     Pt reports: Tried the walking thing and made it half way around his block when he got the twinge. He leaned on the counter and the table and that seemed to help. The pain is touch and go, sometimes it hurts and sometimes it doesn't.   He was compliant with home exercise program.  Response to previous treatment: last was evaluation  Functional change: Felt ok    Pain: 0/10  Location: right back and down the Right leg      Objective      Objective Measures updated at progress report unless specified.     + SLR Right    Treatment     Roberta received the treatments listed below:      Bold = Performed    Roberta received therapeutic exercises to develop strength, endurance, ROM, flexibility, and posture for 17 minutes including:  Double knee to chest 2x20  Hand heel rocking 2x20     Roberta participated in neuromuscular re-education activities to improve: Coordination, Kinesthetic, Sense, Proprioception, and Posture for 15 minutes. The following activities were included:  FMP lumbar flexion 2x10   supine Sciatic nerve slider 3x1min  Pt education     Roberta participated in dynamic functional therapeutic activities to improve functional performance for 23  minutes, includin in box  sit to stand with 8# medicine ball 3x12  Treadmill incline 3, 2mph 7min - had return of twinge in Right low back but it was minimal     Patient Education and Home Exercises       Education provided:   - Pt educated on slight alterations to HEP program    Written Home Exercises Provided: Patient instructed to cont prior HEP. Exercises were reviewed and Roberta was able to demonstrate them prior to the end of the session.  Roberta demonstrated good  understanding of the education provided. See EMR under Patient Instructions for exercises provided during therapy sessions    Assessment     Roberta presents to PT today with compliance with HEP. He continues to have Right sided low back pain with radiating pain. Pain only elicited today with SLR testing and end of treatment treadmill walking. Flexion based activities continues to reduce pain down the leg. PT focused on reducing pain followed by generalized strengthening with a flexion biased and then walking.     Roberta Is progressing well towards his goals.   Pt prognosis is Fair.     Pt will continue to benefit from skilled outpatient physical therapy to address the deficits listed in the problem list box on initial evaluation, provide pt/family education and to maximize pt's level of independence in the home and community environment.     Pt's spiritual, cultural and educational needs considered and pt agreeable to plan of care and goals.     Anticipated barriers to physical therapy: Morbid obesity    Goals:   STG (4 Weeks)  Pt will be independent with Initial home exercise program in order to facilitate Physical Therapy treatment  Pt will reduce worst pain to 5/10 in order to perform ADLs  Pt will be able to walk for >20min without increased pain down the leg   Pt will improve hip abduction strength to 4-/5 in order to improve gait mechanics     LTG(8weeks)  Pt will be independent c final home exercise program in order to DC to home program  Pt will improve FOTO  limitation to 81% indicative of overall improvement in function   Pt will improve worst pain to 1/10 in order to return to previous level of function   Pt will be able to walk for >40min without any pain       Plan     Continue with flexion based activities followed by nerve mobility and generalized strengthening.     Karen Cespedes, PT, DPT, OCS

## 2024-06-18 NOTE — PROGRESS NOTES
[facility]   Physical Therapy Treatment Note      Name: Roberta Ware Jr.  Clinic Number: [id number]    Therapy Diagnosis:   Encounter Diagnoses   Name Primary?    Abnormal posture Yes    Abnormal gait        Physician: Antwan Ambrosio MD    Visit Date: 6/18/2024    Physician Orders: PT Eval and Treat   Medical Diagnosis from Referral:   M54.31 (ICD-10-CM) - Right sided sciatica   M62.830 (ICD-10-CM) - Muscle spasm of back      Evaluation Date: 5/31/2024  Authorization Period Expiration: 12/31/2024  Plan of Care Expiration: 7/26/2024  Progress Note Due: 7/1/2024  Visit # / Visits authorized: 2/ 12 +eval   FOTO: First 5/31/2024        Time In: 2:01pm  Time Out: 2:59pm  Total Appointment Time (timed & untimed codes): 57 minutes     Precautions: Obesity    Subjective     Pt reports: Was sore after last visit but it was definitely the muscles. Realized he hasn't really used those in a while. Pain down the leg comes and goes but has not been as bad.   He was compliant with home exercise program.  Response to previous treatment: sore  Functional change: Felt ok    Pain: 0/10  Location: right back and down the Right leg      Objective      Objective Measures updated at progress report unless specified.     Treatment     Roberta received the treatments listed below:      Bold = Performed    Roberta received therapeutic exercises to develop strength, endurance, ROM, flexibility, and posture for 10 minutes including:  Double knee to chest physioball 2x20  Hand heel rocking 2x20     Roberta participated in neuromuscular re-education activities to improve: Coordination, Kinesthetic, Sense, Proprioception, and Posture for 23 minutes. The following activities were included:  FMP lumbar flexion 2x10   supine Sciatic nerve slider 3x1min  Swiss ball roll out 2x10 3 way  Pt education     Roberta participated in dynamic functional therapeutic activities to improve functional performance for 23  minutes,  "includin in box sit to stand with 8# medicine ball 3x12  Lateral step up 8" 2x10 B   Treadmill incline 3, 2mph 8min - had return of twinge in Right low back but it was minimal     Patient Education and Home Exercises       Education provided:   - Pt educated on slight alterations to HEP program    Written Home Exercises Provided: Patient instructed to cont prior HEP. Exercises were reviewed and Roberta was able to demonstrate them prior to the end of the session.  Roberta demonstrated good  understanding of the education provided. See EMR under Patient Instructions for exercises provided during therapy sessions    Assessment     Roberta presents to PT today with reports of muscle soreness after last visit. He was started on treadmill today and was able to go for increased time and distance with reduced symptoms. He was further progressed in functional strengthening today as pain was minimal.     Roberta Is progressing well towards his goals.   Pt prognosis is Fair.     Pt will continue to benefit from skilled outpatient physical therapy to address the deficits listed in the problem list box on initial evaluation, provide pt/family education and to maximize pt's level of independence in the home and community environment.     Pt's spiritual, cultural and educational needs considered and pt agreeable to plan of care and goals.     Anticipated barriers to physical therapy: Morbid obesity    Goals:   STG (4 Weeks)  Pt will be independent with Initial home exercise program in order to facilitate Physical Therapy treatment  Pt will reduce worst pain to 5/10 in order to perform ADLs  Pt will be able to walk for >20min without increased pain down the leg   Pt will improve hip abduction strength to 4-/5 in order to improve gait mechanics     LTG(8weeks)  Pt will be independent c final home exercise program in order to DC to home program  Pt will improve FOTO limitation to 81% indicative of overall improvement in function "   Pt will improve worst pain to 1/10 in order to return to previous level of function   Pt will be able to walk for >40min without any pain       Plan     Continue with flexion based activities followed by nerve mobility and generalized strengthening.     Karen Cespedes, PT, DPT, OCS

## 2024-06-25 ENCOUNTER — CLINICAL SUPPORT (OUTPATIENT)
Dept: REHABILITATION | Facility: OTHER | Age: 54
End: 2024-06-25
Payer: COMMERCIAL

## 2024-06-25 DIAGNOSIS — R29.3 ABNORMAL POSTURE: Primary | ICD-10-CM

## 2024-06-25 DIAGNOSIS — R26.9 ABNORMAL GAIT: ICD-10-CM

## 2024-06-25 PROCEDURE — 97530 THERAPEUTIC ACTIVITIES: CPT | Mod: PN

## 2024-06-25 PROCEDURE — 97110 THERAPEUTIC EXERCISES: CPT | Mod: PN

## 2024-06-25 PROCEDURE — 97112 NEUROMUSCULAR REEDUCATION: CPT | Mod: PN

## 2024-06-25 NOTE — PROGRESS NOTES
"[facility]   Physical Therapy Treatment Note      Name: Roberta Ware Jr.  Clinic Number: [id number]    Therapy Diagnosis:   Encounter Diagnoses   Name Primary?    Abnormal posture Yes    Abnormal gait          Physician: Antwan Ambrosio MD    Visit Date: 6/25/2024    Physician Orders: PT Eval and Treat   Medical Diagnosis from Referral:   M54.31 (ICD-10-CM) - Right sided sciatica   M62.830 (ICD-10-CM) - Muscle spasm of back      Evaluation Date: 5/31/2024  Authorization Period Expiration: 12/31/2024  Plan of Care Expiration: 7/26/2024  Progress Note Due: 7/1/2024  Visit # / Visits authorized: 3/ 12 +eval   FOTO: First 5/31/2024        Time In: 11:01pm  Time Out: 11:59pm  Total Appointment Time (timed & untimed codes): 57 minutes     Precautions: Obesity    Subjective     Pt reports: Doing alright, has had some good days and some bad days. Nothing has gone down the leg since he started though, only to the butt.     He was compliant with home exercise program.  Response to previous treatment: sore  Functional change: Felt ok    Pain: 0/10  Location: right back and down the Right leg      Objective      Objective Measures updated at progress report unless specified.     Treatment     Roberta received the treatments listed below:      Bold = Performed    Roberta received therapeutic exercises to develop strength, endurance, ROM, flexibility, and posture for 10 minutes including:  Double knee to chest physioball 2x20  Hand heel rocking 2x20     Roberta participated in neuromuscular re-education activities to improve: Coordination, Kinesthetic, Sense, Proprioception, and Posture for 23 minutes. The following activities were included:  PPT hooklying x20 5"  FMP lumbar flexion 2x10   supine Sciatic nerve slider 3x1min  Swiss ball roll out 2x10 3 way  Pt education     Roberta participated in dynamic functional therapeutic activities to improve functional performance for 23  minutes, " "includin in box sit to stand with 8# medicine ball 3x12  Lateral step up 8" 2x10 B   Treadmill incline 4, 2mph 10min - had return of twinge in Right low back but it was minimal     Patient Education and Home Exercises       Education provided:   - Pt educated on slight alterations to HEP program    Written Home Exercises Provided: Patient instructed to cont prior HEP. Exercises were reviewed and Roberta was able to demonstrate them prior to the end of the session.  Roberta demonstrated good  understanding of the education provided. See EMR under Patient Instructions for exercises provided during therapy sessions    Assessment     Roberta presents to PT today with reports of overall improvement and centralization of pain. PPT control added in order to continue to improve lumbar flexion control. Pt had minimal discomfort and relief with flexion based activity.     Roberta Is progressing well towards his goals.   Pt prognosis is Fair.     Pt will continue to benefit from skilled outpatient physical therapy to address the deficits listed in the problem list box on initial evaluation, provide pt/family education and to maximize pt's level of independence in the home and community environment.     Pt's spiritual, cultural and educational needs considered and pt agreeable to plan of care and goals.     Anticipated barriers to physical therapy: Morbid obesity    Goals:   STG (4 Weeks)  Pt will be independent with Initial home exercise program in order to facilitate Physical Therapy treatment  Pt will reduce worst pain to 5/10 in order to perform ADLs  Pt will be able to walk for >20min without increased pain down the leg   Pt will improve hip abduction strength to 4-/5 in order to improve gait mechanics     LTG(8weeks)  Pt will be independent c final home exercise program in order to DC to home program  Pt will improve FOTO limitation to 81% indicative of overall improvement in function   Pt will improve worst pain to " 1/10 in order to return to previous level of function   Pt will be able to walk for >40min without any pain       Plan     Continue with flexion based activities followed by nerve mobility and generalized strengthening.     Karen Cespedes, PT, DPT, OCS

## 2024-07-09 ENCOUNTER — CLINICAL SUPPORT (OUTPATIENT)
Dept: REHABILITATION | Facility: OTHER | Age: 54
End: 2024-07-09
Payer: COMMERCIAL

## 2024-07-09 DIAGNOSIS — R26.9 ABNORMAL GAIT: ICD-10-CM

## 2024-07-09 DIAGNOSIS — R29.3 ABNORMAL POSTURE: Primary | ICD-10-CM

## 2024-07-09 PROCEDURE — 97110 THERAPEUTIC EXERCISES: CPT | Mod: PN

## 2024-07-09 PROCEDURE — 97112 NEUROMUSCULAR REEDUCATION: CPT | Mod: PN

## 2024-07-09 PROCEDURE — 97530 THERAPEUTIC ACTIVITIES: CPT | Mod: PN

## 2024-07-09 NOTE — PROGRESS NOTES
"[facility]   Physical Therapy Treatment Note      Name: Roberta Ware Jr.  Clinic Number: [id number]    Therapy Diagnosis:   Encounter Diagnoses   Name Primary?    Abnormal posture Yes    Abnormal gait        Physician: Antwan Ambrosio MD    Visit Date: 2024    Physician Orders: PT Eval and Treat   Medical Diagnosis from Referral:   M54.31 (ICD-10-CM) - Right sided sciatica   M62.830 (ICD-10-CM) - Muscle spasm of back      Evaluation Date: 2024  Authorization Period Expiration: 2024  Plan of Care Expiration: 2024  Progress Note Due: 2024  Visit # / Visits authorized:  +eval   FOTO: First 2024        Time In: 11:01pm  Time Out: 11:59pm  Total Appointment Time (timed & untimed codes): 57 minutes     Precautions: Obesity    Subjective     Pt reports: Back and leg are doing good. He isnt feeling good though, has a pretty bad headache.    He was compliant with home exercise program.  Response to previous treatment: sore  Functional change: Felt ok    Pain: 0/10  Location: right back and down the Right leg      Objective      Objective Measures updated at progress report unless specified.     BP: 139/71  Treatment     Roberta received the treatments listed below:      Bold = Performed    Roberta received therapeutic exercises to develop strength, endurance, ROM, flexibility, and posture for 10 minutes including:  Double knee to chest physioball 2x20  Hand heel rocking 2x20     Roberta participated in neuromuscular re-education activities to improve: Coordination, Kinesthetic, Sense, Proprioception, and Posture for 23 minutes. The following activities were included:  PPT hooklying x20 5"  FMP lumbar flexion 2x10   supine Sciatic nerve slider 3x1min  Swiss ball roll out 2x10 3 way  Pt education     Roberta participated in dynamic functional therapeutic activities to improve functional performance for 23  minutes, includin in box sit to stand with 8# " "medicine ball 3x12  Lateral step up 8" 2x10 B   Treadmill incline 4, 2mph 10min - had return of twinge in Right low back but it was minimal     Patient Education and Home Exercises       Education provided:   - Pt educated on slight alterations to HEP program    Written Home Exercises Provided: Patient instructed to cont prior HEP. Exercises were reviewed and Roberta was able to demonstrate them prior to the end of the session.  Roberta demonstrated good  understanding of the education provided. See EMR under Patient Instructions for exercises provided during therapy sessions    Assessment     Roberta presents to PT today with reports of a headache. BP taken with slight elevation but not above pt normal. He continues to respond well to flexion based treatment to assist with pain associated with stenosis. Strength is improving. PT to continue to progress functional activities as pain remains low.     Roberta Is progressing well towards his goals.   Pt prognosis is Fair.     Pt will continue to benefit from skilled outpatient physical therapy to address the deficits listed in the problem list box on initial evaluation, provide pt/family education and to maximize pt's level of independence in the home and community environment.     Pt's spiritual, cultural and educational needs considered and pt agreeable to plan of care and goals.     Anticipated barriers to physical therapy: Morbid obesity    Goals:   STG (4 Weeks)  Pt will be independent with Initial home exercise program in order to facilitate Physical Therapy treatment  Pt will reduce worst pain to 5/10 in order to perform ADLs  Pt will be able to walk for >20min without increased pain down the leg   Pt will improve hip abduction strength to 4-/5 in order to improve gait mechanics     LTG(8weeks)  Pt will be independent c final home exercise program in order to DC to home program  Pt will improve FOTO limitation to 81% indicative of overall improvement in function "   Pt will improve worst pain to 1/10 in order to return to previous level of function   Pt will be able to walk for >40min without any pain       Plan     Continue with flexion based activities followed by nerve mobility and generalized strengthening.     Karen Cespedes, PT, DPT, OCS

## 2024-07-16 ENCOUNTER — CLINICAL SUPPORT (OUTPATIENT)
Dept: REHABILITATION | Facility: OTHER | Age: 54
End: 2024-07-16
Payer: COMMERCIAL

## 2024-07-16 DIAGNOSIS — R29.3 ABNORMAL POSTURE: Primary | ICD-10-CM

## 2024-07-16 DIAGNOSIS — R26.9 ABNORMAL GAIT: ICD-10-CM

## 2024-07-16 PROCEDURE — 97140 MANUAL THERAPY 1/> REGIONS: CPT | Mod: PN

## 2024-07-16 PROCEDURE — 97110 THERAPEUTIC EXERCISES: CPT | Mod: PN

## 2024-07-16 PROCEDURE — 97112 NEUROMUSCULAR REEDUCATION: CPT | Mod: PN

## 2024-07-16 NOTE — PROGRESS NOTES
[facility]   Physical Therapy Treatment Note      Name: Roberta Ware Jr.  Clinic Number: [id number]    Therapy Diagnosis:   Encounter Diagnoses   Name Primary?    Abnormal posture Yes    Abnormal gait        Physician: Antwan Ambrosio MD    Visit Date: 7/16/2024    Physician Orders: PT Eval and Treat   Medical Diagnosis from Referral:   M54.31 (ICD-10-CM) - Right sided sciatica   M62.830 (ICD-10-CM) - Muscle spasm of back      Evaluation Date: 5/31/2024  Authorization Period Expiration: 12/31/2024  Plan of Care Expiration: 7/26/2024  Progress Note Due: 8/1/2024  Visit # / Visits authorized: 5/ 12 +eval   FOTO: First 5/31/2024        Time In: 11:01pm  Time Out: 11:59pm  Total Appointment Time (timed & untimed codes): 57 minutes     Precautions: Obesity    Subjective     Pt reports: Right side is doing well, no pain. Sunday he started having a Left sided back pain and radiating pain to the glute. He has to lay on his right side in order for it to ease a little. It will also go around to the lateral side of the thigh and sometimes the ant thigh. Makes it a little hard to walk at times. Hurts more when he is sitting, less when standing.     He was compliant with home exercise program.  Response to previous treatment: sore  Functional change: Felt ok    Pain: 8/10  Location: Left back and down the Left leg    Objective      Objective Measures updated at progress report unless specified.     Lumbar range of motion:  Flexion 50% - increased pain down the leg   Ext 50% - reduced pain down the leg    Repeated extension: slightly reduced pain down the leg    Hip mobility: Reproduction and limitation with hip IR/ER on Left   Treatment     Roberta received the treatments listed below:      Bold = Performed    Roberta received the following manual therapy techniques: Joint mobilizations were applied to the: Left hip for 10 minutes, including:  Lateral hip distraction with IR/ER    Roberta received  "therapeutic exercises to develop strength, endurance, ROM, flexibility, and posture for 22 minutes including:  Repeated extension with slight Left sided bias 2x10   Wall extension x10 increased pain when returning to flexion   Assessment above    Not today:  Double knee to chest physioball 2x20  Hand heel rocking 2x20     Roberta participated in neuromuscular re-education activities to improve: Coordination, Kinesthetic, Sense, Proprioception, and Posture for 25 minutes. The following activities were included:  Standing hip ext B 3x10   supine Sciatic nerve slider 3x1min Left today  Pt education     Not today:  Swiss ball roll out 2x10 3 way  PPT hooklying x20 5"  FMP lumbar flexion 2x10     Roberta participated in dynamic functional therapeutic activities to improve functional performance for 00  minutes, includin in box sit to stand with 8# medicine ball 3x12  Lateral step up 8" 2x10 B   Treadmill incline 4, 2mph 10min - had return of twinge in Right low back but it was minimal     Patient Education and Home Exercises       Education provided:   - Pt educated on avoiding flexion until the Left side pain eases    Written Home Exercises Provided: Patient instructed to cont prior HEP. Exercises were reviewed and Roberta was able to demonstrate them prior to the end of the session.  Roberta demonstrated good  understanding of the education provided. See EMR under Patient Instructions for exercises provided during therapy sessions    Assessment     Roberta presents to PT today with signs and symptoms of Left sided radiculopathy possibly due to disc related injury. Pain improved with extension and increases with flexion. This hinders original referral as Right sided pain was due to stenosis. Pt struggles with pain reduction. Due to BMI, PT unable to perform lumbar manual. Hip mobilization did demonstrated some improvement in leg pain. PT to continue to assess.     Roberta Is progressing well towards his goals.   Pt " prognosis is Fair.     Pt will continue to benefit from skilled outpatient physical therapy to address the deficits listed in the problem list box on initial evaluation, provide pt/family education and to maximize pt's level of independence in the home and community environment.     Pt's spiritual, cultural and educational needs considered and pt agreeable to plan of care and goals.     Anticipated barriers to physical therapy: Morbid obesity    Goals:   STG (4 Weeks)  Pt will be independent with Initial home exercise program in order to facilitate Physical Therapy treatment  Pt will reduce worst pain to 5/10 in order to perform ADLs  Pt will be able to walk for >20min without increased pain down the leg   Pt will improve hip abduction strength to 4-/5 in order to improve gait mechanics     LTG(8weeks)  Pt will be independent c final home exercise program in order to DC to home program  Pt will improve FOTO limitation to 81% indicative of overall improvement in function   Pt will improve worst pain to 1/10 in order to return to previous level of function   Pt will be able to walk for >40min without any pain       Plan     Continue with extension based activities followed by nerve mobility and generalized strengthening.     Karen Cespedes, PT, DPT, OCS

## 2024-07-19 ENCOUNTER — OFFICE VISIT (OUTPATIENT)
Dept: PAIN MEDICINE | Facility: CLINIC | Age: 54
End: 2024-07-19
Payer: COMMERCIAL

## 2024-07-19 VITALS
TEMPERATURE: 98 F | SYSTOLIC BLOOD PRESSURE: 148 MMHG | OXYGEN SATURATION: 97 % | DIASTOLIC BLOOD PRESSURE: 84 MMHG | WEIGHT: 315 LBS | HEART RATE: 80 BPM | BODY MASS INDEX: 38.36 KG/M2 | HEIGHT: 76 IN

## 2024-07-19 DIAGNOSIS — M47.816 LUMBAR SPONDYLOSIS: ICD-10-CM

## 2024-07-19 DIAGNOSIS — M54.16 LUMBAR RADICULOPATHY: ICD-10-CM

## 2024-07-19 DIAGNOSIS — M51.36 DDD (DEGENERATIVE DISC DISEASE), LUMBAR: Primary | ICD-10-CM

## 2024-07-19 DIAGNOSIS — M25.552 PAIN OF LEFT HIP: ICD-10-CM

## 2024-07-19 PROCEDURE — 3079F DIAST BP 80-89 MM HG: CPT | Mod: CPTII,S$GLB,,

## 2024-07-19 PROCEDURE — 3008F BODY MASS INDEX DOCD: CPT | Mod: CPTII,S$GLB,,

## 2024-07-19 PROCEDURE — 3077F SYST BP >= 140 MM HG: CPT | Mod: CPTII,S$GLB,,

## 2024-07-19 PROCEDURE — 99214 OFFICE O/P EST MOD 30 MIN: CPT | Mod: S$GLB,,,

## 2024-07-19 PROCEDURE — 99999 PR PBB SHADOW E&M-EST. PATIENT-LVL V: CPT | Mod: PBBFAC,,,

## 2024-07-19 PROCEDURE — 1160F RVW MEDS BY RX/DR IN RCRD: CPT | Mod: CPTII,S$GLB,,

## 2024-07-19 PROCEDURE — 1159F MED LIST DOCD IN RCRD: CPT | Mod: CPTII,S$GLB,,

## 2024-07-19 PROCEDURE — 4010F ACE/ARB THERAPY RXD/TAKEN: CPT | Mod: CPTII,S$GLB,,

## 2024-07-19 NOTE — PROGRESS NOTES
Subjective:     Patient ID: Roberta Ware Jr. is a 53 y.o. male    Chief Complaint: Follow-up (8wk f/u )      Referred by: No ref. provider found      HPI:    Interval History PA (07/19/2024):  Patient returns to clinic for follow up.  Patient notes since previous visit he has been attending physical therapy with overall benefit.  Previously presented with acute right-sided lower back and extremity pain which has significantly improved since onset.  States no longer having any right-sided lower back or extremity pain since initiating physical therapy.  Patient's concern today is new onset of acute left-sided lower back and extremity pain x1 week.  Denies any inciting event.  States he has been having pain located in his left lower lumbar/lumbosacral region radiating into his left thigh and non-dermatomal pattern.  Also pain radiating into his left calf region, stopping at the ankle.  Also reporting occasional pain radiating into his left inguinal region with certain activities.  His pain is constant, worsened with activity.  Specifically worsened with standing or walking.  He denies any numbness, tingling, weakness, bowel or bladder dysfunction.  Of note he has been taking gabapentin 600 mg b.i.d. with benefit, denies any adverse effects.  He was prescribed Medrol Dosepak although has not yet started this.    Initial Encounter (5/24/24):  Roberta Ware Jr. is a 53 y.o. male who presents today with acute right-sided low back and lower extremity pain.  This pain has been present for roughly 3 weeks.  No specific inciting events or injuries noted.  Patient does report previous episodes of pain that were less severe and self-limited.  Current pain is located in the right lower lumbar/lumbosacral region radiates to the right lower extremity all the way to his foot with associated paresthesias.  He denies any focal weakness or bowel bladder dysfunction.  Pain is constant and worsened with activity.   This pain  is described in detail below.    Physical Therapy:  Yes, currently.    Non-pharmacologic Treatment:  Rest helps         TENS?  No    Pain Medications:         Currently taking:  Naproxen, methocarbamol, gabapentin    Has tried in the past:  Tylenol    Has not tried:  Opioids, TCAs, SNRIs, topical creams    Blood thinners:  None    Interventional Therapies:  None    Relevant Surgeries:  None    Affecting sleep?  Yes    Affecting daily activities? yes    Depressive symptoms? No          SI/HI? No    Work status: Employed    Pain Scores:    Best:       7/10  Worst:     8/10  Usually:   7/10  Today:    7/10         Review of Systems   Constitutional:  Negative for activity change, appetite change, chills, fatigue, fever and unexpected weight change.   HENT:  Negative for hearing loss.    Eyes:  Negative for visual disturbance.   Respiratory:  Negative for chest tightness and shortness of breath.    Cardiovascular:  Negative for chest pain.   Gastrointestinal:  Negative for abdominal pain, constipation, diarrhea, nausea and vomiting.   Genitourinary:  Negative for difficulty urinating.   Musculoskeletal:  Positive for back pain, gait problem and myalgias. Negative for neck pain.   Skin:  Negative for rash.   Neurological:  Positive for numbness. Negative for dizziness, weakness, light-headedness and headaches.   Psychiatric/Behavioral:  Positive for sleep disturbance. Negative for hallucinations and suicidal ideas. The patient is not nervous/anxious.        Past Medical History:   Diagnosis Date    Diverticulosis     Encounter for blood transfusion     Erectile dysfunction     Gout     Hypertension     Osteoarthritis        Past Surgical History:   Procedure Laterality Date    BONE MARROW BIOPSY N/A     CHOLECYSTECTOMY      COLONOSCOPY  2012    COLONOSCOPY N/A 12/27/2021    Procedure: COLONOSCOPY;  Surgeon: Solis Mendez MD;  Location: [facility];  Service: Endoscopy;  Laterality: N/A;  11/16 bariatric stretcher; fully  vaccinated; instr. to portal-st  12/23 pt confirmed arrival time and all prep instructions-ml    FINGER SURGERY Left 2017    left middle finger    UPPER GASTROINTESTINAL ENDOSCOPY         Social History     Socioeconomic History    Marital status:    Tobacco Use    Smoking status: Never    Smokeless tobacco: Never   Substance and Sexual Activity    Alcohol use: Yes     Comment: Ocassionally    Drug use: No    Sexual activity: Yes     Partners: Female     Social Determinants of Health     Financial Resource Strain: Low Risk  (5/20/2024)    Overall Financial Resource Strain (CARDIA)     Difficulty of Paying Living Expenses: Not hard at all   Food Insecurity: No Food Insecurity (5/20/2024)    Hunger Vital Sign     Worried About Running Out of Food in the Last Year: Never true     Ran Out of Food in the Last Year: Never true   Physical Activity: Insufficiently Active (5/20/2024)    Exercise Vital Sign     Days of Exercise per Week: 3 days     Minutes of Exercise per Session: 30 min   Stress: No Stress Concern Present (5/20/2024)    [employer] - Occupational Stress Questionnaire     Feeling of Stress : Only a little   Housing Stability: Unknown (5/20/2024)    Housing Stability Vital Sign     Unable to Pay for Housing in the Last Year: No       Review of patient's allergies indicates:   Allergen Reactions    Hydrochlorothiazide Other (See Comments)     Gout    Tramadol Swelling     Pt states make him jittery        Current Outpatient Medications on File Prior to Visit   Medication Sig Dispense Refill    allopurinoL (ZYLOPRIM) 300 MG tablet Take 1 tablet (300 mg total) by mouth once daily. 90 tablet 3    amLODIPine (NORVASC) 10 MG tablet Take 1 tablet (10 mg total) by mouth once daily. 90 tablet 3    chlorthalidone (HYGROTEN) 25 MG Tab Take 1 tablet (25 mg total) by mouth once daily. 90 tablet 3    colchicine (COLCRYS) 0.6 mg tablet Take 1 tablet (0.6 mg total) by mouth 2 (two) times  "daily as needed (for gout). 90 tablet 3    gabapentin (NEURONTIN) 300 MG capsule Take 2 capsules (600 mg total) by mouth 3 (three) times daily. 180 capsule 5    losartan (COZAAR) 50 MG tablet Take 1 tablet (50 mg total) by mouth once daily. 90 tablet 3    naproxen (NAPROSYN) 500 MG tablet Take 1 tablet (500 mg total) by mouth daily as needed (joint pain). 30 tablet 0     No current facility-administered medications on file prior to visit.       Objective:      BP (!) 148/84 (BP Location: Right forearm, Patient Position: Sitting, BP Method: Large (Manual))   Pulse 80   Temp 98.3 °F (36.8 °C) (Oral)   Ht 6' 4" (1.93 m)   Wt (!) 213 kg (469 lb 9.3 oz)   SpO2 97%   BMI 57.16 kg/m²     Exam:  GEN:  Well developed, well nourished.  No acute distress.  Normal pain behavior.  HEENT:  No trauma.  Mucous membranes moist.  Nares patent bilaterally.  PSYCH: Normal affect. Thought content appropriate.  CHEST:  Breathing symmetric.  No audible wheezing.  ABD: Soft, non-distended.  SKIN:  Warm, pink, dry.  No rash on exposed areas.    EXT:  No cyanosis, clubbing, or edema.  No color change or changes in nail or hair growth.  NEURO/MUSCULOSKELETAL:  Fully alert, oriented, and appropriate. Speech normal nicko. No cranial nerve deficits.   Gait:  Normal.  No trendelenburg sign bilaterally.   Motor Strength:  5/5 motor strength throughout lower extremities.   L-Spine:  Full ROM with pain on extension more than flexion.  Negative pain with axial/facet loading bilaterally.  Positive SLR on the left.    SI Joint/hip:  Positive FADIR on the left  No TTP over lumbar paraspinals, bilateral SI joints, hips, piriformis muscles, or GTB.        Imaging:  Narrative & Impression  EXAMINATION:  XR HIP WITH PELVIS WHEN PERFORMED 2 OR 3 VIEWS LEFT     CLINICAL HISTORY:  Pain in left hip     TECHNIQUE:  AP view of the pelvis and frog leg lateral view of the left hip were performed.     COMPARISON:  None.     FINDINGS:  Mild hip DJD and joint " space narrowing with rim acetabular spurring.  No acute fracture, dislocation, or osseous destruction.     Impression:     As above.        Electronically signed by:Michele Aguilar  Date:                                            07/19/2024  Time:                                           11:19                       Narrative & Impression  EXAMINATION:  XR LUMBAR SPINE 2 OR 3 VIEWS     CLINICAL HISTORY:  Other intervertebral disc degeneration, lumbar region     TECHNIQUE:  Lumbar spine radiograph series.     COMPARISON:  None.     FINDINGS:  Lumbar spine vertebral body heights appear maintained.  Scattered discogenic change with lower disc space narrowing most pronounced at L5-S1 with facet DJD.  Minimal grade 1 anterolisthesis at L4-L5.  No evidence for lytic or blastic lesion.     Impression:     As above.        Electronically signed by:Michele Aguilar  Date:                                            07/19/2024  Time:                                           11:00    Assessment:       Encounter Diagnoses   Name Primary?    DDD (degenerative disc disease), lumbar Yes    Lumbar radiculopathy     Pain of left hip     Lumbar spondylosis            Plan:       Roberta was seen today for follow-up.    Diagnoses and all orders for this visit:    DDD (degenerative disc disease), lumbar  -     X-Ray Lumbar Spine 2 Or 3 Views; Future    Lumbar radiculopathy  -     X-Ray Lumbar Spine 2 Or 3 Views; Future    Pain of left hip  -     X-Ray Hip 2 or 3 views Left with Pelvis when performed; Future    Lumbar spondylosis          Roberta Ware Jr. is a 53 y.o. male with lower back and extremity pain.  Initially presented with acute right-sided lower back and extremity pain consistent with lower lumbar radiculopathy.  This has significantly improved with course of physical therapy.  Now presenting with acute left-sided lower back and extremity pain concerning for radiculopathy.  Also suspect a degree of left  intra-articular hip pathology.  Left hip x-ray does show mild hip joint space narrowing.  No overt neurological deficits on examination today.    Prior records reviewed.  Pertinent imaging studies reviewed by me. Imaging results were discussed with patient.  Ordered lumbar x-ray to get baseline imaging.  Ordered hip x-ray to further evaluate.  We did discuss continuing with additional conservative measures at this time given acuity of pain.  Patient has made significant progress this for specifically of right-sided lower back and extremity pain since initiating physical therapy.  Patient now more focused in her left lower back and extremity pain with continued therapy at this time.   Continue gabapentin.  Patient currently taking 600 mg b.i.d. with benefit, denies adverse effects.  We did discuss increasing to 600 mg t.i.d. as tolerated/needed.  Patient given instructions on how to increase dosage.  Start Medrol Dosepak.  Patient is already picked this up in the pharmacy but has not yet started taking this medication.  Stressed the importance of maintaining regular home exercise program and being mindful of how they use their back throughout the day.  Patient expressed understanding and agreement.   Return to clinic in 6 weeks or sooner if needed.  At that time we will discuss efficacy of medication changes and make any necessary just.  Also we will discuss efficacy of physical therapy.  May consider lumbar MRI if pain persists/worsens.    Levy Alba PA-C  [facility]  Interventional Pain Management   07/19/2024    This note was created by combination of typed  and M-Modal dictation.  Transcription and phonetic errors may be present.  If there are any questions, please contact me.

## 2024-07-23 ENCOUNTER — CLINICAL SUPPORT (OUTPATIENT)
Dept: REHABILITATION | Facility: OTHER | Age: 54
End: 2024-07-23
Payer: COMMERCIAL

## 2024-07-23 DIAGNOSIS — R26.9 ABNORMAL GAIT: ICD-10-CM

## 2024-07-23 DIAGNOSIS — R29.3 ABNORMAL POSTURE: Primary | ICD-10-CM

## 2024-07-23 PROCEDURE — 97112 NEUROMUSCULAR REEDUCATION: CPT | Mod: PN

## 2024-07-23 PROCEDURE — 97530 THERAPEUTIC ACTIVITIES: CPT | Mod: PN

## 2024-07-23 NOTE — PROGRESS NOTES
[facility]   Physical Therapy Treatment Note      Name: Roberta Ware Jr.  Clinic Number: [id number]    Therapy Diagnosis:   Encounter Diagnoses   Name Primary?    Abnormal posture Yes    Abnormal gait        Physician: Antwan Ambrosio MD    Visit Date: 7/23/2024    Physician Orders: PT Eval and Treat   Medical Diagnosis from Referral:   M54.31 (ICD-10-CM) - Right sided sciatica   M62.830 (ICD-10-CM) - Muscle spasm of back      Evaluation Date: 5/31/2024  Authorization Period Expiration: 12/31/2024  Plan of Care Expiration: 7/26/2024  Progress Note Due: 8/1/2024  Visit # / Visits authorized: 6/ 12 +eval   FOTO: First 5/31/2024        Time In: 1:01pm  Time Out: 1:59pm  Total Appointment Time (timed & untimed codes): 58 minutes     Precautions: Obesity    Subjective     Pt reports: No right sided back pain. Left sided radiating pain is gone. He only has pain in the Left side of the low back but its much better than it was before.   He was compliant with home exercise program.  Response to previous treatment: sore  Functional change: Felt ok    Pain: 4/10  Location: Left back and down the Left leg    Objective      Objective Measures updated at progress report unless specified.      Treatment     Roberta received the treatments listed below:      Bold = Performed    Roberta received the following manual therapy techniques: Joint mobilizations were applied to the: Left hip for 00 minutes, including:  Lateral hip distraction with IR/ER    Roberta received therapeutic exercises to develop strength, endurance, ROM, flexibility, and posture for 5 minutes including:    Wall extension x10 increased pain when returning to flexion       Not today:  Repeated extension with slight Left sided bias 2x10   Double knee to chest physioball 2x20  Hand heel rocking 2x20     Roberta participated in neuromuscular re-education activities to improve: Coordination, Kinesthetic, Sense, Proprioception, and Posture  "for 23 minutes. The following activities were included:  Bridging glute and tra cuing 3x10  Standing hip ext B 3x10   supine Sciatic nerve slider 3x1min Left today  Pt education     Not today:  Swiss ball roll out 2x10 3 way  PPT hooklying x20 5"  FMP lumbar flexion 2x10     Roberta participated in dynamic functional therapeutic activities to improve functional performance for 30 minutes, including:  Hip hinge with dowel 3x10  Hip hinge with dowel to stand from 18"box 3x10  Lateral monster walk red theraband 2 laps     Not today:  20 in box sit to stand with 8# medicine ball 3x12  Lateral step up 8" 2x10 B   Treadmill incline 4, 2mph 10min - had return of twinge in Right low back but it was minimal     Patient Education and Home Exercises       Education provided:   - Pt educated on avoiding flexion until the Left side pain eases    Written Home Exercises Provided: Patient instructed to cont prior HEP. Exercises were reviewed and Roberta was able to demonstrate them prior to the end of the session.  Roberta demonstrated good  understanding of the education provided. See EMR under Patient Instructions for exercises provided during therapy sessions    Assessment     Roberta presents to PT today with improvement in acute onset Left sided low back pain and radiating pain. He continues to respond well to extension. No return of Right sided low back pain. Progression today to include hip and lumbar spine dissociation to reduce abnormal transitions. He does well with progression.     Roberta Is progressing well towards his goals.   Pt prognosis is Fair.     Pt will continue to benefit from skilled outpatient physical therapy to address the deficits listed in the problem list box on initial evaluation, provide pt/family education and to maximize pt's level of independence in the home and community environment.     Pt's spiritual, cultural and educational needs considered and pt agreeable to plan of care and goals.   "   Anticipated barriers to physical therapy: Morbid obesity    Goals:   STG (4 Weeks)  Pt will be independent with Initial home exercise program in order to facilitate Physical Therapy treatment  Pt will reduce worst pain to 5/10 in order to perform ADLs  Pt will be able to walk for >20min without increased pain down the leg   Pt will improve hip abduction strength to 4-/5 in order to improve gait mechanics     LTG(8weeks)  Pt will be independent c final home exercise program in order to DC to home program  Pt will improve FOTO limitation to 81% indicative of overall improvement in function   Pt will improve worst pain to 1/10 in order to return to previous level of function   Pt will be able to walk for >40min without any pain       Plan     Continue with extension based activities followed by nerve mobility and generalized strengthening.     Karen Cespedes, PT, DPT, OCS

## 2024-07-29 ENCOUNTER — CLINICAL SUPPORT (OUTPATIENT)
Dept: REHABILITATION | Facility: OTHER | Age: 54
End: 2024-07-29
Payer: COMMERCIAL

## 2024-07-29 DIAGNOSIS — R26.9 ABNORMAL GAIT: ICD-10-CM

## 2024-07-29 DIAGNOSIS — R29.3 ABNORMAL POSTURE: Primary | ICD-10-CM

## 2024-07-29 PROCEDURE — 97112 NEUROMUSCULAR REEDUCATION: CPT | Mod: PN

## 2024-07-29 PROCEDURE — 97530 THERAPEUTIC ACTIVITIES: CPT | Mod: PN

## 2024-07-29 NOTE — PROGRESS NOTES
[facility]   Physical Therapy Treatment Note      Name: Roberta Ware Jr.  Clinic Number: [id number]    Therapy Diagnosis:   Encounter Diagnoses   Name Primary?    Abnormal posture Yes    Abnormal gait          Physician: Antwan Ambrosio MD    Visit Date: 7/29/2024    Physician Orders: PT Eval and Treat   Medical Diagnosis from Referral:   M54.31 (ICD-10-CM) - Right sided sciatica   M62.830 (ICD-10-CM) - Muscle spasm of back      Evaluation Date: 5/31/2024  Authorization Period Expiration: 12/31/2024  Plan of Care Expiration: 7/26/2024  Progress Note Due: 8/1/2024  Visit # / Visits authorized: 7/ 12 +eval   FOTO: First 5/31/2024        Time In: 11:01am  Time Out: 11:59pm  Total Appointment Time (timed & untimed codes): 58 minutes     Precautions: Obesity    Subjective     Pt reports: Left side is feeling better. Still has some radiating pain in to the left leg but its definitely better.   He was compliant with home exercise program.  Response to previous treatment: sore  Functional change: Felt ok    Pain: 4/10  Location: Left back and down the Left leg    Objective      Objective Measures updated at progress report unless specified.      Treatment     Roberta received the treatments listed below:      Bold = Performed    Roberta received the following manual therapy techniques: Joint mobilizations were applied to the: Left hip for 00 minutes, including:  Lateral hip distraction with IR/ER    Roberta received therapeutic exercises to develop strength, endurance, ROM, flexibility, and posture for 5 minutes including:    Wall extension x10 increased pain when returning to flexion       Not today:  Repeated extension with slight Left sided bias 2x10   Double knee to chest physioball 2x20  Hand heel rocking 2x20     Roberta participated in neuromuscular re-education activities to improve: Coordination, Kinesthetic, Sense, Proprioception, and Posture for 25 minutes. The following activities  "were included:  Bridging glute and tra cuing 3x10 RTB thish  Standing hip ext B 3x10 RTB   Staning hip abd B 3x10 RTB  supine Sciatic nerve slider 3x1min Left today  Pallof press 7# 3x10 B   Pt education     Not today:  Swiss ball roll out 2x10 3 way  PPT hooklying x20 5"  FMP lumbar flexion 2x10     Roberta participated in dynamic functional therapeutic activities to improve functional performance for 25 minutes, including:  Hip hinge with dowel 3x10  Sit to stand 15# kettle bell 3x10  Lateral monster walk red theraband 2 laps     Not today:  Hip hinge with dowel to stand from 18"box 3x10  20 in box sit to stand with 8# medicine ball 3x12  Lateral step up 8" 2x10 B   Treadmill incline 4, 2mph 10min - had return of twinge in Right low back but it was minimal     Patient Education and Home Exercises       Education provided:   - Pt educated on avoiding flexion until the Left side pain eases    Written Home Exercises Provided: Patient instructed to cont prior HEP. Exercises were reviewed and Roberta was able to demonstrate them prior to the end of the session.  Roberta demonstrated good  understanding of the education provided. See EMR under Patient Instructions for exercises provided during therapy sessions    Assessment     Roberta presents to PT today with continued slow improvement on Left sided pain. Continues focus on lumbar extension with glute and hip strengthening with increased functional activities and core control.     Roberta Is progressing well towards his goals.   Pt prognosis is Fair.     Pt will continue to benefit from skilled outpatient physical therapy to address the deficits listed in the problem list box on initial evaluation, provide pt/family education and to maximize pt's level of independence in the home and community environment.     Pt's spiritual, cultural and educational needs considered and pt agreeable to plan of care and goals.     Anticipated barriers to physical therapy: Morbid " obesity    Goals:   STG (4 Weeks)  Pt will be independent with Initial home exercise program in order to facilitate Physical Therapy treatment  Pt will reduce worst pain to 5/10 in order to perform ADLs  Pt will be able to walk for >20min without increased pain down the leg   Pt will improve hip abduction strength to 4-/5 in order to improve gait mechanics     LTG(8weeks)  Pt will be independent c final home exercise program in order to DC to home program  Pt will improve FOTO limitation to 81% indicative of overall improvement in function   Pt will improve worst pain to 1/10 in order to return to previous level of function   Pt will be able to walk for >40min without any pain       Plan     Continue with extension based activities followed by nerve mobility and generalized strengthening.     Karen Cespedes, PT, DPT, OCS

## 2024-07-31 DIAGNOSIS — R73.03 PREDIABETES: ICD-10-CM

## 2024-07-31 DIAGNOSIS — I10 ESSENTIAL HYPERTENSION: ICD-10-CM

## 2024-08-01 DIAGNOSIS — M10.9 GOUT, UNSPECIFIED CAUSE, UNSPECIFIED CHRONICITY, UNSPECIFIED SITE: ICD-10-CM

## 2024-08-01 DIAGNOSIS — I10 ESSENTIAL HYPERTENSION: Chronic | ICD-10-CM

## 2024-08-01 NOTE — TELEPHONE ENCOUNTER
Refill Routing Note   Medication(s) are not appropriate for processing by [facility] for the following reason(s):        Required labs outdated  Required vitals abnormal    [facility] action(s):  Defer     Requires labs : Yes             Appointments  past 12m or future 3m with PCP    Date Provider   Last Visit   5/14/2024 Antwan Ambrosio MD   Next Visit   Visit date not found Antwan Ambrosio MD   ED visits in past 90 days: 0        Note composed:12:19 PM 08/01/2024

## 2024-08-01 NOTE — TELEPHONE ENCOUNTER
Care Due:                  Date            Visit Type   Department     Provider  --------------------------------------------------------------------------------                                EP [facility]                              PRIMARY      MED/ INTERNAL  Last Visit: 05-      CARE ([facility])   MED/ PEDS      ROBERTO OROZCO  Next Visit: None Scheduled  None         None Found                                                            Last  Test          Frequency    Reason                     Performed    Due Date  --------------------------------------------------------------------------------    CBC.........  12 months..  allopurinoL, naproxen....  07- 07-    CMP.........  12 months..  allopurinoL,               07- 07-                             chlorthalidone, losartan,                             naproxen.................    Uric Acid...  12 months..  allopurinoL..............  Not Found    Overdue    [employer] Embedded Care Due Messages. Reference number: [id number].   8/01/2024 5:44:47 AM CDT

## 2024-08-02 RX ORDER — CHLORTHALIDONE 25 MG/1
25 TABLET ORAL
Qty: 90 TABLET | Refills: 1 | Status: SHIPPED | OUTPATIENT
Start: 2024-08-02

## 2024-08-02 RX ORDER — AMLODIPINE BESYLATE 10 MG/1
10 TABLET ORAL
Qty: 90 TABLET | Refills: 1 | Status: SHIPPED | OUTPATIENT
Start: 2024-08-02

## 2024-08-02 RX ORDER — ALLOPURINOL 300 MG/1
300 TABLET ORAL
Qty: 90 TABLET | Refills: 1 | Status: SHIPPED | OUTPATIENT
Start: 2024-08-02

## 2024-08-06 ENCOUNTER — CLINICAL SUPPORT (OUTPATIENT)
Dept: REHABILITATION | Facility: OTHER | Age: 54
End: 2024-08-06
Payer: COMMERCIAL

## 2024-08-06 DIAGNOSIS — R26.9 ABNORMAL GAIT: ICD-10-CM

## 2024-08-06 DIAGNOSIS — R29.3 ABNORMAL POSTURE: Primary | ICD-10-CM

## 2024-08-06 PROCEDURE — 97112 NEUROMUSCULAR REEDUCATION: CPT | Mod: PN

## 2024-08-06 PROCEDURE — 97530 THERAPEUTIC ACTIVITIES: CPT | Mod: PN

## 2024-08-20 DIAGNOSIS — M10.9 GOUT, UNSPECIFIED CAUSE, UNSPECIFIED CHRONICITY, UNSPECIFIED SITE: ICD-10-CM

## 2024-08-20 NOTE — TELEPHONE ENCOUNTER
No care due was identified.  [facility] Embedded Care Due Messages. Reference number: [id number].   8/20/2024 5:00:14 PM CDT

## 2024-08-20 NOTE — TELEPHONE ENCOUNTER
----- Message from Maria Eugenia Wei sent at 8/20/2024  4:02 PM CDT -----  Regarding: Self   Type: RX Refill Request    Who Called: Self     Have you contacted your pharmacy: yes     Refill    RX Name and Strength: colchicine (COLCRYS) 0.6 mg tablet     Preferred Pharmacy with phone number: .  [facility][id number] - [facility] [address] [facility]  [address]  [facility] [address]  Phone: [phone] Fax: [phone]        Local or Mail Order: local     Would the patient rather a call back or a response via [facility]? Call back     Best Call Back Number:.[phone]      Additional Information:     Thank you.

## 2024-08-21 RX ORDER — COLCHICINE 0.6 MG/1
0.6 TABLET ORAL 2 TIMES DAILY PRN
Qty: 90 TABLET | Refills: 3 | Status: SHIPPED | OUTPATIENT
Start: 2024-08-21 | End: 2025-08-21

## 2024-09-13 ENCOUNTER — OFFICE VISIT (OUTPATIENT)
Dept: PAIN MEDICINE | Facility: CLINIC | Age: 54
End: 2024-09-13
Payer: COMMERCIAL

## 2024-09-13 VITALS — OXYGEN SATURATION: 96 % | SYSTOLIC BLOOD PRESSURE: 145 MMHG | HEART RATE: 66 BPM | DIASTOLIC BLOOD PRESSURE: 90 MMHG

## 2024-09-13 DIAGNOSIS — M51.36 DDD (DEGENERATIVE DISC DISEASE), LUMBAR: Primary | ICD-10-CM

## 2024-09-13 DIAGNOSIS — M25.552 PAIN OF LEFT HIP: ICD-10-CM

## 2024-09-13 DIAGNOSIS — M54.16 LUMBAR RADICULOPATHY: ICD-10-CM

## 2024-09-13 DIAGNOSIS — M47.816 LUMBAR SPONDYLOSIS: ICD-10-CM

## 2024-09-13 PROCEDURE — 99999 PR PBB SHADOW E&M-EST. PATIENT-LVL III: CPT | Mod: PBBFAC,,,

## 2024-09-13 NOTE — PROGRESS NOTES
Subjective:     Patient ID: Roberta Ware Jr. is a 54 y.o. male    Chief Complaint: Follow-up      Referred by: No ref. provider found      HPI:    Interval History PA (09/13/2024):  Patient returns to clinic for follow up.  Patient initially presented with acute right-sided lower back and extremity pain which has significantly improved since onset and with physical therapy.  Subsequently return to clinic and had new onset of left-sided lower back and extremity pain.  This also has significantly improved since onset.  Patient noting minimal to no continued pain at this time.  He does note some occasional episodic pain with certain activities but this quickly resolves with rest.  Overall his pain is typically at a 0-1/10.  He has finished full course of physical therapy with overall benefit and continues to maintain a regular home exercise program.  Also since the recent improvement of pain he has self weaned/discontinued gabapentin.  Does not feel he needs to take this medication at this time.  He did complete the Medrol Dosepak, noting significant improvement in his pain levels.  No other concerns at this time.    Interval History PA (07/19/2024):  Patient returns to clinic for follow up.  Patient notes since previous visit he has been attending physical therapy with overall benefit.  Previously presented with acute right-sided lower back and extremity pain which has significantly improved since onset.  States no longer having any right-sided lower back or extremity pain since initiating physical therapy.  Patient's concern today is new onset of acute left-sided lower back and extremity pain x1 week.  Denies any inciting event.  States he has been having pain located in his left lower lumbar/lumbosacral region radiating into his left thigh and non-dermatomal pattern.  Also pain radiating into his left calf region, stopping at the ankle.  Also reporting occasional pain radiating into his left inguinal region  with certain activities.  His pain is constant, worsened with activity.  Specifically worsened with standing or walking.  He denies any numbness, tingling, weakness, bowel or bladder dysfunction.  Of note he has been taking gabapentin 600 mg b.i.d. with benefit, denies any adverse effects.  He was prescribed Medrol Dosepak although has not yet started this.    Initial Encounter (5/24/24):  Roberta Ware Jr. is a 54 y.o. male who presents today with acute right-sided low back and lower extremity pain.  This pain has been present for roughly 3 weeks.  No specific inciting events or injuries noted.  Patient does report previous episodes of pain that were less severe and self-limited.  Current pain is located in the right lower lumbar/lumbosacral region radiates to the right lower extremity all the way to his foot with associated paresthesias.  He denies any focal weakness or bowel bladder dysfunction.  Pain is constant and worsened with activity.   This pain is described in detail below.    Physical Therapy:  Yes, currently.    Non-pharmacologic Treatment:  Rest helps         TENS?  No    Pain Medications:         Currently taking:  Naproxen, methocarbamol    Has tried in the past:  Tylenol, gabapentin    Has not tried:  Opioids, TCAs, SNRIs, topical creams    Blood thinners:  None    Interventional Therapies:  None    Relevant Surgeries:  None    Affecting sleep?  Yes    Affecting daily activities? yes    Depressive symptoms? No          SI/HI? No    Work status: Employed    Pain Scores:    Best:       0/10  Worst:     9/10  Usually:   1/10  Today:    0/10    Pain Disability Index  Family/Home Responsibilities:: 0  Recreation:: 4  Social Activity:: 0  Occupation:: 0  Sexual Behavior:: 0  Self Care:: 3  Life-Support Activities:: 4  Pain Disability Index (PDI): 11    Review of Systems   Constitutional:  Negative for activity change, appetite change, chills, fatigue, fever and unexpected weight change.   HENT:   Negative for hearing loss.    Eyes:  Negative for visual disturbance.   Respiratory:  Negative for chest tightness and shortness of breath.    Cardiovascular:  Negative for chest pain.   Gastrointestinal:  Negative for abdominal pain, constipation, diarrhea, nausea and vomiting.   Genitourinary:  Negative for difficulty urinating.   Musculoskeletal:  Positive for back pain, gait problem and myalgias. Negative for neck pain.   Skin:  Negative for rash.   Neurological:  Positive for numbness. Negative for dizziness, weakness, light-headedness and headaches.   Psychiatric/Behavioral:  Positive for sleep disturbance. Negative for hallucinations and suicidal ideas. The patient is not nervous/anxious.        Past Medical History:   Diagnosis Date    Diverticulosis     Encounter for blood transfusion     Erectile dysfunction     Gout     Hypertension     Osteoarthritis        Past Surgical History:   Procedure Laterality Date    BONE MARROW BIOPSY N/A     CHOLECYSTECTOMY      COLONOSCOPY  2012    COLONOSCOPY N/A 12/27/2021    Procedure: COLONOSCOPY;  Surgeon: Solis Mendez MD;  Location: [facility];  Service: Endoscopy;  Laterality: N/A;  11/16 bariatric stretcher; fully vaccinated; instr. to portal-st  12/23 pt confirmed arrival time and all prep instructions-ml    FINGER SURGERY Left 2017    left middle finger    UPPER GASTROINTESTINAL ENDOSCOPY         Social History     Socioeconomic History    Marital status:    Tobacco Use    Smoking status: Never    Smokeless tobacco: Never   Substance and Sexual Activity    Alcohol use: Yes     Comment: Ocassionally    Drug use: No    Sexual activity: Yes     Partners: Female     Social Determinants of Health     Financial Resource Strain: Low Risk  (5/20/2024)    Overall Financial Resource Strain (CARDIA)     Difficulty of Paying Living Expenses: Not hard at all   Food Insecurity: No Food Insecurity (5/20/2024)    Hunger Vital Sign     Worried About Running Out of Food in  the Last Year: Never true     Ran Out of Food in the Last Year: Never true   Physical Activity: Insufficiently Active (5/20/2024)    Exercise Vital Sign     Days of Exercise per Week: 3 days     Minutes of Exercise per Session: 30 min   Stress: No Stress Concern Present (5/20/2024)    [employer] [place] [employer] - Occupational Stress Questionnaire     Feeling of Stress : Only a little   Housing Stability: Unknown (5/20/2024)    Housing Stability Vital Sign     Unable to Pay for Housing in the Last Year: No       Review of patient's allergies indicates:   Allergen Reactions    Hydrochlorothiazide Other (See Comments)     Gout    Tramadol Swelling     Pt states make him jittery        Current Outpatient Medications on File Prior to Visit   Medication Sig Dispense Refill    allopurinoL (ZYLOPRIM) 300 MG tablet TAKE 1 TABLET(300 MG) BY MOUTH EVERY DAY 90 tablet 1    amLODIPine (NORVASC) 10 MG tablet TAKE 1 TABLET(10 MG) BY MOUTH EVERY DAY 90 tablet 1    chlorthalidone (HYGROTEN) 25 MG Tab TAKE 1 TABLET(25 MG) BY MOUTH EVERY DAY 90 tablet 1    colchicine (COLCRYS) 0.6 mg tablet Take 1 tablet (0.6 mg total) by mouth 2 (two) times daily as needed (for gout). 90 tablet 3    gabapentin (NEURONTIN) 300 MG capsule Take 2 capsules (600 mg total) by mouth 3 (three) times daily. 180 capsule 5    losartan (COZAAR) 50 MG tablet Take 1 tablet (50 mg total) by mouth once daily. 90 tablet 3    naproxen (NAPROSYN) 500 MG tablet Take 1 tablet (500 mg total) by mouth daily as needed (joint pain). 30 tablet 0     No current facility-administered medications on file prior to visit.       Objective:      BP (!) 169/92 (BP Location: Left arm, Patient Position: Sitting, BP Method: Medium (Automatic))   Pulse 66   SpO2 96%     Exam:  GEN:  Well developed, well nourished.  No acute distress.  Normal pain behavior.  HEENT:  No trauma.  Mucous membranes moist.  Nares patent bilaterally.  PSYCH: Normal affect. Thought content  appropriate.  CHEST:  Breathing symmetric.  No audible wheezing.  ABD: Soft, non-distended.  SKIN:  Warm, pink, dry.  No rash on exposed areas.    EXT:  No cyanosis, clubbing, or edema.  No color change or changes in nail or hair growth.  NEURO/MUSCULOSKELETAL:  Fully alert, oriented, and appropriate. Speech normal nicko. No cranial nerve deficits.   Gait:  Normal.  No trendelenburg sign bilaterally.         Imaging:  Narrative & Impression  EXAMINATION:  XR HIP WITH PELVIS WHEN PERFORMED 2 OR 3 VIEWS LEFT     CLINICAL HISTORY:  Pain in left hip     TECHNIQUE:  AP view of the pelvis and frog leg lateral view of the left hip were performed.     COMPARISON:  None.     FINDINGS:  Mild hip DJD and joint space narrowing with rim acetabular spurring.  No acute fracture, dislocation, or osseous destruction.     Impression:     As above.        Electronically signed by:Michele Aguilar  Date:                                            07/19/2024  Time:                                           11:19                       Narrative & Impression  EXAMINATION:  XR LUMBAR SPINE 2 OR 3 VIEWS     CLINICAL HISTORY:  Other intervertebral disc degeneration, lumbar region     TECHNIQUE:  Lumbar spine radiograph series.     COMPARISON:  None.     FINDINGS:  Lumbar spine vertebral body heights appear maintained.  Scattered discogenic change with lower disc space narrowing most pronounced at L5-S1 with facet DJD.  Minimal grade 1 anterolisthesis at L4-L5.  No evidence for lytic or blastic lesion.     Impression:     As above.        Electronically signed by:Michele Aguilar  Date:                                            07/19/2024  Time:                                           11:00    Assessment:       Encounter Diagnoses   Name Primary?    DDD (degenerative disc disease), lumbar Yes    Lumbar radiculopathy     Pain of left hip     Lumbar spondylosis              Plan:       Roberta was seen today for follow-up.    Diagnoses and  all orders for this visit:    DDD (degenerative disc disease), lumbar    Lumbar radiculopathy    Pain of left hip    Lumbar spondylosis      Roberta Ware Jr. is a 54 y.o. male with lower back and extremity pain.  Initially presented with acute right-sided lower back and extremity pain consistent with lower lumbar radiculopathy.  This has significantly improved with course of physical therapy.  More recently presenting with acute left-sided lower back and extremity pain concerning for radiculopathy.  This has mostly resolved since previous encounter.  Also suspect a degree of left intra-articular hip pathology.  Left hip x-ray does show mild hip joint space narrowing.  No overt neurological deficits on examination today.    Prior records reviewed.  Pertinent imaging studies reviewed by me. Imaging results were discussed with patient.  Patient noting significant improvement in pain since completing physical therapy and patient continues with a home exercise program at this time.  Overall pain has been manageable currently at a 0/10.  Continue with the additional conservative measures at this time.  Stressed the importance of maintaining regular home exercise program and being mindful of how they use their back throughout the day.  Patient expressed understanding and agreement.  Discontinue gabapentin.  Patient does report benefit in his medication although since pain has resolved he no longer feels he needs to continue taking this medication.  May consider restarting in the future if needed.  Return to clinic as needed.    Levy Alba PA-C  [facility]  Interventional Pain Management   09/13/2024    This note was created by combination of typed  and M-Modal dictation.  Transcription and phonetic errors may be present.  If there are any questions, please contact me.

## 2024-11-20 ENCOUNTER — DOCUMENTATION ONLY (OUTPATIENT)
Dept: PAIN MEDICINE | Facility: CLINIC | Age: 54
End: 2024-11-20

## 2024-11-20 ENCOUNTER — OFFICE VISIT (OUTPATIENT)
Dept: PAIN MEDICINE | Facility: CLINIC | Age: 54
End: 2024-11-20
Payer: COMMERCIAL

## 2024-11-20 VITALS — SYSTOLIC BLOOD PRESSURE: 160 MMHG | DIASTOLIC BLOOD PRESSURE: 99 MMHG | OXYGEN SATURATION: 96 % | HEART RATE: 77 BPM

## 2024-11-20 DIAGNOSIS — M47.816 LUMBAR SPONDYLOSIS: ICD-10-CM

## 2024-11-20 DIAGNOSIS — M62.830 MUSCLE SPASM OF BACK: ICD-10-CM

## 2024-11-20 DIAGNOSIS — M54.16 LUMBAR RADICULOPATHY: ICD-10-CM

## 2024-11-20 DIAGNOSIS — M54.16 LUMBAR RADICULOPATHY, CHRONIC: ICD-10-CM

## 2024-11-20 DIAGNOSIS — M51.362 DEGENERATION OF INTERVERTEBRAL DISC OF LUMBAR REGION WITH DISCOGENIC BACK PAIN AND LOWER EXTREMITY PAIN: Primary | ICD-10-CM

## 2024-11-20 PROCEDURE — 1160F RVW MEDS BY RX/DR IN RCRD: CPT | Mod: CPTII,S$GLB,,

## 2024-11-20 PROCEDURE — 99999 PR PBB SHADOW E&M-EST. PATIENT-LVL III: CPT | Mod: PBBFAC,,,

## 2024-11-20 PROCEDURE — 99214 OFFICE O/P EST MOD 30 MIN: CPT | Mod: S$GLB,,,

## 2024-11-20 PROCEDURE — 4010F ACE/ARB THERAPY RXD/TAKEN: CPT | Mod: CPTII,S$GLB,,

## 2024-11-20 PROCEDURE — 3080F DIAST BP >= 90 MM HG: CPT | Mod: CPTII,S$GLB,,

## 2024-11-20 PROCEDURE — 3077F SYST BP >= 140 MM HG: CPT | Mod: CPTII,S$GLB,,

## 2024-11-20 PROCEDURE — 1159F MED LIST DOCD IN RCRD: CPT | Mod: CPTII,S$GLB,,

## 2024-11-20 RX ORDER — NAPROXEN 500 MG/1
500 TABLET ORAL DAILY PRN
Qty: 30 TABLET | Refills: 0 | Status: SHIPPED | OUTPATIENT
Start: 2024-11-20 | End: 2024-12-20

## 2024-11-20 RX ORDER — GABAPENTIN 300 MG/1
600 CAPSULE ORAL 3 TIMES DAILY
Qty: 180 CAPSULE | Refills: 5 | Status: SHIPPED | OUTPATIENT
Start: 2024-11-20 | End: 2025-05-19

## 2024-11-20 NOTE — PROGRESS NOTES
Subjective:     Patient ID: Roberta Ware Jr. is a 54 y.o. male    Chief Complaint: Low-back Pain (Shooting pain radiating down the left leg)      Referred by: No ref. provider found      HPI:    Interval History PA (11/20/2024):  Patient returns to clinic for follow up.  Patient reports acute worsening of left-sided lower back and extremity pain over the last 2 weeks.  Notes pain has returned in similar quality and location as before.  Previously patient's pain significantly improved with gabapentin as well as physical therapy.  Notes he has been continuing with a regular home exercise program and stretching without significant benefit.  Also recently started gabapentin over the last week or so, currently taking 600 mg t.i.d. without significant relief.  Patient's pain is primarily located over his left lower lumbosacral region/left posterior hip.  Pain will radiate into his left posterolateral thigh and into his calf at times.  Also with pain radiating into his left inguinal region.  This pain is constant, worsened with activity.  Also notes his groin pain increases with weight-bearing exercise.  Denies any numbness, tingling, weakness, bowel or bladder dysfunction.    Interval History PA (09/13/2024):  Patient returns to clinic for follow up.  Patient initially presented with acute right-sided lower back and extremity pain which has significantly improved since onset and with physical therapy.  Subsequently return to clinic and had new onset of left-sided lower back and extremity pain.  This also has significantly improved since onset.  Patient noting minimal to no continued pain at this time.  He does note some occasional episodic pain with certain activities but this quickly resolves with rest.  Overall his pain is typically at a 0-1/10.  He has finished full course of physical therapy with overall benefit and continues to maintain a regular home exercise program.  Also since the recent improvement of pain  he has self weaned/discontinued gabapentin.  Does not feel he needs to take this medication at this time.  He did complete the Medrol Dosepak, noting significant improvement in his pain levels.  No other concerns at this time.    Interval History PA (07/19/2024):  Patient returns to clinic for follow up.  Patient notes since previous visit he has been attending physical therapy with overall benefit.  Previously presented with acute right-sided lower back and extremity pain which has significantly improved since onset.  States no longer having any right-sided lower back or extremity pain since initiating physical therapy.  Patient's concern today is new onset of acute left-sided lower back and extremity pain x1 week.  Denies any inciting event.  States he has been having pain located in his left lower lumbar/lumbosacral region radiating into his left thigh and non-dermatomal pattern.  Also pain radiating into his left calf region, stopping at the ankle.  Also reporting occasional pain radiating into his left inguinal region with certain activities.  His pain is constant, worsened with activity.  Specifically worsened with standing or walking.  He denies any numbness, tingling, weakness, bowel or bladder dysfunction.  Of note he has been taking gabapentin 600 mg b.i.d. with benefit, denies any adverse effects.  He was prescribed Medrol Dosepak although has not yet started this.    Initial Encounter (5/24/24):  Roberta Ware Jr. is a 54 y.o. male who presents today with acute right-sided low back and lower extremity pain.  This pain has been present for roughly 3 weeks.  No specific inciting events or injuries noted.  Patient does report previous episodes of pain that were less severe and self-limited.  Current pain is located in the right lower lumbar/lumbosacral region radiates to the right lower extremity all the way to his foot with associated paresthesias.  He denies any focal weakness or bowel bladder  dysfunction.  Pain is constant and worsened with activity.   This pain is described in detail below.    Physical Therapy:  Yes    Non-pharmacologic Treatment:  Rest helps         TENS?  No    Pain Medications:         Currently taking:  Naproxen, methocarbamol, gabapentin    Has tried in the past:  Tylenol, gabapentin    Has not tried:  Opioids, TCAs, SNRIs, topical creams    Blood thinners:  None    Interventional Therapies:  None    Relevant Surgeries:  None    Affecting sleep?  Yes    Affecting daily activities? yes    Depressive symptoms? No          SI/HI? No    Work status: Employed    Pain Scores:    Best:       7/10  Worst:     10/10  Usually:   7/10  Today:    9/10    Pain Disability Index  Family/Home Responsibilities:: 7  Recreation:: 7  Social Activity:: 8  Occupation:: 7  Sexual Behavior:: 7  Self Care:: 7  Life-Support Activities:: 8  Pain Disability Index (PDI): 51    Review of Systems   Constitutional:  Negative for activity change, appetite change, chills, fatigue, fever and unexpected weight change.   HENT:  Negative for hearing loss.    Eyes:  Negative for visual disturbance.   Respiratory:  Negative for chest tightness and shortness of breath.    Cardiovascular:  Negative for chest pain.   Gastrointestinal:  Negative for abdominal pain, constipation, diarrhea, nausea and vomiting.   Genitourinary:  Negative for difficulty urinating.   Musculoskeletal:  Positive for back pain, gait problem and myalgias. Negative for neck pain.   Skin:  Negative for rash.   Neurological:  Positive for numbness. Negative for dizziness, weakness, light-headedness and headaches.   Psychiatric/Behavioral:  Positive for sleep disturbance. Negative for hallucinations and suicidal ideas. The patient is not nervous/anxious.        Past Medical History:   Diagnosis Date    Diverticulosis     Encounter for blood transfusion     Erectile dysfunction     Gout     Hypertension     Osteoarthritis        Past Surgical History:    Procedure Laterality Date    BONE MARROW BIOPSY N/A     CHOLECYSTECTOMY      COLONOSCOPY  2012    COLONOSCOPY N/A 12/27/2021    Procedure: COLONOSCOPY;  Surgeon: Solis Mendez MD;  Location: [facility];  Service: Endoscopy;  Laterality: N/A;  11/16 bariatric stretcher; fully vaccinated; instr. to portal-st  12/23 pt confirmed arrival time and all prep instructions-ml    FINGER SURGERY Left 2017    left middle finger    UPPER GASTROINTESTINAL ENDOSCOPY         Social History     Socioeconomic History    Marital status:    Tobacco Use    Smoking status: Never    Smokeless tobacco: Never   Substance and Sexual Activity    Alcohol use: Yes     Comment: Ocassionally    Drug use: No    Sexual activity: Yes     Partners: Female     Social Drivers of Health     Financial Resource Strain: Low Risk  (5/20/2024)    Overall Financial Resource Strain (CARDIA)     Difficulty of Paying Living Expenses: Not hard at all   Food Insecurity: No Food Insecurity (5/20/2024)    Hunger Vital Sign     Worried About Running Out of Food in the Last Year: Never true     Ran Out of Food in the Last Year: Never true   Physical Activity: Insufficiently Active (5/20/2024)    Exercise Vital Sign     Days of Exercise per Week: 3 days     Minutes of Exercise per Session: 30 min   Stress: No Stress Concern Present (5/20/2024)    [employer] - Occupational Stress Questionnaire     Feeling of Stress : Only a little   Housing Stability: Unknown (5/20/2024)    Housing Stability Vital Sign     Unable to Pay for Housing in the Last Year: No       Review of patient's allergies indicates:   Allergen Reactions    Hydrochlorothiazide Other (See Comments)     Gout    Tramadol Swelling     Pt states make him jittery        Current Outpatient Medications on File Prior to Visit   Medication Sig Dispense Refill    allopurinoL (ZYLOPRIM) 300 MG tablet TAKE 1 TABLET(300 MG) BY MOUTH EVERY DAY 90 tablet 1    amLODIPine (NORVASC) 10  MG tablet TAKE 1 TABLET(10 MG) BY MOUTH EVERY DAY 90 tablet 1    chlorthalidone (HYGROTEN) 25 MG Tab TAKE 1 TABLET(25 MG) BY MOUTH EVERY DAY 90 tablet 1    colchicine (COLCRYS) 0.6 mg tablet Take 1 tablet (0.6 mg total) by mouth 2 (two) times daily as needed (for gout). 90 tablet 3    losartan (COZAAR) 50 MG tablet Take 1 tablet (50 mg total) by mouth once daily. 90 tablet 3    naproxen (NAPROSYN) 500 MG tablet Take 1 tablet (500 mg total) by mouth daily as needed (joint pain). 30 tablet 0     No current facility-administered medications on file prior to visit.       Objective:      BP (!) 160/99 (BP Location: Left arm, Patient Position: Sitting)   Pulse 77   SpO2 96%     Exam:  GEN:  Well developed, well nourished.  No acute distress.  Normal pain behavior.  HEENT:  No trauma.  Mucous membranes moist.  Nares patent bilaterally.  PSYCH: Normal affect. Thought content appropriate.  CHEST:  Breathing symmetric.  No audible wheezing.  ABD: Soft, non-distended.  SKIN:  Warm, pink, dry.  No rash on exposed areas.    EXT:  No cyanosis, clubbing, or edema.  No color change or changes in nail or hair growth.  NEURO/MUSCULOSKELETAL:  Fully alert, oriented, and appropriate. Speech normal nicko. No cranial nerve deficits.   Gait:  Antalgic.  No trendelenburg sign bilaterally.   Motor Strength:  5/5 motor strength throughout lower extremities.   L-Spine:  Limited ROM with pain on flexion.  No pain with axial/facet loading bilaterally.  Positive SLR on the left.    SI Joint/Hip:  Negative ROSAS bilaterally.  Positive FADIR on the left.  No TTP over lumbar paraspinals, bilateral SI joints, hips, piriformis muscles, or GTB.            Imaging:  Narrative & Impression  EXAMINATION:  XR HIP WITH PELVIS WHEN PERFORMED 2 OR 3 VIEWS LEFT     CLINICAL HISTORY:  Pain in left hip     TECHNIQUE:  AP view of the pelvis and frog leg lateral view of the left hip were performed.     COMPARISON:  None.     FINDINGS:  Mild hip DJD and joint  space narrowing with rim acetabular spurring.  No acute fracture, dislocation, or osseous destruction.     Impression:     As above.        Electronically signed by:Michele Aguilar  Date:                                            07/19/2024  Time:                                           11:19                       Narrative & Impression  EXAMINATION:  XR LUMBAR SPINE 2 OR 3 VIEWS     CLINICAL HISTORY:  Other intervertebral disc degeneration, lumbar region     TECHNIQUE:  Lumbar spine radiograph series.     COMPARISON:  None.     FINDINGS:  Lumbar spine vertebral body heights appear maintained.  Scattered discogenic change with lower disc space narrowing most pronounced at L5-S1 with facet DJD.  Minimal grade 1 anterolisthesis at L4-L5.  No evidence for lytic or blastic lesion.     Impression:     As above.        Electronically signed by:Michele Aguilar  Date:                                            07/19/2024  Time:                                           11:00    Assessment:       Encounter Diagnoses   Name Primary?    Degeneration of intervertebral disc of lumbar region with discogenic back pain and lower extremity pain Yes    Lumbar radiculopathy     Lumbar spondylosis     Lumbar radiculopathy, chronic     Muscle spasm of back      Plan:       Roberta was seen today for low-back pain.    Diagnoses and all orders for this visit:    Degeneration of intervertebral disc of lumbar region with discogenic back pain and lower extremity pain  -     naproxen (NAPROSYN) 500 MG tablet; Take 1 tablet (500 mg total) by mouth daily as needed (joint pain).  -     gabapentin (NEURONTIN) 300 MG capsule; Take 2 capsules (600 mg total) by mouth 3 (three) times daily.    Lumbar radiculopathy  -     naproxen (NAPROSYN) 500 MG tablet; Take 1 tablet (500 mg total) by mouth daily as needed (joint pain).  -     gabapentin (NEURONTIN) 300 MG capsule; Take 2 capsules (600 mg total) by mouth 3 (three) times daily.    Lumbar  spondylosis    Lumbar radiculopathy, chronic  -     Cancel: MRI Lumbar Spine Without Contrast; Future  -     MRI Lumbar Spine Without Contrast; Future  -     MRI Lumbar Spine Without Contrast    Muscle spasm of back        Roberta Ware Jr. is a 54 y.o. male with lower back and extremity pain.  Initially presented with acute right-sided lower back and extremity pain consistent with lower lumbar radiculopathy.  This has significantly improved with course of physical therapy.  Now with acute left-sided lower back and extremity pain which appears to be multifactorial.  Symptoms and exam are consistent with radiculopathy likely L5.  However suspect a degree of left intra-articular hip pathology.  Left hip x-ray does show mild hip joint space narrowing.  No overt neurological deficits on examination    Prior records review.  Pertinent imaging studies reviewed by me. Imaging results were discussed with patient.  Discussed with the patient that the exact etiology of his pain is somewhat unclear and may be multifactorial.  Suspect a degree of lumbar radiculopathy as well as left intra-articular hip pathology.  Ordered lumbar MRI to further evaluate.  Stressed the importance of maintaining regular home exercise program and being mindful of how they use their back throughout the day.  Patient expressed understanding and agreement.  Continue gabapentin 600 mg t.i.d..  Patient taking with benefit, denies adverse effects.  Refills provided.  Continue naproxen  500 mg daily p.r.n..  Refills provided.  Return to clinic after imaging to discuss results.  May consider lumbar JOAQUIM versus left intra-articular hip steroid injection if appropriate.      Levy Alba PA-C  [facility]  Interventional Pain Management   11/20/2024    This note was created by combination of typed  and M-Modal dictation.  Transcription and phonetic errors may be present.  If there are any questions, please contact  me.

## 2024-11-22 ENCOUNTER — OFFICE VISIT (OUTPATIENT)
Dept: PAIN MEDICINE | Facility: CLINIC | Age: 54
End: 2024-11-22
Payer: COMMERCIAL

## 2024-11-22 VITALS — HEART RATE: 72 BPM | DIASTOLIC BLOOD PRESSURE: 91 MMHG | SYSTOLIC BLOOD PRESSURE: 178 MMHG | OXYGEN SATURATION: 96 %

## 2024-11-22 DIAGNOSIS — M47.816 LUMBAR SPONDYLOSIS: ICD-10-CM

## 2024-11-22 DIAGNOSIS — M51.362 DEGENERATION OF INTERVERTEBRAL DISC OF LUMBAR REGION WITH DISCOGENIC BACK PAIN AND LOWER EXTREMITY PAIN: Primary | ICD-10-CM

## 2024-11-22 DIAGNOSIS — M54.16 LUMBAR RADICULOPATHY: ICD-10-CM

## 2024-11-22 PROCEDURE — 99999 PR PBB SHADOW E&M-EST. PATIENT-LVL III: CPT | Mod: PBBFAC,,,

## 2024-11-22 NOTE — H&P (VIEW-ONLY)
Subjective:     Patient ID: Roberta Ware Jr. is a 54 y.o. male    Chief Complaint: Follow-up      Referred by: No ref. provider found      HPI:    Interval History PA (11/22/2024):  Patient returns to clinic for follow up of left-sided lower back and extremity pain and MRI review.  Patient denies changes in the quality or location of his pain since previous encounter.  Denies new or worsening symptoms.    Interval History PA (11/20/2024):  Patient returns to clinic for follow up.  Patient reports acute worsening of left-sided lower back and extremity pain over the last 2 weeks.  Notes pain has returned in similar quality and location as before.  Previously patient's pain significantly improved with gabapentin as well as physical therapy.  Notes he has been continuing with a regular home exercise program and stretching without significant benefit.  Also recently started gabapentin over the last week or so, currently taking 600 mg t.i.d. without significant relief.  Patient's pain is primarily located over his left lower lumbosacral region/left posterior hip.  Pain will radiate into his left posterolateral thigh and into his calf at times.  Also with pain radiating into his left inguinal region.  This pain is constant, worsened with activity.  Also notes his groin pain increases with weight-bearing exercise.  Denies any numbness, tingling, weakness, bowel or bladder dysfunction.    Interval History PA (09/13/2024):  Patient returns to clinic for follow up.  Patient initially presented with acute right-sided lower back and extremity pain which has significantly improved since onset and with physical therapy.  Subsequently return to clinic and had new onset of left-sided lower back and extremity pain.  This also has significantly improved since onset.  Patient noting minimal to no continued pain at this time.  He does note some occasional episodic pain with certain activities but this quickly resolves with rest.   Overall his pain is typically at a 0-1/10.  He has finished full course of physical therapy with overall benefit and continues to maintain a regular home exercise program.  Also since the recent improvement of pain he has self weaned/discontinued gabapentin.  Does not feel he needs to take this medication at this time.  He did complete the Medrol Dosepak, noting significant improvement in his pain levels.  No other concerns at this time.    Interval History PA (07/19/2024):  Patient returns to clinic for follow up.  Patient notes since previous visit he has been attending physical therapy with overall benefit.  Previously presented with acute right-sided lower back and extremity pain which has significantly improved since onset.  States no longer having any right-sided lower back or extremity pain since initiating physical therapy.  Patient's concern today is new onset of acute left-sided lower back and extremity pain x1 week.  Denies any inciting event.  States he has been having pain located in his left lower lumbar/lumbosacral region radiating into his left thigh and non-dermatomal pattern.  Also pain radiating into his left calf region, stopping at the ankle.  Also reporting occasional pain radiating into his left inguinal region with certain activities.  His pain is constant, worsened with activity.  Specifically worsened with standing or walking.  He denies any numbness, tingling, weakness, bowel or bladder dysfunction.  Of note he has been taking gabapentin 600 mg b.i.d. with benefit, denies any adverse effects.  He was prescribed Medrol Dosepak although has not yet started this.    Initial Encounter (5/24/24):  Roberta Ware Jr. is a 54 y.o. male who presents today with acute right-sided low back and lower extremity pain.  This pain has been present for roughly 3 weeks.  No specific inciting events or injuries noted.  Patient does report previous episodes of pain that were less severe and self-limited.   Current pain is located in the right lower lumbar/lumbosacral region radiates to the right lower extremity all the way to his foot with associated paresthesias.  He denies any focal weakness or bowel bladder dysfunction.  Pain is constant and worsened with activity.   This pain is described in detail below.    Physical Therapy:  Yes    Non-pharmacologic Treatment:  Rest helps         TENS?  No    Pain Medications:         Currently taking:  Naproxen, methocarbamol, gabapentin    Has tried in the past:  Tylenol, gabapentin    Has not tried:  Opioids, TCAs, SNRIs, topical creams    Blood thinners:  None    Interventional Therapies:  None    Relevant Surgeries:  None    Affecting sleep?  Yes    Affecting daily activities? yes    Depressive symptoms? No          SI/HI? No    Work status: Employed    Pain Scores:    Best:       6/10  Worst:     10/10  Usually:   9/10  Today:    9/10    Pain Disability Index  Family/Home Responsibilities:: 8  Recreation:: 10  Social Activity:: 9  Occupation:: 8  Sexual Behavior:: 8  Self Care:: 8  Life-Support Activities:: 9  Pain Disability Index (PDI): 60    Review of Systems   Constitutional:  Negative for activity change, appetite change, chills, fatigue, fever and unexpected weight change.   HENT:  Negative for hearing loss.    Eyes:  Negative for visual disturbance.   Respiratory:  Negative for chest tightness and shortness of breath.    Cardiovascular:  Negative for chest pain.   Gastrointestinal:  Negative for abdominal pain, constipation, diarrhea, nausea and vomiting.   Genitourinary:  Negative for difficulty urinating.   Musculoskeletal:  Positive for back pain, gait problem and myalgias. Negative for neck pain.   Skin:  Negative for rash.   Neurological:  Positive for numbness. Negative for dizziness, weakness, light-headedness and headaches.   Psychiatric/Behavioral:  Positive for sleep disturbance. Negative for hallucinations and suicidal ideas. The patient is not  nervous/anxious.        Past Medical History:   Diagnosis Date    Diverticulosis     Encounter for blood transfusion     Erectile dysfunction     Gout     Hypertension     Osteoarthritis        Past Surgical History:   Procedure Laterality Date    BONE MARROW BIOPSY N/A     CHOLECYSTECTOMY      COLONOSCOPY  2012    COLONOSCOPY N/A 12/27/2021    Procedure: COLONOSCOPY;  Surgeon: Solis Mendez MD;  Location: [facility];  Service: Endoscopy;  Laterality: N/A;  11/16 bariatric stretcher; fully vaccinated; instr. to portal-st  12/23 pt confirmed arrival time and all prep instructions-ml    FINGER SURGERY Left 2017    left middle finger    UPPER GASTROINTESTINAL ENDOSCOPY         Social History     Socioeconomic History    Marital status:    Tobacco Use    Smoking status: Never    Smokeless tobacco: Never   Substance and Sexual Activity    Alcohol use: Yes     Comment: Ocassionally    Drug use: No    Sexual activity: Yes     Partners: Female     Social Drivers of Health     Financial Resource Strain: Low Risk  (5/20/2024)    Overall Financial Resource Strain (CARDIA)     Difficulty of Paying Living Expenses: Not hard at all   Food Insecurity: No Food Insecurity (5/20/2024)    Hunger Vital Sign     Worried About Running Out of Food in the Last Year: Never true     Ran Out of Food in the Last Year: Never true   Physical Activity: Insufficiently Active (5/20/2024)    Exercise Vital Sign     Days of Exercise per Week: 3 days     Minutes of Exercise per Session: 30 min   Stress: No Stress Concern Present (5/20/2024)    [employer] - Occupational Stress Questionnaire     Feeling of Stress : Only a little   Housing Stability: Unknown (5/20/2024)    Housing Stability Vital Sign     Unable to Pay for Housing in the Last Year: No       Review of patient's allergies indicates:   Allergen Reactions    Hydrochlorothiazide Other (See Comments)     Gout    Tramadol Swelling     Pt states make him  rosemary        Current Outpatient Medications on File Prior to Visit   Medication Sig Dispense Refill    allopurinoL (ZYLOPRIM) 300 MG tablet TAKE 1 TABLET(300 MG) BY MOUTH EVERY DAY 90 tablet 1    amLODIPine (NORVASC) 10 MG tablet TAKE 1 TABLET(10 MG) BY MOUTH EVERY DAY 90 tablet 1    chlorthalidone (HYGROTEN) 25 MG Tab TAKE 1 TABLET(25 MG) BY MOUTH EVERY DAY 90 tablet 1    colchicine (COLCRYS) 0.6 mg tablet Take 1 tablet (0.6 mg total) by mouth 2 (two) times daily as needed (for gout). 90 tablet 3    gabapentin (NEURONTIN) 300 MG capsule Take 2 capsules (600 mg total) by mouth 3 (three) times daily. 180 capsule 5    losartan (COZAAR) 50 MG tablet Take 1 tablet (50 mg total) by mouth once daily. 90 tablet 3    naproxen (NAPROSYN) 500 MG tablet Take 1 tablet (500 mg total) by mouth daily as needed (joint pain). 30 tablet 0     No current facility-administered medications on file prior to visit.       Objective:      BP (!) 178/91 (BP Location: Left arm, Patient Position: Sitting)   Pulse 72   SpO2 96%     Exam:  GEN:  Well developed, well nourished.  No acute distress.  Normal pain behavior.  HEENT:  No trauma.  Mucous membranes moist.  Nares patent bilaterally.  PSYCH: Normal affect. Thought content appropriate.  CHEST:  Breathing symmetric.  No audible wheezing.  ABD: Soft, non-distended.  SKIN:  Warm, pink, dry.  No rash on exposed areas.    EXT:  No cyanosis, clubbing, or edema.  No color change or changes in nail or hair growth.  NEURO/MUSCULOSKELETAL:  Fully alert, oriented, and appropriate. Speech normal nicko. No cranial nerve deficits.   Gait:  Antalgic.  No trendelenburg sign bilaterally.           Imaging:  Narrative & Impression  EXAMINATION:  XR HIP WITH PELVIS WHEN PERFORMED 2 OR 3 VIEWS LEFT     CLINICAL HISTORY:  Pain in left hip     TECHNIQUE:  AP view of the pelvis and frog leg lateral view of the left hip were performed.     COMPARISON:  None.     FINDINGS:  Mild hip DJD and joint space  narrowing with rim acetabular spurring.  No acute fracture, dislocation, or osseous destruction.     Impression:     As above.        Electronically signed by:Michele Aguilar  Date:                                            07/19/2024  Time:                                           11:19                       Narrative & Impression  EXAMINATION:  XR LUMBAR SPINE 2 OR 3 VIEWS     CLINICAL HISTORY:  Other intervertebral disc degeneration, lumbar region     TECHNIQUE:  Lumbar spine radiograph series.     COMPARISON:  None.     FINDINGS:  Lumbar spine vertebral body heights appear maintained.  Scattered discogenic change with lower disc space narrowing most pronounced at L5-S1 with facet DJD.  Minimal grade 1 anterolisthesis at L4-L5.  No evidence for lytic or blastic lesion.     Impression:     As above.        Electronically signed by:Michele Aguilar  Date:                                            07/19/2024  Time:                                           11:00    Assessment:       Encounter Diagnoses   Name Primary?    Degeneration of intervertebral disc of lumbar region with discogenic back pain and lower extremity pain Yes    Lumbar radiculopathy     Lumbar spondylosis        Plan:       Roberta was seen today for follow-up.    Diagnoses and all orders for this visit:    Degeneration of intervertebral disc of lumbar region with discogenic back pain and lower extremity pain    Lumbar radiculopathy    Lumbar spondylosis          Roberta Jose Ware Jr. is a 54 y.o. male with lower back and extremity pain.  Initially presented with acute right-sided lower back and extremity pain consistent with lower lumbar radiculopathy.  This has significantly improved with course of physical therapy.  Now with left-sided lower back and extremity pain which appears to be multifactorial.  Symptoms and exam are consistent with radiculopathy likely L5.  Lumbar MRI notable for significant disc degeneration at L4-5 and L5-S1  creating severe bilateral foraminal narrowing at these levels.  I do also suspect a degree of left intra-articular hip pathology.  Left hip x-ray does show mild hip joint space narrowing.  No overt neurological deficits on examination.    Prior records review.  Pertinent imaging studies reviewed by me. Imaging results were discussed with patient.  Schedule for left S1 transforaminal epidural steroid injection.  Patient does also appear to have a degree of pain related to intra-articular hip pathology in the left.  May consider left intra-articular hip steroid injection in the future if needed.  Stressed the importance of maintaining regular home exercise program and being mindful of how they use their back throughout the day.  Patient expressed understanding and agreement.  Continue gabapentin 600 mg t.i.d..  Patient taking with benefit, denies adverse effects.  Continue naproxen 500 mg daily p.r.n..  Return to clinic after procedure to discuss efficacy.      Levy Alba PA-C  [facility]  Interventional Pain Management   11/22/2024    This note was created by combination of typed  and M-Modal dictation.  Transcription and phonetic errors may be present.  If there are any questions, please contact me.

## 2024-11-22 NOTE — PROGRESS NOTES
Subjective:     Patient ID: Roberta Ware Jr. is a 54 y.o. male    Chief Complaint: Follow-up      Referred by: No ref. provider found      HPI:    Interval History PA (11/22/2024):  Patient returns to clinic for follow up of left-sided lower back and extremity pain and MRI review.  Patient denies changes in the quality or location of his pain since previous encounter.  Denies new or worsening symptoms.    Interval History PA (11/20/2024):  Patient returns to clinic for follow up.  Patient reports acute worsening of left-sided lower back and extremity pain over the last 2 weeks.  Notes pain has returned in similar quality and location as before.  Previously patient's pain significantly improved with gabapentin as well as physical therapy.  Notes he has been continuing with a regular home exercise program and stretching without significant benefit.  Also recently started gabapentin over the last week or so, currently taking 600 mg t.i.d. without significant relief.  Patient's pain is primarily located over his left lower lumbosacral region/left posterior hip.  Pain will radiate into his left posterolateral thigh and into his calf at times.  Also with pain radiating into his left inguinal region.  This pain is constant, worsened with activity.  Also notes his groin pain increases with weight-bearing exercise.  Denies any numbness, tingling, weakness, bowel or bladder dysfunction.    Interval History PA (09/13/2024):  Patient returns to clinic for follow up.  Patient initially presented with acute right-sided lower back and extremity pain which has significantly improved since onset and with physical therapy.  Subsequently return to clinic and had new onset of left-sided lower back and extremity pain.  This also has significantly improved since onset.  Patient noting minimal to no continued pain at this time.  He does note some occasional episodic pain with certain activities but this quickly resolves with rest.   Overall his pain is typically at a 0-1/10.  He has finished full course of physical therapy with overall benefit and continues to maintain a regular home exercise program.  Also since the recent improvement of pain he has self weaned/discontinued gabapentin.  Does not feel he needs to take this medication at this time.  He did complete the Medrol Dosepak, noting significant improvement in his pain levels.  No other concerns at this time.    Interval History PA (07/19/2024):  Patient returns to clinic for follow up.  Patient notes since previous visit he has been attending physical therapy with overall benefit.  Previously presented with acute right-sided lower back and extremity pain which has significantly improved since onset.  States no longer having any right-sided lower back or extremity pain since initiating physical therapy.  Patient's concern today is new onset of acute left-sided lower back and extremity pain x1 week.  Denies any inciting event.  States he has been having pain located in his left lower lumbar/lumbosacral region radiating into his left thigh and non-dermatomal pattern.  Also pain radiating into his left calf region, stopping at the ankle.  Also reporting occasional pain radiating into his left inguinal region with certain activities.  His pain is constant, worsened with activity.  Specifically worsened with standing or walking.  He denies any numbness, tingling, weakness, bowel or bladder dysfunction.  Of note he has been taking gabapentin 600 mg b.i.d. with benefit, denies any adverse effects.  He was prescribed Medrol Dosepak although has not yet started this.    Initial Encounter (5/24/24):  Roberta Ware Jr. is a 54 y.o. male who presents today with acute right-sided low back and lower extremity pain.  This pain has been present for roughly 3 weeks.  No specific inciting events or injuries noted.  Patient does report previous episodes of pain that were less severe and self-limited.   Current pain is located in the right lower lumbar/lumbosacral region radiates to the right lower extremity all the way to his foot with associated paresthesias.  He denies any focal weakness or bowel bladder dysfunction.  Pain is constant and worsened with activity.   This pain is described in detail below.    Physical Therapy:  Yes    Non-pharmacologic Treatment:  Rest helps         TENS?  No    Pain Medications:         Currently taking:  Naproxen, methocarbamol, gabapentin    Has tried in the past:  Tylenol, gabapentin    Has not tried:  Opioids, TCAs, SNRIs, topical creams    Blood thinners:  None    Interventional Therapies:  None    Relevant Surgeries:  None    Affecting sleep?  Yes    Affecting daily activities? yes    Depressive symptoms? No          SI/HI? No    Work status: Employed    Pain Scores:    Best:       6/10  Worst:     10/10  Usually:   9/10  Today:    9/10    Pain Disability Index  Family/Home Responsibilities:: 8  Recreation:: 10  Social Activity:: 9  Occupation:: 8  Sexual Behavior:: 8  Self Care:: 8  Life-Support Activities:: 9  Pain Disability Index (PDI): 60    Review of Systems   Constitutional:  Negative for activity change, appetite change, chills, fatigue, fever and unexpected weight change.   HENT:  Negative for hearing loss.    Eyes:  Negative for visual disturbance.   Respiratory:  Negative for chest tightness and shortness of breath.    Cardiovascular:  Negative for chest pain.   Gastrointestinal:  Negative for abdominal pain, constipation, diarrhea, nausea and vomiting.   Genitourinary:  Negative for difficulty urinating.   Musculoskeletal:  Positive for back pain, gait problem and myalgias. Negative for neck pain.   Skin:  Negative for rash.   Neurological:  Positive for numbness. Negative for dizziness, weakness, light-headedness and headaches.   Psychiatric/Behavioral:  Positive for sleep disturbance. Negative for hallucinations and suicidal ideas. The patient is not  nervous/anxious.        Past Medical History:   Diagnosis Date    Diverticulosis     Encounter for blood transfusion     Erectile dysfunction     Gout     Hypertension     Osteoarthritis        Past Surgical History:   Procedure Laterality Date    BONE MARROW BIOPSY N/A     CHOLECYSTECTOMY      COLONOSCOPY  2012    COLONOSCOPY N/A 12/27/2021    Procedure: COLONOSCOPY;  Surgeon: Solis Mendez MD;  Location: [facility];  Service: Endoscopy;  Laterality: N/A;  11/16 bariatric stretcher; fully vaccinated; instr. to portal-st  12/23 pt confirmed arrival time and all prep instructions-ml    FINGER SURGERY Left 2017    left middle finger    UPPER GASTROINTESTINAL ENDOSCOPY         Social History     Socioeconomic History    Marital status:    Tobacco Use    Smoking status: Never    Smokeless tobacco: Never   Substance and Sexual Activity    Alcohol use: Yes     Comment: Ocassionally    Drug use: No    Sexual activity: Yes     Partners: Female     Social Drivers of Health     Financial Resource Strain: Low Risk  (5/20/2024)    Overall Financial Resource Strain (CARDIA)     Difficulty of Paying Living Expenses: Not hard at all   Food Insecurity: No Food Insecurity (5/20/2024)    Hunger Vital Sign     Worried About Running Out of Food in the Last Year: Never true     Ran Out of Food in the Last Year: Never true   Physical Activity: Insufficiently Active (5/20/2024)    Exercise Vital Sign     Days of Exercise per Week: 3 days     Minutes of Exercise per Session: 30 min   Stress: No Stress Concern Present (5/20/2024)    [employer] - Occupational Stress Questionnaire     Feeling of Stress : Only a little   Housing Stability: Unknown (5/20/2024)    Housing Stability Vital Sign     Unable to Pay for Housing in the Last Year: No       Review of patient's allergies indicates:   Allergen Reactions    Hydrochlorothiazide Other (See Comments)     Gout    Tramadol Swelling     Pt states make him  rosemary        Current Outpatient Medications on File Prior to Visit   Medication Sig Dispense Refill    allopurinoL (ZYLOPRIM) 300 MG tablet TAKE 1 TABLET(300 MG) BY MOUTH EVERY DAY 90 tablet 1    amLODIPine (NORVASC) 10 MG tablet TAKE 1 TABLET(10 MG) BY MOUTH EVERY DAY 90 tablet 1    chlorthalidone (HYGROTEN) 25 MG Tab TAKE 1 TABLET(25 MG) BY MOUTH EVERY DAY 90 tablet 1    colchicine (COLCRYS) 0.6 mg tablet Take 1 tablet (0.6 mg total) by mouth 2 (two) times daily as needed (for gout). 90 tablet 3    gabapentin (NEURONTIN) 300 MG capsule Take 2 capsules (600 mg total) by mouth 3 (three) times daily. 180 capsule 5    losartan (COZAAR) 50 MG tablet Take 1 tablet (50 mg total) by mouth once daily. 90 tablet 3    naproxen (NAPROSYN) 500 MG tablet Take 1 tablet (500 mg total) by mouth daily as needed (joint pain). 30 tablet 0     No current facility-administered medications on file prior to visit.       Objective:      BP (!) 178/91 (BP Location: Left arm, Patient Position: Sitting)   Pulse 72   SpO2 96%     Exam:  GEN:  Well developed, well nourished.  No acute distress.  Normal pain behavior.  HEENT:  No trauma.  Mucous membranes moist.  Nares patent bilaterally.  PSYCH: Normal affect. Thought content appropriate.  CHEST:  Breathing symmetric.  No audible wheezing.  ABD: Soft, non-distended.  SKIN:  Warm, pink, dry.  No rash on exposed areas.    EXT:  No cyanosis, clubbing, or edema.  No color change or changes in nail or hair growth.  NEURO/MUSCULOSKELETAL:  Fully alert, oriented, and appropriate. Speech normal nicko. No cranial nerve deficits.   Gait:  Antalgic.  No trendelenburg sign bilaterally.           Imaging:  Narrative & Impression  EXAMINATION:  XR HIP WITH PELVIS WHEN PERFORMED 2 OR 3 VIEWS LEFT     CLINICAL HISTORY:  Pain in left hip     TECHNIQUE:  AP view of the pelvis and frog leg lateral view of the left hip were performed.     COMPARISON:  None.     FINDINGS:  Mild hip DJD and joint space  narrowing with rim acetabular spurring.  No acute fracture, dislocation, or osseous destruction.     Impression:     As above.        Electronically signed by:Michele Aguilar  Date:                                            07/19/2024  Time:                                           11:19                       Narrative & Impression  EXAMINATION:  XR LUMBAR SPINE 2 OR 3 VIEWS     CLINICAL HISTORY:  Other intervertebral disc degeneration, lumbar region     TECHNIQUE:  Lumbar spine radiograph series.     COMPARISON:  None.     FINDINGS:  Lumbar spine vertebral body heights appear maintained.  Scattered discogenic change with lower disc space narrowing most pronounced at L5-S1 with facet DJD.  Minimal grade 1 anterolisthesis at L4-L5.  No evidence for lytic or blastic lesion.     Impression:     As above.        Electronically signed by:Michele Aguilar  Date:                                            07/19/2024  Time:                                           11:00    Assessment:       Encounter Diagnoses   Name Primary?    Degeneration of intervertebral disc of lumbar region with discogenic back pain and lower extremity pain Yes    Lumbar radiculopathy     Lumbar spondylosis        Plan:       Roberta was seen today for follow-up.    Diagnoses and all orders for this visit:    Degeneration of intervertebral disc of lumbar region with discogenic back pain and lower extremity pain    Lumbar radiculopathy    Lumbar spondylosis          Roberta Jose Ware Jr. is a 54 y.o. male with lower back and extremity pain.  Initially presented with acute right-sided lower back and extremity pain consistent with lower lumbar radiculopathy.  This has significantly improved with course of physical therapy.  Now with left-sided lower back and extremity pain which appears to be multifactorial.  Symptoms and exam are consistent with radiculopathy likely L5.  Lumbar MRI notable for significant disc degeneration at L4-5 and L5-S1  creating severe bilateral foraminal narrowing at these levels.  I do also suspect a degree of left intra-articular hip pathology.  Left hip x-ray does show mild hip joint space narrowing.  No overt neurological deficits on examination.    Prior records review.  Pertinent imaging studies reviewed by me. Imaging results were discussed with patient.  Schedule for left S1 transforaminal epidural steroid injection.  Patient does also appear to have a degree of pain related to intra-articular hip pathology in the left.  May consider left intra-articular hip steroid injection in the future if needed.  Stressed the importance of maintaining regular home exercise program and being mindful of how they use their back throughout the day.  Patient expressed understanding and agreement.  Continue gabapentin 600 mg t.i.d..  Patient taking with benefit, denies adverse effects.  Continue naproxen 500 mg daily p.r.n..  Return to clinic after procedure to discuss efficacy.      Levy Alba PA-C  [facility]  Interventional Pain Management   11/22/2024    This note was created by combination of typed  and M-Modal dictation.  Transcription and phonetic errors may be present.  If there are any questions, please contact me.

## 2024-11-25 ENCOUNTER — TELEPHONE (OUTPATIENT)
Dept: PAIN MEDICINE | Facility: CLINIC | Age: 54
End: 2024-11-25
Payer: COMMERCIAL

## 2024-11-25 DIAGNOSIS — M51.362 DEGENERATION OF INTERVERTEBRAL DISC OF LUMBAR REGION WITH DISCOGENIC BACK PAIN AND LOWER EXTREMITY PAIN: Primary | ICD-10-CM

## 2024-11-25 DIAGNOSIS — M54.16 LUMBAR RADICULOPATHY: ICD-10-CM

## 2024-11-25 NOTE — TELEPHONE ENCOUNTER
Mr. Ware would like to schedule the left S1 TF JOAQUIM on 12/04/2024- provider updated.     Reviewed pre-procedure instructions with him, as well as provided arrival time of 12:45p for 12/04/2024.  All questions answered- verbalized understanding.

## 2024-12-03 NOTE — DISCHARGE INSTRUCTIONS

## 2024-12-04 ENCOUNTER — HOSPITAL ENCOUNTER (OUTPATIENT)
Facility: HOSPITAL | Age: 54
Discharge: HOME | End: 2024-12-04
Attending: PAIN MEDICINE | Admitting: PAIN MEDICINE
Payer: COMMERCIAL

## 2024-12-04 VITALS
TEMPERATURE: 99 F | SYSTOLIC BLOOD PRESSURE: 157 MMHG | DIASTOLIC BLOOD PRESSURE: 93 MMHG | HEART RATE: 80 BPM | OXYGEN SATURATION: 95 % | RESPIRATION RATE: 18 BRPM

## 2024-12-04 DIAGNOSIS — M54.16 LUMBAR RADICULOPATHY: Primary | ICD-10-CM

## 2024-12-04 PROCEDURE — 63600175 PHARM REV CODE 636 W HCPCS: Performed by: PAIN MEDICINE

## 2024-12-04 PROCEDURE — 25500020 PHARM REV CODE 255: Performed by: PAIN MEDICINE

## 2024-12-04 PROCEDURE — 64483 NJX AA&/STRD TFRM EPI L/S 1: CPT | Mod: LT | Performed by: PAIN MEDICINE

## 2024-12-04 PROCEDURE — 64483 NJX AA&/STRD TFRM EPI L/S 1: CPT | Mod: LT,,, | Performed by: PAIN MEDICINE

## 2024-12-04 RX ORDER — LIDOCAINE HYDROCHLORIDE 10 MG/ML
1 INJECTION, SOLUTION EPIDURAL; INFILTRATION; INTRACAUDAL; PERINEURAL ONCE
Status: COMPLETED | OUTPATIENT
Start: 2024-12-04 | End: 2024-12-04

## 2024-12-04 RX ORDER — DEXAMETHASONE SODIUM PHOSPHATE 10 MG/ML
INJECTION INTRAMUSCULAR; INTRAVENOUS
Status: DISCONTINUED
Start: 2024-12-04 | End: 2024-12-04 | Stop reason: HOSPADM

## 2024-12-04 RX ORDER — LIDOCAINE HYDROCHLORIDE 10 MG/ML
INJECTION, SOLUTION EPIDURAL; INFILTRATION; INTRACAUDAL; PERINEURAL
Status: DISCONTINUED
Start: 2024-12-04 | End: 2024-12-04 | Stop reason: HOSPADM

## 2024-12-04 RX ORDER — DEXAMETHASONE SODIUM PHOSPHATE 10 MG/ML
10 INJECTION INTRAMUSCULAR; INTRAVENOUS ONCE
Status: COMPLETED | OUTPATIENT
Start: 2024-12-04 | End: 2024-12-04

## 2024-12-04 RX ORDER — ALPRAZOLAM 0.5 MG/1
1 TABLET, ORALLY DISINTEGRATING ORAL ONCE AS NEEDED
Status: DISCONTINUED | OUTPATIENT
Start: 2024-12-04 | End: 2024-12-04 | Stop reason: HOSPADM

## 2024-12-04 RX ORDER — LIDOCAINE HYDROCHLORIDE 10 MG/ML
INJECTION, SOLUTION INFILTRATION; PERINEURAL
Status: DISCONTINUED
Start: 2024-12-04 | End: 2024-12-04 | Stop reason: HOSPADM

## 2024-12-04 RX ORDER — LIDOCAINE HYDROCHLORIDE 10 MG/ML
20 INJECTION, SOLUTION INFILTRATION; PERINEURAL ONCE
Status: COMPLETED | OUTPATIENT
Start: 2024-12-04 | End: 2024-12-04

## 2024-12-04 NOTE — DISCHARGE SUMMARY
[facility] [place] - Pain Management  Discharge Note  Short Stay    Procedure(s) (LRB):  Left S1 Transforaminal Epidural Steroid Injection (Left)      OUTCOME: Patient tolerated treatment/procedure well without complication and is now ready for discharge.    DISPOSITION: Home or Self Care    FINAL DIAGNOSIS:  <principal problem not specified>    FOLLOWUP: In clinic    DISCHARGE INSTRUCTIONS:  No discharge procedures on file.       Discharge Diagnosis:Degeneration of intervertebral disc of lumbar region with discogenic back pain and lower extremity pain [M51.362]  Lumbar radiculopathy [M54.16]  Condition on Discharge: Stable.  Diet on Discharge: Same as before.  Activity: as per instruction sheet.  Discharge to: Home with a responsible adult.  Follow up: as per Discharge instructions

## 2024-12-04 NOTE — PLAN OF CARE
Patient tolerated procedure well. Patient reports 3/10 pain after procedure. Assisted patient up for first time. Gait steady. All discharge instructions given.

## 2024-12-04 NOTE — OP NOTE
Lumbar transforaminal JOAQUIM    Time-out taken to identify patient and procedure side prior to starting the procedure.   I attest that I have reviewed the patient's home medications prior to the procedure and no contraindication have been identified. I  re-evaluated the patient after the patient was positioned for the procedure in the procedure room immediately before the procedural time-out. The vital signs are current and represent the current state of the patient which has not significantly changed since the preprocedure assessment.                              Date of Service: 12/04/2024    PCP: Antwan Ambrosio MD    Referring Physician:                                   PROCEDURE: Left S1 transforaminal epidural steroid injection under fluoroscopy    REASON FOR PROCEDURE: Lumbar Radiculopathy    PHYSICIAN: Barrera Villalobos MD    ASSISTANTS:None    MEDICATIONS INJECTED:  Preservative-free dexamethasone 10mg, Xylocaine 1% MPF 3-5ml. 3ml of the mixture per level.     LOCAL ANESTHETIC INJECTED:  Xylocaine 1% 3ml per site.    SEDATION MEDICATIONS: None    ESTIMATED BLOOD LOSS:  None.    COMPLICATIONS:  None.    TECHNIQUE:   Laying in a prone position, the patient was prepped and draped in the usual sterile fashion using ChloraPrep and fenestrated drape.  The area to be injected was determined under fluoroscopic guidance.  Local anesthetic was given by raising a wheal and going down to the hub of a 27-gauge 1.25 inch needle.  The 7 inch 22-gauge spinal needle was introduced towards the transverse process of each above named nerve root level.  The needle was walked medially then hinged into the neural foramen.  Omnipaque was injected to confirm appropriate placement and that there was no vascular runoff.  The medication was then injected after applying negative pressure. The patient tolerated the procedure well.    PAIN BEFORE THE PROCEDURE: 9/10.    PAIN AFTER THE PROCEDURE: 4/10.    The patient was monitored after  the procedure.  Patient was given post procedure and discharge instructions to follow at home.  We will see the patient back in two weeks or the patient may call to inform of status. The patient was discharged in a stable condition.

## 2024-12-04 NOTE — PLAN OF CARE
Pt arrived to post procedure. VSS, alert, awake and well oriented. PRS of 4/10 and will continue to monitor

## 2024-12-13 DIAGNOSIS — N52.01 ERECTILE DYSFUNCTION DUE TO ARTERIAL INSUFFICIENCY: ICD-10-CM

## 2024-12-13 RX ORDER — TADALAFIL 20 MG/1
20 TABLET ORAL EVERY OTHER DAY
Qty: 15 TABLET | Refills: 2 | OUTPATIENT
Start: 2024-12-13

## 2024-12-18 ENCOUNTER — OFFICE VISIT (OUTPATIENT)
Dept: PAIN MEDICINE | Facility: CLINIC | Age: 54
End: 2024-12-18
Payer: COMMERCIAL

## 2024-12-18 VITALS — OXYGEN SATURATION: 96 % | SYSTOLIC BLOOD PRESSURE: 183 MMHG | HEART RATE: 72 BPM | DIASTOLIC BLOOD PRESSURE: 95 MMHG

## 2024-12-18 DIAGNOSIS — M47.816 LUMBAR SPONDYLOSIS: ICD-10-CM

## 2024-12-18 DIAGNOSIS — M51.362 DEGENERATION OF INTERVERTEBRAL DISC OF LUMBAR REGION WITH DISCOGENIC BACK PAIN AND LOWER EXTREMITY PAIN: Primary | ICD-10-CM

## 2024-12-18 DIAGNOSIS — M54.16 LUMBAR RADICULOPATHY: ICD-10-CM

## 2024-12-18 PROCEDURE — 3077F SYST BP >= 140 MM HG: CPT | Mod: CPTII,S$GLB,,

## 2024-12-18 PROCEDURE — 4010F ACE/ARB THERAPY RXD/TAKEN: CPT | Mod: CPTII,S$GLB,,

## 2024-12-18 PROCEDURE — 1160F RVW MEDS BY RX/DR IN RCRD: CPT | Mod: CPTII,S$GLB,,

## 2024-12-18 PROCEDURE — 99999 PR PBB SHADOW E&M-EST. PATIENT-LVL III: CPT | Mod: PBBFAC,,,

## 2024-12-18 PROCEDURE — 99214 OFFICE O/P EST MOD 30 MIN: CPT | Mod: S$GLB,,,

## 2024-12-18 PROCEDURE — 3080F DIAST BP >= 90 MM HG: CPT | Mod: CPTII,S$GLB,,

## 2024-12-18 PROCEDURE — 1159F MED LIST DOCD IN RCRD: CPT | Mod: CPTII,S$GLB,,

## 2024-12-18 NOTE — PROGRESS NOTES
Subjective:     Patient ID: Roberta Ware Jr. is a 54 y.o. male    Chief Complaint: Follow-up      Referred by: No ref. provider found      HPI:    Interval History PA (12/18/2024):  Patient returns to clinic for follow up of left-sided lower back and extremity pain.  Patient is s/p left S1 transforaminal epidural steroid injection done on 12/04/2024 with 98% relief.  Patient notes significant improvement in both pain as well with ADLs.  Able to do more activities and exercise with reduced pain.  Feels his pain is at a more manageable level at this time.  Notes weaning gabapentin after recent injection.  Does not feel medication is needed at this time.    Interval History PA (11/22/2024):  Patient returns to clinic for follow up of left-sided lower back and extremity pain and MRI review.  Patient denies changes in the quality or location of his pain since previous encounter.  Denies new or worsening symptoms.    Interval History PA (11/20/2024):  Patient returns to clinic for follow up.  Patient reports acute worsening of left-sided lower back and extremity pain over the last 2 weeks.  Notes pain has returned in similar quality and location as before.  Previously patient's pain significantly improved with gabapentin as well as physical therapy.  Notes he has been continuing with a regular home exercise program and stretching without significant benefit.  Also recently started gabapentin over the last week or so, currently taking 600 mg t.i.d. without significant relief.  Patient's pain is primarily located over his left lower lumbosacral region/left posterior hip.  Pain will radiate into his left posterolateral thigh and into his calf at times.  Also with pain radiating into his left inguinal region.  This pain is constant, worsened with activity.  Also notes his groin pain increases with weight-bearing exercise.  Denies any numbness, tingling, weakness, bowel or bladder dysfunction.    Interval History PA  (09/13/2024):  Patient returns to clinic for follow up.  Patient initially presented with acute right-sided lower back and extremity pain which has significantly improved since onset and with physical therapy.  Subsequently return to clinic and had new onset of left-sided lower back and extremity pain.  This also has significantly improved since onset.  Patient noting minimal to no continued pain at this time.  He does note some occasional episodic pain with certain activities but this quickly resolves with rest.  Overall his pain is typically at a 0-1/10.  He has finished full course of physical therapy with overall benefit and continues to maintain a regular home exercise program.  Also since the recent improvement of pain he has self weaned/discontinued gabapentin.  Does not feel he needs to take this medication at this time.  He did complete the Medrol Dosepak, noting significant improvement in his pain levels.  No other concerns at this time.    Interval History PA (07/19/2024):  Patient returns to clinic for follow up.  Patient notes since previous visit he has been attending physical therapy with overall benefit.  Previously presented with acute right-sided lower back and extremity pain which has significantly improved since onset.  States no longer having any right-sided lower back or extremity pain since initiating physical therapy.  Patient's concern today is new onset of acute left-sided lower back and extremity pain x1 week.  Denies any inciting event.  States he has been having pain located in his left lower lumbar/lumbosacral region radiating into his left thigh and non-dermatomal pattern.  Also pain radiating into his left calf region, stopping at the ankle.  Also reporting occasional pain radiating into his left inguinal region with certain activities.  His pain is constant, worsened with activity.  Specifically worsened with standing or walking.  He denies any numbness, tingling, weakness, bowel or  bladder dysfunction.  Of note he has been taking gabapentin 600 mg b.i.d. with benefit, denies any adverse effects.  He was prescribed Medrol Dosepak although has not yet started this.    Initial Encounter (5/24/24):  Roberta Ware Jr. is a 54 y.o. male who presents today with acute right-sided low back and lower extremity pain.  This pain has been present for roughly 3 weeks.  No specific inciting events or injuries noted.  Patient does report previous episodes of pain that were less severe and self-limited.  Current pain is located in the right lower lumbar/lumbosacral region radiates to the right lower extremity all the way to his foot with associated paresthesias.  He denies any focal weakness or bowel bladder dysfunction.  Pain is constant and worsened with activity.   This pain is described in detail below.    Physical Therapy:  Yes    Non-pharmacologic Treatment:  Rest helps         TENS?  No    Pain Medications:         Currently taking:  Naproxen, methocarbamol, gabapentin    Has tried in the past:  Tylenol, gabapentin    Has not tried:  Opioids, TCAs, SNRIs, topical creams    Blood thinners:  None    Interventional Therapies:     04/20/2024 - left S1 transforaminal epidural steroid injection - 98% relief    Relevant Surgeries:  None    Affecting sleep?  Yes    Affecting daily activities? yes    Depressive symptoms? No          SI/HI? No    Work status: Employed    Pain Scores:    Best:       0/10  Worst:     10/10  Usually:   3/10  Today:    1/10    Pain Disability Index  Family/Home Responsibilities:: 0  Recreation:: 2  Social Activity:: 0  Occupation:: 1  Sexual Behavior:: 0  Self Care:: 0  Life-Support Activities:: 0  Pain Disability Index (PDI): 3    Review of Systems   Constitutional:  Negative for activity change, appetite change, chills, fatigue, fever and unexpected weight change.   HENT:  Negative for hearing loss.    Eyes:  Negative for visual disturbance.   Respiratory:  Negative for chest  tightness and shortness of breath.    Cardiovascular:  Negative for chest pain.   Gastrointestinal:  Negative for abdominal pain, constipation, diarrhea, nausea and vomiting.   Genitourinary:  Negative for difficulty urinating.   Musculoskeletal:  Positive for back pain, gait problem and myalgias. Negative for neck pain.   Skin:  Negative for rash.   Neurological:  Positive for numbness. Negative for dizziness, weakness, light-headedness and headaches.   Psychiatric/Behavioral:  Positive for sleep disturbance. Negative for hallucinations and suicidal ideas. The patient is not nervous/anxious.        Past Medical History:   Diagnosis Date    Diverticulosis     Encounter for blood transfusion     Erectile dysfunction     Gout     Hypertension     Osteoarthritis        Past Surgical History:   Procedure Laterality Date    BONE MARROW BIOPSY N/A     CHOLECYSTECTOMY      COLONOSCOPY  2012    COLONOSCOPY N/A 12/27/2021    Procedure: COLONOSCOPY;  Surgeon: Solis Mendez MD;  Location: [facility] ENDO;  Service: Endoscopy;  Laterality: N/A;  11/16 bariatric stretcher; fully vaccinated; instr. to portal-st  12/23 pt confirmed arrival time and all prep instructions-ml    FINGER SURGERY Left 2017    left middle finger    TRANSFORAMINAL EPIDURAL INJECTION OF STEROID Left 12/4/2024    Procedure: Left S1 Transforaminal Epidural Steroid Injection;  Surgeon: Barrera Villalobos Jr., MD;  Location: [facility] PAIN MANAGEMENT;  Service: Pain Management;  Laterality: Left;  @1245(given)  No ATC or DM  MD Sign.    UPPER GASTROINTESTINAL ENDOSCOPY         Social History     Socioeconomic History    Marital status:    Tobacco Use    Smoking status: Never    Smokeless tobacco: Never   Substance and Sexual Activity    Alcohol use: Yes     Comment: Ocassionally    Drug use: No    Sexual activity: Yes     Partners: Female     Social Drivers of Health     Financial Resource Strain: Low Risk  (5/20/2024)    Overall Financial Resource Strain  (CARDIA)     Difficulty of Paying Living Expenses: Not hard at all   Food Insecurity: No Food Insecurity (5/20/2024)    Hunger Vital Sign     Worried About Running Out of Food in the Last Year: Never true     Ran Out of Food in the Last Year: Never true   Physical Activity: Insufficiently Active (5/20/2024)    Exercise Vital Sign     Days of Exercise per Week: 3 days     Minutes of Exercise per Session: 30 min   Stress: No Stress Concern Present (5/20/2024)    [employer] [place] [employer] - Occupational Stress Questionnaire     Feeling of Stress : Only a little   Housing Stability: Unknown (5/20/2024)    Housing Stability Vital Sign     Unable to Pay for Housing in the Last Year: No       Review of patient's allergies indicates:   Allergen Reactions    Hydrochlorothiazide Other (See Comments)     Gout    Tramadol Swelling     Pt states make him jittery        Current Outpatient Medications on File Prior to Visit   Medication Sig Dispense Refill    allopurinoL (ZYLOPRIM) 300 MG tablet TAKE 1 TABLET(300 MG) BY MOUTH EVERY DAY 90 tablet 1    amLODIPine (NORVASC) 10 MG tablet TAKE 1 TABLET(10 MG) BY MOUTH EVERY DAY 90 tablet 1    chlorthalidone (HYGROTEN) 25 MG Tab TAKE 1 TABLET(25 MG) BY MOUTH EVERY DAY 90 tablet 1    colchicine (COLCRYS) 0.6 mg tablet Take 1 tablet (0.6 mg total) by mouth 2 (two) times daily as needed (for gout). 90 tablet 3    gabapentin (NEURONTIN) 300 MG capsule Take 2 capsules (600 mg total) by mouth 3 (three) times daily. 180 capsule 5    losartan (COZAAR) 50 MG tablet Take 1 tablet (50 mg total) by mouth once daily. 90 tablet 3    naproxen (NAPROSYN) 500 MG tablet Take 1 tablet (500 mg total) by mouth daily as needed (joint pain). 30 tablet 0     No current facility-administered medications on file prior to visit.       Objective:      BP (!) 183/95 (BP Location: Left arm, Patient Position: Sitting)   Pulse 72   SpO2 96%     Exam:  GEN:  Well developed, well nourished.  No acute  distress.  Normal pain behavior.  HEENT:  No trauma.  Mucous membranes moist.  Nares patent bilaterally.  PSYCH: Normal affect. Thought content appropriate.  CHEST:  Breathing symmetric.  No audible wheezing.  ABD: Soft, non-distended.  SKIN:  Warm, pink, dry.  No rash on exposed areas.    EXT:  No cyanosis, clubbing, or edema.  No color change or changes in nail or hair growth.  NEURO/MUSCULOSKELETAL:  Fully alert, oriented, and appropriate. Speech normal nicko. No cranial nerve deficits.   Gait:  Antalgic.  No trendelenburg sign bilaterally.           Imaging:  Narrative & Impression  EXAMINATION:  XR HIP WITH PELVIS WHEN PERFORMED 2 OR 3 VIEWS LEFT     CLINICAL HISTORY:  Pain in left hip     TECHNIQUE:  AP view of the pelvis and frog leg lateral view of the left hip were performed.     COMPARISON:  None.     FINDINGS:  Mild hip DJD and joint space narrowing with rim acetabular spurring.  No acute fracture, dislocation, or osseous destruction.     Impression:     As above.        Electronically signed by:Michele Aguilar  Date:                                            07/19/2024  Time:                                           11:19                       Narrative & Impression  EXAMINATION:  XR LUMBAR SPINE 2 OR 3 VIEWS     CLINICAL HISTORY:  Other intervertebral disc degeneration, lumbar region     TECHNIQUE:  Lumbar spine radiograph series.     COMPARISON:  None.     FINDINGS:  Lumbar spine vertebral body heights appear maintained.  Scattered discogenic change with lower disc space narrowing most pronounced at L5-S1 with facet DJD.  Minimal grade 1 anterolisthesis at L4-L5.  No evidence for lytic or blastic lesion.     Impression:     As above.        Electronically signed by:Michele Aguilar  Date:                                            07/19/2024  Time:                                           11:00    Assessment:       Encounter Diagnoses   Name Primary?    Degeneration of intervertebral disc of  lumbar region with discogenic back pain and lower extremity pain Yes    Lumbar spondylosis     Lumbar radiculopathy          Plan:       Roberta was seen today for follow-up.    Diagnoses and all orders for this visit:    Degeneration of intervertebral disc of lumbar region with discogenic back pain and lower extremity pain    Lumbar spondylosis    Lumbar radiculopathy            Roberta Ware Jr. is a 54 y.o. male with lower back and extremity pain.  Initially presented with acute right-sided lower back and extremity pain consistent with lower lumbar radiculopathy.  This has significantly improved with course of physical therapy.  Now with left-sided lower back and extremity pain which appears to be multifactorial.  Symptoms and exam are consistent with radiculopathy likely L5.  Lumbar MRI notable for significant disc degeneration at L4-5 and L5-S1 creating severe bilateral foraminal narrowing at these levels.  I do also suspect a degree of left intra-articular hip pathology.  Left hip x-ray does show mild hip joint space narrowing.  No overt neurological deficits on examination.  Significant improvement with lumbar JOAQUIM.    Prior records review.  Pertinent imaging studies reviewed by me. Imaging results were discussed with patient.  Patient is s/p left S1 TF JOAQUIM with 98% relief.  Consider repeating in the future if needed.  Stressed the importance of maintaining regular home exercise program and being mindful of how they use their back throughout the day.  Patient expressed understanding and agreement.   Okay to wean/discontinuing gabapentin.  Discussed having patient restart this medication if his pain returns/worsens in the future.  Continue naproxen 500 mg daily p.r.n..  Return to clinic as needed.    Levy Alba PA-C  [facility]  Interventional Pain Management   12/18/2024    This note was created by combination of typed  and M-Modal dictation.  Transcription and  phonetic errors may be present.  If there are any questions, please contact me.

## 2024-12-19 ENCOUNTER — OFFICE VISIT (OUTPATIENT)
Dept: UROLOGY | Facility: CLINIC | Age: 54
End: 2024-12-19
Payer: COMMERCIAL

## 2024-12-19 VITALS
WEIGHT: 315 LBS | HEART RATE: 83 BPM | DIASTOLIC BLOOD PRESSURE: 96 MMHG | HEIGHT: 76 IN | SYSTOLIC BLOOD PRESSURE: 153 MMHG | BODY MASS INDEX: 38.36 KG/M2

## 2024-12-19 DIAGNOSIS — I10 ESSENTIAL HYPERTENSION: Chronic | ICD-10-CM

## 2024-12-19 DIAGNOSIS — N13.8 BPH WITH URINARY OBSTRUCTION: ICD-10-CM

## 2024-12-19 DIAGNOSIS — N52.01 ERECTILE DYSFUNCTION DUE TO ARTERIAL INSUFFICIENCY: Primary | ICD-10-CM

## 2024-12-19 DIAGNOSIS — E66.01 MORBID OBESITY WITH BMI OF 50.0-59.9, ADULT: ICD-10-CM

## 2024-12-19 DIAGNOSIS — N40.1 BPH WITH URINARY OBSTRUCTION: ICD-10-CM

## 2024-12-19 PROBLEM — M10.9 ACUTE GOUTY ARTHRITIS: Status: RESOLVED | Noted: 2018-07-01 | Resolved: 2024-12-19

## 2024-12-19 PROCEDURE — 3080F DIAST BP >= 90 MM HG: CPT | Mod: CPTII,S$GLB,, | Performed by: UROLOGY

## 2024-12-19 PROCEDURE — 1160F RVW MEDS BY RX/DR IN RCRD: CPT | Mod: CPTII,S$GLB,, | Performed by: UROLOGY

## 2024-12-19 PROCEDURE — 99214 OFFICE O/P EST MOD 30 MIN: CPT | Mod: S$GLB,,, | Performed by: UROLOGY

## 2024-12-19 PROCEDURE — 3008F BODY MASS INDEX DOCD: CPT | Mod: CPTII,S$GLB,, | Performed by: UROLOGY

## 2024-12-19 PROCEDURE — 1159F MED LIST DOCD IN RCRD: CPT | Mod: CPTII,S$GLB,, | Performed by: UROLOGY

## 2024-12-19 PROCEDURE — 99999 PR PBB SHADOW E&M-EST. PATIENT-LVL IV: CPT | Mod: PBBFAC,,, | Performed by: UROLOGY

## 2024-12-19 PROCEDURE — 3077F SYST BP >= 140 MM HG: CPT | Mod: CPTII,S$GLB,, | Performed by: UROLOGY

## 2024-12-19 PROCEDURE — 4010F ACE/ARB THERAPY RXD/TAKEN: CPT | Mod: CPTII,S$GLB,, | Performed by: UROLOGY

## 2024-12-19 RX ORDER — TADALAFIL 20 MG/1
20 TABLET ORAL DAILY PRN
Qty: 20 TABLET | Refills: 11 | Status: SHIPPED | OUTPATIENT
Start: 2024-12-19

## 2024-12-19 NOTE — PROGRESS NOTES
CHIEF COMPLAINT:    Mr. Ware is a 54 y.o. male presenting with ED.    PRESENTING ILLNESS:    Roberta Ware Jr. is a 54 y.o. male who c/o ED.  He's used Cialis with good results.    He has hypogonadism.  He c/o decreased energy, low libido.   He tried Androgel 1.62%, but his T didn't increase to > 300 despite 4 pumps.  Elected Aveed after this, but he was then lost to follow up.    He has LUTS. Nocturia x5. He is pleased with how he voids.      REVIEW OF SYSTEMS:    Roberta Ware Jr. denies headache, blurred vision, fever, nausea, vomiting, chills, abdominal pain, chest pain, sore throat, bleeding per rectum, cough, SOB, recent loss of consciousness, recent mental status changes, seizures, dizziness, or upper or lower extremity weakness.    POONAM  1. 3  2. 3  3. 2  4. 2  5. 1     PATIENT HISTORY:    Past Medical History:   Diagnosis Date    Diverticulosis     Encounter for blood transfusion     Erectile dysfunction     Gout     Hypertension     Osteoarthritis        Past Surgical History:   Procedure Laterality Date    BONE MARROW BIOPSY N/A     CHOLECYSTECTOMY      COLONOSCOPY  2012    COLONOSCOPY N/A 12/27/2021    Procedure: COLONOSCOPY;  Surgeon: Solis Mendez MD;  Location: [facility] ENDO;  Service: Endoscopy;  Laterality: N/A;  11/16 bariatric stretcher; fully vaccinated; instr. to portal-st  12/23 pt confirmed arrival time and all prep instructions-ml    FINGER SURGERY Left 2017    left middle finger    TRANSFORAMINAL EPIDURAL INJECTION OF STEROID Left 12/4/2024    Procedure: Left S1 Transforaminal Epidural Steroid Injection;  Surgeon: Barrera Villalboos Jr., MD;  Location: [facility] PAIN MANAGEMENT;  Service: Pain Management;  Laterality: Left;  @1245(given)  No ATC or DM  MD Sign.    UPPER GASTROINTESTINAL ENDOSCOPY         Family History   Problem Relation Name Age of Onset    Heart disease Mother      Cancer Mother          Breast    Diabetes Father      Hypertension Father      Ulcers Father      Anemia  Sister         Social History     Socioeconomic History    Marital status:    Tobacco Use    Smoking status: Never    Smokeless tobacco: Never   Substance and Sexual Activity    Alcohol use: Yes     Comment: Ocassionally    Drug use: No    Sexual activity: Yes     Partners: Female     Social Drivers of Health     Financial Resource Strain: Low Risk  (5/20/2024)    Overall Financial Resource Strain (CARDIA)     Difficulty of Paying Living Expenses: Not hard at all   Food Insecurity: No Food Insecurity (5/20/2024)    Hunger Vital Sign     Worried About Running Out of Food in the Last Year: Never true     Ran Out of Food in the Last Year: Never true   Physical Activity: Insufficiently Active (5/20/2024)    Exercise Vital Sign     Days of Exercise per Week: 3 days     Minutes of Exercise per Session: 30 min   Stress: No Stress Concern Present (5/20/2024)    [employer] [place] [employer] - Occupational Stress Questionnaire     Feeling of Stress : Only a little   Housing Stability: Unknown (5/20/2024)    Housing Stability Vital Sign     Unable to Pay for Housing in the Last Year: No       Allergies:  Hydrochlorothiazide and Tramadol    Medications:    Current Outpatient Medications:     allopurinoL (ZYLOPRIM) 300 MG tablet, TAKE 1 TABLET(300 MG) BY MOUTH EVERY DAY, Disp: 90 tablet, Rfl: 1    amLODIPine (NORVASC) 10 MG tablet, TAKE 1 TABLET(10 MG) BY MOUTH EVERY DAY, Disp: 90 tablet, Rfl: 1    chlorthalidone (HYGROTEN) 25 MG Tab, TAKE 1 TABLET(25 MG) BY MOUTH EVERY DAY, Disp: 90 tablet, Rfl: 1    colchicine (COLCRYS) 0.6 mg tablet, Take 1 tablet (0.6 mg total) by mouth 2 (two) times daily as needed (for gout)., Disp: 90 tablet, Rfl: 3    gabapentin (NEURONTIN) 300 MG capsule, Take 2 capsules (600 mg total) by mouth 3 (three) times daily., Disp: 180 capsule, Rfl: 5    losartan (COZAAR) 50 MG tablet, Take 1 tablet (50 mg total) by mouth once daily., Disp: 90 tablet, Rfl: 3    naproxen (NAPROSYN) 500 MG  tablet, Take 1 tablet (500 mg total) by mouth daily as needed (joint pain)., Disp: 30 tablet, Rfl: 0    PHYSICAL EXAMINATION:    The patient generally appears in good health, is appropriately interactive, and is in no apparent distress.     Eyes: anicteric sclerae, moist conjunctivae; no lid-lag; PERRLA     HENT: Atraumatic; oropharynx clear with moist mucous membranes and no mucosal ulcerations;normal hard and soft palate.  No evidence of lymphadenopathy.    Neck: Trachea midline.  No thyromegaly.    Skin: No lesions.    Mental: Cooperative with normal affect.  Is oriented to time, place, and person.    Neuro: Grossly intact.    Chest: Normal inspiratory effort.   No accessory muscles.  No audible wheezes.  Respirations symmetric on inspiration and expiration.    Heart: Regular rhythm.      Abdomen:  Soft, non-tender. No masses or organomegaly. Bladder is not palpable. No evidence of flank discomfort. No evidence of inguinal hernia.    Genitourinary: The penis is not circumcised with no evidence of plaques or induration. The urethral meatus is normal. The testes, epididymides, and cord structures are normal in size and contour bilaterally. The scrotum is normal in size and contour.    Normal anal sphincter tone. No rectal mass.    The prostate is smooth. Normal landmarks. Lateral sulci. Median furrow intact.  No nodularity or induration. Seminal vesicles are normal.    Extremities: No clubbing, cyanosis, or edema      LABS:      Lab Results   Component Value Date    PSA 1.1 07/25/2023    PSA 0.96 07/07/2022    PSA 1.2 06/15/2015    PSADIAG 1.0 06/16/2022       IMPRESSION:    Encounter Diagnoses   Name Primary?    Erectile dysfunction due to arterial insufficiency Yes    BPH with urinary obstruction     Essential hypertension    HTN, stable      PLAN:    1. Will observe his LUTS as they don't bother him.  2. Discussed options for his ED (affected by above comorbidities).  He'd like Cialis. Side effects discussed.  A  new Rx was given  3. RTC 3 months to see an KIANNA./  4. Will consult Dietary for his obesity.    Copy to:

## 2025-03-27 ENCOUNTER — OFFICE VISIT (OUTPATIENT)
Dept: OTOLARYNGOLOGY | Facility: CLINIC | Age: 55
End: 2025-03-27
Payer: COMMERCIAL

## 2025-03-27 DIAGNOSIS — H61.23 BILATERAL IMPACTED CERUMEN: Primary | ICD-10-CM

## 2025-03-27 PROCEDURE — 99999 PR PBB SHADOW E&M-EST. PATIENT-LVL II: CPT | Mod: PBBFAC,,, | Performed by: NURSE PRACTITIONER

## 2025-03-27 NOTE — PROGRESS NOTES
Subjective:   Roberta Ware Jr. is a 54 y.o. male presents for ear fullness. He has a past medical history of Diverticulosis, Encounter for blood transfusion, Erectile dysfunction, Gout, Hypertension, and Osteoarthritis. He reports about 1 year of bilateral fullness, clogged sensation with muffled hearing. He has a history of recurrent cerumen impactions, and was being seen routinely, however his last cleaning was in October 2022 and he was lost to follow-up. He denies any otalgia, otorrhea, ear  tinnitus or vertigo. He declines any hearing concerns. There is not a family history of hearing loss at a young age. There is not a prior history of ear surgery. He denies a history of significant noise exposure.     Past Medical History  He has a past medical history of Diverticulosis, Encounter for blood transfusion, Erectile dysfunction, Gout, Hypertension, and Osteoarthritis.    Past Surgical History  He has a past surgical history that includes Colonoscopy (2012); Upper gastrointestinal endoscopy; Cholecystectomy; Colonoscopy (N/A, 12/27/2021); Finger surgery (Left, 2017); Bone marrow biopsy (N/A); and Transforaminal epidural injection of steroid (Left, 12/4/2024).    Family History  His family history includes Anemia in his sister; Cancer in his mother; Diabetes in his father; Heart disease in his mother; Hypertension in his father; Ulcers in his father.    Social History  He reports that he has never smoked. He has never used smokeless tobacco. He reports current alcohol use. He reports that he does not use drugs.    Allergies  He is allergic to hydrochlorothiazide and tramadol.    Medications  He has a current medication list which includes the following prescription(s): allopurinol, amlodipine, chlorthalidone, colchicine, gabapentin, losartan, and tadalafil.  Review of Systems     Constitutional: Negative for chills and fever.      HENT: Negative for ear discharge, ear infection, ear pain, hearing loss and  ringing in the ears.      Neurological: Negative for dizziness and light-headedness.          Objective:     Constitutional:   He is oriented to person, place, and time. He appears well-developed and well-nourished. He appears alert. He is cooperative.  Non-toxic appearance. He does not have a sickly appearance. He does not appear ill. Normal speech.      Head:  Normocephalic and atraumatic. Not macrocephalic and not microcephalic. Head is without abrasion, without right periorbital erythema, without left periorbital erythema and without TMJ tenderness.     Ears:    Right Ear: No drainage, swelling or tenderness. No mastoid tenderness. Tympanic membrane is not scarred, not perforated, not erythematous, not retracted and not bulging. No middle ear effusion.   Left Ear: No drainage, swelling or tenderness. No mastoid tenderness. Tympanic membrane is not scarred, not perforated, not erythematous, not retracted and not bulging.  No middle ear effusion.   Ceruminous debris obstructing bilateral TMs removed under microscopy    Pulmonary/Chest:   Effort normal.     Psychiatric:   He has a normal mood and affect. His speech is normal and behavior is normal.     Neurological:   He is alert and oriented to person, place, and time.     Procedure  Cerumen removal performed.  See procedure note.  Procedure Note:  The patient was brought to the minor procedure room and placed under the operating microscope of the left ear canal which was cleaned of ceruminous debris. Using a combination of suction, curettes and/or alligator forceps the patient's cerumen was removed. The patient tolerated the procedure well. There were no complications.  Procedure Note:  The patient was brought to the minor procedure room and placed under the operating microscope of the right ear canal which was cleaned of ceruminous debris. Using a combination of suction, curettes and/or alligator forceps the patient's cerumen was removed. The patient tolerated  the procedure well. There were no complications.    Assessment:     1. Bilateral impacted cerumen      Plan:     Bilateral impacted cerumen  Bilateral cerumen impaction removed under microscopy. Patient tolerated procedure well and noted immediate improvement upon impaction removal. Education about normal ear hygiene and the avoidance of Q-tips was reviewed.  RTC every 6 months for regular ear cleanings.

## 2025-04-28 DIAGNOSIS — M10.9 GOUT, UNSPECIFIED CAUSE, UNSPECIFIED CHRONICITY, UNSPECIFIED SITE: ICD-10-CM

## 2025-04-28 DIAGNOSIS — I10 ESSENTIAL HYPERTENSION: Chronic | ICD-10-CM

## 2025-04-28 RX ORDER — CHLORTHALIDONE 25 MG/1
25 TABLET ORAL
Qty: 90 TABLET | Refills: 0 | Status: SHIPPED | OUTPATIENT
Start: 2025-04-28

## 2025-04-28 RX ORDER — ALLOPURINOL 300 MG/1
300 TABLET ORAL
Qty: 90 TABLET | Refills: 0 | Status: SHIPPED | OUTPATIENT
Start: 2025-04-28

## 2025-04-28 NOTE — TELEPHONE ENCOUNTER
Refill Routing Note   Medication(s) are not appropriate for processing by [facility] for the following reason(s):        Required vitals abnormal  Required labs outdated    [facility] action(s):  Defer     Requires labs : Yes             Appointments  past 12m or future 3m with PCP    Date Provider   Last Visit   5/14/2024 Antwan Ambrosio MD   Next Visit   Visit date not found Antwan Ambrosio MD   ED visits in past 90 days: 0        Note composed:6:07 AM 04/28/2025

## 2025-04-28 NOTE — TELEPHONE ENCOUNTER
Care Due:                  Date            Visit Type   Department     Provider  --------------------------------------------------------------------------------                                [facility]                              PRIMARY      / INTERNAL MED  Last Visit: 05-      CARE ([facility])   / [place] Ambrosio  Next Visit: None Scheduled  None         None Found                                                            Last  Test          Frequency    Reason                     Performed    Due Date  --------------------------------------------------------------------------------    CBC.........  12 months..  allopurinoL..............  07- 07-    CMP.........  12 months..  allopurinoL,               07- 07-                             chlorthalidone,                             colchicine, losartan.....    Uric Acid...  12 months..  allopurinoL, colchicine..  Not Found    Overdue    [employer] Embedded Care Due Messages. Reference number: [id number].   4/28/2025 5:40:49 AM CDT

## 2025-04-29 NOTE — TELEPHONE ENCOUNTER
Spoke with patient and informed him that his refill request was approved but will need to schedule office visit with provider before next refill. Patient decline to schedule appointment at this time due to work status and will reach out to us when he is ready to schedule.

## 2025-06-09 ENCOUNTER — PATIENT MESSAGE (OUTPATIENT)
Dept: ADMINISTRATIVE | Facility: HOSPITAL | Age: 55
End: 2025-06-09
Payer: COMMERCIAL

## 2025-07-25 DIAGNOSIS — I10 ESSENTIAL HYPERTENSION: Chronic | ICD-10-CM

## 2025-07-25 DIAGNOSIS — M10.9 GOUT, UNSPECIFIED CAUSE, UNSPECIFIED CHRONICITY, UNSPECIFIED SITE: ICD-10-CM

## 2025-07-25 NOTE — TELEPHONE ENCOUNTER
Care Due:                  Date            Visit Type   Department     Provider  --------------------------------------------------------------------------------                                [facility]                              PRIMARY      / INTERNAL MED  Last Visit: 05-      [facility]         Antwan Ambrosio                              [facility]                              PRIMARY      / INTERNAL MED  Next Visit: 08-      [facility]         Antwan Ambrosio                                                            Last  Test          Frequency    Reason                     Performed    Due Date  --------------------------------------------------------------------------------    CBC.........  12 months..  allopurinoL..............  07- 07-    CMP.........  12 months..  allopurinoL,               07- 07-                             chlorthalidone,                             colchicine...............    Uric Acid...  12 months..  allopurinoL, colchicine..  Not Found    Overdue    [employer] Embedded Care Due Messages. Reference number: [id number].   7/25/2025 4:17:44 PM CDT

## 2025-07-25 NOTE — TELEPHONE ENCOUNTER
Copied from CRM #[id number]. Topic: Medications - Medication Refill  >> Jul 25, 2025  4:13 PM Jared wrote:  Type: RX Refill Request    Who Called: Self     Have you contacted your pharmacy:    Refill or New Rx:refill     RX Name and Strength:amLODIPine (NORVASC) 10 MG tablet, chlorthalidone (HYGROTEN) 25 MG Tab, allopurinoL (ZYLOPRIM) 300 MG tablet    How is the patient currently taking it? (ex. 1XDay):    Is this a 30 day or 90 day RX:    Preferred Pharmacy with phone number:   [id number] - [place] - [address] [facility]  [address]  [place] [address]  Phone: [phone] Fax: [phone]    Local or Mail Order: local     Ordering Provider:    Would the patient rather a call back or a response via [facility]? Call back     Best Call Back Number: [phone]      Additional Information:  pt is scheduled to see the physician on 08/08/2025

## 2025-07-28 DIAGNOSIS — I10 ESSENTIAL HYPERTENSION: Chronic | ICD-10-CM

## 2025-07-28 RX ORDER — ALLOPURINOL 300 MG/1
300 TABLET ORAL
Qty: 90 TABLET | Refills: 0 | OUTPATIENT
Start: 2025-07-28

## 2025-07-28 RX ORDER — CHLORTHALIDONE 25 MG/1
25 TABLET ORAL
Qty: 90 TABLET | Refills: 0 | OUTPATIENT
Start: 2025-07-28

## 2025-07-28 RX ORDER — AMLODIPINE BESYLATE 10 MG/1
10 TABLET ORAL
Qty: 90 TABLET | Refills: 1 | OUTPATIENT
Start: 2025-07-28

## 2025-07-28 NOTE — TELEPHONE ENCOUNTER
Care Due:                  Date            Visit Type   Department     Provider  --------------------------------------------------------------------------------                                [facility]                              PRIMARY      / INTERNAL MED  Last Visit: 05-      CARE ([facility])   / [facility]         Antwan Ambrosio                              [facility]                              PRIMARY      / INTERNAL MED  Next Visit: 08-      CARE ([facility])   / [place] Ambrosio                                                            Last  Test          Frequency    Reason                     Performed    Due Date  --------------------------------------------------------------------------------    CBC.........  12 months..  allopurinoL..............  Not Found    Overdue    CMP.........  12 months..  allopurinoL,               Not Found    Overdue                             chlorthalidone,                             colchicine...............    [facility] Embedded Care Due Messages. Reference number: [id number].   7/28/2025 4:05:47 PM CDT

## 2025-07-29 RX ORDER — AMLODIPINE BESYLATE 10 MG/1
10 TABLET ORAL
Qty: 90 TABLET | Refills: 0 | Status: SHIPPED | OUTPATIENT
Start: 2025-07-29

## 2025-07-29 NOTE — TELEPHONE ENCOUNTER
Refill Routing Note   Medication(s) are not appropriate for processing by [facility] for the following reason(s):        Required vitals abnormal    [facility] action(s):  Defer     Requires labs : Yes             Appointments  past 12m or future 3m with PCP    Date Provider   Last Visit   5/14/2024 Antwan Ambrosio MD   Next Visit   8/8/2025 Antwan Ambrosio MD   ED visits in past 90 days: 0        Note composed:9:02 AM 07/29/2025

## 2025-08-07 DIAGNOSIS — I10 ESSENTIAL HYPERTENSION: ICD-10-CM

## 2025-08-08 ENCOUNTER — OFFICE VISIT (OUTPATIENT)
Dept: FAMILY MEDICINE | Facility: CLINIC | Age: 55
End: 2025-08-08
Payer: COMMERCIAL

## 2025-08-08 VITALS
HEART RATE: 63 BPM | HEIGHT: 76 IN | WEIGHT: 315 LBS | SYSTOLIC BLOOD PRESSURE: 184 MMHG | TEMPERATURE: 98 F | OXYGEN SATURATION: 97 % | DIASTOLIC BLOOD PRESSURE: 100 MMHG | BODY MASS INDEX: 38.36 KG/M2

## 2025-08-08 DIAGNOSIS — E66.01 MORBID OBESITY: ICD-10-CM

## 2025-08-08 DIAGNOSIS — I10 ESSENTIAL HYPERTENSION: Chronic | ICD-10-CM

## 2025-08-08 DIAGNOSIS — Z00.00 ROUTINE PHYSICAL EXAMINATION: Primary | ICD-10-CM

## 2025-08-08 DIAGNOSIS — Z23 PNEUMOCOCCAL VACCINATION ADMINISTERED AT CURRENT VISIT: ICD-10-CM

## 2025-08-08 DIAGNOSIS — M10.9 GOUT, UNSPECIFIED CAUSE, UNSPECIFIED CHRONICITY, UNSPECIFIED SITE: ICD-10-CM

## 2025-08-08 PROCEDURE — 99999 PR PBB SHADOW E&M-EST. PATIENT-LVL III: CPT | Mod: PBBFAC,,, | Performed by: FAMILY MEDICINE

## 2025-08-08 RX ORDER — TIRZEPATIDE 2.5 MG/.5ML
2.5 INJECTION, SOLUTION SUBCUTANEOUS
Qty: 4 PEN | Refills: 3 | Status: SHIPPED | OUTPATIENT
Start: 2025-08-08

## 2025-08-08 RX ORDER — LOSARTAN POTASSIUM 50 MG/1
50 TABLET ORAL DAILY
Qty: 90 TABLET | Refills: 3 | Status: SHIPPED | OUTPATIENT
Start: 2025-08-08

## 2025-08-08 RX ORDER — SPIRONOLACTONE 50 MG/1
50 TABLET, FILM COATED ORAL DAILY
Qty: 90 TABLET | Refills: 3 | Status: SHIPPED | OUTPATIENT
Start: 2025-08-08 | End: 2026-08-08

## 2025-08-08 RX ORDER — ALLOPURINOL 300 MG/1
300 TABLET ORAL DAILY
Qty: 90 TABLET | Refills: 3 | Status: SHIPPED | OUTPATIENT
Start: 2025-08-08

## 2025-08-08 RX ORDER — AMLODIPINE BESYLATE 10 MG/1
10 TABLET ORAL DAILY
Qty: 90 TABLET | Refills: 3 | Status: SHIPPED | OUTPATIENT
Start: 2025-08-08

## 2025-08-08 NOTE — PROGRESS NOTES
[facility]  Progress Note    SUBJECTIVE:     Chief Complaint   Patient presents with    Annual Exam       HPI   Roberta Ware Jr.  is a 54 y.o. male here for physical exam and f/u of his chronic conditions. Patient has no other new complaints/problems at this time.      Review of patient's allergies indicates:   Allergen Reactions    Hydrochlorothiazide Other (See Comments)     Gout    Tramadol Swelling     Pt states make him jittery        Past Medical History:   Diagnosis Date    Diverticulosis     Encounter for blood transfusion     Erectile dysfunction     Gout     Hypertension     Osteoarthritis      Past Surgical History:   Procedure Laterality Date    BONE MARROW BIOPSY N/A     CHOLECYSTECTOMY      COLONOSCOPY  2012    COLONOSCOPY N/A 12/27/2021    Procedure: COLONOSCOPY;  Surgeon: Solis Mendez MD;  Location: [facility] ENDO;  Service: Endoscopy;  Laterality: N/A;  11/16 bariatric stretcher; fully vaccinated; instr. to portal-st  12/23 pt confirmed arrival time and all prep instructions-ml    FINGER SURGERY Left 2017    left middle finger    TRANSFORAMINAL EPIDURAL INJECTION OF STEROID Left 12/4/2024    Procedure: Left S1 Transforaminal Epidural Steroid Injection;  Surgeon: Barrera Villalobos Jr., MD;  Location: [facility] PAIN MANAGEMENT;  Service: Pain Management;  Laterality: Left;  @1245(given)  No ATC or DM  MD Sign.    UPPER GASTROINTESTINAL ENDOSCOPY       Family History   Problem Relation Name Age of Onset    Heart disease Mother      Cancer Mother          Breast    Diabetes Father      Hypertension Father      Ulcers Father      Anemia Sister       Social History[1]     Review of Systems   Constitutional:  Negative for chills, diaphoresis and fever.   HENT:  Negative for congestion, ear pain and sore throat.    Eyes:  Negative for photophobia and discharge.   Respiratory:  Negative for cough, shortness of breath and wheezing.    Cardiovascular:  Negative for chest pain and palpitations.    Gastrointestinal:  Negative for abdominal pain, constipation, diarrhea, nausea and vomiting.   Genitourinary:  Negative for dysuria and hematuria.   Musculoskeletal:  Negative for back pain and myalgias.   Skin:  Negative for itching and rash.   Neurological:  Negative for dizziness, sensory change, focal weakness, weakness and headaches.   All other systems reviewed and are negative.    OBJECTIVE:     Vitals:    08/08/25 0752   BP: (!) 184/100   Pulse: 63   Temp: 97.8 °F (36.6 °C)     Body mass index is 55.25 kg/m².    Physical Exam  Constitutional:       General: He is not in acute distress.     Appearance: He is not diaphoretic.   HENT:      Head: Normocephalic and atraumatic.      Right Ear: Tympanic membrane and ear canal normal. No hemotympanum. Tympanic membrane is not perforated, erythematous or bulging.      Left Ear: Tympanic membrane and ear canal normal. No hemotympanum. Tympanic membrane is not perforated, erythematous or bulging.   Eyes:      Conjunctiva/sclera: Conjunctivae normal.      Pupils: Pupils are equal, round, and reactive to light.   Neck:      Thyroid: No thyromegaly.   Cardiovascular:      Rate and Rhythm: Normal rate and regular rhythm.      Heart sounds: Normal heart sounds. No murmur heard.     No friction rub. No gallop.   Pulmonary:      Effort: Pulmonary effort is normal. No respiratory distress.      Breath sounds: Normal breath sounds. No wheezing or rales.   Abdominal:      General: Bowel sounds are normal. There is no distension.      Palpations: Abdomen is soft.      Tenderness: There is no abdominal tenderness. There is no guarding or rebound.   Musculoskeletal:         General: No tenderness. Normal range of motion.   Skin:     General: Skin is warm.      Findings: No erythema or rash.   Neurological:      Mental Status: He is alert and oriented to person, place, and time.         Old records were reviewed. Labs and/or images were independently reviewed.    ASSESSMENT     1.  Routine physical examination    2. Essential hypertension    3. Gout, unspecified cause, unspecified chronicity, unspecified site    4. Morbid obesity    5. Pneumococcal vaccination administered at current visit        PLAN:     Routine physical examination  -     CBC Auto Differential; Future; Expected date: 08/08/2025  -     Comprehensive Metabolic Panel; Future; Expected date: 08/08/2025  -     Hemoglobin A1C; Future; Expected date: 08/08/2025  -     Lipid Panel; Future; Expected date: 08/08/2025  -     PSA, Screening; Future; Expected date: 08/08/2025  -     T4, Free; Future; Expected date: 08/08/2025  -     TSH; Future; Expected date: 08/08/2025  -     We briefly discussed diet, exercise, and routine preventive exams. All questions and comments addressed.    Essential hypertension  -     Start spironolactone (ALDACTONE) 50 MG tablet; Take 1 tablet (50 mg total) by mouth once daily.  Dispense: 90 tablet; Refill: 3  -     amLODIPine (NORVASC) 10 MG tablet; Take 1 tablet (10 mg total) by mouth once daily.  Dispense: 90 tablet; Refill: 3  -     losartan (COZAAR) 50 MG tablet; Take 1 tablet (50 mg total) by mouth once daily.  Dispense: 90 tablet; Refill: 3  -     stop chlorthiadone, try spironolactone  -     Educated patient to take medications as prescribed, and to record bp logs daily to bring along to next visit. Instructed patient to decrease salt intake. Also told patient to call if any new signs or symptoms develop.    Gout, unspecified cause, unspecified chronicity, unspecified site  -     allopurinoL (ZYLOPRIM) 300 MG tablet; Take 1 tablet (300 mg total) by mouth once daily.  Dispense: 90 tablet; Refill: 3  -     Stable. Continue current regimen.    Morbid obesity  -     Start tirzepatide (MOUNJARO) 2.5 mg/0.5 mL PnIj; Inject 2.5 mg into the skin every 7 days.  Dispense: 4 Pen; Refill: 3  -     Counseled patient about healthy diet, exercise habits, and to increase physical activity.    Pneumococcal  vaccination administered at current visit  -     pneumoc 20-ayan conj-dip cr(PF) (PREVNAR-20 (PF)) injection Syrg 0.5 mL        RTC PRN, or  1 week for bp check.    Antwan Ambrosio MD  08/08/2025 8:09 AM           [1]   Social History  Tobacco Use    Smoking status: Never    Smokeless tobacco: Never   Substance Use Topics    Alcohol use: Yes     Comment: Ocassionally    Drug use: No

## 2025-08-26 ENCOUNTER — TELEPHONE (OUTPATIENT)
Dept: FAMILY MEDICINE | Facility: CLINIC | Age: 55
End: 2025-08-26

## 2025-08-26 ENCOUNTER — CLINICAL SUPPORT (OUTPATIENT)
Dept: FAMILY MEDICINE | Facility: CLINIC | Age: 55
End: 2025-08-26
Payer: COMMERCIAL

## 2025-08-26 VITALS — SYSTOLIC BLOOD PRESSURE: 120 MMHG | DIASTOLIC BLOOD PRESSURE: 84 MMHG | OXYGEN SATURATION: 96 % | HEART RATE: 66 BPM

## 2025-08-26 DIAGNOSIS — I10 ESSENTIAL HYPERTENSION: Primary | ICD-10-CM

## 2025-08-26 PROCEDURE — 99999 PR PBB SHADOW E&M-EST. PATIENT-LVL II: CPT | Mod: PBBFAC,,,

## (undated) DEVICE — DRESSING ADH ISLAND 2.5 X 3

## (undated) DEVICE — CANISTER SUCTION 2 LTR

## (undated) DEVICE — KIT ANTIFOG

## (undated) DEVICE — CLIPPER BLADE MOD 4406 (CAREF)

## (undated) DEVICE — SHEARS HARMONIC 36CM HD 1000I

## (undated) DEVICE — SWAB CULTURETTE II DUAL

## (undated) DEVICE — SEE MEDLINE ITEM 107746

## (undated) DEVICE — DRAPE PLASTIC U 60X72

## (undated) DEVICE — COLLECTOR SPECIMEN ANAEROBIC

## (undated) DEVICE — SYR 10CC LUER LOCK

## (undated) DEVICE — GAUZE SPONGE 4X4 12PLY

## (undated) DEVICE — GLOVE BIOGEL ORTHOPEDIC 8

## (undated) DEVICE — SEE MEDLINE ITEM 152515

## (undated) DEVICE — APPLIER CLIP ENDO LIGAMAX 5MM

## (undated) DEVICE — SUT 4-0 ETHILON 18 PS-2

## (undated) DEVICE — GLOVE SURGICAL LATEX SZ 8

## (undated) DEVICE — SLEEVE SCD EXPRESS CALF LARGE

## (undated) DEVICE — SEE MEDLINE ITEM 152622

## (undated) DEVICE — PADDING CAST SOFT-ROLL 4 X 4

## (undated) DEVICE — SEE MEDLINE ITEM 157117

## (undated) DEVICE — IRRIGATOR ENDOSCOPY DISP.

## (undated) DEVICE — SOL NS 1000CC

## (undated) DEVICE — Device

## (undated) DEVICE — GLOVE BIOGEL 7.5

## (undated) DEVICE — TUBING INSUFFLATION 10

## (undated) DEVICE — PADDING CAST AST SOFT-ROLL 3X4

## (undated) DEVICE — BLANKET UPPER BODY 78.7X29.9IN

## (undated) DEVICE — SUPPORT ULNA NERVE PROTECTOR

## (undated) DEVICE — GLOVE SURG BIOGEL LATEX SZ 7.5

## (undated) DEVICE — CHLORAPREP W TINT 26ML APPL

## (undated) DEVICE — BLADE SURG CARBON STEEL SZ11

## (undated) DEVICE — HEMOSTAT SURGICEL 4X8IN

## (undated) DEVICE — APPLICATOR CHLORAPREP ORN 26ML

## (undated) DEVICE — GLOVE SURGICAL LATEX SZ 6.5

## (undated) DEVICE — SUT 4/0 18IN ETHILON BL P3

## (undated) DEVICE — BANDAGE RUBBER ELAS STD 3X5YD

## (undated) DEVICE — ELECTRODE REM PLYHSV RETURN 9

## (undated) DEVICE — GAUZE SPONGE 4'X4 12 PLY

## (undated) DEVICE — SOL IRR NACL .9% 3000ML

## (undated) DEVICE — TROCAR ENDOPATH XCEL 5X100MM

## (undated) DEVICE — SUT 0 VICRYL / UR6 (J603)

## (undated) DEVICE — PAD CAST SPECIALIST STRL 4

## (undated) DEVICE — FOAM APP FILM BARRIER NO STING

## (undated) DEVICE — DRESSING XEROFORM FOIL PK 1X8

## (undated) DEVICE — PULSAVAC ZIMMER

## (undated) DEVICE — TROCAR ENDOPATH XCEL 11MM 10CM

## (undated) DEVICE — NDL HYPO REG 25G X 1 1/2

## (undated) DEVICE — TOURNIQUET SB QC DP 18X4IN

## (undated) DEVICE — SYR ONLY LUER LOCK 20CC

## (undated) DEVICE — SEE MEDLINE ITEM 157173

## (undated) DEVICE — PACK ENDOSCOPY GENERAL

## (undated) DEVICE — SUT 4/0 18IN COATED VICRYL

## (undated) DEVICE — CLOSURE SKIN STERI STRIP 1/2X4

## (undated) DEVICE — UNDERGLOVE BIOGEL PI SZ 6.5 LF